# Patient Record
Sex: MALE | Race: WHITE | NOT HISPANIC OR LATINO | Employment: UNEMPLOYED | URBAN - METROPOLITAN AREA
[De-identification: names, ages, dates, MRNs, and addresses within clinical notes are randomized per-mention and may not be internally consistent; named-entity substitution may affect disease eponyms.]

---

## 2017-01-03 ENCOUNTER — TELEPHONE (OUTPATIENT)
Dept: CARDIAC SURGERY | Facility: CLINIC | Age: 62
End: 2017-01-03

## 2017-01-03 ENCOUNTER — OFFICE VISIT (OUTPATIENT)
Dept: CARDIAC SURGERY | Facility: CLINIC | Age: 62
End: 2017-01-03

## 2017-01-03 VITALS
DIASTOLIC BLOOD PRESSURE: 84 MMHG | TEMPERATURE: 98.2 F | HEART RATE: 58 BPM | RESPIRATION RATE: 20 BRPM | BODY MASS INDEX: 23.1 KG/M2 | SYSTOLIC BLOOD PRESSURE: 129 MMHG | HEIGHT: 74 IN | OXYGEN SATURATION: 99 % | WEIGHT: 180 LBS

## 2017-01-03 DIAGNOSIS — Z95.1 S/P CABG X 5: Primary | ICD-10-CM

## 2017-01-03 PROBLEM — Z95.2 S/P AVR: Status: ACTIVE | Noted: 2017-01-03

## 2017-01-03 PROBLEM — I71.40 AAA (ABDOMINAL AORTIC ANEURYSM) WITHOUT RUPTURE: Status: ACTIVE | Noted: 2017-01-03

## 2017-01-03 PROBLEM — R91.8 MASS OF LINGULA OF LUNG: Status: ACTIVE | Noted: 2017-01-03

## 2017-01-03 PROCEDURE — 99024 POSTOP FOLLOW-UP VISIT: CPT | Performed by: NURSE PRACTITIONER

## 2017-01-03 RX ORDER — LISINOPRIL 10 MG/1
TABLET ORAL
COMMUNITY
Start: 2016-11-06 | End: 2020-04-22

## 2017-01-03 RX ORDER — AMIODARONE HYDROCHLORIDE 200 MG/1
200 TABLET ORAL DAILY
COMMUNITY
End: 2017-01-26 | Stop reason: ALTCHOICE

## 2017-01-03 RX ORDER — BUDESONIDE AND FORMOTEROL FUMARATE DIHYDRATE 160; 4.5 UG/1; UG/1
2 AEROSOL RESPIRATORY (INHALATION)
COMMUNITY
Start: 2016-11-08

## 2017-01-03 RX ORDER — METOPROLOL SUCCINATE 25 MG/1
25 TABLET, EXTENDED RELEASE ORAL DAILY
COMMUNITY
End: 2020-04-22

## 2017-01-03 RX ORDER — ATORVASTATIN CALCIUM 20 MG/1
20 TABLET, FILM COATED ORAL DAILY
COMMUNITY
End: 2017-01-26

## 2017-01-03 RX ORDER — DOXYCYCLINE HYCLATE 100 MG/1
100 TABLET, DELAYED RELEASE ORAL 2 TIMES DAILY
Refills: 0
Start: 2017-01-03 | End: 2017-01-05

## 2017-01-03 RX ORDER — AZELASTINE HCL 205.5 UG/1
SPRAY NASAL
Status: ON HOLD | COMMUNITY
Start: 2016-10-01 | End: 2020-05-25

## 2017-01-03 NOTE — Clinical Note
January 3, 2017     Susan Wang MD  [address] [facility]  [address]  [facility] [address]    Patient: Alejandro Bain   YOB: 1955   Date of Visit: 1/3/2017       Dear Dr. Felipe MD:    Alejandro Bain was in my office today. Below are the relevant portions of my assessment and plan of care.           If you have questions, please do not hesitate to call me. I look forward to following Alejandro along with you.         Sincerely,        GARY Mathew        CC: MD Flor Boothn MD Yoshi Puentes MD

## 2017-01-03 NOTE — PROGRESS NOTES
1/3/2017        Subjective:      Susan Wang MD & Dr. Blank    Chief Complaint of Post-op Follow-up and Wound Check      History of Present Illness:       Dear Dr. Susan Wang MD and Colleagues,  It was nice to see Alejandro Bain in follow up today. He is status post CABG and AVR at [facility] on 12/2/2016.  As you may recall, this gentleman was also diagnosed with a 7 cm infrarenal abdominal aortic aneurysm doing his hospitalization for his NSTEMI/ CAD visit. He reports his right upper thigh incision from endovein harvest has had some bloody drainage.  He has seen Dr. Meyer since discharge and is slated for TEVAR surgery on Thursday 1/5/2017 at [facility].  Dr. Phan will be assisting with this surgery as well.  He reports he is doing much better since discharge.  He still reports minor aches/pains but these improve with movement and stretching.  He denies any c/o fever or chills.  His wife is present today for his appointment.    Patient Active Problem List   Diagnosis   • S/P CABG x 5 by Dr. Phan   • S/P AVR (tissue) by Dr. Phan   • AAA (abdominal aortic aneurysm) without rupture   • Mass of lingula of lung--left       Past Medical History   Diagnosis Date   • Aneurysm    • Anxiety    • Asthma    • Coronary artery disease    • Depression    • Hyperlipidemia    • Hypertension    • Myocardial infarction        Past Surgical History   Procedure Laterality Date   • Carotid stent     • Sinus surgery     • Coronary artery bypass graft  12/02/2016     X4  Dr Phan   • Aortic valve repair/replacement  12/03/2016     CABG x 5/ tissue AVR by DR. PHAN       Allergies   Allergen Reactions   • Avelox [Moxifloxacin]    • Penicillins    • Sulfa Antibiotics        Review of Systems   HENT:        Seasonal allergies   Respiratory: Positive for cough. Negative for chest tightness and shortness of breath.    Cardiovascular: Negative for chest pain, palpitations and leg swelling.    Musculoskeletal: Positive for neck pain and neck stiffness.   Skin:        Right upper thigh--scant bloody drainage and tenderness   All other systems reviewed and are negative.        Current Outpatient Prescriptions:   •  amiodarone (PACERONE) 200 MG tablet, Take 200 mg by mouth Daily., Disp: , Rfl:   •  atorvastatin (LIPITOR) 20 MG tablet, Take 20 mg by mouth Daily., Disp: , Rfl:   •  azelastine (ASTEPRO) 0.15 % solution nasal spray, , Disp: , Rfl:   •  lisinopril (PRINIVIL,ZESTRIL) 10 MG tablet, , Disp: , Rfl:   •  metoprolol succinate XL (TOPROL-XL) 25 MG 24 hr tablet, Take 25 mg by mouth Daily., Disp: , Rfl:   •  SYMBICORT 160-4.5 MCG/ACT inhaler, , Disp: , Rfl:   •  doxycycline (DORYX) 100 MG enteric coated tablet, Take 1 tablet by mouth 2 (Two) Times a Day for 2 days., Disp: , Rfl: 0    Social History     Social History   • Marital status:      Spouse name: N/A   • Number of children: N/A   • Years of education: N/A     Occupational History   • Not on file.     Social History Main Topics   • Smoking status: Former Smoker   • Smokeless tobacco: Not on file   • Alcohol use No   • Drug use: No   • Sexual activity: Not on file     Other Topics Concern   • Not on file     Social History Narrative   • No narrative on file       Family History   Problem Relation Age of Onset   • Cancer Father    • Cancer Sister            Physical Exam:      Vital Signs:  Weight: 180 lb (81.6 kg)   Body mass index is 23.11 kg/(m^2).  Temp: 98.2 °F (36.8 °C)   Heart Rate: 58   BP: 129/84     Physical Exam   Constitutional: He is oriented to person, place, and time. He appears well-developed and well-nourished.   HENT:   Head: Normocephalic and atraumatic.   Cardiovascular: Normal rate, regular rhythm, normal heart sounds and intact distal pulses.    Pulmonary/Chest: Effort normal and breath sounds normal.   Neurological: He is alert and oriented to person, place, and time.   Skin: Skin is warm and dry.        Sternotomy &  SVHS well healed.  Right upper thigh stitch removed without difficulty.  Scant amount of pulse expressed with small amount of blood.     Psychiatric: He has a normal mood and affect. His behavior is normal. Judgment and thought content normal.        Recommendation/Plan:     Alejnadro Bain was seen for post operative follow up. He is doing very well from a surgical standpoint. His incisions are healing well. I removed the surgical stitch and had Dr. Phan assess the site.  He recommended doxycycline 100 mg BID until surgery.  Prescription was given to pt.  I spoke with Dr. Meyer about the findings.  The plan is still to proceed with surgery on Thursday barring no further complications.  I told Mr. Bain he did not need to keep his postop CABG follow up appointment.  We will see him back after his TEVAR.   He is scheduled to see Dr. Escobedo after his vascular surgery with repeat imaging for the lung mass.  If you have any questions, please do not hesitate to call me or Dr. Phan.    Thank you for allowing me to participate in his care.    Sincerely,    GARY Wallace

## 2017-01-03 NOTE — LETTER
January 3, 2017     Susan Wang MD  [address] [facility]  [address]  [facility] [address]    Patient: Alejandro Bain   YOB: 1955   Date of Visit: 1/3/2017       Dear Dr. Felipe MD:    Thank you for allowing me to participate in Alejandro Bain to me for evaluation. Below are the relevant portions of my assessment and plan of care.    If you have questions, please do not hesitate to call me. I look forward to following Alejandro along with you.         Sincerely,        Mary Kay Nava, GARY        CC: Maciej Blank MD  Ángel MD Yoshi Puentes MD Tabitha L. Satterly-Welborn, GARY  1/3/2017  3:44 PM  Signed  1/3/2017        Subjective:      Susan Wang MD & Dr. Blank    Chief Complaint of Post-op Follow-up and Wound Check      History of Present Illness:       Dear Dr. Susan Wang MD and Colleagues,  It was nice to see Alejandro Bain in follow up today. He is status post CABG and AVR at [facility] on 12/2/2016.  As you may recall, this gentleman was also diagnosed with a 7 cm infrarenal abdominal aortic aneurysm doing his hospitalization for his NSTEMI/ CAD visit. He reports his right upper thigh incision from endovein harvest has had some bloody drainage.  He has seen Dr. Meyer since discharge and is slated for TEVAR surgery on Thursday 1/5/2017 at [facility].  Dr. Phan will be assisting with this surgery as well.  He reports he is doing much better since discharge.  He still reports minor aches/pains but these improve with movement and stretching.  He denies any c/o fever or chills.  His wife is present today for his appointment.    Patient Active Problem List   Diagnosis   • S/P CABG x 5 by Dr. Phan   • S/P AVR (tissue) by Dr. Phan   • AAA (abdominal aortic aneurysm) without rupture   • Mass of lingula of lung--left       Past Medical History   Diagnosis Date   • Aneurysm    • Anxiety    • Asthma    • Coronary artery disease       • Depression    • Hyperlipidemia    • Hypertension    • Myocardial infarction        Past Surgical History   Procedure Laterality Date   • Carotid stent     • Sinus surgery     • Coronary artery bypass graft  12/02/2016     X4  Dr Phan   • Aortic valve repair/replacement  12/03/2016     CABG x 5/ tissue AVR by DR. PHAN       Allergies   Allergen Reactions   • Avelox [Moxifloxacin]    • Penicillins    • Sulfa Antibiotics        Review of Systems   HENT:        Seasonal allergies   Respiratory: Positive for cough. Negative for chest tightness and shortness of breath.    Cardiovascular: Negative for chest pain, palpitations and leg swelling.   Musculoskeletal: Positive for neck pain and neck stiffness.   Skin:        Right upper thigh--scant bloody drainage and tenderness   All other systems reviewed and are negative.        Current Outpatient Prescriptions:   •  amiodarone (PACERONE) 200 MG tablet, Take 200 mg by mouth Daily., Disp: , Rfl:   •  atorvastatin (LIPITOR) 20 MG tablet, Take 20 mg by mouth Daily., Disp: , Rfl:   •  azelastine (ASTEPRO) 0.15 % solution nasal spray, , Disp: , Rfl:   •  lisinopril (PRINIVIL,ZESTRIL) 10 MG tablet, , Disp: , Rfl:   •  metoprolol succinate XL (TOPROL-XL) 25 MG 24 hr tablet, Take 25 mg by mouth Daily., Disp: , Rfl:   •  SYMBICORT 160-4.5 MCG/ACT inhaler, , Disp: , Rfl:   •  doxycycline (DORYX) 100 MG enteric coated tablet, Take 1 tablet by mouth 2 (Two) Times a Day for 2 days., Disp: , Rfl: 0    Social History     Social History   • Marital status:      Spouse name: N/A   • Number of children: N/A   • Years of education: N/A     Occupational History   • Not on file.     Social History Main Topics   • Smoking status: Former Smoker   • Smokeless tobacco: Not on file   • Alcohol use No   • Drug use: No   • Sexual activity: Not on file     Other Topics Concern   • Not on file     Social History Narrative   • No narrative on file       Family History   Problem Relation Age  of Onset   • Cancer Father    • Cancer Sister            Physical Exam:      Vital Signs:  Weight: 180 lb (81.6 kg)   Body mass index is 23.11 kg/(m^2).  Temp: 98.2 °F (36.8 °C)   Heart Rate: 58   BP: 129/84     Physical Exam   Constitutional: He is oriented to person, place, and time. He appears well-developed and well-nourished.   HENT:   Head: Normocephalic and atraumatic.   Cardiovascular: Normal rate, regular rhythm, normal heart sounds and intact distal pulses.    Pulmonary/Chest: Effort normal and breath sounds normal.   Neurological: He is alert and oriented to person, place, and time.   Skin: Skin is warm and dry.        Sternotomy & SVHS well healed.  Right upper thigh stitch removed without difficulty.  Scant amount of pulse expressed with small amount of blood.     Psychiatric: He has a normal mood and affect. His behavior is normal. Judgment and thought content normal.        Recommendation/Plan:     Alejandro Bain was seen for post operative follow up. He is doing very well from a surgical standpoint. His incisions are healing well. I removed the surgical stitch and had Dr. Phan assess the site.  He recommended doxycycline 100 mg BID until surgery.  Prescription was given to pt.  I spoke with Dr. Meyer about the findings.  The plan is still to proceed with surgery on Thursday barring no further complications.  I told Mr. Bain he did not need to keep his postop CABG follow up appointment.  We will see him back after his TEVAR.   He is scheduled to see Dr. Escobedo after his vascular surgery with repeat imaging for the lung mass.  If you have any questions, please do not hesitate to call me or Dr. Phan.    Thank you for allowing me to participate in his care.    Sincerely,    GARY Wallace

## 2017-01-03 NOTE — MR AVS SNAPSHOT
Alejandro Bain   1/3/2017 1:15 PM   Office Visit    Dept Phone:  [phone]   Encounter #:  [id number]    Provider:  GARY Murguia   Department:  [facility] CARDIAC SURGERY                Your Full Care Plan              Your Updated Medication List          This list is accurate as of: 1/3/17  1:52 PM.  Always use your most recent med list.                amiodarone 200 MG tablet   Commonly known as:  PACERONE       atorvastatin 20 MG tablet   Commonly known as:  LIPITOR       azelastine 0.15 % solution nasal spray   Commonly known as:  ASTEPRO       lisinopril 10 MG tablet   Commonly known as:  PRINIVIL,ZESTRIL       metoprolol succinate XL 25 MG 24 hr tablet   Commonly known as:  TOPROL-XL       SYMBICORT 160-4.5 MCG/ACT inhaler   Generic drug:  budesonide-formoterol               Instructions     None    Patient Instructions History      Upcoming Appointments     Visit Type Date Time Department    POST-OP 1/3/2017  1:15 PM [facility] CARDIAC [facility]G [place]    OUTSIDE FACILITY 2017  7:30 AM [facility]IAC [facility] [place]    POST-OP 2017 11:15 AM [facility] [place]      [employer] Signup     [facility] [employer] allows you to send messages to your doctor, view your test results, renew your prescriptions, schedule appointments, and more. To sign up, go to [employer] and click on the Sign Up Now link in the New User? box. Enter your [employer] Activation Code exactly as it appears below along with the last four digits of your Social Security Number and your Date of Birth () to complete the sign-up process. If you do not sign up before the expiration date, you must request a new code.    [employer] Activation Code: [email or web address]  Expires: 2017  1:52 PM    If you have questions, you can email [email or web address] or call [phone] to talk to our [employer] staff. Remember, [employer] is NOT to be used for urgent needs. For medical  "emergencies, dial 911.               Other Info from Your Visit           Your Appointments     Jan 05, 2017  7:30 AM EST   Outside Facility with Stevan Phan MD   [facility] CARDIAC SURGERY (--)    [address]  [facility] [address] [phone]            Jan 23, 2017 11:15 AM EST   Post-Op with GARY Murguia   [facility] CARDIAC SURGERY (--)    [address]  [facility] [address] [phone]              Allergies     Avelox [Moxifloxacin]      Penicillins      Sulfa Antibiotics        Vital Signs     Blood Pressure Pulse Temperature Respirations Height Weight    129/84 (BP Location: Right arm, Patient Position: Sitting, Cuff Size: Adult) 58 98.2 °F (36.8 °C) (Oral) 20 74\" (188 cm) 180 lb (81.6 kg)    Oxygen Saturation Body Mass Index Smoking Status             99% 23.11 kg/m2 Former Smoker           "

## 2017-01-03 NOTE — TELEPHONE ENCOUNTER
Mr Bain called concerned that he was still having a slight amount of drainage from an incision site on his upper thigh where a vein was taken for his recent CABG X 5 . He is scheduled for a AAA with Dr Phan and Dr Carpenter Thursday 1/5/17 and wanted to have it,looked at before then. I scheduled him to come in and see Dr Phan this afternoon 1/3/17

## 2017-01-05 ENCOUNTER — OUTSIDE FACILITY SERVICE (OUTPATIENT)
Dept: CARDIAC SURGERY | Facility: CLINIC | Age: 62
End: 2017-01-05

## 2017-01-05 PROCEDURE — 34825 PR AAA REPR,1ST VESSEL,EXTENSION PROSTH: CPT | Performed by: THORACIC SURGERY (CARDIOTHORACIC VASCULAR SURGERY)

## 2017-01-05 PROCEDURE — 36200 PLACE CATHETER IN AORTA: CPT | Performed by: THORACIC SURGERY (CARDIOTHORACIC VASCULAR SURGERY)

## 2017-01-05 PROCEDURE — 34802 PR AAA REPAIR,MODULR BIFURCATED PROSTH: CPT | Performed by: THORACIC SURGERY (CARDIOTHORACIC VASCULAR SURGERY)

## 2017-01-05 PROCEDURE — 34826: CPT | Performed by: THORACIC SURGERY (CARDIOTHORACIC VASCULAR SURGERY)

## 2017-01-05 PROCEDURE — 34812 OPN FEM ART EXPOS: CPT | Performed by: THORACIC SURGERY (CARDIOTHORACIC VASCULAR SURGERY)

## 2017-01-05 PROCEDURE — 0254T: CPT | Performed by: THORACIC SURGERY (CARDIOTHORACIC VASCULAR SURGERY)

## 2017-01-11 ENCOUNTER — CONVERSION ENCOUNTER (OUTPATIENT)
Dept: CARDIOLOGY | Facility: CLINIC | Age: 62
End: 2017-01-11

## 2017-01-25 ENCOUNTER — CONVERSION ENCOUNTER (OUTPATIENT)
Dept: CARDIOLOGY | Facility: CLINIC | Age: 62
End: 2017-01-25

## 2017-01-26 ENCOUNTER — OFFICE VISIT (OUTPATIENT)
Dept: CARDIAC SURGERY | Facility: CLINIC | Age: 62
End: 2017-01-26

## 2017-01-26 VITALS
BODY MASS INDEX: 23.83 KG/M2 | HEIGHT: 73 IN | TEMPERATURE: 97.5 F | DIASTOLIC BLOOD PRESSURE: 84 MMHG | RESPIRATION RATE: 20 BRPM | WEIGHT: 179.8 LBS | OXYGEN SATURATION: 99 % | HEART RATE: 66 BPM | SYSTOLIC BLOOD PRESSURE: 141 MMHG

## 2017-01-26 DIAGNOSIS — Z95.1 S/P CABG X 5: Primary | ICD-10-CM

## 2017-01-26 DIAGNOSIS — Z98.890 S/P AAA REPAIR: ICD-10-CM

## 2017-01-26 DIAGNOSIS — Z86.79 S/P AAA REPAIR: ICD-10-CM

## 2017-01-26 DIAGNOSIS — R91.8 MASS OF LINGULA OF LUNG: ICD-10-CM

## 2017-01-26 DIAGNOSIS — Z95.2 S/P AVR: ICD-10-CM

## 2017-01-26 DIAGNOSIS — I71.40 AAA (ABDOMINAL AORTIC ANEURYSM) WITHOUT RUPTURE (HCC): ICD-10-CM

## 2017-01-26 PROCEDURE — 99024 POSTOP FOLLOW-UP VISIT: CPT | Performed by: THORACIC SURGERY (CARDIOTHORACIC VASCULAR SURGERY)

## 2017-01-26 RX ORDER — HYDROCODONE BITARTRATE AND ACETAMINOPHEN 5; 325 MG/1; MG/1
TABLET ORAL
COMMUNITY
Start: 2016-12-08 | End: 2020-04-22

## 2017-01-26 RX ORDER — PANTOPRAZOLE SODIUM 40 MG/1
TABLET, DELAYED RELEASE ORAL
COMMUNITY
Start: 2016-12-08 | End: 2020-04-22

## 2017-01-26 RX ORDER — ATORVASTATIN CALCIUM 40 MG/1
40 TABLET, FILM COATED ORAL NIGHTLY
COMMUNITY
Start: 2017-01-09 | End: 2020-04-22

## 2017-01-26 RX ORDER — METOPROLOL TARTRATE 50 MG/1
TABLET, FILM COATED ORAL
COMMUNITY
Start: 2017-01-15 | End: 2017-01-26 | Stop reason: DRUGHIGH

## 2017-01-26 RX ORDER — DOXYCYCLINE HYCLATE 100 MG/1
100 CAPSULE ORAL DAILY
COMMUNITY
Start: 2017-01-03 | End: 2020-04-22

## 2017-01-26 RX ORDER — SPIRONOLACTONE 25 MG/1
TABLET ORAL
COMMUNITY
Start: 2017-01-19 | End: 2020-04-22

## 2017-01-26 RX ORDER — HYDRALAZINE HYDROCHLORIDE 25 MG/1
TABLET, FILM COATED ORAL
COMMUNITY
Start: 2016-12-08 | End: 2020-04-22

## 2017-01-26 RX ORDER — LISINOPRIL 20 MG/1
TABLET ORAL
COMMUNITY
Start: 2017-01-09 | End: 2017-01-26 | Stop reason: ALTCHOICE

## 2017-01-26 NOTE — LETTER
"January 29, 2017     Susan Wang MD  [address] [facility]  [address]  [place] [id number]    Patient: Alejandro Bain   YOB: 1955   Date of Visit: 1/26/2017       Dear Dr. Felipe MD:    Thank you for referring Alejandro Bain to me for evaluation. Below are the relevant portions of my assessment and plan of care.    If you have questions, please do not hesitate to call me. I look forward to following Alejandro along with you.         Sincerely,        Stevan Phan MD        CC: No Recipients  Stevan Phan MD  1/29/2017  9:17 AM  Sign at close encounter  CARDIOVASCULAR SURGERY FOLLOW-UP PROGRESS NOTE    HPI:     It was nice to see Alejandro Bain in follow up 4  after surgery.  As you know, he is a 61 y.o. male with a history of CAD, NSTEMI and AS who underwent a CABG/AVR in early december. He did well postoperatively and continues to do well. 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 He comes in today complaining of left gluteal discomfort, worse with ambulation and pointed. Denies LE claudication or embolic signs  His activity level has been good.       Physical Exam:         Visit Vitals   • /84 (BP Location: Right arm, Patient Position: Sitting, Cuff Size: Adult)   • Pulse 66   • Temp 97.5 °F (36.4 °C) (Oral)   • Resp 20   • Ht 73\" (185.4 cm)   • Wt 179 lb 12.8 oz (81.6 kg)   • SpO2 99%   • BMI 23.72 kg/m2     Heart:  regular rate and rhythm, S1, S2 normal, no murmur, click, rub or gallop  Lungs:  clear to auscultation bilaterally  Extremities:  no edema  Incision(s):  right groin healing well, sternum stable    Assessment/Plan:     S/P CABG and AVR. Overall, he is doing well.    I will defer to Dr. Meyer whether he will need a diagnostic angiogram of the iliacs. He still has the left lingula . I will order a PET CT to further evaluate the mass. She will see Dr. Escobedo next week hopefully.    Thank you for allowing me to participate in the care of your  " patient.    Regards,  Stevan Phan MD

## 2017-01-26 NOTE — MR AVS SNAPSHOT
Alejandro Bain   1/26/2017 11:30 AM   Office Visit    Dept Phone:  [phone]   Encounter #:  [id number]    Provider:  Stevan Phan MD   Department:  [facility] CARDIAC SURGERY                Your Full Care Plan              Today's Medication Changes          These changes are accurate as of: 1/26/17 12:20 PM.  If you have any questions, ask your nurse or doctor.               Medication(s)that have changed:     atorvastatin 40 MG tablet   Commonly known as:  LIPITOR   What changed:  Another medication with the same name was removed. Continue taking this medication, and follow the directions you see here.   Changed by:  Stevan Phan MD       lisinopril 10 MG tablet   Commonly known as:  PRINIVIL,ZESTRIL   What changed:  Another medication with the same name was removed. Continue taking this medication, and follow the directions you see here.   Changed by:  Barber Baker RN         Stop taking medication(s)listed here:     amiodarone 200 MG tablet   Commonly known as:  PACERONE   Stopped by:  Stevan Phan MD           metoprolol tartrate 50 MG tablet   Commonly known as:  LOPRESSOR   Stopped by:  Stevan Phan MD                      Your Updated Medication List          This list is accurate as of: 1/26/17 12:20 PM.  Always use your most recent med list.                atorvastatin 40 MG tablet   Commonly known as:  LIPITOR       azelastine 0.15 % solution nasal spray   Commonly known as:  ASTEPRO       doxycycline 100 MG capsule   Commonly known as:  VIBRAMYCIN       hydrALAZINE 25 MG tablet   Commonly known as:  APRESOLINE       HYDROcodone-acetaminophen 5-325 MG per tablet   Commonly known as:  NORCO       lisinopril 10 MG tablet   Commonly known as:  PRINIVIL,ZESTRIL       metoprolol succinate XL 25 MG 24 hr tablet   Commonly known as:  TOPROL-XL       pantoprazole 40 MG EC tablet   Commonly known as:  PROTONIX       spironolactone 25 MG tablet    "  Commonly known as:  ALDACTONE       SYMBICORT 160-4.5 MCG/ACT inhaler   Generic drug:  budesonide-formoterol               Instructions     None    Patient Instructions History      Upcoming Appointments     Visit Type Date Time Department    POST-OP 2017 11:30 AM [facility] [place]      [employer] Signup     [facility] [email or web address] allows you to send messages to your doctor, view your test results, renew your prescriptions, schedule appointments, and more. To sign up, go to "[employer] ([email or web address] and click on the Sign Up Now link in the New User? box. Enter your [email or web address] Activation Code exactly as it appears below along with the last four digits of your Social Security Number and your Date of Birth () to complete the sign-up process. If you do not sign up before the expiration date, you must request a new code.    [email or web address] Activation Code: [email or web address]  Expires: 2017  5:35 AM    If you have questions, you can email [email or web address] or call [phone] to talk to our [email or web address] staff. Remember, [email or web address] is NOT to be used for urgent needs. For medical emergencies, dial 911.               Other Info from Your Visit           Allergies     Avelox [Moxifloxacin]      Penicillins      Sulfa Antibiotics        Vital Signs     Blood Pressure Pulse Temperature Respirations Height Weight    141/84 (BP Location: Right arm, Patient Position: Sitting, Cuff Size: Adult) 66 97.5 °F (36.4 °C) (Oral) 20 73\" (185.4 cm) 179 lb 12.8 oz (81.6 kg)    Oxygen Saturation Body Mass Index Smoking Status             99% 23.72 kg/m2 Former Smoker           "

## 2017-01-27 ENCOUNTER — TELEPHONE (OUTPATIENT)
Dept: CARDIAC SURGERY | Facility: CLINIC | Age: 62
End: 2017-01-27

## 2017-01-29 PROBLEM — Z86.79 S/P AAA REPAIR: Status: ACTIVE | Noted: 2017-01-29

## 2017-01-29 PROBLEM — Z98.890 S/P AAA REPAIR: Status: ACTIVE | Noted: 2017-01-29

## 2017-01-29 NOTE — PROGRESS NOTES
"CARDIOVASCULAR SURGERY FOLLOW-UP PROGRESS NOTE    HPI:     It was nice to see Alejandro Bain in follow up 4  after surgery.  As you know, he is a 61 y.o. male with a history of CAD, NSTEMI and AS who underwent a CABG/AVR in early december. He did well postoperatively and continues to do well. 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 He comes in today complaining of left gluteal discomfort, worse with ambulation and pointed. Denies LE claudication or embolic signs  His activity level has been good.       Physical Exam:         Visit Vitals   • /84 (BP Location: Right arm, Patient Position: Sitting, Cuff Size: Adult)   • Pulse 66   • Temp 97.5 °F (36.4 °C) (Oral)   • Resp 20   • Ht 73\" (185.4 cm)   • Wt 179 lb 12.8 oz (81.6 kg)   • SpO2 99%   • BMI 23.72 kg/m2     Heart:  regular rate and rhythm, S1, S2 normal, no murmur, click, rub or gallop  Lungs:  clear to auscultation bilaterally  Extremities:  no edema  Incision(s):  right groin healing well, sternum stable    Assessment/Plan:     S/P CABG and AVR. Overall, he is doing well.    I will defer to Dr. Meyer whether he will need a diagnostic angiogram of the iliacs. He still has the left lingula . I will order a PET CT to further evaluate the mass. She will see Dr. Escobedo next week hopefully.    Thank you for allowing me to participate in the care of your   patient.    Regards,  Stevan Phan MD  "

## 2017-02-01 ENCOUNTER — HOSPITAL ENCOUNTER (OUTPATIENT)
Dept: ONCOLOGY | Facility: CLINIC | Age: 62
Discharge: HOME | End: 2017-02-01

## 2017-02-01 LAB — GLUCOSE BLD-MCNC: 90 MG/DL (ref 70–105)

## 2017-02-23 ENCOUNTER — TELEPHONE (OUTPATIENT)
Dept: CARDIAC SURGERY | Facility: CLINIC | Age: 62
End: 2017-02-23

## 2017-03-01 ENCOUNTER — TELEPHONE (OUTPATIENT)
Dept: CARDIAC SURGERY | Facility: CLINIC | Age: 62
End: 2017-03-01

## 2017-03-01 NOTE — TELEPHONE ENCOUNTER
Mr Bain called me back and I relayed to him Dr Phan's thoughts concerning his recent testing. Dr Phan feels the results are encouraging and that Mr Bain should continue to follow up with Dr Escobedo. Mr Bain is scheduled to have another chest CT in May with a follow up visit with Dr Escobedo. He will call our office with any questions or concerns. He was very appreciative of Dr Phan's care and continued attention

## 2017-04-26 ENCOUNTER — CONVERSION ENCOUNTER (OUTPATIENT)
Dept: CARDIOLOGY | Facility: CLINIC | Age: 62
End: 2017-04-26

## 2017-05-31 ENCOUNTER — HOSPITAL ENCOUNTER (OUTPATIENT)
Dept: CT IMAGING | Facility: HOSPITAL | Age: 62
Discharge: HOME | End: 2017-05-31
Attending: THORACIC SURGERY (CARDIOTHORACIC VASCULAR SURGERY) | Admitting: THORACIC SURGERY (CARDIOTHORACIC VASCULAR SURGERY)

## 2017-06-01 LAB — CREAT BLDA-MCNC: 0.9 MG/DL (ref 0.6–1.3)

## 2017-06-02 ENCOUNTER — CONVERSION ENCOUNTER (OUTPATIENT)
Dept: CARDIOLOGY | Facility: CLINIC | Age: 62
End: 2017-06-02

## 2017-07-05 ENCOUNTER — CONVERSION ENCOUNTER (OUTPATIENT)
Dept: CARDIOLOGY | Facility: CLINIC | Age: 62
End: 2017-07-05

## 2017-09-06 ENCOUNTER — CONVERSION ENCOUNTER (OUTPATIENT)
Dept: CARDIOLOGY | Facility: CLINIC | Age: 62
End: 2017-09-06

## 2017-09-12 ENCOUNTER — HOSPITAL ENCOUNTER (OUTPATIENT)
Dept: MRI IMAGING | Facility: HOSPITAL | Age: 62
Discharge: HOME | End: 2017-09-12
Attending: INTERNAL MEDICINE | Admitting: INTERNAL MEDICINE

## 2017-11-15 ENCOUNTER — HOSPITAL ENCOUNTER (OUTPATIENT)
Dept: LAB | Facility: HOSPITAL | Age: 62
Discharge: HOME | End: 2017-11-15
Attending: INTERNAL MEDICINE | Admitting: INTERNAL MEDICINE

## 2018-02-28 ENCOUNTER — CONVERSION ENCOUNTER (OUTPATIENT)
Dept: CARDIOLOGY | Facility: CLINIC | Age: 63
End: 2018-02-28

## 2018-06-27 ENCOUNTER — CONVERSION ENCOUNTER (OUTPATIENT)
Dept: CARDIOLOGY | Facility: CLINIC | Age: 63
End: 2018-06-27

## 2018-07-10 ENCOUNTER — HOSPITAL ENCOUNTER (OUTPATIENT)
Dept: LAB | Facility: HOSPITAL | Age: 63
Discharge: HOME | End: 2018-07-10
Attending: SURGERY | Admitting: SURGERY

## 2018-07-10 LAB — CREAT UR-MCNC: 1 MG/DL (ref 0.7–1.2)

## 2018-07-25 ENCOUNTER — HOSPITAL ENCOUNTER (OUTPATIENT)
Dept: CT IMAGING | Facility: HOSPITAL | Age: 63
Discharge: HOME | End: 2018-07-25
Attending: SURGERY | Admitting: SURGERY

## 2018-07-27 ENCOUNTER — CONVERSION ENCOUNTER (OUTPATIENT)
Dept: CARDIOLOGY | Facility: CLINIC | Age: 63
End: 2018-07-27

## 2019-02-27 ENCOUNTER — CONVERSION ENCOUNTER (OUTPATIENT)
Dept: CARDIOLOGY | Facility: CLINIC | Age: 64
End: 2019-02-27

## 2019-05-23 ENCOUNTER — HOSPITAL ENCOUNTER (OUTPATIENT)
Dept: OTHER | Facility: HOSPITAL | Age: 64
Discharge: HOME | End: 2019-05-23
Attending: SURGERY | Admitting: SURGERY

## 2019-06-02 ENCOUNTER — TRANSCRIBE ORDERS (OUTPATIENT)
Dept: ADMINISTRATIVE | Facility: HOSPITAL | Age: 64
End: 2019-06-02

## 2019-06-04 VITALS
OXYGEN SATURATION: 97 % | HEART RATE: 50 BPM | SYSTOLIC BLOOD PRESSURE: 134 MMHG | BODY MASS INDEX: 25.06 KG/M2 | SYSTOLIC BLOOD PRESSURE: 116 MMHG | HEART RATE: 49 BPM | DIASTOLIC BLOOD PRESSURE: 81 MMHG | HEART RATE: 64 BPM | HEIGHT: 71 IN | HEIGHT: 71 IN | HEART RATE: 53 BPM | HEART RATE: 53 BPM | WEIGHT: 190.6 LBS | DIASTOLIC BLOOD PRESSURE: 73 MMHG | SYSTOLIC BLOOD PRESSURE: 134 MMHG | OXYGEN SATURATION: 97 % | BODY MASS INDEX: 25.52 KG/M2 | WEIGHT: 183 LBS | SYSTOLIC BLOOD PRESSURE: 141 MMHG | WEIGHT: 174 LBS | HEIGHT: 71 IN | SYSTOLIC BLOOD PRESSURE: 135 MMHG | BODY MASS INDEX: 25.2 KG/M2 | WEIGHT: 188 LBS | OXYGEN SATURATION: 98 % | WEIGHT: 183 LBS | HEIGHT: 71 IN | HEIGHT: 71 IN | SYSTOLIC BLOOD PRESSURE: 115 MMHG | SYSTOLIC BLOOD PRESSURE: 194 MMHG | BODY MASS INDEX: 25.62 KG/M2 | HEART RATE: 52 BPM | WEIGHT: 183 LBS | DIASTOLIC BLOOD PRESSURE: 93 MMHG | HEART RATE: 53 BPM | BODY MASS INDEX: 24.92 KG/M2 | WEIGHT: 180 LBS | DIASTOLIC BLOOD PRESSURE: 82 MMHG | BODY MASS INDEX: 25.52 KG/M2 | WEIGHT: 178 LBS | SYSTOLIC BLOOD PRESSURE: 119 MMHG | HEIGHT: 71 IN | HEART RATE: 50 BPM | BODY MASS INDEX: 26.32 KG/M2 | BODY MASS INDEX: 26.68 KG/M2 | DIASTOLIC BLOOD PRESSURE: 91 MMHG | BODY MASS INDEX: 24.36 KG/M2 | HEART RATE: 45 BPM | SYSTOLIC BLOOD PRESSURE: 184 MMHG | DIASTOLIC BLOOD PRESSURE: 87 MMHG | DIASTOLIC BLOOD PRESSURE: 82 MMHG | WEIGHT: 179 LBS | DIASTOLIC BLOOD PRESSURE: 81 MMHG | RESPIRATION RATE: 20 BRPM | DIASTOLIC BLOOD PRESSURE: 82 MMHG | HEIGHT: 71 IN

## 2019-06-19 ENCOUNTER — TRANSCRIBE ORDERS (OUTPATIENT)
Dept: ADMINISTRATIVE | Facility: HOSPITAL | Age: 64
End: 2019-06-19

## 2019-06-19 DIAGNOSIS — Z95.828 HISTORY OF ENDOVASCULAR STENT GRAFT FOR ABDOMINAL AORTIC ANEURYSM: Primary | ICD-10-CM

## 2019-06-20 ENCOUNTER — LAB (OUTPATIENT)
Dept: LAB | Facility: HOSPITAL | Age: 64
End: 2019-06-20

## 2019-06-20 ENCOUNTER — TRANSCRIBE ORDERS (OUTPATIENT)
Dept: WOUND CARE | Facility: HOSPITAL | Age: 64
End: 2019-06-20

## 2019-06-20 DIAGNOSIS — Z95.9 STATUS POST ARTERIAL STENT: ICD-10-CM

## 2019-06-20 DIAGNOSIS — Z95.9 STATUS POST ARTERIAL STENT: Primary | ICD-10-CM

## 2019-06-20 LAB
CREAT BLD-MCNC: 1 MG/DL (ref 0.7–1.2)
GFR SERPL CREATININE-BSD FRML MDRD: 76 ML/MIN/1.73

## 2019-06-20 PROCEDURE — 36415 COLL VENOUS BLD VENIPUNCTURE: CPT

## 2019-06-20 PROCEDURE — 82565 ASSAY OF CREATININE: CPT

## 2019-06-25 ENCOUNTER — HOSPITAL ENCOUNTER (OUTPATIENT)
Dept: CT IMAGING | Facility: HOSPITAL | Age: 64
Discharge: HOME | End: 2019-06-25
Admitting: SURGERY

## 2019-06-25 DIAGNOSIS — Z95.828 HISTORY OF ENDOVASCULAR STENT GRAFT FOR ABDOMINAL AORTIC ANEURYSM: ICD-10-CM

## 2019-06-25 PROCEDURE — 0 IOPAMIDOL PER 1 ML: Performed by: SURGERY

## 2019-06-25 PROCEDURE — 74174 CTA ABD&PLVS W/CONTRAST: CPT

## 2019-06-25 RX ADMIN — IOPAMIDOL 100 ML: 755 INJECTION, SOLUTION INTRAVENOUS at 09:43

## 2019-06-27 ENCOUNTER — HOSPITAL ENCOUNTER (OUTPATIENT)
Dept: CARDIOLOGY | Facility: HOSPITAL | Age: 64
Discharge: HOME | End: 2019-06-27

## 2019-06-27 ENCOUNTER — APPOINTMENT (OUTPATIENT)
Dept: VASCULAR SURGERY | Facility: HOSPITAL | Age: 64
End: 2019-06-27

## 2019-06-27 ENCOUNTER — HOSPITAL ENCOUNTER (OUTPATIENT)
Dept: CARDIOLOGY | Facility: HOSPITAL | Age: 64
Discharge: HOME | End: 2019-06-27
Admitting: SURGERY

## 2019-06-27 DIAGNOSIS — I71.40 ABDOMINAL AORTIC ANEURYSM (AAA) WITHOUT RUPTURE (HCC): ICD-10-CM

## 2019-06-27 LAB
BH CV LOWER ARTERIAL LEFT ABI RATIO: 0.96
BH CV LOWER ARTERIAL LEFT DORSALIS PEDIS SYS MAX: 172 MMHG
BH CV LOWER ARTERIAL LEFT POST TIBIAL SYS MAX: 179 MMHG
BH CV LOWER ARTERIAL LEFT TBI RATIO: 0.67
BH CV LOWER ARTERIAL RIGHT ABI RATIO: 1.08
BH CV LOWER ARTERIAL RIGHT DORSALIS PEDIS SYS MAX: 200 MMHG
BH CV LOWER ARTERIAL RIGHT POST TIBIAL SYS MAX: 196 MMHG
BH CV LOWER ARTERIAL RIGHT TBI RATIO: 0.94
UPPER ARTERIAL LEFT ARM BRACHIAL SYS MAX: 186 MMHG
UPPER ARTERIAL RIGHT ARM BRACHIAL SYS MAX: 178 MMHG

## 2019-06-27 PROCEDURE — 93925 LOWER EXTREMITY STUDY: CPT

## 2019-06-27 PROCEDURE — G0463 HOSPITAL OUTPT CLINIC VISIT: HCPCS

## 2019-06-27 PROCEDURE — 93922 UPR/L XTREMITY ART 2 LEVELS: CPT

## 2019-06-28 ENCOUNTER — TRANSCRIBE ORDERS (OUTPATIENT)
Dept: ADMINISTRATIVE | Facility: HOSPITAL | Age: 64
End: 2019-06-28

## 2019-06-28 ENCOUNTER — LAB (OUTPATIENT)
Dept: LAB | Facility: HOSPITAL | Age: 64
End: 2019-06-28

## 2019-06-28 DIAGNOSIS — J98.9 RESPIRATION DISORDER: ICD-10-CM

## 2019-06-28 DIAGNOSIS — J30.1 ALLERGIC RHINITIS DUE TO POLLEN, UNSPECIFIED SEASONALITY: ICD-10-CM

## 2019-06-28 DIAGNOSIS — J30.89 OTHER ALLERGIC RHINITIS: ICD-10-CM

## 2019-06-28 DIAGNOSIS — J32.9 CHRONIC SINUSITIS, UNSPECIFIED LOCATION: ICD-10-CM

## 2019-06-28 DIAGNOSIS — J32.9 CHRONIC SINUSITIS, UNSPECIFIED LOCATION: Primary | ICD-10-CM

## 2019-06-28 LAB
ALBUMIN SERPL-MCNC: 3.6 G/DL (ref 3.5–4.8)
ALBUMIN/GLOB SERPL: 1.9 G/DL (ref 1–1.7)
ALP SERPL-CCNC: 79 U/L (ref 32–91)
ALT SERPL W P-5'-P-CCNC: 12 U/L (ref 17–63)
ANION GAP SERPL CALCULATED.3IONS-SCNC: 12.8 MMOL/L (ref 10–20)
AST SERPL-CCNC: 17 U/L (ref 15–41)
BASOPHILS # BLD AUTO: 0 10*3/MM3 (ref 0–0.2)
BASOPHILS NFR BLD AUTO: 0.5 % (ref 0–1.5)
BH CV GRAFT BRACHIAL PRESSURE LEFT: 186 MMHG
BH CV GRAFT BRACHIAL PRESSURE RIGHT: 178 MMHG
BH CV LEA LEFT DPA PRESSURE: 172 MMHG
BH CV LEA LEFT POPITEAL A  DISTAL PSV: 41 CM/S
BH CV LEA LEFT POPITEAL A  PROX PSV: 40 CM/S
BH CV LEA LEFT PTA PRESSURE: 179 MMHG
BH CV LEA LEFT SFA DISTAL PSV: 30 CM/S
BH CV LEA RIGHT DPA PRESSURE: 200 MMHG
BH CV LEA RIGHT POPITEAL A  DISTAL PSV: 67 CM/S
BH CV LEA RIGHT POPITEAL A  PROX PSV: 41 CM/S
BH CV LEA RIGHT PTA PRESSURE: 196 MMHG
BH CV LEA RIGHT SFA DISTAL PSV: 55 CM/S
BH CV LOWER ARTERIAL LEFT ABI RATIO: 0.96
BH CV LOWER ARTERIAL RIGHT ABI RATIO: 1.08
BILIRUB SERPL-MCNC: 1 MG/DL (ref 0.3–1.2)
BUN BLD-MCNC: 17 MG/DL (ref 8–20)
BUN/CREAT SERPL: 17 (ref 6.2–20.3)
CALCIUM SPEC-SCNC: 8.9 MG/DL (ref 8.9–10.3)
CHLORIDE SERPL-SCNC: 106 MMOL/L (ref 101–111)
CO2 SERPL-SCNC: 25 MMOL/L (ref 22–32)
CREAT BLD-MCNC: 1 MG/DL (ref 0.7–1.2)
DEPRECATED RDW RBC AUTO: 44.6 FL (ref 37–54)
EOSINOPHIL # BLD AUTO: 0.3 10*3/MM3 (ref 0–0.4)
EOSINOPHIL NFR BLD AUTO: 4.8 % (ref 0.3–6.2)
ERYTHROCYTE [DISTWIDTH] IN BLOOD BY AUTOMATED COUNT: 15 % (ref 12.3–15.4)
GFR SERPL CREATININE-BSD FRML MDRD: 76 ML/MIN/1.73
GLOBULIN UR ELPH-MCNC: 1.9 GM/DL (ref 2.5–3.8)
GLUCOSE BLD-MCNC: 91 MG/DL (ref 65–99)
HCT VFR BLD AUTO: 39.6 % (ref 37.5–51)
HGB BLD-MCNC: 13.2 G/DL (ref 13–17.7)
IGA1 MFR SER: 55 MG/DL (ref 50–400)
IGG1 SER-MCNC: 588 MG/DL (ref 600–1500)
IGM SERPL-MCNC: 104 MG/DL (ref 40–300)
LYMPHOCYTES # BLD AUTO: 0.3 10*3/MM3 (ref 0.7–3.1)
LYMPHOCYTES NFR BLD AUTO: 5.3 % (ref 19.6–45.3)
MCH RBC QN AUTO: 28.5 PG (ref 26.6–33)
MCHC RBC AUTO-ENTMCNC: 33.4 G/DL (ref 31.5–35.7)
MCV RBC AUTO: 85.4 FL (ref 79–97)
MONOCYTES # BLD AUTO: 0.6 10*3/MM3 (ref 0.1–0.9)
MONOCYTES NFR BLD AUTO: 11.3 % (ref 5–12)
NEUTROPHILS # BLD AUTO: 4.5 10*3/MM3 (ref 1.7–7)
NEUTROPHILS NFR BLD AUTO: 78.1 % (ref 42.7–76)
NRBC BLD AUTO-RTO: 0.1 /100 WBC (ref 0–0.2)
PLATELET # BLD AUTO: 110 10*3/MM3 (ref 140–450)
PMV BLD AUTO: 8.5 FL (ref 6–12)
POTASSIUM BLD-SCNC: 3.8 MMOL/L (ref 3.6–5.1)
PROT SERPL-MCNC: 5.5 G/DL (ref 6.1–7.9)
RBC # BLD AUTO: 4.64 10*6/MM3 (ref 4.14–5.8)
SODIUM BLD-SCNC: 140 MMOL/L (ref 136–144)
WBC NRBC COR # BLD: 5.7 10*3/MM3 (ref 3.4–10.8)

## 2019-06-28 PROCEDURE — 36415 COLL VENOUS BLD VENIPUNCTURE: CPT

## 2019-06-28 PROCEDURE — 85025 COMPLETE CBC W/AUTO DIFF WBC: CPT

## 2019-06-28 PROCEDURE — 82784 ASSAY IGA/IGD/IGG/IGM EACH: CPT

## 2019-06-28 PROCEDURE — 82785 ASSAY OF IGE: CPT

## 2019-06-28 PROCEDURE — 82787 IGG 1 2 3 OR 4 EACH: CPT

## 2019-06-28 PROCEDURE — 80053 COMPREHEN METABOLIC PANEL: CPT

## 2019-06-29 LAB
IGG SERPL-MCNC: 575 MG/DL (ref 700–1600)
IGG1 SER-MCNC: 345 MG/DL (ref 248–810)
IGG2 SER-MCNC: 122 MG/DL (ref 130–555)
IGG3 SER-MCNC: 75 MG/DL (ref 15–102)
IGG4 SER-MCNC: 2 MG/DL (ref 2–96)

## 2019-07-01 LAB — TOTAL IGE SMQN RAST: 5 IU/ML (ref 6–495)

## 2019-07-03 RX ORDER — PRAVASTATIN SODIUM 20 MG
TABLET ORAL
Qty: 30 TABLET | Refills: 3 | Status: SHIPPED | OUTPATIENT
Start: 2019-07-03 | End: 2019-10-31 | Stop reason: SDUPTHER

## 2019-07-03 RX ORDER — LISINOPRIL 20 MG/1
TABLET ORAL
Qty: 60 TABLET | Refills: 2 | Status: SHIPPED | OUTPATIENT
Start: 2019-07-03 | End: 2019-10-01 | Stop reason: SDUPTHER

## 2019-07-03 RX ORDER — HYDRALAZINE HYDROCHLORIDE 100 MG/1
TABLET, FILM COATED ORAL
Qty: 60 TABLET | Refills: 3 | Status: SHIPPED | OUTPATIENT
Start: 2019-07-03 | End: 2019-10-31 | Stop reason: SDUPTHER

## 2019-08-19 ENCOUNTER — LAB (OUTPATIENT)
Dept: LAB | Facility: HOSPITAL | Age: 64
End: 2019-08-19

## 2019-08-19 ENCOUNTER — TRANSCRIBE ORDERS (OUTPATIENT)
Dept: ADMINISTRATIVE | Facility: HOSPITAL | Age: 64
End: 2019-08-19

## 2019-08-19 DIAGNOSIS — J30.1 ALLERGIC RHINITIS DUE TO POLLEN, UNSPECIFIED SEASONALITY: Primary | ICD-10-CM

## 2019-08-19 DIAGNOSIS — J32.9 CHRONIC SINUSITIS, UNSPECIFIED LOCATION: ICD-10-CM

## 2019-08-19 DIAGNOSIS — J45.50 SEVERE PERSISTENT ASTHMA, UNCOMPLICATED: ICD-10-CM

## 2019-08-19 DIAGNOSIS — J30.89 OTHER ALLERGIC RHINITIS: ICD-10-CM

## 2019-08-19 DIAGNOSIS — J30.1 ALLERGIC RHINITIS DUE TO POLLEN, UNSPECIFIED SEASONALITY: ICD-10-CM

## 2019-08-19 LAB — IGG1 SER-MCNC: 610 MG/DL (ref 600–1500)

## 2019-08-19 PROCEDURE — 86609 BACTERIUM ANTIBODY: CPT

## 2019-08-19 PROCEDURE — 86317 IMMUNOASSAY INFECTIOUS AGENT: CPT

## 2019-08-19 PROCEDURE — 36415 COLL VENOUS BLD VENIPUNCTURE: CPT

## 2019-08-19 PROCEDURE — 82784 ASSAY IGA/IGD/IGG/IGM EACH: CPT

## 2019-08-21 LAB — C TETANI IGG SER QL: 0.99 IU/ML

## 2019-08-23 LAB
DEPRECATED S PNEUM 1 IGG SER-MCNC: <0.1 UG/ML
DEPRECATED S PNEUM12 IGG SER-MCNC: <0.1 UG/ML
DEPRECATED S PNEUM14 IGG SER-MCNC: 0.2 UG/ML
DEPRECATED S PNEUM19 IGG SER-MCNC: 0.2 UG/ML
DEPRECATED S PNEUM23 IGG SER-MCNC: <0.1 UG/ML
DEPRECATED S PNEUM3 IGG SER-MCNC: 0.2 UG/ML
DEPRECATED S PNEUM4 IGG SER-MCNC: <0.1 UG/ML
DEPRECATED S PNEUM8 IGG SER-MCNC: 1.9 UG/ML
DEPRECATED S PNEUM9 IGG SER-MCNC: <0.1 UG/ML
S PNEUM DA 18C IGG SER-MCNC: 0.2 UG/ML
S PNEUM DA 19A IGG SER-MCNC: 0.2 UG/ML
S PNEUM DA 6B IGG SER-MCNC: <0.1 UG/ML
S PNEUM DA 7F IGG SER-MCNC: <0.1 UG/ML
S PNEUM DA 9V IGG SER-MCNC: <0.1 UG/ML

## 2019-10-01 RX ORDER — LISINOPRIL 20 MG/1
TABLET ORAL
Qty: 60 TABLET | Refills: 1 | Status: SHIPPED | OUTPATIENT
Start: 2019-10-01 | End: 2019-11-29 | Stop reason: SDUPTHER

## 2019-10-28 ENCOUNTER — LAB (OUTPATIENT)
Dept: LAB | Facility: HOSPITAL | Age: 64
End: 2019-10-28

## 2019-10-28 ENCOUNTER — TRANSCRIBE ORDERS (OUTPATIENT)
Dept: ADMINISTRATIVE | Facility: HOSPITAL | Age: 64
End: 2019-10-28

## 2019-10-28 DIAGNOSIS — J30.1 ALLERGIC RHINITIS DUE TO POLLEN, UNSPECIFIED SEASONALITY: Primary | ICD-10-CM

## 2019-10-28 DIAGNOSIS — J30.89 OTHER ALLERGIC RHINITIS: ICD-10-CM

## 2019-10-28 DIAGNOSIS — J45.50 SEVERE PERSISTENT ASTHMA, UNCOMPLICATED: ICD-10-CM

## 2019-10-28 DIAGNOSIS — J30.1 ALLERGIC RHINITIS DUE TO POLLEN, UNSPECIFIED SEASONALITY: ICD-10-CM

## 2019-10-28 DIAGNOSIS — J32.9 CHRONIC SINUSITIS, UNSPECIFIED LOCATION: ICD-10-CM

## 2019-10-28 PROCEDURE — 86609 BACTERIUM ANTIBODY: CPT

## 2019-10-28 PROCEDURE — 36415 COLL VENOUS BLD VENIPUNCTURE: CPT

## 2019-10-31 RX ORDER — HYDRALAZINE HYDROCHLORIDE 100 MG/1
TABLET, FILM COATED ORAL
Qty: 60 TABLET | Refills: 2 | Status: SHIPPED | OUTPATIENT
Start: 2019-10-31 | End: 2020-01-29

## 2019-10-31 RX ORDER — PRAVASTATIN SODIUM 20 MG
TABLET ORAL
Qty: 30 TABLET | Refills: 2 | Status: SHIPPED | OUTPATIENT
Start: 2019-10-31 | End: 2020-04-22

## 2019-11-01 LAB
DEPRECATED S PNEUM 1 IGG SER-MCNC: 4.2 UG/ML
DEPRECATED S PNEUM12 IGG SER-MCNC: <0.1 UG/ML
DEPRECATED S PNEUM14 IGG SER-MCNC: 11.8 UG/ML
DEPRECATED S PNEUM19 IGG SER-MCNC: 2.4 UG/ML
DEPRECATED S PNEUM23 IGG SER-MCNC: 1 UG/ML
DEPRECATED S PNEUM3 IGG SER-MCNC: 7.3 UG/ML
DEPRECATED S PNEUM4 IGG SER-MCNC: >9.9 UG/ML
DEPRECATED S PNEUM8 IGG SER-MCNC: 2 UG/ML
DEPRECATED S PNEUM9 IGG SER-MCNC: 0.6 UG/ML
S PNEUM DA 18C IGG SER-MCNC: >10.9 UG/ML
S PNEUM DA 19A IGG SER-MCNC: 1.8 UG/ML
S PNEUM DA 6B IGG SER-MCNC: >25.8 UG/ML
S PNEUM DA 7F IGG SER-MCNC: >12.9 UG/ML
S PNEUM DA 9V IGG SER-MCNC: 4.9 UG/ML

## 2019-11-25 PROBLEM — I10 HYPERTENSION: Status: ACTIVE | Noted: 2018-02-28

## 2019-11-25 PROBLEM — R00.1 BRADYCARDIA: Status: ACTIVE | Noted: 2017-04-26

## 2019-11-25 PROBLEM — R06.02 SHORTNESS OF BREATH: Status: ACTIVE | Noted: 2017-01-25

## 2019-11-25 PROBLEM — R00.1 BRADYCARDIA, SINUS: Status: ACTIVE | Noted: 2019-11-25

## 2019-11-25 PROBLEM — I72.3 ANEURYSM OF ILIAC ARTERY: Status: ACTIVE | Noted: 2018-07-27

## 2019-11-25 PROBLEM — R00.2 PALPITATIONS: Status: ACTIVE | Noted: 2019-02-27

## 2019-11-25 PROBLEM — I72.4: Status: ACTIVE | Noted: 2018-07-27

## 2019-11-25 PROBLEM — I48.0 PAROXYSMAL ATRIAL FIBRILLATION: Status: ACTIVE | Noted: 2019-02-27

## 2019-11-25 PROBLEM — I25.10 CORONARY ARTERY DISEASE: Status: ACTIVE | Noted: 2017-01-25

## 2019-11-25 PROBLEM — E78.2 MIXED HYPERLIPIDEMIA: Status: ACTIVE | Noted: 2018-02-28

## 2019-11-25 PROBLEM — E78.5 HYPERLIPIDEMIA: Status: ACTIVE | Noted: 2018-02-28

## 2019-11-25 PROBLEM — I48.91 ATRIAL FIBRILLATION (HCC): Status: ACTIVE | Noted: 2019-02-27

## 2019-12-02 RX ORDER — LISINOPRIL 20 MG/1
TABLET ORAL
Qty: 60 TABLET | Refills: 0 | Status: SHIPPED | OUTPATIENT
Start: 2019-12-02 | End: 2019-12-30

## 2019-12-30 RX ORDER — LISINOPRIL 20 MG/1
TABLET ORAL
Qty: 60 TABLET | Refills: 0 | Status: SHIPPED | OUTPATIENT
Start: 2019-12-30 | End: 2020-01-29

## 2020-01-29 RX ORDER — HYDRALAZINE HYDROCHLORIDE 100 MG/1
TABLET, FILM COATED ORAL
Qty: 60 TABLET | Refills: 1 | Status: SHIPPED | OUTPATIENT
Start: 2020-01-29 | End: 2020-03-30

## 2020-01-29 RX ORDER — LISINOPRIL 20 MG/1
TABLET ORAL
Qty: 60 TABLET | Refills: 0 | Status: SHIPPED | OUTPATIENT
Start: 2020-01-29 | End: 2020-02-27

## 2020-02-27 RX ORDER — LISINOPRIL 20 MG/1
TABLET ORAL
Qty: 60 TABLET | Refills: 5 | Status: SHIPPED | OUTPATIENT
Start: 2020-02-27 | End: 2020-09-08

## 2020-03-06 ENCOUNTER — TRANSCRIBE ORDERS (OUTPATIENT)
Dept: ADMINISTRATIVE | Facility: HOSPITAL | Age: 65
End: 2020-03-06

## 2020-03-06 DIAGNOSIS — I71.40 ABDOMINAL AORTIC ANEURYSM WITHOUT RUPTURE (HCC): Primary | ICD-10-CM

## 2020-03-30 RX ORDER — HYDRALAZINE HYDROCHLORIDE 100 MG/1
TABLET, FILM COATED ORAL
Qty: 60 TABLET | Refills: 5 | Status: SHIPPED | OUTPATIENT
Start: 2020-03-30 | End: 2020-10-07

## 2020-04-22 ENCOUNTER — TELEMEDICINE (OUTPATIENT)
Dept: CARDIOLOGY | Facility: CLINIC | Age: 65
End: 2020-04-22

## 2020-04-22 VITALS
DIASTOLIC BLOOD PRESSURE: 79 MMHG | HEART RATE: 61 BPM | HEIGHT: 71 IN | BODY MASS INDEX: 24.92 KG/M2 | WEIGHT: 178 LBS | SYSTOLIC BLOOD PRESSURE: 144 MMHG

## 2020-04-22 DIAGNOSIS — I48.0 PAROXYSMAL ATRIAL FIBRILLATION (HCC): Primary | ICD-10-CM

## 2020-04-22 DIAGNOSIS — I25.10 CORONARY ARTERY DISEASE INVOLVING NATIVE CORONARY ARTERY OF NATIVE HEART WITHOUT ANGINA PECTORIS: ICD-10-CM

## 2020-04-22 DIAGNOSIS — E78.2 MIXED HYPERLIPIDEMIA: ICD-10-CM

## 2020-04-22 DIAGNOSIS — R00.1 BRADYCARDIA, SINUS: ICD-10-CM

## 2020-04-22 DIAGNOSIS — I10 ESSENTIAL HYPERTENSION: ICD-10-CM

## 2020-04-22 DIAGNOSIS — R00.2 PALPITATIONS: ICD-10-CM

## 2020-04-22 DIAGNOSIS — I71.40 AAA (ABDOMINAL AORTIC ANEURYSM) WITHOUT RUPTURE (HCC): ICD-10-CM

## 2020-04-22 PROCEDURE — 99213 OFFICE O/P EST LOW 20 MIN: CPT | Performed by: INTERNAL MEDICINE

## 2020-04-22 RX ORDER — ALBUTEROL SULFATE 90 UG/1
2 AEROSOL, METERED RESPIRATORY (INHALATION) EVERY 6 HOURS PRN
COMMUNITY
Start: 2020-01-22

## 2020-04-22 RX ORDER — AMLODIPINE BESYLATE 5 MG/1
5 TABLET ORAL DAILY
Qty: 90 TABLET | Refills: 3 | Status: SHIPPED | OUTPATIENT
Start: 2020-04-22 | End: 2021-02-15 | Stop reason: SDUPTHER

## 2020-04-22 RX ORDER — MELATONIN
1000 DAILY
COMMUNITY
End: 2023-01-17

## 2020-04-22 RX ORDER — ASCORBIC ACID 500 MG
500 TABLET ORAL DAILY
COMMUNITY
End: 2023-01-09

## 2020-04-22 NOTE — PROGRESS NOTES
Date of Office Visit: 2020  Encounter Provider: Dr. Maciej Blank  Place of Service: [facility] CARDIOLOGY [place]  Patient Name: Alejandro Chamberlain  :1955  Provider, No Known  You have chosen to receive care through a telephone visit. Do you consent to use a telephone visit for your medical care today? Yes  Chief Complaint   Patient presents with   • Atrial Fibrillation     1 year follow up/telephone visit   • Coronary Artery Disease   • Hyperlipidemia   • Palpitations   • Slow Heart Rate     History of Present Illness    I am pleased to have video visit with Mr. chamberlain in my office today as a follow-up.    As you know, patient is 64-year-old white gentleman whose past medical history significant for hypertension, CAD, abdominal aortic aneurysm, CABG, status post AAA repair who had video visit.    In 2016, patient was admitted with the symptom of acute coronary artery syndrome and underwent cardiac catheterization which showed three-vessel coronary artery disease.  Patient underwent CABG x5.  Patient was also diagnosed with AAA .  After CABG patient underwent AAA repair after 4 weeks.    Since the previous visit, patient is doing fairly well from cardiovascular standpoint.  Patient denies any symptom of chest pain or tightness or heaviness no orthopnea, PND, syncope or presyncope.  No leg edema noted.  No palpitation.    His blood pressure is running high.  Patient has consistent elevated readings.  Patient is on lisinopril and hydralazine.  He is on maximum doses of both of these medication.  I would add amlodipine 5 mg daily.    Past Medical History:   Diagnosis Date   • Aneurysm (CMS/HCC)    • Anxiety    • Asthma    • Bronchitis    • Coronary artery disease    • Depression    • Gout    • Hyperlipidemia    • Hypertension    • Myocardial infarction (CMS/HCC)    • Pneumonia    • Pulmonary nodule    • Familia Mountain spotted fever          Past Surgical History:   Procedure Laterality Date   •  AORTIC VALVE REPAIR/REPLACEMENT  12/03/2016    CABG x 5/ tissue AVR by DR. PHAN   • CARDIAC CATHETERIZATION     • CARDIAC SURGERY     • CAROTID STENT     • CORONARY ARTERY BYPASS GRAFT  12/02/2016    X4  Dr Phan   • ILIAC ARTERY ANEURYSM REPAIR  01/05/2017    abdominal and bilateral   • OTHER SURGICAL HISTORY      PTCI   • SINUS SURGERY     • VASCULAR SURGERY             Current Outpatient Medications:   •  aspirin (ADULT ASPIRIN EC LOW STRENGTH) 81 MG EC tablet, 1 tablet Daily., Disp: , Rfl:   •  azelastine (ASTEPRO) 0.15 % solution nasal spray, , Disp: , Rfl:   •  cholecalciferol (VITAMIN D3) 25 MCG (1000 UT) tablet, Take 1,000 Units by mouth Daily., Disp: , Rfl:   •  Dupilumab (DUPIXENT SC), Inject  under the skin into the appropriate area as directed Take As Directed., Disp: , Rfl:   •  hydrALAZINE (APRESOLINE) 100 MG tablet, TAKE ONE TABLET BY MOUTH TWICE A DAY, Disp: 60 tablet, Rfl: 5  •  lisinopril (PRINIVIL,ZESTRIL) 20 MG tablet, TAKE ONE TABLET BY MOUTH TWICE A DAY, Disp: 60 tablet, Rfl: 5  •  Multiple Vitamins-Minerals (MULTIVITAMIN ADULT PO), Take  by mouth Daily., Disp: , Rfl:   •  SYMBICORT 160-4.5 MCG/ACT inhaler, , Disp: , Rfl:   •  VENTOLIN  (90 Base) MCG/ACT inhaler, As Needed., Disp: , Rfl:   •  vitamin C (ASCORBIC ACID) 500 MG tablet, Take 500 mg by mouth Daily., Disp: , Rfl:   •  amLODIPine (NORVASC) 5 MG tablet, Take 1 tablet by mouth Daily., Disp: 90 tablet, Rfl: 3      Social History     Socioeconomic History   • Marital status:      Spouse name: Not on file   • Number of children: Not on file   • Years of education: Not on file   • Highest education level: Not on file   Tobacco Use   • Smoking status: Never Smoker   • Smokeless tobacco: Never Used   Substance and Sexual Activity   • Alcohol use: No   • Drug use: No   • Sexual activity: Defer         Review of Systems   Constitution: Negative for chills and fever.   HENT: Negative for ear discharge and nosebleeds.    Eyes:  "Negative for discharge and redness.   Cardiovascular: Negative for chest pain, orthopnea, palpitations, paroxysmal nocturnal dyspnea and syncope.   Respiratory: Negative for cough, shortness of breath and wheezing.    Endocrine: Negative for heat intolerance.   Skin: Negative for rash.   Musculoskeletal: Positive for arthritis and joint pain. Negative for myalgias.   Gastrointestinal: Negative for abdominal pain, melena, nausea and vomiting.   Genitourinary: Negative for dysuria and hematuria.   Neurological: Negative for dizziness, light-headedness, numbness and tremors.   Psychiatric/Behavioral: Negative for depression. The patient is not nervous/anxious.        Procedures    Procedures    No orders to display           Objective:    /79   Pulse 61   Ht 180.3 cm (70.98\")   Wt 80.7 kg (178 lb)   BMI 24.84 kg/m²         Physical Exam   Constitutional: He is oriented to person, place, and time. He appears well-developed and well-nourished.   Pulmonary/Chest: No respiratory distress. He has no wheezes.   Musculoskeletal: He exhibits no edema.   Neurological: He is alert and oriented to person, place, and time.           Assessment:       Diagnosis Plan   1. Paroxysmal atrial fibrillation (CMS/HCC)  amLODIPine (NORVASC) 5 MG tablet   2. Coronary artery disease involving native coronary artery of native heart without angina pectoris  amLODIPine (NORVASC) 5 MG tablet   3. Mixed hyperlipidemia  amLODIPine (NORVASC) 5 MG tablet   4. Palpitations  amLODIPine (NORVASC) 5 MG tablet   5. Essential hypertension  amLODIPine (NORVASC) 5 MG tablet   6. AAA (abdominal aortic aneurysm) without rupture (CMS/HCC)  amLODIPine (NORVASC) 5 MG tablet   7. Bradycardia, sinus  amLODIPine (NORVASC) 5 MG tablet            Plan:       I would recommend to start amlodipine.  I would consider stress test in future.  "

## 2020-05-25 ENCOUNTER — APPOINTMENT (OUTPATIENT)
Dept: NUCLEAR MEDICINE | Facility: HOSPITAL | Age: 65
End: 2020-05-25

## 2020-05-25 ENCOUNTER — HOSPITAL ENCOUNTER (OUTPATIENT)
Facility: HOSPITAL | Age: 65
Setting detail: OBSERVATION
Discharge: HOME | End: 2020-05-25
Attending: EMERGENCY MEDICINE | Admitting: EMERGENCY MEDICINE

## 2020-05-25 ENCOUNTER — APPOINTMENT (OUTPATIENT)
Dept: CT IMAGING | Facility: HOSPITAL | Age: 65
End: 2020-05-25

## 2020-05-25 ENCOUNTER — APPOINTMENT (OUTPATIENT)
Dept: GENERAL RADIOLOGY | Facility: HOSPITAL | Age: 65
End: 2020-05-25

## 2020-05-25 VITALS
BODY MASS INDEX: 23.49 KG/M2 | SYSTOLIC BLOOD PRESSURE: 132 MMHG | OXYGEN SATURATION: 95 % | WEIGHT: 177.25 LBS | HEIGHT: 73 IN | DIASTOLIC BLOOD PRESSURE: 74 MMHG | RESPIRATION RATE: 18 BRPM | HEART RATE: 57 BPM | TEMPERATURE: 97.8 F

## 2020-05-25 DIAGNOSIS — R07.9 CHEST PAIN, UNSPECIFIED TYPE: Primary | ICD-10-CM

## 2020-05-25 PROBLEM — D69.6 THROMBOCYTOPENIA (HCC): Chronic | Status: ACTIVE | Noted: 2020-05-25

## 2020-05-25 PROBLEM — R00.1 BRADYCARDIA, SINUS: Chronic | Status: ACTIVE | Noted: 2019-11-25

## 2020-05-25 PROBLEM — I48.0 PAROXYSMAL ATRIAL FIBRILLATION: Chronic | Status: ACTIVE | Noted: 2019-02-27

## 2020-05-25 PROBLEM — D69.6 THROMBOCYTOPENIA (HCC): Status: ACTIVE | Noted: 2020-05-25

## 2020-05-25 PROBLEM — J45.909 ASTHMA: Chronic | Status: ACTIVE | Noted: 2020-05-25

## 2020-05-25 PROBLEM — M54.9 ACUTE UPPER BACK PAIN: Status: ACTIVE | Noted: 2020-05-25

## 2020-05-25 PROBLEM — I25.10 CORONARY ARTERY DISEASE INVOLVING NATIVE CORONARY ARTERY OF NATIVE HEART WITHOUT ANGINA PECTORIS: Chronic | Status: ACTIVE | Noted: 2017-01-25

## 2020-05-25 PROBLEM — E78.2 MIXED HYPERLIPIDEMIA: Chronic | Status: ACTIVE | Noted: 2018-02-28

## 2020-05-25 PROBLEM — I10 ESSENTIAL HYPERTENSION: Chronic | Status: ACTIVE | Noted: 2018-02-28

## 2020-05-25 LAB
ALBUMIN SERPL-MCNC: 4.1 G/DL (ref 3.5–5.2)
ALBUMIN/GLOB SERPL: 1.7 G/DL
ALP SERPL-CCNC: 86 U/L (ref 39–117)
ALT SERPL W P-5'-P-CCNC: 22 U/L (ref 1–41)
ANION GAP SERPL CALCULATED.3IONS-SCNC: 12 MMOL/L (ref 5–15)
AST SERPL-CCNC: 19 U/L (ref 1–40)
BASOPHILS # BLD AUTO: 0.1 10*3/MM3 (ref 0–0.2)
BASOPHILS NFR BLD AUTO: 0.6 % (ref 0–1.5)
BILIRUB SERPL-MCNC: 0.3 MG/DL (ref 0.2–1.2)
BUN BLD-MCNC: 28 MG/DL (ref 8–23)
BUN/CREAT SERPL: 22.6 (ref 7–25)
CALCIUM SPEC-SCNC: 9.1 MG/DL (ref 8.6–10.5)
CHLORIDE SERPL-SCNC: 104 MMOL/L (ref 98–107)
CO2 SERPL-SCNC: 25 MMOL/L (ref 22–29)
CREAT BLD-MCNC: 1.24 MG/DL (ref 0.76–1.27)
DEPRECATED RDW RBC AUTO: 42.4 FL (ref 37–54)
EOSINOPHIL # BLD AUTO: 0.4 10*3/MM3 (ref 0–0.4)
EOSINOPHIL NFR BLD AUTO: 3.6 % (ref 0.3–6.2)
ERYTHROCYTE [DISTWIDTH] IN BLOOD BY AUTOMATED COUNT: 14.6 % (ref 12.3–15.4)
GFR SERPL CREATININE-BSD FRML MDRD: 59 ML/MIN/1.73
GLOBULIN UR ELPH-MCNC: 2.4 GM/DL
GLUCOSE BLD-MCNC: 85 MG/DL (ref 65–99)
HCT VFR BLD AUTO: 39.3 % (ref 37.5–51)
HGB BLD-MCNC: 14.1 G/DL (ref 13–17.7)
HOLD SPECIMEN: NORMAL
HOLD SPECIMEN: NORMAL
INR PPP: 1.2 (ref 0.9–1.1)
LYMPHOCYTES # BLD AUTO: 2.4 10*3/MM3 (ref 0.7–3.1)
LYMPHOCYTES NFR BLD AUTO: 24.7 % (ref 19.6–45.3)
MCH RBC QN AUTO: 29.6 PG (ref 26.6–33)
MCHC RBC AUTO-ENTMCNC: 36 G/DL (ref 31.5–35.7)
MCV RBC AUTO: 82.2 FL (ref 79–97)
MONOCYTES # BLD AUTO: 0.7 10*3/MM3 (ref 0.1–0.9)
MONOCYTES NFR BLD AUTO: 7.5 % (ref 5–12)
NEUTROPHILS # BLD AUTO: 6.3 10*3/MM3 (ref 1.7–7)
NEUTROPHILS NFR BLD AUTO: 63.6 % (ref 42.7–76)
NRBC BLD AUTO-RTO: 0 /100 WBC (ref 0–0.2)
PLATELET # BLD AUTO: 131 10*3/MM3 (ref 140–450)
PMV BLD AUTO: 7.2 FL (ref 6–12)
POTASSIUM BLD-SCNC: 3.7 MMOL/L (ref 3.5–5.2)
PROT SERPL-MCNC: 6.5 G/DL (ref 6–8.5)
PROTHROMBIN TIME: 12.2 SECONDS (ref 9.6–11.7)
RBC # BLD AUTO: 4.78 10*6/MM3 (ref 4.14–5.8)
SODIUM BLD-SCNC: 141 MMOL/L (ref 136–145)
TROPONIN T SERPL-MCNC: <0.01 NG/ML (ref 0–0.03)
WBC NRBC COR # BLD: 9.9 10*3/MM3 (ref 3.4–10.8)
WHOLE BLOOD HOLD SPECIMEN: NORMAL
WHOLE BLOOD HOLD SPECIMEN: NORMAL

## 2020-05-25 PROCEDURE — 71045 X-RAY EXAM CHEST 1 VIEW: CPT

## 2020-05-25 PROCEDURE — 99236 HOSP IP/OBS SAME DATE HI 85: CPT | Performed by: INTERNAL MEDICINE

## 2020-05-25 PROCEDURE — 84484 ASSAY OF TROPONIN QUANT: CPT | Performed by: EMERGENCY MEDICINE

## 2020-05-25 PROCEDURE — 93005 ELECTROCARDIOGRAM TRACING: CPT | Performed by: EMERGENCY MEDICINE

## 2020-05-25 PROCEDURE — G0378 HOSPITAL OBSERVATION PER HR: HCPCS

## 2020-05-25 PROCEDURE — 84484 ASSAY OF TROPONIN QUANT: CPT | Performed by: NURSE PRACTITIONER

## 2020-05-25 PROCEDURE — 85610 PROTHROMBIN TIME: CPT | Performed by: EMERGENCY MEDICINE

## 2020-05-25 PROCEDURE — 80053 COMPREHEN METABOLIC PANEL: CPT | Performed by: EMERGENCY MEDICINE

## 2020-05-25 PROCEDURE — 71250 CT THORAX DX C-: CPT

## 2020-05-25 PROCEDURE — 93018 CV STRESS TEST I&R ONLY: CPT | Performed by: INTERNAL MEDICINE

## 2020-05-25 PROCEDURE — 85025 COMPLETE CBC W/AUTO DIFF WBC: CPT | Performed by: EMERGENCY MEDICINE

## 2020-05-25 PROCEDURE — 78452 HT MUSCLE IMAGE SPECT MULT: CPT

## 2020-05-25 PROCEDURE — 0 TECHNETIUM SESTAMIBI: Performed by: INTERNAL MEDICINE

## 2020-05-25 PROCEDURE — A9500 TC99M SESTAMIBI: HCPCS | Performed by: INTERNAL MEDICINE

## 2020-05-25 PROCEDURE — 78452 HT MUSCLE IMAGE SPECT MULT: CPT | Performed by: INTERNAL MEDICINE

## 2020-05-25 PROCEDURE — 25010000002 REGADENOSON 0.4 MG/5ML SOLUTION: Performed by: INTERNAL MEDICINE

## 2020-05-25 PROCEDURE — 99284 EMERGENCY DEPT VISIT MOD MDM: CPT

## 2020-05-25 PROCEDURE — 93017 CV STRESS TEST TRACING ONLY: CPT

## 2020-05-25 RX ORDER — LISINOPRIL 20 MG/1
20 TABLET ORAL 2 TIMES DAILY
Status: DISCONTINUED | OUTPATIENT
Start: 2020-05-25 | End: 2020-05-25 | Stop reason: HOSPADM

## 2020-05-25 RX ORDER — SODIUM CHLORIDE 0.9 % (FLUSH) 0.9 %
10 SYRINGE (ML) INJECTION AS NEEDED
Status: DISCONTINUED | OUTPATIENT
Start: 2020-05-25 | End: 2020-05-25 | Stop reason: HOSPADM

## 2020-05-25 RX ORDER — ASCORBIC ACID 500 MG
500 TABLET ORAL DAILY
Status: DISCONTINUED | OUTPATIENT
Start: 2020-05-25 | End: 2020-05-25 | Stop reason: HOSPADM

## 2020-05-25 RX ORDER — CHOLECALCIFEROL (VITAMIN D3) 125 MCG
5 CAPSULE ORAL NIGHTLY PRN
Status: DISCONTINUED | OUTPATIENT
Start: 2020-05-25 | End: 2020-05-25 | Stop reason: HOSPADM

## 2020-05-25 RX ORDER — MELATONIN
1000 DAILY
Status: DISCONTINUED | OUTPATIENT
Start: 2020-05-25 | End: 2020-05-25 | Stop reason: HOSPADM

## 2020-05-25 RX ORDER — ALUMINA, MAGNESIA, AND SIMETHICONE 2400; 2400; 240 MG/30ML; MG/30ML; MG/30ML
15 SUSPENSION ORAL EVERY 6 HOURS PRN
Status: DISCONTINUED | OUTPATIENT
Start: 2020-05-25 | End: 2020-05-25 | Stop reason: HOSPADM

## 2020-05-25 RX ORDER — ONDANSETRON 4 MG/1
4 TABLET, FILM COATED ORAL EVERY 6 HOURS PRN
Status: DISCONTINUED | OUTPATIENT
Start: 2020-05-25 | End: 2020-05-25 | Stop reason: HOSPADM

## 2020-05-25 RX ORDER — ACETAMINOPHEN 160 MG/5ML
650 SOLUTION ORAL EVERY 4 HOURS PRN
Status: DISCONTINUED | OUTPATIENT
Start: 2020-05-25 | End: 2020-05-25 | Stop reason: HOSPADM

## 2020-05-25 RX ORDER — FLUTICASONE PROPIONATE 50 MCG
1 SPRAY, SUSPENSION (ML) NASAL 2 TIMES DAILY
COMMUNITY
End: 2022-12-14

## 2020-05-25 RX ORDER — ASPIRIN 81 MG/1
324 TABLET, CHEWABLE ORAL ONCE
Status: DISCONTINUED | OUTPATIENT
Start: 2020-05-25 | End: 2020-05-25

## 2020-05-25 RX ORDER — ACETAMINOPHEN 650 MG/1
650 SUPPOSITORY RECTAL EVERY 4 HOURS PRN
Status: DISCONTINUED | OUTPATIENT
Start: 2020-05-25 | End: 2020-05-25 | Stop reason: HOSPADM

## 2020-05-25 RX ORDER — ASPIRIN 81 MG/1
81 TABLET ORAL DAILY
Status: DISCONTINUED | OUTPATIENT
Start: 2020-05-25 | End: 2020-05-25 | Stop reason: HOSPADM

## 2020-05-25 RX ORDER — SODIUM CHLORIDE 0.9 % (FLUSH) 0.9 %
10 SYRINGE (ML) INJECTION EVERY 12 HOURS SCHEDULED
Status: DISCONTINUED | OUTPATIENT
Start: 2020-05-25 | End: 2020-05-25 | Stop reason: HOSPADM

## 2020-05-25 RX ORDER — MAGNESIUM SULFATE HEPTAHYDRATE 40 MG/ML
2 INJECTION, SOLUTION INTRAVENOUS AS NEEDED
Status: DISCONTINUED | OUTPATIENT
Start: 2020-05-25 | End: 2020-05-25 | Stop reason: HOSPADM

## 2020-05-25 RX ORDER — FOLIC ACID/MULTIVIT,IRON,MINER .4-18-35
1 TABLET,CHEWABLE ORAL DAILY
Status: DISCONTINUED | OUTPATIENT
Start: 2020-05-25 | End: 2020-05-25 | Stop reason: HOSPADM

## 2020-05-25 RX ORDER — MAGNESIUM SULFATE 1 G/100ML
1 INJECTION INTRAVENOUS AS NEEDED
Status: DISCONTINUED | OUTPATIENT
Start: 2020-05-25 | End: 2020-05-25 | Stop reason: HOSPADM

## 2020-05-25 RX ORDER — ONDANSETRON 2 MG/ML
4 INJECTION INTRAMUSCULAR; INTRAVENOUS EVERY 6 HOURS PRN
Status: DISCONTINUED | OUTPATIENT
Start: 2020-05-25 | End: 2020-05-25 | Stop reason: HOSPADM

## 2020-05-25 RX ORDER — HYDRALAZINE HYDROCHLORIDE 25 MG/1
100 TABLET, FILM COATED ORAL 2 TIMES DAILY
Status: DISCONTINUED | OUTPATIENT
Start: 2020-05-25 | End: 2020-05-25 | Stop reason: HOSPADM

## 2020-05-25 RX ORDER — ALBUTEROL SULFATE 2.5 MG/3ML
2.5 SOLUTION RESPIRATORY (INHALATION) EVERY 6 HOURS PRN
Status: DISCONTINUED | OUTPATIENT
Start: 2020-05-25 | End: 2020-05-25 | Stop reason: HOSPADM

## 2020-05-25 RX ORDER — DOXYCYCLINE HYCLATE 100 MG/1
100 CAPSULE ORAL 2 TIMES DAILY
COMMUNITY
End: 2021-01-06

## 2020-05-25 RX ORDER — BISACODYL 10 MG
10 SUPPOSITORY, RECTAL RECTAL DAILY PRN
Status: DISCONTINUED | OUTPATIENT
Start: 2020-05-25 | End: 2020-05-25 | Stop reason: HOSPADM

## 2020-05-25 RX ORDER — KETOROLAC TROMETHAMINE 15 MG/ML
15 INJECTION, SOLUTION INTRAMUSCULAR; INTRAVENOUS EVERY 6 HOURS PRN
Status: DISCONTINUED | OUTPATIENT
Start: 2020-05-25 | End: 2020-05-25 | Stop reason: HOSPADM

## 2020-05-25 RX ORDER — POTASSIUM CHLORIDE 20 MEQ/1
40 TABLET, EXTENDED RELEASE ORAL AS NEEDED
Status: DISCONTINUED | OUTPATIENT
Start: 2020-05-25 | End: 2020-05-25 | Stop reason: HOSPADM

## 2020-05-25 RX ORDER — AMLODIPINE BESYLATE 5 MG/1
5 TABLET ORAL DAILY
Status: DISCONTINUED | OUTPATIENT
Start: 2020-05-25 | End: 2020-05-25 | Stop reason: HOSPADM

## 2020-05-25 RX ORDER — NITROGLYCERIN 0.4 MG/1
0.4 TABLET SUBLINGUAL
Status: DISCONTINUED | OUTPATIENT
Start: 2020-05-25 | End: 2020-05-25 | Stop reason: HOSPADM

## 2020-05-25 RX ORDER — ACETAMINOPHEN 325 MG/1
650 TABLET ORAL EVERY 4 HOURS PRN
Status: DISCONTINUED | OUTPATIENT
Start: 2020-05-25 | End: 2020-05-25 | Stop reason: HOSPADM

## 2020-05-25 RX ORDER — BUDESONIDE AND FORMOTEROL FUMARATE DIHYDRATE 160; 4.5 UG/1; UG/1
2 AEROSOL RESPIRATORY (INHALATION)
Status: DISCONTINUED | OUTPATIENT
Start: 2020-05-25 | End: 2020-05-25 | Stop reason: HOSPADM

## 2020-05-25 RX ADMIN — REGADENOSON 0.4 MG: 0.08 INJECTION, SOLUTION INTRAVENOUS at 11:30

## 2020-05-25 RX ADMIN — MELATONIN 1000 UNITS: at 11:02

## 2020-05-25 RX ADMIN — OXYCODONE HYDROCHLORIDE AND ACETAMINOPHEN 500 MG: 500 TABLET ORAL at 11:02

## 2020-05-25 RX ADMIN — TECHNETIUM TC 99M SESTAMIBI 1 DOSE: 1 INJECTION INTRAVENOUS at 08:45

## 2020-05-25 RX ADMIN — Medication 10 ML: at 11:02

## 2020-05-25 RX ADMIN — TECHNETIUM TC 99M SESTAMIBI 1 DOSE: 1 INJECTION INTRAVENOUS at 10:10

## 2020-05-25 RX ADMIN — Medication 1 TABLET: at 11:02

## 2020-05-30 LAB
BH CV NUCLEAR PRIOR STUDY: 3
BH CV STRESS BP STAGE 1: NORMAL
BH CV STRESS BP STAGE 3: NORMAL
BH CV STRESS DURATION MIN STAGE 1: 3
BH CV STRESS DURATION MIN STAGE 2: 3
BH CV STRESS DURATION MIN STAGE 3: 3
BH CV STRESS DURATION SEC STAGE 1: 0
BH CV STRESS DURATION SEC STAGE 2: 0
BH CV STRESS DURATION SEC STAGE 3: 0
BH CV STRESS GRADE STAGE 1: 10
BH CV STRESS GRADE STAGE 2: 12
BH CV STRESS GRADE STAGE 3: 14
BH CV STRESS HR STAGE 1: 80
BH CV STRESS HR STAGE 2: 112
BH CV STRESS HR STAGE 3: 146
BH CV STRESS METS STAGE 1: 5
BH CV STRESS METS STAGE 2: 7.5
BH CV STRESS METS STAGE 3: 10
BH CV STRESS PROTOCOL 1: NORMAL
BH CV STRESS RECOVERY BP: NORMAL MMHG
BH CV STRESS RECOVERY HR: 82 BPM
BH CV STRESS SPEED STAGE 1: 1.7
BH CV STRESS SPEED STAGE 2: 2.5
BH CV STRESS SPEED STAGE 3: 3.4
BH CV STRESS STAGE 1: 1
BH CV STRESS STAGE 2: 2
BH CV STRESS STAGE 3: 3
LV EF NUC BP: 56 %
MAXIMAL PREDICTED HEART RATE: 157 BPM
PERCENT MAX PREDICTED HR: 92.99 %
STRESS BASELINE BP: NORMAL MMHG
STRESS BASELINE HR: 63 BPM
STRESS PERCENT HR: 109 %
STRESS POST ESTIMATED WORKLOAD: 10.1 METS
STRESS POST EXERCISE DUR MIN: 8 MIN
STRESS POST EXERCISE DUR SEC: 0 SEC
STRESS POST PEAK BP: NORMAL MMHG
STRESS POST PEAK HR: 146 BPM
STRESS TARGET HR: 133 BPM

## 2020-06-04 ENCOUNTER — APPOINTMENT (OUTPATIENT)
Dept: CARDIOLOGY | Facility: HOSPITAL | Age: 65
End: 2020-06-04

## 2020-07-15 ENCOUNTER — TELEPHONE (OUTPATIENT)
Dept: CARDIOLOGY | Facility: CLINIC | Age: 65
End: 2020-07-15

## 2020-08-06 ENCOUNTER — APPOINTMENT (OUTPATIENT)
Dept: CARDIOLOGY | Facility: HOSPITAL | Age: 65
End: 2020-08-06

## 2020-08-06 ENCOUNTER — APPOINTMENT (OUTPATIENT)
Dept: VASCULAR SURGERY | Facility: HOSPITAL | Age: 65
End: 2020-08-06

## 2020-09-08 RX ORDER — LISINOPRIL 20 MG/1
TABLET ORAL
Qty: 60 TABLET | Refills: 4 | Status: SHIPPED | OUTPATIENT
Start: 2020-09-08 | End: 2021-02-08 | Stop reason: SDUPTHER

## 2020-10-07 RX ORDER — HYDRALAZINE HYDROCHLORIDE 100 MG/1
TABLET, FILM COATED ORAL
Qty: 60 TABLET | Refills: 4 | Status: SHIPPED | OUTPATIENT
Start: 2020-10-07 | End: 2021-03-22 | Stop reason: SDUPTHER

## 2021-01-06 ENCOUNTER — OFFICE VISIT (OUTPATIENT)
Dept: CARDIOLOGY | Facility: CLINIC | Age: 66
End: 2021-01-06

## 2021-01-06 VITALS
HEIGHT: 73 IN | WEIGHT: 194 LBS | SYSTOLIC BLOOD PRESSURE: 122 MMHG | DIASTOLIC BLOOD PRESSURE: 84 MMHG | BODY MASS INDEX: 25.71 KG/M2 | HEART RATE: 49 BPM

## 2021-01-06 DIAGNOSIS — I10 ESSENTIAL HYPERTENSION: Primary | Chronic | ICD-10-CM

## 2021-01-06 DIAGNOSIS — E78.2 MIXED HYPERLIPIDEMIA: Chronic | ICD-10-CM

## 2021-01-06 DIAGNOSIS — I48.0 PAROXYSMAL ATRIAL FIBRILLATION (HCC): Chronic | ICD-10-CM

## 2021-01-06 DIAGNOSIS — R06.02 SHORTNESS OF BREATH: ICD-10-CM

## 2021-01-06 DIAGNOSIS — R00.2 PALPITATIONS: ICD-10-CM

## 2021-01-06 DIAGNOSIS — I25.10 CORONARY ARTERY DISEASE INVOLVING NATIVE CORONARY ARTERY OF NATIVE HEART WITHOUT ANGINA PECTORIS: Chronic | ICD-10-CM

## 2021-01-06 PROCEDURE — 93000 ELECTROCARDIOGRAM COMPLETE: CPT | Performed by: INTERNAL MEDICINE

## 2021-01-06 PROCEDURE — 99214 OFFICE O/P EST MOD 30 MIN: CPT | Performed by: INTERNAL MEDICINE

## 2021-01-06 RX ORDER — DUPILUMAB 300 MG/2ML
INJECTION, SOLUTION SUBCUTANEOUS
COMMUNITY
End: 2021-05-22 | Stop reason: SDUPTHER

## 2021-01-06 NOTE — PROGRESS NOTES
Date of Office Visit: 2021  Encounter Provider: Dr. Maciej Blank  Place of Service: [facility] CARDIOLOGY [place]  Patient Name: Alejandro Chamberlain  :1955  Provider, No Known    Chief Complaint   Patient presents with   • Atrial Fibrillation     9 month follow up/stress test   • Hypertension   • Hyperlipidemia   • Palpitations   • Shortness of Breath   • Coronary Artery Disease     History of Present Illness    I am pleased to see Mr. chamberlain in my office today as a follow-up.    As you know, patient is 65-year-old white gentleman whose past medical history significant for hypertension, CAD, abdominal aortic aneurysm, CABG, status post AAA repair came today for follow-up    In 2016, patient was admitted with the symptom of acute coronary artery syndrome and underwent cardiac catheterization which showed three-vessel coronary artery disease.  Patient underwent CABG x5.  Patient was also diagnosed with AAA .  After CABG patient underwent AAA repair after 4 weeks.    Since the previous visit, patient is overall stable.  Patient is fairly active.  Patient denies any symptom of angina pectoris or congestive heart failure.  Patient denies any orthopnea, PND, syncope or presyncope.  No leg edema noted.    EKG showed sinus bradycardia.  Baseline artifact noted.    Overall I am pleased with the patient progress.  He has relative bradycardia.  Previously patient was a  and runner.  However I advised the patient to monitor the blood pressure and heart rate at home.  Patient also complained of some visual disturbances I advised to see his ophthalmologist.      Past Medical History:   Diagnosis Date   • Aneurysm (CMS/HCC)    • Anxiety    • Asthma    • Bronchitis    • Coronary artery disease    • Depression    • Gout    • Hyperlipidemia    • Hypertension    • Myocardial infarction (CMS/HCC)    • Pneumonia    • Pulmonary nodule    • Familia Mountain spotted fever          Past Surgical History:   Procedure  Laterality Date   • AORTIC VALVE REPAIR/REPLACEMENT  12/03/2016    CABG x 5/ tissue AVR by DR. PHAN   • CARDIAC CATHETERIZATION     • CARDIAC SURGERY     • CARDIOVASCULAR STRESS TEST  2020   • CAROTID STENT     • CORONARY ARTERY BYPASS GRAFT  12/02/2016    X4  Dr Phan   • ILIAC ARTERY ANEURYSM REPAIR  01/05/2017    abdominal and bilateral   • OTHER SURGICAL HISTORY      PTCI   • SINUS SURGERY     • VASCULAR SURGERY             Current Outpatient Medications:   •  amLODIPine (NORVASC) 5 MG tablet, Take 1 tablet by mouth Daily., Disp: 90 tablet, Rfl: 3  •  cholecalciferol (VITAMIN D3) 25 MCG (1000 UT) tablet, Take 1,000 Units by mouth Daily., Disp: , Rfl:   •  Dupilumab (Dupixent) 300 MG/2ML solution pen-injector, Inject  under the skin into the appropriate area as directed Every 14 (Fourteen) Days., Disp: , Rfl:   •  fluticasone (FLONASE) 50 MCG/ACT nasal spray, 1 spray into the nostril(s) as directed by provider 2 (Two) Times a Day., Disp: , Rfl:   •  hydrALAZINE (APRESOLINE) 100 MG tablet, TAKE ONE TABLET BY MOUTH TWICE A DAY (Patient taking differently: Take 100 mg by mouth Daily.), Disp: 60 tablet, Rfl: 4  •  lisinopril (PRINIVIL,ZESTRIL) 20 MG tablet, TAKE ONE TABLET BY MOUTH TWICE A DAY, Disp: 60 tablet, Rfl: 4  •  Multiple Vitamins-Minerals (MULTIVITAMIN ADULT PO), Take  by mouth Daily., Disp: , Rfl:   •  SYMBICORT 160-4.5 MCG/ACT inhaler, , Disp: , Rfl:   •  VENTOLIN  (90 Base) MCG/ACT inhaler, As Needed., Disp: , Rfl:   •  vitamin C (ASCORBIC ACID) 500 MG tablet, Take 500 mg by mouth Daily., Disp: , Rfl:       Social History     Socioeconomic History   • Marital status:      Spouse name: Not on file   • Number of children: Not on file   • Years of education: Not on file   • Highest education level: Not on file   Tobacco Use   • Smoking status: Never Smoker   • Smokeless tobacco: Never Used   Substance and Sexual Activity   • Alcohol use: Not Currently     Comment: quit 2005   • Drug use: No  "  • Sexual activity: Defer         Review of Systems   Constitution: Negative for chills and fever.   HENT: Negative for ear discharge and nosebleeds.    Eyes: Negative for discharge and redness.   Cardiovascular: Negative for chest pain, orthopnea, palpitations, paroxysmal nocturnal dyspnea and syncope.   Respiratory: Negative for cough, shortness of breath and wheezing.    Endocrine: Negative for heat intolerance.   Skin: Negative for rash.   Musculoskeletal: Negative for arthritis and myalgias.   Gastrointestinal: Negative for abdominal pain, melena, nausea and vomiting.   Genitourinary: Negative for dysuria and hematuria.   Neurological: Negative for dizziness, light-headedness, numbness and tremors.   Psychiatric/Behavioral: Negative for depression. The patient is not nervous/anxious.        Procedures      ECG 12 Lead    Date/Time: 1/6/2021 10:22 AM  Performed by: Maciej Blank MD  Authorized by: Maciej Blank MD   Comparison: compared with previous ECG   Similar to previous ECG  Rhythm: sinus rhythm and sinus bradycardia    Clinical impression: normal ECG            ECG 12 Lead    (Results Pending)           Objective:    /84   Pulse (!) 49   Ht 185.4 cm (72.99\")   Wt 88 kg (194 lb)   BMI 25.60 kg/m²         Constitutional:       Appearance: Well-developed.   Eyes:      General: No scleral icterus.        Right eye: No discharge.   HENT:      Head: Normocephalic and atraumatic.   Neck:      Thyroid: No thyromegaly.      Lymphadenopathy: No cervical adenopathy.   Pulmonary:      Effort: Pulmonary effort is normal. No respiratory distress.      Breath sounds: Normal breath sounds. No wheezing. No rales.   Cardiovascular:      Normal rate. Regular rhythm.      No gallop.   Abdominal:      Tenderness: There is no abdominal tenderness.   Skin:     Findings: No erythema or rash.   Neurological:      Mental Status: Alert and oriented to person, place, and time.             Assessment:       Diagnosis Plan "   1. Essential hypertension  ECG 12 Lead   2. Paroxysmal atrial fibrillation (CMS/HCC)  ECG 12 Lead   3. Coronary artery disease involving native coronary artery of native heart without angina pectoris  ECG 12 Lead   4. Mixed hyperlipidemia  ECG 12 Lead   5. Palpitations  ECG 12 Lead   6. Shortness of breath  ECG 12 Lead            Plan:       1.  Coronary artery disease:    Clinically, at present patient is doing fairly well from CAD.  Patient does not have any signs of coronary insufficiency.  I would continue current treatment.  Patient had ischemic evaluation in May 2020 and stress test was negative for ischemia.    2.  Essential hypertension:    His blood pressure is very well controlled on current treatment.  I will continue amlodipine, hydralazine and lisinopril.    3.  Sinus bradycardia:    Patient has relative sinus bradycardia but he is asymptomatic.  I will continue to observe.  I would consider Holter monitor in future.    4.  Tachybradycardia syndrome:    Patient has paroxysmal atrial fibrillation and relative bradycardia.  He is tolerating at present.  In future he may need pacemaker.    5.  Paroxysmal atrial fibrillation,    Patient clinically is not getting any recurrence of atrial fibrillation.  At present he is in sinus rhythm.  Continue to observe.    6.  Shortness of breath:    At present patient is doing well.  I would recommend to increase aerobic activity.    7.  Wellness improvement:    Patient is advised to increase aerobic activity.  Weight loss is emphasized.

## 2021-02-09 RX ORDER — LISINOPRIL 20 MG/1
20 TABLET ORAL 2 TIMES DAILY
Qty: 60 TABLET | Refills: 4 | Status: SHIPPED | OUTPATIENT
Start: 2021-02-09 | End: 2021-05-22 | Stop reason: SDUPTHER

## 2021-02-15 DIAGNOSIS — R00.2 PALPITATIONS: ICD-10-CM

## 2021-02-15 DIAGNOSIS — I25.10 CORONARY ARTERY DISEASE INVOLVING NATIVE CORONARY ARTERY OF NATIVE HEART WITHOUT ANGINA PECTORIS: ICD-10-CM

## 2021-02-15 DIAGNOSIS — I48.0 PAROXYSMAL ATRIAL FIBRILLATION (HCC): ICD-10-CM

## 2021-02-15 DIAGNOSIS — R00.1 BRADYCARDIA, SINUS: ICD-10-CM

## 2021-02-15 DIAGNOSIS — I71.40 AAA (ABDOMINAL AORTIC ANEURYSM) WITHOUT RUPTURE (HCC): ICD-10-CM

## 2021-02-15 DIAGNOSIS — I10 ESSENTIAL HYPERTENSION: ICD-10-CM

## 2021-02-15 DIAGNOSIS — E78.2 MIXED HYPERLIPIDEMIA: ICD-10-CM

## 2021-02-15 RX ORDER — AMLODIPINE BESYLATE 5 MG/1
5 TABLET ORAL DAILY
Qty: 90 TABLET | Refills: 3 | Status: SHIPPED | OUTPATIENT
Start: 2021-02-15 | End: 2021-05-22 | Stop reason: SDUPTHER

## 2021-02-19 RX ORDER — CYCLOBENZAPRINE HCL 10 MG
TABLET ORAL
COMMUNITY
Start: 2021-02-18 | End: 2021-05-22 | Stop reason: SDUPTHER

## 2021-02-19 RX ORDER — AZITHROMYCIN 250 MG/1
TABLET, FILM COATED ORAL
COMMUNITY
Start: 2021-02-18 | End: 2021-05-22 | Stop reason: SDUPTHER

## 2021-02-19 RX ORDER — HYDRALAZINE HYDROCHLORIDE 100 MG/1
100 TABLET, FILM COATED ORAL DAILY
Qty: 90 TABLET | Refills: 2 | OUTPATIENT
Start: 2021-02-19

## 2021-02-19 RX ORDER — PREDNISONE 20 MG/1
TABLET ORAL
COMMUNITY
Start: 2021-02-18 | End: 2021-05-22 | Stop reason: SDUPTHER

## 2021-02-19 NOTE — TELEPHONE ENCOUNTER
Prescription is sent to me 100 mg daily.  However this is not the usual dose of hydralazine.  I would ask from the patient how he is taking hydralazine.

## 2021-03-23 RX ORDER — HYDRALAZINE HYDROCHLORIDE 100 MG/1
100 TABLET, FILM COATED ORAL 2 TIMES DAILY
Qty: 60 TABLET | Refills: 1 | Status: SHIPPED | OUTPATIENT
Start: 2021-03-23 | End: 2021-05-22 | Stop reason: SDUPTHER

## 2021-05-21 ENCOUNTER — TRANSCRIBE ORDERS (OUTPATIENT)
Dept: ADMINISTRATIVE | Facility: HOSPITAL | Age: 66
End: 2021-05-21

## 2021-05-21 DIAGNOSIS — R06.02 SHORTNESS OF BREATH: Primary | ICD-10-CM

## 2021-05-26 ENCOUNTER — HOSPITAL ENCOUNTER (OUTPATIENT)
Dept: CT IMAGING | Facility: HOSPITAL | Age: 66
Discharge: HOME | End: 2021-05-26
Admitting: INTERNAL MEDICINE

## 2021-05-26 DIAGNOSIS — R06.02 SHORTNESS OF BREATH: ICD-10-CM

## 2021-05-26 PROCEDURE — 71250 CT THORAX DX C-: CPT

## 2021-06-01 RX ORDER — HYDRALAZINE HYDROCHLORIDE 100 MG/1
TABLET, FILM COATED ORAL
Qty: 60 TABLET | Refills: 1 | Status: SHIPPED | OUTPATIENT
Start: 2021-06-01 | End: 2021-08-02

## 2021-06-08 NOTE — PROGRESS NOTES
Date of Office Visit: 2021  Encounter Provider: Dr. Maciej Blank  Place of Service: [facility] CARDIOLOGY [place]  Patient Name: Alejandro Chamberlain  :1955  Herrera Sarabia MD    Chief Complaint   Patient presents with   • Atrial Fibrillation     5 month follow up    • Hypertension   • Coronary Artery Disease   • Palpitations   • Hyperlipidemia     History of Present Illness    I am pleased to see Mr. chamberlain in my office today as a follow-up.    As you know, patient is 66-year-old white gentleman whose past medical history significant for hypertension, CAD, abdominal aortic aneurysm, CABG, status post AAA repair came today for follow-up    In 2016, patient was admitted with the symptom of acute coronary artery syndrome and underwent cardiac catheterization which showed three-vessel coronary artery disease.  Patient underwent CABG x5.  Patient was also diagnosed with AAA .  After CABG patient underwent AAA repair after 4 weeks.  In May 2020, patient underwent stress test which was negative for ischemia or myocardial infarction.    This is unscheduled visit for the patient.  Patient came today to discuss for symptom of shortness of breath.  Patient reports that from last 5 to 6 months he is getting short of breath.  Patient believes that what ever activity he does get short of breath.  Patient denies any chest pain or tightness or heaviness.  Patient denies any orthopnea, PND, syncope or presyncope.  No significant leg edema noted.  Occasionally edema on ankles are noted after staying on his feet for long hours.    EKG showed normal sinus rhythm.  Heart rate was 54.  Nonspecific T wave inversion in inferior leads noted.    Patient is complaining of shortness of breath.  Clinically he is not in congestive heart failure.  His recent stress test last year was negative.  I would proceed with echocardiogram.  I would recommend that patient should have CAT scan of chest as well as PFT through  pulmonary.  If these work-up is negative, I would recommend to increase stamina building and exercise.  If symptoms persist, patient may need right and left heart catheterization.        Past Medical History:   Diagnosis Date   • Aneurysm (CMS/HCC)    • Anxiety    • Asthma    • Bronchitis    • Coronary artery disease    • Depression    • Gout    • Hyperlipidemia    • Hypertension    • Myocardial infarction (CMS/HCC)    • Pneumonia    • Pulmonary nodule    • Familia Mountain spotted fever          Past Surgical History:   Procedure Laterality Date   • AORTIC VALVE REPAIR/REPLACEMENT  12/03/2016    CABG x 5/ tissue AVR by DR. PHAN   • CARDIAC CATHETERIZATION     • CARDIAC SURGERY     • CARDIOVASCULAR STRESS TEST  2020   • CAROTID STENT     • CORONARY ARTERY BYPASS GRAFT  12/02/2016    X4  Dr Phan   • ILIAC ARTERY ANEURYSM REPAIR  01/05/2017    abdominal and bilateral   • OTHER SURGICAL HISTORY      PTCI   • SINUS SURGERY     • VASCULAR SURGERY             Current Outpatient Medications:   •  amLODIPine (NORVASC) 10 MG tablet, TAKE 1 TABLET BY MOUTH EVERY DAY, Disp: , Rfl:   •  ASPIRIN 81 PO, Take  by mouth., Disp: , Rfl:   •  cholecalciferol (VITAMIN D3) 25 MCG (1000 UT) tablet, Take 1,000 Units by mouth Daily., Disp: , Rfl:   •  Dupilumab (Dupixent) 300 MG/2ML solution pen-injector, , Disp: , Rfl:   •  fluticasone (FLONASE) 50 MCG/ACT nasal spray, 1 spray into the nostril(s) as directed by provider 2 (Two) Times a Day., Disp: , Rfl:   •  hydrALAZINE (APRESOLINE) 100 MG tablet, TAKE 1 TABLET BY MOUTH TWICE A DAY, Disp: 60 tablet, Rfl: 1  •  lisinopril (PRINIVIL,ZESTRIL) 20 MG tablet, TAKE 1 TABLET BY MOUTH TWICE A DAY, Disp: , Rfl:   •  Multiple Vitamins-Minerals (MULTIVITAMIN ADULT PO), Take  by mouth Daily., Disp: , Rfl:   •  multivitamin (MULTIPLE VITAMIN PO), Take  by mouth Daily., Disp: , Rfl:   •  SYMBICORT 160-4.5 MCG/ACT inhaler, , Disp: , Rfl:   •  VENTOLIN  (90 Base) MCG/ACT inhaler, As Needed., Disp:  ", Rfl:   •  vitamin C (ASCORBIC ACID) 500 MG tablet, Take 500 mg by mouth Daily., Disp: , Rfl:       Social History     Socioeconomic History   • Marital status:      Spouse name: Not on file   • Number of children: Not on file   • Years of education: Not on file   • Highest education level: Not on file   Tobacco Use   • Smoking status: Never Smoker   • Smokeless tobacco: Never Used   Vaping Use   • Vaping Use: Never used   Substance and Sexual Activity   • Alcohol use: Not Currently     Comment: quit 2005   • Drug use: No   • Sexual activity: Defer         Review of Systems   Constitutional: Negative for chills and fever.   HENT: Negative for ear discharge and nosebleeds.    Eyes: Negative for discharge and redness.   Cardiovascular: Negative for chest pain, orthopnea, palpitations, paroxysmal nocturnal dyspnea and syncope.   Respiratory: Positive for shortness of breath. Negative for cough and wheezing.    Endocrine: Negative for heat intolerance.   Skin: Negative for rash.   Musculoskeletal: Positive for arthritis and joint pain. Negative for myalgias.   Gastrointestinal: Negative for abdominal pain, melena, nausea and vomiting.   Genitourinary: Negative for dysuria and hematuria.   Neurological: Negative for dizziness, light-headedness, numbness and tremors.   Psychiatric/Behavioral: Negative for depression. The patient is not nervous/anxious.        Procedures      ECG 12 Lead    Date/Time: 6/9/2021 9:52 AM  Performed by: Maciej Blank MD  Authorized by: Maciej Blank MD   Comparison: compared with previous ECG   Similar to previous ECG  Rhythm: sinus rhythm and sinus bradycardia    Clinical impression: normal ECG            ECG 12 Lead    (Results Pending)           Objective:    /82   Pulse 54   Ht 182.9 cm (72.01\")   Wt 85.7 kg (189 lb)   BMI 25.63 kg/m²         Constitutional:       Appearance: Well-developed.   Eyes:      General: No scleral icterus.        Right eye: No discharge.   HENT: "      Head: Normocephalic and atraumatic.   Neck:      Thyroid: No thyromegaly.      Lymphadenopathy: No cervical adenopathy.   Pulmonary:      Effort: Pulmonary effort is normal. No respiratory distress.      Breath sounds: Normal breath sounds. No wheezing. No rales.   Cardiovascular:      Normal rate. Regular rhythm.      No gallop.   Abdominal:      Tenderness: There is no abdominal tenderness.   Skin:     Findings: No erythema or rash.   Neurological:      Mental Status: Alert and oriented to person, place, and time.             Assessment:       Diagnosis Plan   1. Essential hypertension  ECG 12 Lead    Adult Transthoracic Echo Complete W/ Cont if Necessary Per Protocol   2. Paroxysmal atrial fibrillation (CMS/HCC)  ECG 12 Lead    Adult Transthoracic Echo Complete W/ Cont if Necessary Per Protocol   3. Coronary artery disease involving native coronary artery of native heart without angina pectoris  ECG 12 Lead    Adult Transthoracic Echo Complete W/ Cont if Necessary Per Protocol   4. Mixed hyperlipidemia  ECG 12 Lead    Adult Transthoracic Echo Complete W/ Cont if Necessary Per Protocol   5. Palpitations  ECG 12 Lead    Adult Transthoracic Echo Complete W/ Cont if Necessary Per Protocol   6. Shortness of breath  ECG 12 Lead    Adult Transthoracic Echo Complete W/ Cont if Necessary Per Protocol            Plan:       MDM:    1.  Shortness of breath:    Patient is very anxious about his symptom of shortness of breath.  I suspect this may be due to deconditioning or pulmonary nature.  Patient has cough as well as occasional wheezing.  Patient is following with Dr. Kang.  Proceed with CT scan of the chest and possible repeat PFT.  I would proceed with echocardiogram.  Clinically patient does not seem to be in congestive heart failure    2.  CAD:    From a cardiac standpoint patient is stable.  He does not have any symptom of angina pectoris    3.  Atrial fibrillation:    Patient is in sinus rhythm.    4.   Hyperlipidemia:    Repeat lipid panel in future.    5.  Hypertension:    Blood pressure is slightly on the higher side today but blood pressure previously was stable.  I will continue to observe.

## 2021-06-09 ENCOUNTER — OFFICE VISIT (OUTPATIENT)
Dept: CARDIOLOGY | Facility: CLINIC | Age: 66
End: 2021-06-09

## 2021-06-09 VITALS
HEART RATE: 54 BPM | SYSTOLIC BLOOD PRESSURE: 154 MMHG | HEIGHT: 72 IN | DIASTOLIC BLOOD PRESSURE: 82 MMHG | BODY MASS INDEX: 25.6 KG/M2 | WEIGHT: 189 LBS

## 2021-06-09 DIAGNOSIS — I25.10 CORONARY ARTERY DISEASE INVOLVING NATIVE CORONARY ARTERY OF NATIVE HEART WITHOUT ANGINA PECTORIS: Chronic | ICD-10-CM

## 2021-06-09 DIAGNOSIS — R00.2 PALPITATIONS: ICD-10-CM

## 2021-06-09 DIAGNOSIS — R06.02 SHORTNESS OF BREATH: ICD-10-CM

## 2021-06-09 DIAGNOSIS — I48.0 PAROXYSMAL ATRIAL FIBRILLATION (HCC): Chronic | ICD-10-CM

## 2021-06-09 DIAGNOSIS — E78.2 MIXED HYPERLIPIDEMIA: Chronic | ICD-10-CM

## 2021-06-09 DIAGNOSIS — I10 ESSENTIAL HYPERTENSION: Primary | Chronic | ICD-10-CM

## 2021-06-09 PROCEDURE — 99214 OFFICE O/P EST MOD 30 MIN: CPT | Performed by: INTERNAL MEDICINE

## 2021-06-09 PROCEDURE — 93000 ELECTROCARDIOGRAM COMPLETE: CPT | Performed by: INTERNAL MEDICINE

## 2021-06-09 RX ORDER — DOXYCYCLINE HYCLATE 100 MG/1
100 CAPSULE ORAL
COMMUNITY
Start: 2021-06-04 | End: 2021-06-09

## 2021-06-11 ENCOUNTER — HOSPITAL ENCOUNTER (OUTPATIENT)
Dept: CARDIOLOGY | Facility: HOSPITAL | Age: 66
Discharge: HOME | End: 2021-06-11
Admitting: INTERNAL MEDICINE

## 2021-06-11 VITALS
SYSTOLIC BLOOD PRESSURE: 147 MMHG | WEIGHT: 189 LBS | BODY MASS INDEX: 25.6 KG/M2 | DIASTOLIC BLOOD PRESSURE: 80 MMHG | HEIGHT: 72 IN

## 2021-06-11 DIAGNOSIS — I48.0 PAROXYSMAL ATRIAL FIBRILLATION (HCC): ICD-10-CM

## 2021-06-11 DIAGNOSIS — I10 ESSENTIAL HYPERTENSION: ICD-10-CM

## 2021-06-11 DIAGNOSIS — R00.2 PALPITATIONS: ICD-10-CM

## 2021-06-11 DIAGNOSIS — I25.10 CORONARY ARTERY DISEASE INVOLVING NATIVE CORONARY ARTERY OF NATIVE HEART WITHOUT ANGINA PECTORIS: ICD-10-CM

## 2021-06-11 DIAGNOSIS — R06.02 SHORTNESS OF BREATH: ICD-10-CM

## 2021-06-11 DIAGNOSIS — E78.2 MIXED HYPERLIPIDEMIA: ICD-10-CM

## 2021-06-11 PROCEDURE — 93306 TTE W/DOPPLER COMPLETE: CPT

## 2021-06-11 PROCEDURE — 93306 TTE W/DOPPLER COMPLETE: CPT | Performed by: INTERNAL MEDICINE

## 2021-06-17 LAB
BH CV ECHO MEAS - AO MAX PG (FULL): 21.2 MMHG
BH CV ECHO MEAS - AO MAX PG: 23.8 MMHG
BH CV ECHO MEAS - AO MEAN PG (FULL): 12.5 MMHG
BH CV ECHO MEAS - AO MEAN PG: 13.8 MMHG
BH CV ECHO MEAS - AO ROOT AREA (BSA CORRECTED): 1.2
BH CV ECHO MEAS - AO ROOT AREA: 4.6 CM^2
BH CV ECHO MEAS - AO ROOT DIAM: 2.4 CM
BH CV ECHO MEAS - AO V2 MAX: 243.6 CM/SEC
BH CV ECHO MEAS - AO V2 MEAN: 173.4 CM/SEC
BH CV ECHO MEAS - AO V2 VTI: 56 CM
BH CV ECHO MEAS - ASC AORTA: 3.5 CM
BH CV ECHO MEAS - AVA(I,A): 1.2 CM^2
BH CV ECHO MEAS - AVA(I,D): 1.2 CM^2
BH CV ECHO MEAS - AVA(V,A): 1.1 CM^2
BH CV ECHO MEAS - AVA(V,D): 1.1 CM^2
BH CV ECHO MEAS - BSA(HAYCOCK): 2.1 M^2
BH CV ECHO MEAS - BSA: 2.1 M^2
BH CV ECHO MEAS - BZI_BMI: 25.6 KILOGRAMS/M^2
BH CV ECHO MEAS - BZI_METRIC_HEIGHT: 182.9 CM
BH CV ECHO MEAS - BZI_METRIC_WEIGHT: 85.7 KG
BH CV ECHO MEAS - EDV(CUBED): 132.2 ML
BH CV ECHO MEAS - EDV(MOD-SP4): 131.2 ML
BH CV ECHO MEAS - EDV(TEICH): 123.5 ML
BH CV ECHO MEAS - EF(CUBED): 78.6 %
BH CV ECHO MEAS - EF(MOD-BP): 54 %
BH CV ECHO MEAS - EF(MOD-SP4): 53.7 %
BH CV ECHO MEAS - EF(TEICH): 70.5 %
BH CV ECHO MEAS - ESV(CUBED): 28.3 ML
BH CV ECHO MEAS - ESV(MOD-SP4): 60.7 ML
BH CV ECHO MEAS - ESV(TEICH): 36.4 ML
BH CV ECHO MEAS - FS: 40.2 %
BH CV ECHO MEAS - IVS/LVPW: 0.97
BH CV ECHO MEAS - IVSD: 1.3 CM
BH CV ECHO MEAS - LA DIMENSION(2D): 4.2 CM
BH CV ECHO MEAS - LV DIASTOLIC VOL/BSA (35-75): 63.1 ML/M^2
BH CV ECHO MEAS - LV MASS(C)D: 283.4 GRAMS
BH CV ECHO MEAS - LV MASS(C)DI: 136.3 GRAMS/M^2
BH CV ECHO MEAS - LV MAX PG: 2.6 MMHG
BH CV ECHO MEAS - LV MEAN PG: 1.3 MMHG
BH CV ECHO MEAS - LV SYSTOLIC VOL/BSA (12-30): 29.2 ML/M^2
BH CV ECHO MEAS - LV V1 MAX: 80.3 CM/SEC
BH CV ECHO MEAS - LV V1 MEAN: 52.8 CM/SEC
BH CV ECHO MEAS - LV V1 VTI: 19.5 CM
BH CV ECHO MEAS - LVIDD: 5.1 CM
BH CV ECHO MEAS - LVIDS: 3 CM
BH CV ECHO MEAS - LVOT AREA: 3.4 CM^2
BH CV ECHO MEAS - LVOT DIAM: 2.1 CM
BH CV ECHO MEAS - LVPWD: 1.4 CM
BH CV ECHO MEAS - MR MAX PG: 92.8 MMHG
BH CV ECHO MEAS - MR MAX VEL: 480.7 CM/SEC
BH CV ECHO MEAS - MV A MAX VEL: 58.2 CM/SEC
BH CV ECHO MEAS - MV DEC SLOPE: 124.8 CM/SEC^2
BH CV ECHO MEAS - MV DEC TIME: 0.41 SEC
BH CV ECHO MEAS - MV E MAX VEL: 51.4 CM/SEC
BH CV ECHO MEAS - MV E/A: 0.88
BH CV ECHO MEAS - MV MAX PG: 2.6 MMHG
BH CV ECHO MEAS - MV MEAN PG: 0.73 MMHG
BH CV ECHO MEAS - MV V2 MAX: 81.4 CM/SEC
BH CV ECHO MEAS - MV V2 MEAN: 38 CM/SEC
BH CV ECHO MEAS - MV V2 VTI: 33.2 CM
BH CV ECHO MEAS - MVA(VTI): 2 CM^2
BH CV ECHO MEAS - PA MAX PG (FULL): 2.6 MMHG
BH CV ECHO MEAS - PA MAX PG: 3.7 MMHG
BH CV ECHO MEAS - PA MEAN PG (FULL): 1.4 MMHG
BH CV ECHO MEAS - PA MEAN PG: 2 MMHG
BH CV ECHO MEAS - PA V2 MAX: 96.1 CM/SEC
BH CV ECHO MEAS - PA V2 MEAN: 66.8 CM/SEC
BH CV ECHO MEAS - PA V2 VTI: 20.6 CM
BH CV ECHO MEAS - PI MAX PG: 10.3 MMHG
BH CV ECHO MEAS - PI MAX VEL: 160.4 CM/SEC
BH CV ECHO MEAS - PULM A REVS DUR: 0.18 SEC
BH CV ECHO MEAS - PULM A REVS VEL: 36.1 CM/SEC
BH CV ECHO MEAS - PULM DIAS VEL: 37.3 CM/SEC
BH CV ECHO MEAS - PULM S/D: 1.6
BH CV ECHO MEAS - PULM SYS VEL: 57.9 CM/SEC
BH CV ECHO MEAS - PVA(I,A): 2.2 CM^2
BH CV ECHO MEAS - PVA(I,D): 2.2 CM^2
BH CV ECHO MEAS - PVA(V,A): 1.9 CM^2
BH CV ECHO MEAS - PVA(V,D): 1.9 CM^2
BH CV ECHO MEAS - QP/QS: 0.67
BH CV ECHO MEAS - RAP SYSTOLE: 8 MMHG
BH CV ECHO MEAS - RV MAX PG: 1.1 MMHG
BH CV ECHO MEAS - RV MEAN PG: 0.63 MMHG
BH CV ECHO MEAS - RV V1 MAX: 52.6 CM/SEC
BH CV ECHO MEAS - RV V1 MEAN: 37.5 CM/SEC
BH CV ECHO MEAS - RV V1 VTI: 12.9 CM
BH CV ECHO MEAS - RVDD: 3.7 CM
BH CV ECHO MEAS - RVOT AREA: 3.5 CM^2
BH CV ECHO MEAS - RVOT DIAM: 2.1 CM
BH CV ECHO MEAS - RVSP: 30.4 MMHG
BH CV ECHO MEAS - SI(AO): 124.8 ML/M^2
BH CV ECHO MEAS - SI(CUBED): 49.9 ML/M^2
BH CV ECHO MEAS - SI(LVOT): 31.9 ML/M^2
BH CV ECHO MEAS - SI(MOD-SP4): 33.9 ML/M^2
BH CV ECHO MEAS - SI(TEICH): 41.9 ML/M^2
BH CV ECHO MEAS - SV(AO): 259.5 ML
BH CV ECHO MEAS - SV(CUBED): 103.9 ML
BH CV ECHO MEAS - SV(LVOT): 66.3 ML
BH CV ECHO MEAS - SV(MOD-SP4): 70.5 ML
BH CV ECHO MEAS - SV(RVOT): 44.6 ML
BH CV ECHO MEAS - SV(TEICH): 87.1 ML
BH CV ECHO MEAS - TR MAX VEL: 236.7 CM/SEC

## 2021-06-22 ENCOUNTER — TELEPHONE (OUTPATIENT)
Dept: CARDIOLOGY | Facility: CLINIC | Age: 66
End: 2021-06-22

## 2021-08-02 RX ORDER — HYDRALAZINE HYDROCHLORIDE 100 MG/1
TABLET, FILM COATED ORAL
Qty: 60 TABLET | Refills: 1 | Status: SHIPPED | OUTPATIENT
Start: 2021-08-02 | End: 2021-10-11

## 2021-08-05 ENCOUNTER — TELEPHONE (OUTPATIENT)
Dept: CARDIOLOGY | Facility: CLINIC | Age: 66
End: 2021-08-05

## 2021-08-05 DIAGNOSIS — I25.10 CORONARY ARTERY DISEASE INVOLVING NATIVE CORONARY ARTERY OF NATIVE HEART WITHOUT ANGINA PECTORIS: Primary | ICD-10-CM

## 2021-08-12 RX ORDER — LISINOPRIL 20 MG/1
20 TABLET ORAL 2 TIMES DAILY
Qty: 60 TABLET | Refills: 1 | Status: SHIPPED | OUTPATIENT
Start: 2021-08-12 | End: 2021-10-19

## 2021-10-11 RX ORDER — HYDRALAZINE HYDROCHLORIDE 100 MG/1
TABLET, FILM COATED ORAL
Qty: 60 TABLET | Refills: 3 | Status: SHIPPED | OUTPATIENT
Start: 2021-10-11 | End: 2022-01-05

## 2021-10-19 RX ORDER — LISINOPRIL 20 MG/1
TABLET ORAL
Qty: 60 TABLET | Refills: 0 | Status: SHIPPED | OUTPATIENT
Start: 2021-10-19 | End: 2021-11-15

## 2021-11-15 RX ORDER — LISINOPRIL 20 MG/1
TABLET ORAL
Qty: 60 TABLET | Refills: 0 | Status: SHIPPED | OUTPATIENT
Start: 2021-11-15 | End: 2021-12-09

## 2021-12-09 RX ORDER — LISINOPRIL 20 MG/1
TABLET ORAL
Qty: 60 TABLET | Refills: 0 | Status: SHIPPED | OUTPATIENT
Start: 2021-12-09 | End: 2022-01-10

## 2021-12-15 ENCOUNTER — TELEPHONE (OUTPATIENT)
Dept: CARDIOLOGY | Facility: CLINIC | Age: 66
End: 2021-12-15

## 2021-12-15 RX ORDER — AMLODIPINE BESYLATE 10 MG/1
10 TABLET ORAL DAILY
Qty: 90 TABLET | Refills: 1 | Status: SHIPPED | OUTPATIENT
Start: 2021-12-15 | End: 2022-03-18 | Stop reason: SDUPTHER

## 2022-01-05 RX ORDER — HYDRALAZINE HYDROCHLORIDE 100 MG/1
TABLET, FILM COATED ORAL
Qty: 180 TABLET | Refills: 1 | Status: SHIPPED | OUTPATIENT
Start: 2022-01-05 | End: 2022-07-27

## 2022-01-10 RX ORDER — LISINOPRIL 20 MG/1
TABLET ORAL
Qty: 60 TABLET | Refills: 0 | Status: SHIPPED | OUTPATIENT
Start: 2022-01-10 | End: 2022-02-14

## 2022-02-14 RX ORDER — LISINOPRIL 20 MG/1
TABLET ORAL
Qty: 60 TABLET | Refills: 0 | Status: SHIPPED | OUTPATIENT
Start: 2022-02-14 | End: 2022-03-14

## 2022-02-23 NOTE — PROGRESS NOTES
Date of Office Visit: 2022  Encounter Provider: Dr. Maciej Blank  Place of Service: [facility] CARDIOLOGY [place]  Patient Name: Alejandro Chamberlain  :1955  Herrera Sarabia MD    Chief Complaint   Patient presents with   • Atrial Fibrillation     6 month follow up   • Coronary Artery Disease   • Hypertension   • Hyperlipidemia     History of Present Illness    I am pleased to see Mr. chamberlain in my office today as a follow-up.    As you know, patient is 66-year-old white gentleman whose past medical history significant for hypertension, CAD, abdominal aortic aneurysm, CABG, s/p AVR, status post AAA repair came today for follow-up    In 2016, patient was admitted with the symptom of acute coronary artery syndrome and underwent cardiac catheterization which showed three-vessel coronary artery disease.  Patient underwent CABG x5.  Patient was also diagnosed with AAA .  After CABG patient underwent AAA repair after 4 weeks.  In May 2020, patient underwent stress test which was negative for ischemia or myocardial infarction.    In 2021, patient underwent stress test in which she walked for total of 9 minutes and 10 seconds.  It was negative for ischemia.  Echocardiogram showed normal left ventricle size and function with EF of 55 to 60%.  Bioprosthetic aortic valve was functioning appropriately.    This is 9-month follow-up appointment for the patient.  Patient is doing well.  Patient has gained slight weight.  Patient denies any chest pain or tightness or heaviness.  No orthopnea PND no syncope or presyncope.  No leg edema noted.    EKG showed normal sinus rhythm with nonspecific ST changes.    I am overall pleased with the patient progress.  At this stage I will continue current treatment.  Blood pressure and heart rate is controlled.  Recent ischemic evaluation was desirable.  Echocardiogram showed proper functioning of bioprosthetic aortic valve        Past Medical History:   Diagnosis Date    • Aneurysm (HCC)    • Anxiety    • Asthma    • Atrial fibrillation (HCC)    • Bronchitis    • Coronary artery disease    • Depression    • Gout    • Hyperlipidemia    • Hypertension    • Myocardial infarction (HCC)    • Pneumonia    • Pulmonary nodule    • Familia Mountain spotted fever          Past Surgical History:   Procedure Laterality Date   • AORTIC VALVE REPAIR/REPLACEMENT  12/03/2016    CABG x 5/ tissue AVR by DR. PHAN   • CARDIAC CATHETERIZATION     • CARDIAC SURGERY     • CARDIOVASCULAR STRESS TEST  2020   • CAROTID STENT     • CORONARY ARTERY BYPASS GRAFT  12/02/2016    X4  Dr Phan   • ILIAC ARTERY ANEURYSM REPAIR  01/05/2017    abdominal and bilateral   • OTHER SURGICAL HISTORY      PTCI   • SINUS SURGERY     • VASCULAR SURGERY             Current Outpatient Medications:   •  amLODIPine (NORVASC) 10 MG tablet, Take 1 tablet by mouth Daily., Disp: 90 tablet, Rfl: 1  •  ASPIRIN 81 PO, Take  by mouth., Disp: , Rfl:   •  cholecalciferol (VITAMIN D3) 25 MCG (1000 UT) tablet, Take 1,000 Units by mouth Daily., Disp: , Rfl:   •  Dupilumab (Dupixent) 300 MG/2ML solution pen-injector, , Disp: , Rfl:   •  fluticasone (FLONASE) 50 MCG/ACT nasal spray, 1 spray into the nostril(s) as directed by provider 2 (Two) Times a Day., Disp: , Rfl:   •  hydrALAZINE (APRESOLINE) 100 MG tablet, TAKE 1 TABLET BY MOUTH TWICE A DAY, Disp: 180 tablet, Rfl: 1  •  lisinopril (PRINIVIL,ZESTRIL) 20 MG tablet, TAKE 1 TABLET BY MOUTH TWICE A DAY, Disp: 60 tablet, Rfl: 0  •  Multiple Vitamins-Minerals (MULTIVITAMIN ADULT PO), Take  by mouth Daily., Disp: , Rfl:   •  multivitamin (MULTIPLE VITAMIN PO), Take  by mouth Daily., Disp: , Rfl:   •  SYMBICORT 160-4.5 MCG/ACT inhaler, , Disp: , Rfl:   •  VENTOLIN  (90 Base) MCG/ACT inhaler, As Needed., Disp: , Rfl:   •  vitamin C (ASCORBIC ACID) 500 MG tablet, Take 500 mg by mouth Daily., Disp: , Rfl:       Social History     Socioeconomic History   • Marital status:    Tobacco Use  "  • Smoking status: Never Smoker   • Smokeless tobacco: Never Used   Vaping Use   • Vaping Use: Never used   Substance and Sexual Activity   • Alcohol use: Not Currently     Comment: quit 2005   • Drug use: No   • Sexual activity: Defer         Review of Systems   Constitutional: Negative for chills and fever.   HENT: Negative for ear discharge and nosebleeds.    Eyes: Negative for discharge and redness.   Cardiovascular: Negative for chest pain, orthopnea, palpitations, paroxysmal nocturnal dyspnea and syncope.   Respiratory: Positive for shortness of breath. Negative for cough and wheezing.    Endocrine: Negative for heat intolerance.   Skin: Negative for rash.   Musculoskeletal: Negative for arthritis and myalgias.   Gastrointestinal: Negative for abdominal pain, melena, nausea and vomiting.   Genitourinary: Negative for dysuria and hematuria.   Neurological: Negative for dizziness, light-headedness, numbness and tremors.   Psychiatric/Behavioral: Negative for depression. The patient is not nervous/anxious.        Procedures      ECG 12 Lead    Date/Time: 2/24/2022 6:32 PM  Performed by: Maciej Blank MD  Authorized by: Maciej Blank MD   Comparison: compared with previous ECG   Similar to previous ECG  Rhythm: sinus rhythm and sinus bradycardia    Clinical impression: normal ECG            ECG 12 Lead    (Results Pending)           Objective:    /76   Pulse 54   Ht 182.9 cm (72.01\")   Wt 87.5 kg (193 lb)   BMI 26.17 kg/m²         Constitutional:       Appearance: Well-developed.   Eyes:      General: No scleral icterus.        Right eye: No discharge.   HENT:      Head: Normocephalic and atraumatic.   Neck:      Thyroid: No thyromegaly.      Lymphadenopathy: No cervical adenopathy.   Pulmonary:      Effort: Pulmonary effort is normal. No respiratory distress.      Breath sounds: Normal breath sounds. No wheezing. No rales.   Cardiovascular:      Normal rate. Regular rhythm.      No gallop.   Abdominal: "      Tenderness: There is no abdominal tenderness.   Skin:     Findings: No erythema or rash.   Neurological:      Mental Status: Alert and oriented to person, place, and time.             Assessment:       Diagnosis Plan   1. Coronary artery disease involving native coronary artery of native heart without angina pectoris  ECG 12 Lead   2. Essential hypertension  ECG 12 Lead   3. Mixed hyperlipidemia  ECG 12 Lead   4. Palpitations  ECG 12 Lead   5. Paroxysmal atrial fibrillation (HCC)  ECG 12 Lead   6. Shortness of breath  ECG 12 Lead            Plan:       MDM:    1.  Bradycardia:    Patient has relative bradycardia.  Patient is not on any negative chronotropic agents.  Patient is asymptomatic I would continue to observe.    2.  S/p AVR:    Patient has bioprosthetic aortic valve and it is functioning appropriately.    3.  CAD:/CABG:    Patient underwent stress test this was negative for ischemia    4.  Hypertension:    Blood pressure is very well controlled

## 2022-02-24 ENCOUNTER — OFFICE VISIT (OUTPATIENT)
Dept: CARDIOLOGY | Facility: CLINIC | Age: 67
End: 2022-02-24

## 2022-02-24 VITALS
HEART RATE: 54 BPM | DIASTOLIC BLOOD PRESSURE: 76 MMHG | WEIGHT: 193 LBS | SYSTOLIC BLOOD PRESSURE: 118 MMHG | HEIGHT: 72 IN | BODY MASS INDEX: 26.14 KG/M2

## 2022-02-24 DIAGNOSIS — I10 ESSENTIAL HYPERTENSION: Chronic | ICD-10-CM

## 2022-02-24 DIAGNOSIS — R00.2 PALPITATIONS: ICD-10-CM

## 2022-02-24 DIAGNOSIS — I48.0 PAROXYSMAL ATRIAL FIBRILLATION: Chronic | ICD-10-CM

## 2022-02-24 DIAGNOSIS — E78.2 MIXED HYPERLIPIDEMIA: Chronic | ICD-10-CM

## 2022-02-24 DIAGNOSIS — R06.02 SHORTNESS OF BREATH: ICD-10-CM

## 2022-02-24 DIAGNOSIS — I25.10 CORONARY ARTERY DISEASE INVOLVING NATIVE CORONARY ARTERY OF NATIVE HEART WITHOUT ANGINA PECTORIS: Primary | Chronic | ICD-10-CM

## 2022-02-24 PROCEDURE — 93000 ELECTROCARDIOGRAM COMPLETE: CPT | Performed by: INTERNAL MEDICINE

## 2022-02-24 PROCEDURE — 99214 OFFICE O/P EST MOD 30 MIN: CPT | Performed by: INTERNAL MEDICINE

## 2022-03-14 RX ORDER — LISINOPRIL 20 MG/1
TABLET ORAL
Qty: 60 TABLET | Refills: 0 | Status: SHIPPED | OUTPATIENT
Start: 2022-03-14 | End: 2022-04-13

## 2022-03-18 ENCOUNTER — TELEPHONE (OUTPATIENT)
Dept: CARDIOLOGY | Facility: CLINIC | Age: 67
End: 2022-03-18

## 2022-03-18 RX ORDER — AMLODIPINE BESYLATE 10 MG/1
10 TABLET ORAL DAILY
Qty: 90 TABLET | Refills: 1 | Status: SHIPPED | OUTPATIENT
Start: 2022-03-18 | End: 2022-08-30

## 2022-03-18 NOTE — TELEPHONE ENCOUNTER
Needs a refill on amlodipine 10 mg sent to [facility] on [address]  The pharmacy was suppose to send for the refill 3 days ago  he is out  Can this be sent back today

## 2022-04-13 RX ORDER — LISINOPRIL 20 MG/1
TABLET ORAL
Qty: 60 TABLET | Refills: 0 | Status: SHIPPED | OUTPATIENT
Start: 2022-04-13 | End: 2022-05-18

## 2022-05-18 RX ORDER — LISINOPRIL 20 MG/1
TABLET ORAL
Qty: 60 TABLET | Refills: 0 | Status: SHIPPED | OUTPATIENT
Start: 2022-05-18 | End: 2022-06-17

## 2022-06-06 ENCOUNTER — APPOINTMENT (OUTPATIENT)
Dept: GENERAL RADIOLOGY | Facility: HOSPITAL | Age: 67
End: 2022-06-06

## 2022-06-06 ENCOUNTER — APPOINTMENT (OUTPATIENT)
Dept: CT IMAGING | Facility: HOSPITAL | Age: 67
End: 2022-06-06

## 2022-06-06 ENCOUNTER — APPOINTMENT (OUTPATIENT)
Dept: MRI IMAGING | Facility: HOSPITAL | Age: 67
End: 2022-06-06

## 2022-06-06 ENCOUNTER — HOSPITAL ENCOUNTER (EMERGENCY)
Facility: HOSPITAL | Age: 67
Discharge: HOME | End: 2022-06-06
Attending: EMERGENCY MEDICINE | Admitting: EMERGENCY MEDICINE

## 2022-06-06 VITALS
HEIGHT: 73 IN | BODY MASS INDEX: 25.22 KG/M2 | OXYGEN SATURATION: 98 % | HEART RATE: 55 BPM | WEIGHT: 190.26 LBS | RESPIRATION RATE: 16 BRPM | TEMPERATURE: 97.6 F | SYSTOLIC BLOOD PRESSURE: 168 MMHG | DIASTOLIC BLOOD PRESSURE: 96 MMHG
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
## 2022-06-06 DIAGNOSIS — S13.9XXA NECK SPRAIN, INITIAL ENCOUNTER: ICD-10-CM

## 2022-06-06 DIAGNOSIS — S43.492A OTHER SPRAIN OF LEFT SHOULDER JOINT, INITIAL ENCOUNTER: ICD-10-CM

## 2022-06-06 LAB
ALBUMIN SERPL-MCNC: 4.7 G/DL (ref 3.5–5.2)
ALBUMIN/GLOB SERPL: 2 G/DL
ALP SERPL-CCNC: 106 U/L (ref 39–117)
ALT SERPL W P-5'-P-CCNC: 12 U/L (ref 1–41)
ANION GAP SERPL CALCULATED.3IONS-SCNC: 12 MMOL/L (ref 5–15)
AST SERPL-CCNC: 19 U/L (ref 1–40)
BASOPHILS # BLD AUTO: 0 10*3/MM3 (ref 0–0.2)
BASOPHILS NFR BLD AUTO: 0.5 % (ref 0–1.5)
BILIRUB SERPL-MCNC: 0.6 MG/DL (ref 0–1.2)
BUN SERPL-MCNC: 18 MG/DL (ref 8–23)
BUN/CREAT SERPL: 16.7 (ref 7–25)
CALCIUM SPEC-SCNC: 9.4 MG/DL (ref 8.6–10.5)
CHLORIDE SERPL-SCNC: 104 MMOL/L (ref 98–107)
CO2 SERPL-SCNC: 22 MMOL/L (ref 22–29)
CREAT SERPL-MCNC: 1.08 MG/DL (ref 0.76–1.27)
DEPRECATED RDW RBC AUTO: 44.6 FL (ref 37–54)
EGFRCR SERPLBLD CKD-EPI 2021: 76.2 ML/MIN/1.73
EOSINOPHIL # BLD AUTO: 0.2 10*3/MM3 (ref 0–0.4)
EOSINOPHIL NFR BLD AUTO: 2.5 % (ref 0.3–6.2)
ERYTHROCYTE [DISTWIDTH] IN BLOOD BY AUTOMATED COUNT: 15.1 % (ref 12.3–15.4)
GLOBULIN UR ELPH-MCNC: 2.3 GM/DL
GLUCOSE SERPL-MCNC: 103 MG/DL (ref 65–99)
HCT VFR BLD AUTO: 45.3 % (ref 37.5–51)
HGB BLD-MCNC: 15 G/DL (ref 13–17.7)
LYMPHOCYTES # BLD AUTO: 1 10*3/MM3 (ref 0.7–3.1)
LYMPHOCYTES NFR BLD AUTO: 12.8 % (ref 19.6–45.3)
MCH RBC QN AUTO: 27.8 PG (ref 26.6–33)
MCHC RBC AUTO-ENTMCNC: 33.1 G/DL (ref 31.5–35.7)
MCV RBC AUTO: 84.1 FL (ref 79–97)
MONOCYTES # BLD AUTO: 0.5 10*3/MM3 (ref 0.1–0.9)
MONOCYTES NFR BLD AUTO: 6.3 % (ref 5–12)
NEUTROPHILS NFR BLD AUTO: 5.9 10*3/MM3 (ref 1.7–7)
NEUTROPHILS NFR BLD AUTO: 77.9 % (ref 42.7–76)
NRBC BLD AUTO-RTO: 0 /100 WBC (ref 0–0.2)
PLATELET # BLD AUTO: 173 10*3/MM3 (ref 140–450)
PMV BLD AUTO: 7.4 FL (ref 6–12)
POTASSIUM SERPL-SCNC: 4 MMOL/L (ref 3.5–5.2)
PROT SERPL-MCNC: 7 G/DL (ref 6–8.5)
QT INTERVAL: 476 MS
RBC # BLD AUTO: 5.39 10*6/MM3 (ref 4.14–5.8)
SODIUM SERPL-SCNC: 138 MMOL/L (ref 136–145)
TROPONIN T SERPL-MCNC: <0.01 NG/ML (ref 0–0.03)
WBC NRBC COR # BLD: 7.6 10*3/MM3 (ref 3.4–10.8)

## 2022-06-06 PROCEDURE — 36415 COLL VENOUS BLD VENIPUNCTURE: CPT

## 2022-06-06 PROCEDURE — 99282 EMERGENCY DEPT VISIT SF MDM: CPT

## 2022-06-06 PROCEDURE — 70450 CT HEAD/BRAIN W/O DYE: CPT

## 2022-06-06 PROCEDURE — 73000 X-RAY EXAM OF COLLAR BONE: CPT

## 2022-06-06 PROCEDURE — 72125 CT NECK SPINE W/O DYE: CPT

## 2022-06-06 PROCEDURE — 73030 X-RAY EXAM OF SHOULDER: CPT

## 2022-06-06 PROCEDURE — 84484 ASSAY OF TROPONIN QUANT: CPT | Performed by: EMERGENCY MEDICINE

## 2022-06-06 PROCEDURE — 72141 MRI NECK SPINE W/O DYE: CPT

## 2022-06-06 PROCEDURE — 85025 COMPLETE CBC W/AUTO DIFF WBC: CPT | Performed by: EMERGENCY MEDICINE

## 2022-06-06 PROCEDURE — 80053 COMPREHEN METABOLIC PANEL: CPT | Performed by: EMERGENCY MEDICINE

## 2022-06-06 PROCEDURE — 93005 ELECTROCARDIOGRAM TRACING: CPT | Performed by: EMERGENCY MEDICINE

## 2022-06-06 PROCEDURE — 71046 X-RAY EXAM CHEST 2 VIEWS: CPT
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
## 2022-06-07 NOTE — DISCHARGE INSTRUCTIONS
Tylenol for pain.  Ice pack next couple days and then heating pad.  Passive range of motion every day stretching but no lifting weights.  Follow-up with your orthopedist call tomorrow and set up appointment with the next week.  Return for severe headache vision change speech difficulty weakness to the extremity cool discolored hand unable to use arm or hand chest pain coughing up blood abdominal pain fevers or any other new or worse problems or concerns return immediately to the ER.

## 2022-06-17 RX ORDER — LISINOPRIL 20 MG/1
TABLET ORAL
Qty: 60 TABLET | Refills: 0 | Status: SHIPPED | OUTPATIENT
Start: 2022-06-17 | End: 2022-07-22

## 2022-07-22 RX ORDER — LISINOPRIL 20 MG/1
TABLET ORAL
Qty: 60 TABLET | Refills: 0 | Status: SHIPPED | OUTPATIENT
Start: 2022-07-22 | End: 2022-08-18

## 2022-07-26 NOTE — PROGRESS NOTES
Date of Office Visit: 2022  Encounter Provider: Dr. Maciej Blank  Place of Service: [facility] CARDIOLOGY [place]  Patient Name: Alejandro Chamberlain  :1955  Herrera Sarabia MD    Chief Complaint   Patient presents with   • Atrial Fibrillation   • Hypertension   • Coronary Artery Disease   • Hyperlipidemia   • Palpitations   • Follow-up     History of Present Illness    I am pleased to see Mr. chamberlain in my office today as a follow-up.    As you know, patient is 67-year-old white gentleman whose past medical history significant for hypertension, CAD, abdominal aortic aneurysm, CABG, s/p AVR, status post AAA repair came today for follow-up    In 2016, patient was admitted with the symptom of acute coronary artery syndrome and underwent cardiac catheterization which showed three-vessel coronary artery disease.  Patient underwent CABG x5.  Patient was also diagnosed with AAA .  After CABG patient underwent AAA repair after 4 weeks.  In May 2020, patient underwent stress test which was negative for ischemia or myocardial infarction.    In 2021, patient underwent stress test in which she walked for total of 9 minutes and 10 seconds.  It was negative for ischemia.  Echocardiogram showed normal left ventricle size and function with EF of 55 to 60%.  Bioprosthetic aortic valve was functioning appropriately.    Patient came early for the appointment.  Patient reports that he is having some memory deficits.  They recently started.  Patient was worried this could be due to his bradycardia.  Patient wants to discuss more about that.  Patient continues to have relative bradycardia patient complains of fatigue and tiredness.  Patient has not passed out.  No dizziness or lightheadedness.  No chest pain.    EKG showed sinus bradycardia with heart rate of 52 bpm    At this stage I would recommend that patient should proceed with TMT to assess chronotropic competence.  I would also proceed with 24-hour  Holter monitor.  I doubt that patient symptom of memory deficits are related to this bradycardia.  I advised him to discuss with PCP for possible memory test to rule out early dementia        Past Medical History:   Diagnosis Date   • Aneurysm (HCC)    • Anxiety    • Asthma    • Atrial fibrillation (HCC)    • Bronchitis    • Coronary artery disease    • Depression    • Gout    • Hyperlipidemia    • Hypertension    • Myocardial infarction (HCC)    • Pneumonia    • Pulmonary nodule    • Familia [place] spotted fever          Past Surgical History:   Procedure Laterality Date   • AORTIC VALVE REPAIR/REPLACEMENT  12/03/2016    CABG x 5/ tissue AVR by DR. PHAN   • CARDIAC CATHETERIZATION     • CARDIAC SURGERY     • CARDIOVASCULAR STRESS TEST  2020   • CAROTID STENT     • CORONARY ARTERY BYPASS GRAFT  12/02/2016    X4  Dr Phna   • ILIAC ARTERY ANEURYSM REPAIR  01/05/2017    abdominal and bilateral   • OTHER SURGICAL HISTORY      [facility]   • SINUS SURGERY     • VASCULAR SURGERY             Current Outpatient Medications:   •  amLODIPine (NORVASC) 10 MG tablet, Take 1 tablet by mouth Daily., Disp: 90 tablet, Rfl: 1  •  ASPIRIN 81 PO, Take  by mouth., Disp: , Rfl:   •  azelastine (ASTELIN) 0.1 % nasal spray, 1 spray 2 (Two) Times a Day., Disp: , Rfl:   •  cholecalciferol (VITAMIN D3) 25 MCG (1000 UT) tablet, Take 1,000 Units by mouth Daily., Disp: , Rfl:   •  fluticasone (FLONASE) 50 MCG/ACT nasal spray, 1 spray into the nostril(s) as directed by provider 2 (Two) Times a Day., Disp: , Rfl:   •  lisinopril (PRINIVIL,ZESTRIL) 20 MG tablet, TAKE 1 TABLET BY MOUTH TWICE A DAY, Disp: 60 tablet, Rfl: 0  •  Multiple Vitamins-Minerals (MULTIVITAMIN ADULT PO), Take  by mouth Daily., Disp: , Rfl:   •  SYMBICORT 160-4.5 MCG/ACT inhaler, , Disp: , Rfl:   •  VENTOLIN  (90 Base) MCG/ACT inhaler, As Needed., Disp: , Rfl:   •  vitamin C (ASCORBIC ACID) 500 MG tablet, Take 500 mg by mouth Daily., Disp: , Rfl:       Social History  "    Socioeconomic History   • Marital status:    Tobacco Use   • Smoking status: Never Smoker   • Smokeless tobacco: Never Used   Vaping Use   • Vaping Use: Never used   Substance and Sexual Activity   • Alcohol use: Not Currently     Comment: quit 2005   • Drug use: No   • Sexual activity: Defer         Review of Systems   Constitutional: Positive for malaise/fatigue. Negative for chills and fever.   HENT: Negative for ear discharge and nosebleeds.    Eyes: Negative for discharge and redness.   Cardiovascular: Negative for chest pain, orthopnea, palpitations, paroxysmal nocturnal dyspnea and syncope.   Respiratory: Positive for shortness of breath. Negative for cough and wheezing.    Endocrine: Negative for heat intolerance.   Skin: Negative for rash.   Musculoskeletal: Negative for arthritis and myalgias.   Gastrointestinal: Negative for abdominal pain, melena, nausea and vomiting.   Genitourinary: Negative for dysuria and hematuria.   Neurological: Negative for dizziness, light-headedness, numbness and tremors.   Psychiatric/Behavioral: Negative for depression. The patient is not nervous/anxious.        Procedures      ECG 12 Lead    Date/Time: 7/27/2022 9:24 AM  Performed by: Maciej Blank MD  Authorized by: Maciej Blank MD   Comparison: compared with previous ECG   Similar to previous ECG  Rhythm: sinus bradycardia    Clinical impression: normal ECG            ECG 12 Lead    (Results Pending)           Objective:    /88 (BP Location: Left arm, Patient Position: Sitting, Cuff Size: Adult)   Pulse 52   Ht 185.4 cm (72.99\")   Wt 88 kg (194 lb)   SpO2 98%   BMI 25.60 kg/m²         Constitutional:       Appearance: Well-developed.   Eyes:      General: No scleral icterus.        Right eye: No discharge.   HENT:      Head: Normocephalic and atraumatic.   Neck:      Thyroid: No thyromegaly.      Lymphadenopathy: No cervical adenopathy.   Pulmonary:      Effort: Pulmonary effort is normal. No " respiratory distress.      Breath sounds: Normal breath sounds. No wheezing. No rales.   Cardiovascular:      Normal rate. Regular rhythm.      No gallop.   Abdominal:      Tenderness: There is no abdominal tenderness.   Skin:     Findings: No erythema or rash.   Neurological:      Mental Status: Alert and oriented to person, place, and time.             Assessment:       Diagnosis Plan   1. Mixed hyperlipidemia  ECG 12 Lead    Treadmill Stress Test    Holter Monitor - 24 Hour   2. Palpitations  ECG 12 Lead    Treadmill Stress Test    Holter Monitor - 24 Hour   3. Paroxysmal atrial fibrillation (HCC)  ECG 12 Lead    Treadmill Stress Test    Holter Monitor - 24 Hour   4. Essential hypertension  ECG 12 Lead    Treadmill Stress Test    Holter Monitor - 24 Hour   5. Coronary artery disease involving native coronary artery of native heart without angina pectoris  ECG 12 Lead    Treadmill Stress Test    Holter Monitor - 24 Hour   6. Bradycardia, sinus  Treadmill Stress Test    Holter Monitor - 24 Hour            Plan:       MDM:    1.  Sinus bradycardia:    I suspect patient has relative sick sinus syndrome.  At this stage, I would recommend to proceed with TMT and Holter monitor.  However I doubt that this is contributing to his memory deficit    2.  Memory deficit:    I advised him to discuss with PCP for memory test to rule out early dementia    3.  CAD:    Patient is stable from coronary standpoint.    4.  Hypertension:    Blood pressure is very well controlled current treatment would be continued with lisinopril and amlodipine    5.  Paroxysmal atrial fibrillation:    Patient had postoperative A. fib but he is doing well

## 2022-07-27 ENCOUNTER — OFFICE VISIT (OUTPATIENT)
Dept: CARDIOLOGY | Facility: CLINIC | Age: 67
End: 2022-07-27

## 2022-07-27 VITALS
HEART RATE: 52 BPM | HEIGHT: 73 IN | DIASTOLIC BLOOD PRESSURE: 88 MMHG | WEIGHT: 194 LBS | BODY MASS INDEX: 25.71 KG/M2 | SYSTOLIC BLOOD PRESSURE: 136 MMHG | OXYGEN SATURATION: 98 %

## 2022-07-27 DIAGNOSIS — I48.0 PAROXYSMAL ATRIAL FIBRILLATION: Chronic | ICD-10-CM

## 2022-07-27 DIAGNOSIS — E78.2 MIXED HYPERLIPIDEMIA: Primary | Chronic | ICD-10-CM

## 2022-07-27 DIAGNOSIS — R00.2 PALPITATIONS: ICD-10-CM

## 2022-07-27 DIAGNOSIS — I10 ESSENTIAL HYPERTENSION: Chronic | ICD-10-CM

## 2022-07-27 DIAGNOSIS — I25.10 CORONARY ARTERY DISEASE INVOLVING NATIVE CORONARY ARTERY OF NATIVE HEART WITHOUT ANGINA PECTORIS: Chronic | ICD-10-CM

## 2022-07-27 DIAGNOSIS — R00.1 BRADYCARDIA, SINUS: ICD-10-CM

## 2022-07-27 PROCEDURE — 99214 OFFICE O/P EST MOD 30 MIN: CPT | Performed by: INTERNAL MEDICINE

## 2022-07-27 PROCEDURE — 93000 ELECTROCARDIOGRAM COMPLETE: CPT | Performed by: INTERNAL MEDICINE

## 2022-07-28 DIAGNOSIS — I48.0 PAROXYSMAL ATRIAL FIBRILLATION: Primary | ICD-10-CM

## 2022-07-28 DIAGNOSIS — I25.10 CORONARY ARTERY DISEASE INVOLVING NATIVE CORONARY ARTERY OF NATIVE HEART WITHOUT ANGINA PECTORIS: ICD-10-CM

## 2022-08-18 RX ORDER — LISINOPRIL 20 MG/1
TABLET ORAL
Qty: 60 TABLET | Refills: 0 | Status: SHIPPED | OUTPATIENT
Start: 2022-08-18 | End: 2022-08-30

## 2022-08-22 PROBLEM — Z20.822 ENCOUNTER FOR LABORATORY TESTING FOR COVID-19 VIRUS: Status: ACTIVE | Noted: 2022-08-22

## 2022-08-22 PROCEDURE — U0005 INFEC AGEN DETEC AMPLI PROBE: HCPCS | Performed by: NURSE PRACTITIONER

## 2022-08-22 PROCEDURE — U0004 COV-19 TEST NON-CDC HGH THRU: HCPCS | Performed by: NURSE PRACTITIONER

## 2022-08-30 RX ORDER — LISINOPRIL 20 MG/1
TABLET ORAL
Qty: 60 TABLET | Refills: 0 | Status: SHIPPED | OUTPATIENT
Start: 2022-08-30 | End: 2022-11-30

## 2022-08-30 RX ORDER — AMLODIPINE BESYLATE 10 MG/1
TABLET ORAL
Qty: 90 TABLET | Refills: 1 | Status: SHIPPED | OUTPATIENT
Start: 2022-08-30

## 2022-09-13 ENCOUNTER — TELEPHONE (OUTPATIENT)
Dept: CARDIOLOGY | Facility: CLINIC | Age: 67
End: 2022-09-13

## 2022-11-30 ENCOUNTER — TELEPHONE (OUTPATIENT)
Dept: CARDIOLOGY | Facility: CLINIC | Age: 67
End: 2022-11-30

## 2022-11-30 RX ORDER — LISINOPRIL 10 MG/1
10 TABLET ORAL DAILY
COMMUNITY
End: 2022-12-14

## 2022-11-30 NOTE — TELEPHONE ENCOUNTER
Caller: Alejandro Bain     Relationship: SELF     Best call back number: [phone]    What is your medical concern? PT HAS BEEN HAVING LOW BLOOD PRESSURES, SOMETIMES TOP NUMBER IN THE 'S, SAYS HE HAS NO OTHER SYMPTOMS WHEN THIS IS HAPPENING - PT HAS CUT DOWN HIS LISINOPRIL TO ONCE A DAY FROM TWICE A DAY - REPORTS THAT IT HELPS SOME, BUT HE IS STILL HAVING THE LOW BPS     How long has this issue been going on? ABOUT 1 WEEK

## 2022-12-14 ENCOUNTER — HOSPITAL ENCOUNTER (INPATIENT)
Facility: HOSPITAL | Age: 67
LOS: 1 days | Discharge: HOME | End: 2022-12-15
Attending: EMERGENCY MEDICINE | Admitting: INTERNAL MEDICINE

## 2022-12-14 ENCOUNTER — APPOINTMENT (OUTPATIENT)
Dept: CT IMAGING | Facility: HOSPITAL | Age: 67
End: 2022-12-14

## 2022-12-14 ENCOUNTER — APPOINTMENT (OUTPATIENT)
Dept: GENERAL RADIOLOGY | Facility: HOSPITAL | Age: 67
End: 2022-12-14

## 2022-12-14 DIAGNOSIS — N17.9 AKI (ACUTE KIDNEY INJURY): ICD-10-CM

## 2022-12-14 DIAGNOSIS — U07.1 COVID: Primary | ICD-10-CM

## 2022-12-14 DIAGNOSIS — R06.00 DYSPNEA, UNSPECIFIED TYPE: ICD-10-CM

## 2022-12-14 DIAGNOSIS — J18.9 MULTIFOCAL PNEUMONIA: ICD-10-CM

## 2022-12-14 PROBLEM — R06.03 ACUTE RESPIRATORY DISTRESS: Status: ACTIVE | Noted: 2022-12-14

## 2022-12-14 PROBLEM — I50.33 ACUTE ON CHRONIC DIASTOLIC HEART FAILURE (HCC): Status: ACTIVE | Noted: 2022-12-14

## 2022-12-14 PROBLEM — D72.829 LEUKOCYTOSIS: Status: ACTIVE | Noted: 2022-12-14

## 2022-12-14 PROBLEM — R79.89 ELEVATED LFTS: Status: ACTIVE | Noted: 2022-12-14

## 2022-12-14 PROBLEM — N28.9 ACUTE RENAL INSUFFICIENCY: Status: ACTIVE | Noted: 2022-12-14

## 2022-12-14 LAB
ALBUMIN SERPL-MCNC: 4.1 G/DL (ref 3.5–5.2)
ALBUMIN/GLOB SERPL: 1.5 G/DL
ALP SERPL-CCNC: 102 U/L (ref 39–117)
ALT SERPL W P-5'-P-CCNC: 42 U/L (ref 1–41)
ANION GAP SERPL CALCULATED.3IONS-SCNC: 12 MMOL/L (ref 5–15)
AST SERPL-CCNC: 61 U/L (ref 1–40)
BILIRUB SERPL-MCNC: 4.2 MG/DL (ref 0–1.2)
BUN SERPL-MCNC: 38 MG/DL (ref 8–23)
BUN/CREAT SERPL: 22.8 (ref 7–25)
CALCIUM SPEC-SCNC: 9.3 MG/DL (ref 8.6–10.5)
CHLORIDE SERPL-SCNC: 102 MMOL/L (ref 98–107)
CO2 SERPL-SCNC: 25 MMOL/L (ref 22–29)
CREAT SERPL-MCNC: 1.67 MG/DL (ref 0.76–1.27)
D DIMER PPP FEU-MCNC: >35.2 MG/L (FEU) (ref 0–0.65)
D-LACTATE SERPL-SCNC: 2.1 MMOL/L (ref 0.5–2)
DEPRECATED RDW RBC AUTO: 49.9 FL (ref 37–54)
EGFRCR SERPLBLD CKD-EPI 2021: 45.1 ML/MIN/1.73
ERYTHROCYTE [DISTWIDTH] IN BLOOD BY AUTOMATED COUNT: 15.8 % (ref 12.3–15.4)
GLOBULIN UR ELPH-MCNC: 2.7 GM/DL
GLUCOSE SERPL-MCNC: 100 MG/DL (ref 65–99)
HCT VFR BLD AUTO: 39.7 % (ref 37.5–51)
HGB BLD-MCNC: 13.2 G/DL (ref 13–17.7)
HOLD SPECIMEN: NORMAL
HOLD SPECIMEN: NORMAL
LYMPHOCYTES # BLD MANUAL: 1.05 10*3/MM3 (ref 0.7–3.1)
LYMPHOCYTES NFR BLD MANUAL: 4 % (ref 5–12)
MCH RBC QN AUTO: 28.5 PG (ref 26.6–33)
MCHC RBC AUTO-ENTMCNC: 33.2 G/DL (ref 31.5–35.7)
MCV RBC AUTO: 85.8 FL (ref 79–97)
MONOCYTES # BLD: 0.84 10*3/MM3 (ref 0.1–0.9)
NEUTROPHILS # BLD AUTO: 19.11 10*3/MM3 (ref 1.7–7)
NEUTROPHILS NFR BLD MANUAL: 83 % (ref 42.7–76)
NEUTS BAND NFR BLD MANUAL: 8 % (ref 0–5)
NT-PROBNP SERPL-MCNC: 1019 PG/ML (ref 0–900)
PLAT MORPH BLD: NORMAL
PLATELET # BLD AUTO: 147 10*3/MM3 (ref 140–450)
PMV BLD AUTO: 7.5 FL (ref 6–12)
POTASSIUM SERPL-SCNC: 4.2 MMOL/L (ref 3.5–5.2)
PROCALCITONIN SERPL-MCNC: 8.19 NG/ML (ref 0–0.25)
PROT SERPL-MCNC: 6.8 G/DL (ref 6–8.5)
RBC # BLD AUTO: 4.63 10*6/MM3 (ref 4.14–5.8)
RBC MORPH BLD: NORMAL
SCAN SLIDE: NORMAL
SODIUM SERPL-SCNC: 139 MMOL/L (ref 136–145)
TROPONIN T SERPL-MCNC: <0.01 NG/ML (ref 0–0.03)
VARIANT LYMPHS NFR BLD MANUAL: 5 % (ref 19.6–45.3)
WBC MORPH BLD: NORMAL
WBC NRBC COR # BLD: 21 10*3/MM3 (ref 3.4–10.8)
WHOLE BLOOD HOLD COAG: NORMAL
WHOLE BLOOD HOLD SPECIMEN: NORMAL

## 2022-12-14 PROCEDURE — 93005 ELECTROCARDIOGRAM TRACING: CPT

## 2022-12-14 PROCEDURE — 84484 ASSAY OF TROPONIN QUANT: CPT | Performed by: NURSE PRACTITIONER

## 2022-12-14 PROCEDURE — 83605 ASSAY OF LACTIC ACID: CPT | Performed by: NURSE PRACTITIONER

## 2022-12-14 PROCEDURE — 25010000002 REMDESIVIR 100 MG RECONSTITUTED SOLUTION: Performed by: NURSE PRACTITIONER

## 2022-12-14 PROCEDURE — 84145 PROCALCITONIN (PCT): CPT | Performed by: PHYSICIAN ASSISTANT

## 2022-12-14 PROCEDURE — 80053 COMPREHEN METABOLIC PANEL: CPT | Performed by: NURSE PRACTITIONER

## 2022-12-14 PROCEDURE — 36415 COLL VENOUS BLD VENIPUNCTURE: CPT | Performed by: NURSE PRACTITIONER

## 2022-12-14 PROCEDURE — 25010000002 AZITHROMYCIN PER 500 MG: Performed by: PHYSICIAN ASSISTANT

## 2022-12-14 PROCEDURE — 93005 ELECTROCARDIOGRAM TRACING: CPT | Performed by: EMERGENCY MEDICINE

## 2022-12-14 PROCEDURE — 0 IOPAMIDOL PER 1 ML: Performed by: EMERGENCY MEDICINE

## 2022-12-14 PROCEDURE — 85025 COMPLETE CBC W/AUTO DIFF WBC: CPT | Performed by: NURSE PRACTITIONER

## 2022-12-14 PROCEDURE — 25010000002 ONDANSETRON PER 1 MG: Performed by: EMERGENCY MEDICINE

## 2022-12-14 PROCEDURE — 25010000002 ENOXAPARIN PER 10 MG: Performed by: NURSE PRACTITIONER

## 2022-12-14 PROCEDURE — 85379 FIBRIN DEGRADATION QUANT: CPT | Performed by: NURSE PRACTITIONER

## 2022-12-14 PROCEDURE — 83880 ASSAY OF NATRIURETIC PEPTIDE: CPT | Performed by: NURSE PRACTITIONER

## 2022-12-14 PROCEDURE — 85007 BL SMEAR W/DIFF WBC COUNT: CPT | Performed by: NURSE PRACTITIONER

## 2022-12-14 PROCEDURE — 25010000002 DEXAMETHASONE PER 1 MG: Performed by: PHYSICIAN ASSISTANT

## 2022-12-14 PROCEDURE — 71045 X-RAY EXAM CHEST 1 VIEW: CPT

## 2022-12-14 PROCEDURE — 25010000002 CEFTRIAXONE PER 250 MG: Performed by: EMERGENCY MEDICINE

## 2022-12-14 PROCEDURE — 82248 BILIRUBIN DIRECT: CPT | Performed by: NURSE PRACTITIONER

## 2022-12-14 PROCEDURE — 87040 BLOOD CULTURE FOR BACTERIA: CPT | Performed by: PHYSICIAN ASSISTANT

## 2022-12-14 PROCEDURE — 83605 ASSAY OF LACTIC ACID: CPT

## 2022-12-14 PROCEDURE — 71275 CT ANGIOGRAPHY CHEST: CPT

## 2022-12-14 PROCEDURE — XW033E5 INTRODUCTION OF REMDESIVIR ANTI-INFECTIVE INTO PERIPHERAL VEIN, PERCUTANEOUS APPROACH, NEW TECHNOLOGY GROUP 5: ICD-10-PCS | Performed by: INTERNAL MEDICINE

## 2022-12-14 PROCEDURE — 99285 EMERGENCY DEPT VISIT HI MDM: CPT

## 2022-12-14 RX ORDER — LISINOPRIL 20 MG/1
20 TABLET ORAL DAILY
Status: DISCONTINUED | OUTPATIENT
Start: 2022-12-15 | End: 2022-12-15 | Stop reason: HOSPADM

## 2022-12-14 RX ORDER — AZELASTINE 1 MG/ML
1 SPRAY, METERED NASAL 2 TIMES DAILY
Status: DISCONTINUED | OUTPATIENT
Start: 2022-12-14 | End: 2022-12-15 | Stop reason: HOSPADM

## 2022-12-14 RX ORDER — ENOXAPARIN SODIUM 100 MG/ML
40 INJECTION SUBCUTANEOUS
Status: DISCONTINUED | OUTPATIENT
Start: 2022-12-14 | End: 2022-12-15 | Stop reason: HOSPADM

## 2022-12-14 RX ORDER — ASCORBIC ACID 500 MG
500 TABLET ORAL DAILY
Status: DISCONTINUED | OUTPATIENT
Start: 2022-12-15 | End: 2022-12-15 | Stop reason: HOSPADM

## 2022-12-14 RX ORDER — AMLODIPINE BESYLATE 5 MG/1
10 TABLET ORAL DAILY
Status: DISCONTINUED | OUTPATIENT
Start: 2022-12-15 | End: 2022-12-15 | Stop reason: HOSPADM

## 2022-12-14 RX ORDER — MULTIPLE VITAMINS W/ MINERALS TAB 9MG-400MCG
1 TAB ORAL DAILY
Status: DISCONTINUED | OUTPATIENT
Start: 2022-12-15 | End: 2022-12-15 | Stop reason: HOSPADM

## 2022-12-14 RX ORDER — SODIUM CHLORIDE 0.9 % (FLUSH) 0.9 %
10 SYRINGE (ML) INJECTION AS NEEDED
Status: DISCONTINUED | OUTPATIENT
Start: 2022-12-14 | End: 2022-12-15 | Stop reason: HOSPADM

## 2022-12-14 RX ORDER — ASPIRIN 81 MG/1
81 TABLET ORAL DAILY
Status: DISCONTINUED | OUTPATIENT
Start: 2022-12-15 | End: 2022-12-15 | Stop reason: HOSPADM

## 2022-12-14 RX ORDER — BUDESONIDE AND FORMOTEROL FUMARATE DIHYDRATE 160; 4.5 UG/1; UG/1
2 AEROSOL RESPIRATORY (INHALATION)
Status: DISCONTINUED | OUTPATIENT
Start: 2022-12-14 | End: 2022-12-15 | Stop reason: HOSPADM

## 2022-12-14 RX ORDER — MELATONIN
1000 DAILY
Status: DISCONTINUED | OUTPATIENT
Start: 2022-12-15 | End: 2022-12-15 | Stop reason: HOSPADM

## 2022-12-14 RX ORDER — ONDANSETRON 2 MG/ML
4 INJECTION INTRAMUSCULAR; INTRAVENOUS ONCE
Status: COMPLETED | OUTPATIENT
Start: 2022-12-14 | End: 2022-12-14

## 2022-12-14 RX ORDER — SODIUM CHLORIDE 9 MG/ML
50 INJECTION, SOLUTION INTRAVENOUS CONTINUOUS
Status: DISCONTINUED | OUTPATIENT
Start: 2022-12-14 | End: 2022-12-15 | Stop reason: HOSPADM

## 2022-12-14 RX ORDER — ALBUTEROL SULFATE 2.5 MG/3ML
2.5 SOLUTION RESPIRATORY (INHALATION) EVERY 6 HOURS PRN
Status: DISCONTINUED | OUTPATIENT
Start: 2022-12-14 | End: 2022-12-15 | Stop reason: HOSPADM

## 2022-12-14 RX ORDER — ACETAMINOPHEN 325 MG/1
650 TABLET ORAL EVERY 6 HOURS PRN
Status: DISCONTINUED | OUTPATIENT
Start: 2022-12-14 | End: 2022-12-14

## 2022-12-14 RX ORDER — DEXAMETHASONE SODIUM PHOSPHATE 4 MG/ML
6 INJECTION, SOLUTION INTRA-ARTICULAR; INTRALESIONAL; INTRAMUSCULAR; INTRAVENOUS; SOFT TISSUE ONCE
Status: COMPLETED | OUTPATIENT
Start: 2022-12-14 | End: 2022-12-14

## 2022-12-14 RX ORDER — DEXAMETHASONE SODIUM PHOSPHATE 4 MG/ML
6 INJECTION, SOLUTION INTRA-ARTICULAR; INTRALESIONAL; INTRAMUSCULAR; INTRAVENOUS; SOFT TISSUE DAILY
Status: DISCONTINUED | OUTPATIENT
Start: 2022-12-15 | End: 2022-12-15

## 2022-12-14 RX ORDER — ONDANSETRON 2 MG/ML
4 INJECTION INTRAMUSCULAR; INTRAVENOUS EVERY 6 HOURS PRN
Status: DISCONTINUED | OUTPATIENT
Start: 2022-12-14 | End: 2022-12-15 | Stop reason: HOSPADM

## 2022-12-14 RX ORDER — IPRATROPIUM BROMIDE AND ALBUTEROL SULFATE 2.5; .5 MG/3ML; MG/3ML
3 SOLUTION RESPIRATORY (INHALATION) EVERY 4 HOURS PRN
Status: DISCONTINUED | OUTPATIENT
Start: 2022-12-14 | End: 2022-12-15 | Stop reason: HOSPADM

## 2022-12-14 RX ADMIN — ENOXAPARIN SODIUM 40 MG: 100 INJECTION SUBCUTANEOUS at 19:45

## 2022-12-14 RX ADMIN — REMDESIVIR 200 MG: 100 INJECTION, POWDER, LYOPHILIZED, FOR SOLUTION INTRAVENOUS at 22:06

## 2022-12-14 RX ADMIN — SODIUM CHLORIDE 50 ML/HR: 9 INJECTION, SOLUTION INTRAVENOUS at 19:45

## 2022-12-14 RX ADMIN — ONDANSETRON 4 MG: 2 INJECTION INTRAMUSCULAR; INTRAVENOUS at 18:57

## 2022-12-14 RX ADMIN — AZITHROMYCIN MONOHYDRATE 500 MG: 500 INJECTION, POWDER, LYOPHILIZED, FOR SOLUTION INTRAVENOUS at 18:57

## 2022-12-14 RX ADMIN — IOPAMIDOL 100 ML: 755 INJECTION, SOLUTION INTRAVENOUS at 17:47

## 2022-12-14 RX ADMIN — CEFTRIAXONE 2 G: 2 INJECTION, POWDER, FOR SOLUTION INTRAMUSCULAR; INTRAVENOUS at 18:56

## 2022-12-14 RX ADMIN — DEXAMETHASONE SODIUM PHOSPHATE 6 MG: 4 INJECTION, SOLUTION INTRAMUSCULAR; INTRAVENOUS at 18:57

## 2022-12-14 NOTE — ED NOTES
Patient presents to the ED after testing covid positive at urgent care earlier today. Patient reports shortness of breath, chills, nausea, and vomiting, and generalized weakness.

## 2022-12-14 NOTE — ED PROVIDER NOTES
Subjective      Provider in Triage Note  67-year-old male presents with 1 week history of dyspnea,  cough, generalized body aches, headache, sore throat, back pain, nausea vomiting and weakness and fatigue. He was seen for same 1 week ago and negative for Influenza and COVID.  He reports being diagnosed with COVID today at [facility].     History of Present Illness  Chief Complaint: Shortness of breath    Patient is a 67-year-old  male history of A. fib, CAD, hypertension, previous MI presents to the ER with complaints of shortness of breath and COVID exposure.  Patient states he has felt bad for the last week, worse in the last 24 hours.  Patient states he had a negative COVID last week but tested positive for COVID today at urgent care.  Patient was directed to come to the ER due to worsening shortness of breath.  He denies any chest pain, and reports generalized fatigue, body aches, nausea.  Nonproductive cough.  Mild headache.  Fever yesterday, not today.  No lower extremity swelling.  No history of PE or DVT.  No recent travel or surgery.    PCP: Haile Sarabia    History provided by:  Patient      Review of Systems   Constitutional: Positive for chills. Negative for fever.   HENT: Negative for congestion, sore throat and trouble swallowing.    Eyes: Negative.    Respiratory: Positive for cough and shortness of breath. Negative for chest tightness and wheezing.    Cardiovascular: Negative for chest pain.   Gastrointestinal: Positive for nausea. Negative for abdominal pain, diarrhea and vomiting.   Endocrine: Negative.    Genitourinary: Negative for dysuria.   Musculoskeletal: Positive for myalgias.   Skin: Negative for rash.   Allergic/Immunologic: Negative.    Neurological: Positive for headaches.   Psychiatric/Behavioral: Negative for behavioral problems.   All other systems reviewed and are negative.      Past Medical History:   Diagnosis Date   • Acute on chronic diastolic heart failure (HCC) 12/14/2022    • Aneurysm (HCC)    • Anxiety    • Asthma    • Atrial fibrillation (HCC)    • Bronchitis    • Coronary artery disease    • Depression    • Encounter for laboratory testing for COVID-19 virus 08/22/2022   • Gout    • Hyperlipidemia    • Hypertension    • Myocardial infarction (HCC)    • Pneumonia    • Pulmonary nodule    • Familia Mountain spotted fever        Allergies   Allergen Reactions   • Avelox [Moxifloxacin] Rash   • Penicillins Rash   • Sulfa Antibiotics Rash   • Doxycycline Rash       Past Surgical History:   Procedure Laterality Date   • AORTIC VALVE REPAIR/REPLACEMENT  12/03/2016    CABG x 5/ tissue AVR by DR. PHAN   • CARDIAC CATHETERIZATION     • CARDIAC SURGERY     • CARDIOVASCULAR STRESS TEST  2020   • CAROTID STENT     • CORONARY ARTERY BYPASS GRAFT  12/02/2016    X4  Dr Phan   • ILIAC ARTERY ANEURYSM REPAIR  01/05/2017    abdominal and bilateral   • OTHER SURGICAL HISTORY      PTCI   • SINUS SURGERY     • VASCULAR SURGERY         Family History   Problem Relation Age of Onset   • Cancer Father         brain   • Cancer Sister         lung   • Heart disease Sister    • Pulmonary fibrosis Mother    • Heart disease Mother    • Cancer Brother         pancreatic, lung       Social History     Socioeconomic History   • Marital status:    Tobacco Use   • Smoking status: Never   • Smokeless tobacco: Never   Vaping Use   • Vaping Use: Never used   Substance and Sexual Activity   • Alcohol use: Not Currently     Comment: quit 2005   • Drug use: No   • Sexual activity: Defer           Objective   Physical Exam  Vitals and nursing note reviewed.   Constitutional:       General: He is not in acute distress.     Appearance: Normal appearance. He is normal weight. He is not diaphoretic.   HENT:      Head: Normocephalic and atraumatic.      Nose: Nose normal. No congestion.      Mouth/Throat:      Mouth: Mucous membranes are moist.      Pharynx: No oropharyngeal exudate.   Eyes:      Extraocular Movements:  "Extraocular movements intact.      Pupils: Pupils are equal, round, and reactive to light.   Cardiovascular:      Rate and Rhythm: Normal rate and regular rhythm.      Pulses: Normal pulses.      Heart sounds: Normal heart sounds. No murmur heard.  Pulmonary:      Effort: Pulmonary effort is normal.      Breath sounds: Wheezing present.   Abdominal:      General: Abdomen is flat.      Tenderness: There is no abdominal tenderness. There is no right CVA tenderness or left CVA tenderness.   Musculoskeletal:         General: Normal range of motion.      Cervical back: Normal range of motion.   Skin:     General: Skin is warm.      Capillary Refill: Capillary refill takes less than 2 seconds.   Neurological:      General: No focal deficit present.      Mental Status: He is alert and oriented to person, place, and time.   Psychiatric:         Mood and Affect: Mood normal.         Behavior: Behavior normal.         ECG 12 Lead Dyspnea      Date/Time: 12/14/2022 10:53 PM  Performed by: Ally Ibarra PA  Authorized by: Erika Dhaliwal APRN   Interpreted by physician  Previous ECG: no previous ECG available  Rhythm: sinus rhythm  Rate: normal  BPM: 62  Conduction: conduction normal  ST Segments: ST segments normal  T Waves: T waves normal  Other: no other findings  Clinical impression: non-specific ECG                 ED Course  ED Course as of 12/14/22 2253   Wed Dec 14, 2022   1928 Spoke with GARY Kenny who agreed to accept patient for admission for Dr. Gresham []      ED Course User Index  [] Ally Ibarra PA    /69   Pulse 80   Temp 99.5 °F (37.5 °C) (Oral)   Resp 20   Ht 185.4 cm (73\")   Wt 88.5 kg (195 lb)   SpO2 94%   BMI 25.73 kg/m²   Labs Reviewed   COMPREHENSIVE METABOLIC PANEL - Abnormal; Notable for the following components:       Result Value    Glucose 100 (*)     BUN 38 (*)     Creatinine 1.67 (*)     ALT (SGPT) 42 (*)     AST (SGOT) 61 (*)     Total Bilirubin 4.2 (*)     eGFR 45.1 (*)     All " "other components within normal limits    Narrative:     GFR Normal >60  Chronic Kidney Disease <60  Kidney Failure <15     BNP (IN-HOUSE) - Abnormal; Notable for the following components:    proBNP 1,019.0 (*)     All other components within normal limits    Narrative:     Among patients with dyspnea, NT-proBNP is highly sensitive for the detection of acute congestive heart failure. In addition NT-proBNP of <300 pg/ml effectively rules out acute congestive heart failure with 99% negative predictive value.     D-DIMER, QUANTITATIVE - Abnormal; Notable for the following components:    D-Dimer, Quantitative >35.20 (*)     All other components within normal limits    Narrative:     According to the assay 's published package insert, a normal (<0.50 mg/L (FEU)) D-dimer result in conjunction with a non-high clinical probability assessment, excludes deep vein thrombosis (DVT) and pulmonary embolism (PE) with high sensitivity.    D-dimer values increase with age and this can make VTE exclusion of an older population difficult. To address this, the [employer] College [employer], based on best available evidence and recent guidelines, recommends that clinicians use age-adjusted D-dimer thresholds in patients greater than 50 years of age with: a) a low probability of PE who do not meet all Pulmonary Embolism Rule Out Criteria, or b) in those with intermediate probability of PE.   The formula for an age-adjusted D-dimer cut-off is \"age/100\".  For example, a 60 year old patient would have an age-adjusted cut-off of 0.60 mg/L (FEU) and an 80 year old 0.80 mg/L (FEU).   CBC WITH AUTO DIFFERENTIAL - Abnormal; Notable for the following components:    WBC 21.00 (*)     RDW 15.8 (*)     All other components within normal limits    Narrative:     Modified report. Previous result was Hemogram on 12/14/2022 at 1612 EST.  The previously reported component NRBC is no longer being reported. Previous result was 0.1 /100 WBC " "(Reference Range: 0.0-0.2 /100 WBC) on 12/14/2022 at 1612 EST.   MANUAL DIFFERENTIAL - Abnormal; Notable for the following components:    Neutrophil % 83.0 (*)     Lymphocyte % 5.0 (*)     Monocyte % 4.0 (*)     Bands %  8.0 (*)     Neutrophils Absolute 19.11 (*)     All other components within normal limits   PROCALCITONIN - Abnormal; Notable for the following components:    Procalcitonin 8.19 (*)     All other components within normal limits    Narrative:     As a Marker for Sepsis (Non-Neonates):    1. <0.5 ng/mL represents a low risk of severe sepsis and/or septic shock.  2. >2 ng/mL represents a high risk of severe sepsis and/or septic shock.    As a Marker for Lower Respiratory Tract Infections that require antibiotic therapy:    PCT on Admission    Antibiotic Therapy       6-12 Hrs later    >0.5                Strongly Recommended  >0.25 - <0.5        Recommended   0.1 - 0.25          Discouraged              Remeasure/reassess PCT  <0.1                Strongly Discouraged     Remeasure/reassess PCT    As 28 day mortality risk marker: \"Change in Procalcitonin Result\" (>80% or <=80%) if Day 0 (or Day 1) and Day 4 values are available. Refer to http://www.[employer]-pct-calculator.com    Change in PCT <=80%  A decrease of PCT levels below or equal to 80% defines a positive change in PCT test result representing a higher risk for 28-day all-cause mortality of patients diagnosed with severe sepsis for septic shock.    Change in PCT >80%  A decrease of PCT levels of more than 80% defines a negative change in PCT result representing a lower risk for 28-day all-cause mortality of patients diagnosed with severe sepsis or septic shock.      POC LACTATE - Abnormal; Notable for the following components:    Lactate 2.1 (*)     All other components within normal limits   TROPONIN (IN-HOUSE) - Normal    Narrative:     Troponin T Reference Range:  <= 0.03 ng/mL-   Negative for AMI  >0.03 ng/mL-     Abnormal for myocardial " necrosis.  Clinicians would have to utilize clinical acumen, EKG, Troponin and serial changes to determine if it is an Acute Myocardial Infarction or myocardial injury due to an underlying chronic condition.       Results may be falsely decreased if patient taking Biotin.     BLOOD CULTURE   BLOOD CULTURE   RAINBOW DRAW    Narrative:     The following orders were created for panel order [place] Draw.  Procedure                               Abnormality         Status                     ---------                               -----------         ------                     Green Top (Gel)[[id number]]                                  Final result               Lavender Top[[id number]]                                     Final result               Gold Top - SST[[id number]]                                   Final result               Light Blue Top[[id number]]                                   Final result                 Please view results for these tests on the individual orders.   SCAN SLIDE   LACTIC ACID, REFLEX   LACTIC ACID, PLASMA   HEPATIC FUNCTION PANEL   CREATININE SERUM (KIDNEY FUNCTION) GFR COMPONENT   BASIC METABOLIC PANEL   LACTIC ACID, PLASMA   HEPATIC FUNCTION PANEL   CBC WITH AUTO DIFFERENTIAL   POC LACTATE   CBC AND DIFFERENTIAL    Narrative:     The following orders were created for panel order CBC & Differential.  Procedure                               Abnormality         Status                     ---------                               -----------         ------                     CBC Auto Differential[[id number]]        Abnormal            Final result               Scan Slide[[id number]]                                       Final result                 Please view results for these tests on the individual orders.   GREEN TOP   LAVENDER TOP   GOLD TOP - SST   LIGHT BLUE TOP   CBC AND DIFFERENTIAL    Narrative:     The following orders were created for panel order CBC & Differential.  Procedure                                Abnormality         Status                     ---------                               -----------         ------                     CBC Auto Differential[[id number]]                                                         Please view results for these tests on the individual orders.     Medications   sodium chloride 0.9 % flush 10 mL (has no administration in time range)   azithromycin (ZITHROMAX) 500 mg in sodium chloride 0.9 % 250 mL IVPB-VTB (has no administration in time range)   cefTRIAXone (ROCEPHIN) 2 g in sodium chloride 0.9 % 100 mL IVPB (has no administration in time range)   dexamethasone (DECADRON) injection 6 mg (has no administration in time range)   Enoxaparin Sodium (LOVENOX) syringe 40 mg (40 mg Subcutaneous Given 12/14/22 1945)   ipratropium-albuterol (DUO-NEB) nebulizer solution 3 mL (has no administration in time range)   sodium chloride 0.9 % infusion (50 mL/hr Intravenous Currently Infusing 12/14/22 2201)   ondansetron (ZOFRAN) injection 4 mg (has no administration in time range)   Pharmacy Consult - Remdesivir (has no administration in time range)   remdesivir 200 mg in 290 mL NS (200 mg Intravenous New Bag 12/14/22 2206)     Followed by   remdesivir 100 mg in sodium chloride 0.9 % 250 mL IVPB (powder vial) (has no administration in time range)   iopamidol (ISOVUE-370) 76 % injection 100 mL (100 mL Intravenous Given 12/14/22 1747)   cefTRIAXone (ROCEPHIN) 2 g in sodium chloride 0.9 % 100 mL IVPB (0 g Intravenous Stopped 12/14/22 1938)   ondansetron (ZOFRAN) injection 4 mg (4 mg Intravenous Given 12/14/22 1857)   azithromycin (ZITHROMAX) 500 mg in sodium chloride 0.9 % 250 mL IVPB-VTB (500 mg Intravenous Given 12/14/22 1857)   dexamethasone (DECADRON) injection 6 mg (6 mg Intravenous Given 12/14/22 1857)     XR Chest 2 View    Result Date: 12/14/2022  No acute cardiopulmonary abnormality. Probable emphysema.  Electronically Signed By-Mila Spencer MD On:12/14/2022 12:56  PM This report was finalized on [id number] by  Mila Spencer MD.    XR Chest 1 View    Result Date: 12/14/2022  There is no significant change when compared to the prior study. There is no evidence for acute cardiopulmonary process.  Electronically Signed By-Rinku Ko MD On:12/14/2022 3:31 PM This report was finalized on [id number] by  Rinku Ko MD.    CT Angiogram Chest Pulmonary Embolism    Result Date: 12/14/2022  1.     No evidence for pulmonary embolism. 2.     Evidence for scattered small airway or centrilobular nodular opacities throughout the lungs diffusely. Areas of septal thickening and ground glass infiltrate are also seen. The findings suggest changes of developing atypical/viral infection or multifocal pneumonia versus bronchitis/bronchiolitis.  Electronically Signed By-Rinku Ko MD On:12/14/2022 5:56 PM This report was finalized on [id number] by  Rinku Ko MD.                                           MDM  Number of Diagnoses or Management Options  IRINA (acute kidney injury) (HCC)  COVID  Dyspnea, unspecified type  Multifocal pneumonia  Diagnosis management comments: MEDICAL DECISION  Epic Chart Review: Patient seen in urgent care prior to ER arrival, positive for COVID  Stress test 9/10/2022  Conclusion:    1.  Maximal treadmill exercise stress test with no electrocardiographic evidence of ischemia  2.  Exercise capacity is fair (patient walked for total of 8 minutes and achieved workload of 9.7 METS )  3.  Normal hemodynamic response to exercise  4.  Normal chronotropic response to exercise  5.  No arrhythmia was noted on peak exercise or during recovery    Impression:    This is normal treadmill exercise stress test without evidence of ischemia or provoked arrhythmia.  Clinical correlation recommended     Comorbidities: A. fib, CAD, hypertension, anxiety, asthma  Differentials: COVID, viral syndrome, pneumonia, pleural effusion, PE, cancer; this list is not all  inclusive and does not constitute the entirety of considered causes  Radiology interpretation:  Images reviewed by me and interpreted by radiologist, as above  Lab interpretation:  Labs viewed by me significant for, as above  EKG interpretation: Reviewed myself interpreted by ER attending sinus rhythm rate of 62 repolarization abnormality, no acute ST elevation or depression    While in the ED IV was placed and labs were obtained appropriate PPE was worn during exam and throughout all encounters with the patient.  Patient had the above evaluation.  IV established, lab work obtained.  Patient placed on continuous telemetry monitoring throughout ER stay.  Patient afebrile, nontoxic in appearance and in no acute respiratory distress.  Patient given Zofran for nausea and DuoNeb treatment.  CBC moderate leukocytosis 21,000, POC lactate ordered this was 2.1.  Blood cultures pending procalcitonin elevated 8.19.  Patient given Rocephin and azithromycin, due to doxycycline allergy.  Chest x-ray shows no obvious pneumonia or cardiopulmonary abnormality.  Normal troponin.  D-dimer greater than 35.  CT PE protocol shows no evidence of pulmonary embolism.  Evidence for scattered small airway or centrilobular nodule opacities throughout the lungs diffusely, findings suggest changes of developing atypical/viral infection or multifocal pneumonia.  Patient's oxygen fluctuating, did drop to 88% on room air he was placed on 2 L.  Patient will be admitted for oxygen supplementation, likely pulmonology consultation for possible remdesivir administration due to multifocal pneumonia likely due to COVID.  Patient is agreeable.  I spoke with GARY Kenny who agreed to accept patient for admission.  Patient had otherwise unremarkable ER stay.       Amount and/or Complexity of Data Reviewed  Clinical lab tests: reviewed and ordered  Tests in the radiology section of CPT®: reviewed and ordered  Tests in the medicine section of CPT®:  reviewed    Patient Progress  Patient progress: stable      Final diagnoses:   COVID   Multifocal pneumonia   Dyspnea, unspecified type   IRINA (acute kidney injury) ([facility])       ED Disposition  ED Disposition     ED Disposition   Decision to Admit    Condition   --    Comment   Level of Care: Med/Surg [1]   Diagnosis: COVID [[id number]]   Admitting Physician: ROBIN JERNIGAN [[id number]]   Attending Physician: ROBIN JERNIGAN [[id number]]   Bed Request Comments: cardiac monitor   Certification: I Certify That Inpatient Hospital Services Are Medically Necessary For Greater Than 2 Midnights               No follow-up provider specified.       Medication List      No changes were made to your prescriptions during this visit.          Ally Ibarra PA  12/14/22 0188

## 2022-12-15 ENCOUNTER — READMISSION MANAGEMENT (OUTPATIENT)
Dept: CALL CENTER | Facility: HOSPITAL | Age: 67
End: 2022-12-15

## 2022-12-15 ENCOUNTER — APPOINTMENT (OUTPATIENT)
Dept: CARDIOLOGY | Facility: HOSPITAL | Age: 67
End: 2022-12-15

## 2022-12-15 VITALS
HEIGHT: 73 IN | SYSTOLIC BLOOD PRESSURE: 114 MMHG | OXYGEN SATURATION: 96 % | HEART RATE: 60 BPM | RESPIRATION RATE: 16 BRPM | TEMPERATURE: 95.9 F | WEIGHT: 198.41 LBS | DIASTOLIC BLOOD PRESSURE: 79 MMHG | BODY MASS INDEX: 26.3 KG/M2

## 2022-12-15 LAB
ALBUMIN SERPL-MCNC: 3.4 G/DL (ref 3.5–5.2)
ALP SERPL-CCNC: 84 U/L (ref 39–117)
ALT SERPL W P-5'-P-CCNC: 38 U/L (ref 1–41)
ANION GAP SERPL CALCULATED.3IONS-SCNC: 13 MMOL/L (ref 5–15)
ANISOCYTOSIS BLD QL: NORMAL
AST SERPL-CCNC: 94 U/L (ref 1–40)
BASOPHILS # BLD AUTO: 0.1 10*3/MM3 (ref 0–0.2)
BASOPHILS # BLD AUTO: 0.2 10*3/MM3 (ref 0–0.2)
BASOPHILS NFR BLD AUTO: 0.3 % (ref 0–1.5)
BASOPHILS NFR BLD AUTO: 1.2 % (ref 0–1.5)
BH CV LOWER VASCULAR LEFT COMMON FEMORAL AUGMENT: NORMAL
BH CV LOWER VASCULAR LEFT COMMON FEMORAL COMPETENT: NORMAL
BH CV LOWER VASCULAR LEFT COMMON FEMORAL COMPRESS: NORMAL
BH CV LOWER VASCULAR LEFT COMMON FEMORAL PHASIC: NORMAL
BH CV LOWER VASCULAR LEFT COMMON FEMORAL SPONT: NORMAL
BH CV LOWER VASCULAR LEFT DISTAL FEMORAL COMPRESS: NORMAL
BH CV LOWER VASCULAR LEFT GASTRONEMIUS COMPRESS: NORMAL
BH CV LOWER VASCULAR LEFT GREATER SAPH AK COMPRESS: NORMAL
BH CV LOWER VASCULAR LEFT GREATER SAPH BK COMPRESS: NORMAL
BH CV LOWER VASCULAR LEFT MID FEMORAL AUGMENT: NORMAL
BH CV LOWER VASCULAR LEFT MID FEMORAL COMPETENT: NORMAL
BH CV LOWER VASCULAR LEFT MID FEMORAL COMPRESS: NORMAL
BH CV LOWER VASCULAR LEFT MID FEMORAL PHASIC: NORMAL
BH CV LOWER VASCULAR LEFT MID FEMORAL SPONT: NORMAL
BH CV LOWER VASCULAR LEFT PERONEAL COMPRESS: NORMAL
BH CV LOWER VASCULAR LEFT POPLITEAL AUGMENT: NORMAL
BH CV LOWER VASCULAR LEFT POPLITEAL COMPETENT: NORMAL
BH CV LOWER VASCULAR LEFT POPLITEAL COMPRESS: NORMAL
BH CV LOWER VASCULAR LEFT POPLITEAL PHASIC: NORMAL
BH CV LOWER VASCULAR LEFT POPLITEAL SPONT: NORMAL
BH CV LOWER VASCULAR LEFT POSTERIOR TIBIAL COMPRESS: NORMAL
BH CV LOWER VASCULAR LEFT PROXIMAL FEMORAL COMPRESS: NORMAL
BH CV LOWER VASCULAR LEFT SAPHENOFEMORAL JUNCTION COMPRESS: NORMAL
BH CV LOWER VASCULAR RIGHT COMMON FEMORAL AUGMENT: NORMAL
BH CV LOWER VASCULAR RIGHT COMMON FEMORAL COMPETENT: NORMAL
BH CV LOWER VASCULAR RIGHT COMMON FEMORAL COMPRESS: NORMAL
BH CV LOWER VASCULAR RIGHT COMMON FEMORAL PHASIC: NORMAL
BH CV LOWER VASCULAR RIGHT COMMON FEMORAL SPONT: NORMAL
BILIRUB CONJ SERPL-MCNC: 0.4 MG/DL (ref 0–0.3)
BILIRUB INDIRECT SERPL-MCNC: 1.1 MG/DL
BILIRUB SERPL-MCNC: 1.5 MG/DL (ref 0–1.2)
BUN SERPL-MCNC: 32 MG/DL (ref 8–23)
BUN/CREAT SERPL: 26.7 (ref 7–25)
CALCIUM SPEC-SCNC: 8.3 MG/DL (ref 8.6–10.5)
CHLORIDE SERPL-SCNC: 103 MMOL/L (ref 98–107)
CO2 SERPL-SCNC: 20 MMOL/L (ref 22–29)
CREAT SERPL-MCNC: 1.2 MG/DL (ref 0.76–1.27)
D-LACTATE SERPL-SCNC: 1.5 MMOL/L (ref 0.5–2)
D-LACTATE SERPL-SCNC: 1.6 MMOL/L (ref 0.5–2)
D-LACTATE SERPL-SCNC: 1.7 MMOL/L (ref 0.5–2)
DEPRECATED RDW RBC AUTO: 46.8 FL (ref 37–54)
DEPRECATED RDW RBC AUTO: 47.7 FL (ref 37–54)
EGFRCR SERPLBLD CKD-EPI 2021: 67.1 ML/MIN/1.73
EOSINOPHIL # BLD AUTO: 0 10*3/MM3 (ref 0–0.4)
EOSINOPHIL # BLD AUTO: 0 10*3/MM3 (ref 0–0.4)
EOSINOPHIL NFR BLD AUTO: 0 % (ref 0.3–6.2)
EOSINOPHIL NFR BLD AUTO: 0.1 % (ref 0.3–6.2)
ERYTHROCYTE [DISTWIDTH] IN BLOOD BY AUTOMATED COUNT: 15.7 % (ref 12.3–15.4)
ERYTHROCYTE [DISTWIDTH] IN BLOOD BY AUTOMATED COUNT: 16.1 % (ref 12.3–15.4)
GLUCOSE SERPL-MCNC: 125 MG/DL (ref 65–99)
HCT VFR BLD AUTO: 37.5 % (ref 37.5–51)
HCT VFR BLD AUTO: 38.2 % (ref 37.5–51)
HGB BLD-MCNC: 12.2 G/DL (ref 13–17.7)
HGB BLD-MCNC: 12.5 G/DL (ref 13–17.7)
LYMPHOCYTES # BLD AUTO: 0.4 10*3/MM3 (ref 0.7–3.1)
LYMPHOCYTES # BLD AUTO: 0.5 10*3/MM3 (ref 0.7–3.1)
LYMPHOCYTES NFR BLD AUTO: 1.8 % (ref 19.6–45.3)
LYMPHOCYTES NFR BLD AUTO: 3.6 % (ref 19.6–45.3)
MAXIMAL PREDICTED HEART RATE: 155 BPM
MCH RBC QN AUTO: 27.9 PG (ref 26.6–33)
MCH RBC QN AUTO: 27.9 PG (ref 26.6–33)
MCHC RBC AUTO-ENTMCNC: 32.4 G/DL (ref 31.5–35.7)
MCHC RBC AUTO-ENTMCNC: 32.7 G/DL (ref 31.5–35.7)
MCV RBC AUTO: 85.3 FL (ref 79–97)
MCV RBC AUTO: 86.3 FL (ref 79–97)
MONOCYTES # BLD AUTO: 0.5 10*3/MM3 (ref 0.1–0.9)
MONOCYTES # BLD AUTO: 0.5 10*3/MM3 (ref 0.1–0.9)
MONOCYTES NFR BLD AUTO: 2.3 % (ref 5–12)
MONOCYTES NFR BLD AUTO: 3.8 % (ref 5–12)
NEUTROPHILS NFR BLD AUTO: 12.1 10*3/MM3 (ref 1.7–7)
NEUTROPHILS NFR BLD AUTO: 20.1 10*3/MM3 (ref 1.7–7)
NEUTROPHILS NFR BLD AUTO: 91.4 % (ref 42.7–76)
NEUTROPHILS NFR BLD AUTO: 95.5 % (ref 42.7–76)
NRBC BLD AUTO-RTO: 0 /100 WBC (ref 0–0.2)
NRBC BLD AUTO-RTO: 0.1 /100 WBC (ref 0–0.2)
PLATELET # BLD AUTO: 46 10*3/MM3 (ref 140–450)
PLATELET # BLD AUTO: 75 10*3/MM3 (ref 140–450)
PMV BLD AUTO: 7.9 FL (ref 6–12)
PMV BLD AUTO: 8 FL (ref 6–12)
POIKILOCYTOSIS BLD QL SMEAR: NORMAL
POTASSIUM SERPL-SCNC: 4.7 MMOL/L (ref 3.5–5.2)
PROT SERPL-MCNC: 5.8 G/DL (ref 6–8.5)
RBC # BLD AUTO: 4.35 10*6/MM3 (ref 4.14–5.8)
RBC # BLD AUTO: 4.48 10*6/MM3 (ref 4.14–5.8)
SMALL PLATELETS BLD QL SMEAR: NORMAL
SODIUM SERPL-SCNC: 136 MMOL/L (ref 136–145)
STRESS TARGET HR: 132 BPM
WBC MORPH BLD: NORMAL
WBC NRBC COR # BLD: 13.2 10*3/MM3 (ref 3.4–10.8)
WBC NRBC COR # BLD: 21.1 10*3/MM3 (ref 3.4–10.8)

## 2022-12-15 PROCEDURE — 85025 COMPLETE CBC W/AUTO DIFF WBC: CPT | Performed by: INTERNAL MEDICINE

## 2022-12-15 PROCEDURE — 83605 ASSAY OF LACTIC ACID: CPT | Performed by: NURSE PRACTITIONER

## 2022-12-15 PROCEDURE — 93971 EXTREMITY STUDY: CPT

## 2022-12-15 PROCEDURE — 85007 BL SMEAR W/DIFF WBC COUNT: CPT | Performed by: INTERNAL MEDICINE

## 2022-12-15 PROCEDURE — 94640 AIRWAY INHALATION TREATMENT: CPT

## 2022-12-15 PROCEDURE — 94761 N-INVAS EAR/PLS OXIMETRY MLT: CPT

## 2022-12-15 PROCEDURE — 94799 UNLISTED PULMONARY SVC/PX: CPT

## 2022-12-15 PROCEDURE — 94618 PULMONARY STRESS TESTING: CPT

## 2022-12-15 PROCEDURE — 63710000001 DEXAMETHASONE PER 0.25 MG: Performed by: NURSE PRACTITIONER

## 2022-12-15 RX ORDER — BENZONATATE 100 MG/1
100 CAPSULE ORAL 3 TIMES DAILY PRN
Status: DISCONTINUED | OUTPATIENT
Start: 2022-12-15 | End: 2022-12-15 | Stop reason: HOSPADM

## 2022-12-15 RX ORDER — DEXAMETHASONE 4 MG/1
6 TABLET ORAL
Status: DISCONTINUED | OUTPATIENT
Start: 2022-12-15 | End: 2022-12-15 | Stop reason: HOSPADM

## 2022-12-15 RX ORDER — CEFDINIR 300 MG/1
300 CAPSULE ORAL 2 TIMES DAILY
Qty: 6 CAPSULE | Refills: 0 | Status: SHIPPED | OUTPATIENT
Start: 2022-12-15 | End: 2023-01-09

## 2022-12-15 RX ADMIN — DEXAMETHASONE 6 MG: 4 TABLET ORAL at 12:08

## 2022-12-15 RX ADMIN — AZELASTINE HYDROCHLORIDE 1 SPRAY: 137 SPRAY, METERED NASAL at 12:08

## 2022-12-15 RX ADMIN — Medication 10 ML: at 08:12

## 2022-12-15 RX ADMIN — BENZONATATE 100 MG: 100 CAPSULE ORAL at 01:04

## 2022-12-15 RX ADMIN — MULTIPLE VITAMINS W/ MINERALS TAB 1 TABLET: TAB at 08:11

## 2022-12-15 RX ADMIN — Medication 1000 UNITS: at 08:11

## 2022-12-15 RX ADMIN — BUDESONIDE AND FORMOTEROL FUMARATE DIHYDRATE 2 PUFF: 160; 4.5 AEROSOL RESPIRATORY (INHALATION) at 11:20

## 2022-12-15 RX ADMIN — Medication 81 MG: at 08:11

## 2022-12-15 RX ADMIN — OXYCODONE HYDROCHLORIDE AND ACETAMINOPHEN 500 MG: 500 TABLET ORAL at 08:11

## 2022-12-15 RX ADMIN — AMLODIPINE BESYLATE 10 MG: 5 TABLET ORAL at 08:11

## 2022-12-15 NOTE — PROGRESS NOTES
[facility] Medicine Services Daily Progress Note    Patient Name: Alejandro Bain  : 1955  MRN: [id number]  Primary Care Physician:  Herrera Sarabia MD  Date of admission: 2022      Subjective      Brief interim history: 67-year-old male with known history of A. fib, CAD,S/p CABG, AVR, AAA, s/p repair, hypertension, HLD, pulmonary nodules, history of RMSF, asthma, HFpEF, depression/anxiety.  Was admitted on 2022, presented to [facility] ED from urgent care clinic complaining of shortness of breath and tested positive for COVID-19.  He endorses chest congestion, and generalized weakness.  Laboratory notable for lactate 2.1, procalcitonin of 8.19 and WBC of 20 1K.  CTA negative for pulmonary embolism but showed groundglass infiltrate suggesting developing atypical viral infection or multifocal pneumonia.  He is admitted with diagnosis of acute hypoxic respiratory failure in the setting of COVID-19 infection/pneumonia    12/15/2022: Seen and examined in follow-up.  No complaints or events overnight.          Objective      Vitals:   Temp:  [95.9 °F (35.5 °C)-99.5 °F (37.5 °C)] 95.9 °F (35.5 °C)  Heart Rate:  [49-80] 55  Resp:  [12-20] 12  BP: ()/(55-79) 114/79  Flow (L/min):  [2] 2    Physical Exam  Constitutional:       General: He is not in acute distress.     Appearance: He is not ill-appearing.   HENT:      Head: Normocephalic.      Right Ear: Tympanic membrane normal.      Nose: Nose normal.      Mouth/Throat:      Mouth: Mucous membranes are moist.   Eyes:      Pupils: Pupils are equal, round, and reactive to light.   Cardiovascular:      Rate and Rhythm: Normal rate.      Pulses: Normal pulses.   Pulmonary:      Effort: Pulmonary effort is normal.   Abdominal:      Palpations: Abdomen is soft.   Musculoskeletal:         General: Normal range of motion.      Cervical back: Neck supple.   Skin:     General: Skin is warm.   Neurological:      General: No focal deficit  present.      Mental Status: He is alert.   Psychiatric:         Mood and Affect: Mood normal.             Result Review    Result Review:  I have personally reviewed the results from the time of this admission to 12/15/2022 09:36 EST and agree with these findings:  []  Laboratory  []  Microbiology  []  Radiology  []  EKG/Telemetry   []  Cardiology/Vascular   []  Pathology  []  Old records  []  Other:  Most notable findings include:           Assessment & Plan          amLODIPine, 10 mg, Oral, Daily  vitamin C, 500 mg, Oral, Daily  aspirin, 81 mg, Oral, Daily  azelastine, 1 spray, Each Nare, BID  azithromycin, 500 mg, Intravenous, Q24H  budesonide-formoterol, 2 puff, Inhalation, BID - RT  cefTRIAXone, 2 g, Intravenous, Q24H  cholecalciferol, 1,000 Units, Oral, Daily  dexamethasone, 6 mg, Oral, Daily With Breakfast  enoxaparin, 40 mg, Subcutaneous, Q24H  lisinopril, 20 mg, Oral, Daily  multivitamin with minerals, 1 tablet, Oral, Daily  remdesivir, 100 mg, Intravenous, Q24H       Pharmacy Consult - Remdesivir,   sodium chloride, 50 mL/hr, Last Rate: 50 mL/hr (12/14/22 2201)         Active Hospital Problems:  Active Hospital Problems    Diagnosis    • **COVID    • Leukocytosis    • Elevated LFTs    • Acute renal insufficiency    • Acute on chronic diastolic heart failure (HCC)    • Acute respiratory distress    • Asthma    • Mixed hyperlipidemia    • Essential hypertension    • S/P AAA repair    • Coronary artery disease involving native coronary artery of native heart without angina pectoris    • S/P AVR (tissue) by Dr. Phan    • S/P CABG x 5 by Dr. Phan      Assessment/plan:    Acute hypoxic respiratory failure       -2/2 below    COVID-19 infection      -remdesivir/Decadron/Lovenox/O2 via NC       -monitor hemodynamics and oxygenation closely    Probable superimposed bacterial pneumonia      -Azithromycin/Rocephin    IRINA      continue to hydrate with saline and monitor renal indicis close    CAD,S/p CABG       -Aspirin/lisinopril/Norvasc    Hypertension      -see above    History of AVR      -cont current management    AAA , S/p repair    HFpEF       -2D echo ( 6/21), with EF of 56-60%)      -hemodynamically compensated        DVT prophylaxis:  Medical DVT prophylaxis orders are present.    CODE STATUS:    Code Status (Patient has no pulse and is not breathing): CPR (Attempt to Resuscitate)  Medical Interventions (Patient has pulse or is breathing): Full Support      Disposition:  I expect patient to be discharged       Electronically signed by Brent Torres MD, 12/15/22, 09:36 EST.  [facility] Hospitalist Team

## 2022-12-15 NOTE — PROGRESS NOTES
Exercise Oximetry    Patient Name:Alejandro Bain   MRN: [id number]   Date: 12/15/22             ROOM AIR BASELINE   SpO2% 93   Heart Rate    Blood Pressure      EXERCISE ON ROOM AIR SpO2% EXERCISE ON O2 @  LPM SpO2%   1 MINUTE 93 1 MINUTE    2 MINUTES 95 2 MINUTES    3 MINUTES 95 3 MINUTES    4 MINUTES 96 4 MINUTES    5 MINUTES 96 5 MINUTES    6 MINUTES 96 6 MINUTES               Distance Walked   Distance Walked   Dyspnea (Vandana Scale)   Dyspnea (Vandana Scale)   Fatigue (Vandana Scale)   Fatigue (Vandana Scale)   SpO2% Post Exercise   SpO2% Post Exercise   HR Post Exercise   HR Post Exercise   Time to Recovery   Time to Recovery     Comments: pt walked in room on room air.  Pt's SpO2 increased from resting saturation to 96%.  Pt had no complaints of dyspnea or dizziness.    Maritza Kim, MAURICE  12/15/2022  11:18 EST

## 2022-12-15 NOTE — PLAN OF CARE
Goal Outcome Evaluation:      Patient admitted from ED for shortness of air, diagnosed with covid. Patient denies any shortness of pain or other symptoms right now. Patient has intermittent dry cough, tessalon PO given. On heart/oxygen monitor on. Spouse is at bedside. No other complaints at this time. Call light is within reach and is able to make needs known. Enhanced precaution maintained.

## 2022-12-15 NOTE — DISCHARGE SUMMARY
[facility] Medicine Services  DISCHARGE SUMMARY    Patient Name: Alejandro Bain  : 1955  MRN: [id number]    Date of Admission: 2022  Date of discharge: 12/15/2022  Discharge Diagnosis:  1.  Dyspnea  2.  COVID-19 infection  3. Probable superimposed bacterial pneumonia/bronchitis  4.  IRINA  5.  CAD, S/p CABG  6.  Hypertension  7.  History of AVR  8.  AAA, status postrepair  9.  HFpEF    Primary Care Physician: Herrera Sarabia MD      Presenting Problem:   IRINA (acute kidney injury) (HCC) [N17.9]  Multifocal pneumonia [J18.9]  Dyspnea, unspecified type [R06.00]  COVID [U07.1]    Active and Resolved Hospital Problems:  Active Hospital Problems    Diagnosis POA   • **COVID [U07.1] Yes   • Leukocytosis [D72.829] Yes   • Elevated LFTs [R79.89] Yes   • Acute renal insufficiency [N28.9] Yes   • Acute on chronic diastolic heart failure (HCC) [I50.33] Yes   • Acute respiratory distress [R06.03] Yes   • Asthma [J45.909] Yes   • Mixed hyperlipidemia [E78.2] Yes   • Essential hypertension [I10] Yes   • S/P AAA repair [Z98.890, Z86.79] Not Applicable   • Coronary artery disease involving native coronary artery of native heart without angina pectoris [I25.10] Yes   • S/P AVR (tissue) by Dr. Phan [Z95.2] Not Applicable   • S/P CABG x 5 by Dr. Phan [Z95.1] Not Applicable      Resolved Hospital Problems   No resolved problems to display.         Hospital Course       Brief HPI/Hospital course: 67-year-old male with known history of A. fib, CAD,S/p CABG, AVR, AAA, s/p repair, hypertension, HLD, pulmonary nodules, history of RMSF, asthma, HFpEF, depression/anxiety.  Was admitted on 2022, presented to [facility] ED from urgent care clinic complaining of shortness of breath and tested positive for COVID-19.  He endorses chest congestion, and generalized weakness.  Laboratory notable for lactate 2.1, procalcitonin of 8.19 and WBC of 20 1K.  CTA negative for pulmonary embolism but showed  groundglass infiltrate suggesting developing atypical viral infection or multifocal pneumonia.      Patient was admitted with principal diagnosis of COVID-19 infection/probable superimposed bacterial pneumonia with dyspnea and generalized body ache and weakness.  Blood cultures (10/14) showed no growth.  Patient was and treated with remdesivir, Decadron, bronchodilator, Lovenox, and Rocephin/azithromycin with improvement.  Hospital course is notable for complaining of left calf pain.  Doppler ultrasound of the lower extremities (12/15) negative for DVT.  Prior to discharge home, he is afebrile with stable hemodynamics and oxygenating well on room air.  He is to continue on oral antibiotic with Omnicef for additional 3 days for a total of 5-day treatment .        DISCHARGE Follow Up Recommendations for labs and diagnostics:  1.  Follow-up with PCP and complete antibiotic as outlined discharge summary    Reasons For Change In Medications and Indications for New Medications:      Day of Discharge     Vital Signs:  Temp:  [95.9 °F (35.5 °C)-99.5 °F (37.5 °C)] 95.9 °F (35.5 °C)  Heart Rate:  [49-80] 60  Resp:  [12-20] 16  BP: ()/(55-79) 114/79  Flow (L/min):  [2] 2      Physical Exam  Constitutional:       General: He is not in acute distress.     Appearance: He is obese. He is not ill-appearing.   HENT:      Head: Normocephalic.      Right Ear: Tympanic membrane normal.      Nose: Nose normal.      Mouth/Throat:      Mouth: Mucous membranes are moist.   Eyes:      Pupils: Pupils are equal, round, and reactive to light.   Cardiovascular:      Rate and Rhythm: Normal rate.      Pulses: Normal pulses.   Pulmonary:      Effort: Pulmonary effort is normal.   Abdominal:      Palpations: Abdomen is soft.   Musculoskeletal:         General: No swelling.      Cervical back: Neck supple.   Skin:     General: Skin is warm.   Neurological:      General: No focal deficit present.      Mental Status: He is alert.           Pertinent  and/or Most Recent Results     LAB RESULTS:      Lab 12/15/22  1059 12/15/22  0539 12/14/22  2314 12/14/22  1803 12/14/22  1601   WBC 13.20*  --  21.10*  --  21.00*   HEMOGLOBIN 12.5*  --  12.2*  --  13.2   HEMATOCRIT 38.2  --  37.5  --  39.7   PLATELETS 46*  --  75*  --  147   NEUTROS ABS 12.10*  --  20.10*  --  19.11*   LYMPHS ABS 0.50*  --  0.40*  --   --    MONOS ABS 0.50  --  0.50  --   --    EOS ABS 0.00  --  0.00  --   --    MCV 85.3  --  86.3  --  85.8   PROCALCITONIN  --   --   --   --  8.19*   LACTATE 1.7 1.5 1.6 2.1*  --          Lab 12/14/22  2314 12/14/22  1601   SODIUM 136 139   POTASSIUM 4.7 4.2   CHLORIDE 103 102   CO2 20.0* 25.0   ANION GAP 13.0 12.0   BUN 32* 38*   CREATININE 1.20 1.67*   EGFR 67.1 45.1*   GLUCOSE 125* 100*   CALCIUM 8.3* 9.3         Lab 12/14/22  2314 12/14/22  1601   TOTAL PROTEIN 5.8* 6.8   ALBUMIN 3.40* 4.10   GLOBULIN  --  2.7   ALT (SGPT) 38 42*   AST (SGOT) 94* 61*   BILIRUBIN 1.5* 4.2*   INDIRECT BILIRUBIN 1.1  --    BILIRUBIN DIRECT 0.4*  --    ALK PHOS 84 102         Lab 12/14/22  1601   PROBNP 1,019.0*   TROPONIN T <0.010                 Brief Urine Lab Results     None        Microbiology Results (last 10 days)     ** No results found for the last 240 hours. **          XR Chest 2 View    Result Date: 12/14/2022  Impression: No acute cardiopulmonary abnormality. Probable emphysema.  Electronically Signed By-Mila Spencer MD On:12/14/2022 12:56 PM This report was finalized on [id number] by  Mila Spencer MD.    XR Chest 2 View    Result Date: 11/29/2022  Impression: No acute cardiopulmonary disease.  Electronically Signed By-Edy Fernandez MD On:11/29/2022 9:01 AM This report was finalized on [id number] by  Edy Fernandez MD.    XR Chest 1 View    Result Date: 12/14/2022  Impression: There is no significant change when compared to the prior study. There is no evidence for acute cardiopulmonary process.  Electronically Signed By-Rinku Ko MD  On:12/14/2022 3:31 PM This report was finalized on [id number] by  Rinku Ko MD.    CT Angiogram Chest Pulmonary Embolism    Result Date: 12/14/2022  Impression: 1.     No evidence for pulmonary embolism. 2.     Evidence for scattered small airway or centrilobular nodular opacities throughout the lungs diffusely. Areas of septal thickening and ground glass infiltrate are also seen. The findings suggest changes of developing atypical/viral infection or multifocal pneumonia versus bronchitis/bronchiolitis.  Electronically Signed By-Rinku Ko MD On:12/14/2022 5:56 PM This report was finalized on [id number] by  Rinku Ko MD.      Results for orders placed during the hospital encounter of 12/14/22    Duplex Venous Lower Extremity - Left CAR    Interpretation Summary  •  Normal left lower extremity venous duplex scan.      Results for orders placed during the hospital encounter of 12/14/22    Duplex Venous Lower Extremity - Left CAR    Interpretation Summary  •  Normal left lower extremity venous duplex scan.      Results for orders placed during the hospital encounter of 06/11/21    Adult Transthoracic Echo Complete W/ Cont if Necessary Per Protocol    Interpretation Summary  · Left ventricular wall thickness is consistent with mild concentric hypertrophy.  · Estimated left ventricular EF was in agreement with the calculated left ventricular EF. Left ventricular ejection fraction appears to be 56 - 60%. Left ventricular systolic function is normal.  · Left ventricular diastolic function is consistent with (grade I) impaired relaxation.  · The right ventricular cavity is moderately dilated.  · Estimated right ventricular systolic pressure from tricuspid regurgitation is normal (<35 mmHg).      Labs Pending at Discharge:  Pending Labs     Order Current Status    Blood Culture - Blood, Arm, Left In process    Blood Culture - Blood, Arm, Right In process          Procedures Performed           Consults:    Consults     Date and Time Order Name Status Description    12/14/2022  6:38 PM Hospitalist (on-call MD unless specified)              Discharge Details        Discharge Medications      New Medications      Instructions Start Date   cefdinir 300 MG capsule  Commonly known as: OMNICEF   300 mg, Oral, 2 Times Daily         Continue These Medications      Instructions Start Date   amLODIPine 10 MG tablet  Commonly known as: NORVASC   TAKE 1 TABLET BY MOUTH EVERY DAY      aspirin 81 MG EC tablet   81 mg, Oral, Daily      azelastine 0.1 % nasal spray  Commonly known as: ASTELIN   1 spray, 2 Times Daily      cholecalciferol 25 MCG (1000 UT) tablet  Commonly known as: VITAMIN D3   1,000 Units, Oral, Daily      lisinopril 20 MG tablet  Commonly known as: PRINIVIL,ZESTRIL   20 mg, Oral, Daily      multivitamin with minerals tablet tablet   Oral, Daily      Symbicort 160-4.5 MCG/ACT inhaler  Generic drug: budesonide-formoterol   2 puffs, Inhalation, 2 Times Daily - RT      Ventolin  (90 Base) MCG/ACT inhaler  Generic drug: albuterol sulfate HFA   2 puffs, Inhalation, Every 6 Hours PRN      vitamin C 500 MG tablet  Commonly known as: ASCORBIC ACID   500 mg, Oral, Daily             Allergies   Allergen Reactions   • Avelox [Moxifloxacin] Rash   • Penicillins Rash   • Sulfa Antibiotics Rash   • Doxycycline Rash         Discharge Disposition:   Home or Self Care    Diet:  Hospital:  Diet Order   Procedures   • Diet: Regular/House Diet; Texture: Regular Texture (IDDSI 7); Fluid Consistency: Thin (IDDSI 0)         Discharge Activity: Ad shanell.        CODE STATUS:  Code Status and Medical Interventions:   Ordered at: 12/14/22 1934     Code Status (Patient has no pulse and is not breathing):    CPR (Attempt to Resuscitate)     Medical Interventions (Patient has pulse or is breathing):    Full Support         Future Appointments   Date Time Provider Department Center   4/26/2023  9:45 AM Maciej Blank MD [facility]            Time spent on Discharge including face to face service:  > 30  minutes

## 2022-12-15 NOTE — CASE MANAGEMENT/SOCIAL WORK
Discharge Planning Assessment  [facility]     Patient Name: Alejandro Bain  MRN: [id number]  Today's Date: 12/15/2022    Admit Date: 12/14/2022    Plan: home   Discharge Needs Assessment     Row Name 12/15/22 1259       Living Environment    People in Home spouse    Current Living Arrangements home    Provides Primary Care For no one    Family Caregiver if Needed spouse    Quality of Family Relationships supportive    Able to Return to Prior Arrangements yes       Resource/Environmental Concerns    Resource/Environmental Concerns none    Transportation Concerns none       Transition Planning    Patient/Family Anticipates Transition to home with family    Patient/Family Anticipated Services at Transition none    Transportation Anticipated family or friend will provide       Discharge Needs Assessment    Readmission Within the Last 30 Days no previous admission in last 30 days    Equipment Currently Used at Home none    Concerns to be Addressed discharge planning    Anticipated Changes Related to Illness none    Equipment Needed After Discharge none    Provided Post Acute Provider List? N/A    Provided Post Acute Provider Quality & Resource List? N/A               Discharge Plan     Row Name 12/15/22 1300       Plan    Plan home    Patient/Family in Agreement with Plan yes    Plan Comments Patient is covid +. PCP and pharmacy confirmed. Barrier to dc: remdisivir IV              Continued Care and Services - Admitted Since 12/14/2022    Coordination has not been started for this encounter.       Expected Discharge Date and Time     Expected Discharge Date Expected Discharge Time    Dec 17, 2022          Demographic Summary     Row Name 12/15/22 1258       General Information    Admission Type inpatient    Arrived From emergency department    Required Notices Provided Important Message from Medicare    Referral Source admission list    Reason for Consult discharge planning    Preferred Language English    Row Name  12/15/22 1256       General Information    Admission Type inpatient    Arrived From emergency department               Functional Status     Row Name 12/15/22 1258       Functional Status    Usual Activity Tolerance good    Current Activity Tolerance moderate       Functional Status, IADL    Medications independent    Meal Preparation independent    Housekeeping independent    Laundry independent    Shopping independent       Mental Status    General Appearance WDL WDL       Mental Status Summary    Recent Changes in Mental Status/Cognitive Functioning no changes              Phone communication or documentation only - no physical contact with patient or family.           Aparna Novak RN

## 2022-12-16 LAB — QT INTERVAL: 398 MS

## 2022-12-16 NOTE — CASE MANAGEMENT/SOCIAL WORK
Case Management Discharge Note      Final Note: home    Provided Post Acute Provider List?: N/A  Provided Post Acute Provider Quality & Resource List?: N/A    Selected Continued Care - Discharged on 12/15/2022 Admission date: 12/14/2022 - Discharge disposition: Home or Self Care            Transportation Services  Private: Car    Final Discharge Disposition Code: 01 - home or self-care

## 2022-12-16 NOTE — OUTREACH NOTE
Prep Survey    Flowsheet Row Responses   [facility] from? [facility]   Is LACE score < 7 ? No   Eligibility Readm Mgmt   Discharge diagnosis Dyspnea, covid, bacterial pna   Does the patient have one of the following disease processes/diagnoses(primary or secondary)? Pneumonia   Does the patient have Home health ordered? No   Is there a DME ordered? No   Prep survey completed? Yes          MELANIE SOSA - Registered Nurse

## 2022-12-19 ENCOUNTER — TRANSCRIBE ORDERS (OUTPATIENT)
Dept: ADMINISTRATIVE | Facility: HOSPITAL | Age: 67
End: 2022-12-19

## 2022-12-19 ENCOUNTER — READMISSION MANAGEMENT (OUTPATIENT)
Dept: CALL CENTER | Facility: HOSPITAL | Age: 67
End: 2022-12-19

## 2022-12-19 ENCOUNTER — LAB (OUTPATIENT)
Dept: LAB | Facility: HOSPITAL | Age: 67
End: 2022-12-19

## 2022-12-19 DIAGNOSIS — D69.6 THROMBOCYTOPENIA, UNSPECIFIED: ICD-10-CM

## 2022-12-19 DIAGNOSIS — N17.9 ACUTE RENAL FAILURE, UNSPECIFIED ACUTE RENAL FAILURE TYPE: Primary | ICD-10-CM

## 2022-12-19 DIAGNOSIS — N17.9 ACUTE RENAL FAILURE, UNSPECIFIED ACUTE RENAL FAILURE TYPE: ICD-10-CM

## 2022-12-19 LAB
ALBUMIN SERPL-MCNC: 3.9 G/DL (ref 3.5–5.2)
ALBUMIN/GLOB SERPL: 1.7 G/DL
ALP SERPL-CCNC: 98 U/L (ref 39–117)
ALT SERPL W P-5'-P-CCNC: 48 U/L (ref 1–41)
ANION GAP SERPL CALCULATED.3IONS-SCNC: 6.9 MMOL/L (ref 5–15)
AST SERPL-CCNC: 36 U/L (ref 1–40)
BACTERIA SPEC AEROBE CULT: NORMAL
BACTERIA SPEC AEROBE CULT: NORMAL
BASOPHILS # BLD AUTO: 0.02 10*3/MM3 (ref 0–0.2)
BASOPHILS NFR BLD AUTO: 0.3 % (ref 0–1.5)
BILIRUB SERPL-MCNC: 0.8 MG/DL (ref 0–1.2)
BUN SERPL-MCNC: 21 MG/DL (ref 8–23)
BUN/CREAT SERPL: 18.8 (ref 7–25)
CALCIUM SPEC-SCNC: 8.6 MG/DL (ref 8.6–10.5)
CHLORIDE SERPL-SCNC: 103 MMOL/L (ref 98–107)
CO2 SERPL-SCNC: 27.1 MMOL/L (ref 22–29)
CREAT SERPL-MCNC: 1.12 MG/DL (ref 0.76–1.27)
DEPRECATED RDW RBC AUTO: 44.3 FL (ref 37–54)
EGFRCR SERPLBLD CKD-EPI 2021: 72.9 ML/MIN/1.73
EOSINOPHIL # BLD AUTO: 0.16 10*3/MM3 (ref 0–0.4)
EOSINOPHIL NFR BLD AUTO: 2.1 % (ref 0.3–6.2)
ERYTHROCYTE [DISTWIDTH] IN BLOOD BY AUTOMATED COUNT: 14.7 % (ref 12.3–15.4)
GLOBULIN UR ELPH-MCNC: 2.3 GM/DL
GLUCOSE SERPL-MCNC: 80 MG/DL (ref 65–99)
HCT VFR BLD AUTO: 36.4 % (ref 37.5–51)
HGB BLD-MCNC: 12.3 G/DL (ref 13–17.7)
LYMPHOCYTES # BLD AUTO: 0.97 10*3/MM3 (ref 0.7–3.1)
LYMPHOCYTES NFR BLD AUTO: 12.8 % (ref 19.6–45.3)
MCH RBC QN AUTO: 28.5 PG (ref 26.6–33)
MCHC RBC AUTO-ENTMCNC: 33.8 G/DL (ref 31.5–35.7)
MCV RBC AUTO: 84.3 FL (ref 79–97)
MONOCYTES # BLD AUTO: 0.6 10*3/MM3 (ref 0.1–0.9)
MONOCYTES NFR BLD AUTO: 7.9 % (ref 5–12)
NEUTROPHILS NFR BLD AUTO: 5.75 10*3/MM3 (ref 1.7–7)
NEUTROPHILS NFR BLD AUTO: 75.8 % (ref 42.7–76)
PLATELET # BLD AUTO: 72 10*3/MM3 (ref 140–450)
PMV BLD AUTO: 11.2 FL (ref 6–12)
POTASSIUM SERPL-SCNC: 3.7 MMOL/L (ref 3.5–5.2)
PROT SERPL-MCNC: 6.2 G/DL (ref 6–8.5)
RBC # BLD AUTO: 4.32 10*6/MM3 (ref 4.14–5.8)
SODIUM SERPL-SCNC: 137 MMOL/L (ref 136–145)
WBC NRBC COR # BLD: 7.58 10*3/MM3 (ref 3.4–10.8)

## 2022-12-19 PROCEDURE — 80053 COMPREHEN METABOLIC PANEL: CPT

## 2022-12-19 PROCEDURE — 85025 COMPLETE CBC W/AUTO DIFF WBC: CPT

## 2022-12-19 PROCEDURE — 36415 COLL VENOUS BLD VENIPUNCTURE: CPT

## 2022-12-19 NOTE — OUTREACH NOTE
COPD/PN Week 1 Survey    Flowsheet Row Responses   [facility] patient discharged from? [facility]   Does the patient have one of the following disease processes/diagnoses(primary or secondary)? Pneumonia   Week 1 attempt successful? Yes   Call start time 1138   Call end time 1144   Person spoke with today (if not patient) and relationship Priti-spouse.   Meds reviewed with patient/caregiver? Yes   Is the patient having any side effects they believe may be caused by any medication additions or changes? No   Does the patient have all medications ordered at discharge? Yes   Is the patient taking all medications as directed (includes completed medication regime)? Yes   Comments regarding appointments PCP appt 12/22/22.   Does the patient have a primary care provider?  Yes   Does the patient have an appointment with their PCP or specialist within 7 days of discharge? Yes   Has the patient kept scheduled appointments due by today? N/A   Has home health visited the patient within 72 hours of discharge? N/A   Pulse Ox monitoring None   Psychosocial issues? No   Did the patient receive a copy of their discharge instructions? Yes   Nursing interventions Reviewed instructions with patient   What is the patient's perception of their health status since discharge? Improving   Nursing Interventions Nurse provided patient education   Is the patient/caregiver able to teach back the hierarchy of who to call/visit for symptoms/problems? PCP, Specialist, Home health nurse, Urgent Care, ED, 911 Yes   Is the patient/caregiver able to teach back signs and symptoms of worsening condition: Fever/chills, Shortness of breath, Chest pain   Is the patient/caregiver able to teach back importance of completing antibiotic course of treatment? Yes   Week 1 call completed? Yes   Is the patient interested in additional calls from an ambulatory ?  NOTE:  applies to high risk patients requiring additional follow-up. No   Wrap up  additional comments Spouse states patient is improving, and attempted to return to work today, but had some nausea/fatigue. States feeling better now, and plans to return to work next week. Denies any fever, chest pain or SOA. States will call if has any questions/concerns. PCP visit 12/22/22.          MEGAN DOWELL - Registered Nurse

## 2023-01-09 ENCOUNTER — HOSPITAL ENCOUNTER (INPATIENT)
Facility: HOSPITAL | Age: 68
LOS: 2 days | Discharge: HOME | DRG: 65 | End: 2023-01-11
Attending: EMERGENCY MEDICINE | Admitting: INTERNAL MEDICINE
Payer: MEDICARE

## 2023-01-09 ENCOUNTER — APPOINTMENT (OUTPATIENT)
Dept: CT IMAGING | Facility: HOSPITAL | Age: 68
DRG: 65 | End: 2023-01-09
Payer: MEDICARE

## 2023-01-09 ENCOUNTER — APPOINTMENT (OUTPATIENT)
Dept: GENERAL RADIOLOGY | Facility: HOSPITAL | Age: 68
DRG: 65 | End: 2023-01-09
Payer: MEDICARE

## 2023-01-09 DIAGNOSIS — R51.9 ACUTE INTRACTABLE HEADACHE, UNSPECIFIED HEADACHE TYPE: ICD-10-CM

## 2023-01-09 DIAGNOSIS — R47.1 DYSARTHRIA: ICD-10-CM

## 2023-01-09 DIAGNOSIS — R26.89 BALANCE PROBLEM: ICD-10-CM

## 2023-01-09 DIAGNOSIS — U07.1 STROKE ASSOCIATED WITH COVID-19: Primary | ICD-10-CM

## 2023-01-09 DIAGNOSIS — H54.61 VISUAL LOSS, RIGHT EYE: ICD-10-CM

## 2023-01-09 DIAGNOSIS — D69.6 THROMBOCYTOPENIA: ICD-10-CM

## 2023-01-09 DIAGNOSIS — I63.9 STROKE ASSOCIATED WITH COVID-19: Primary | ICD-10-CM

## 2023-01-09 LAB
ALBUMIN SERPL-MCNC: 4.1 G/DL (ref 3.5–5.2)
ALBUMIN/GLOB SERPL: 1.7 G/DL
ALP SERPL-CCNC: 89 U/L (ref 39–117)
ALT SERPL W P-5'-P-CCNC: 21 U/L (ref 1–41)
ANION GAP SERPL CALCULATED.3IONS-SCNC: 13 MMOL/L (ref 5–15)
AST SERPL-CCNC: 22 U/L (ref 1–40)
BASOPHILS # BLD AUTO: 0 10*3/MM3 (ref 0–0.2)
BASOPHILS NFR BLD AUTO: 0.5 % (ref 0–1.5)
BILIRUB SERPL-MCNC: 0.4 MG/DL (ref 0–1.2)
BUN SERPL-MCNC: 18 MG/DL (ref 8–23)
BUN/CREAT SERPL: 14.8 (ref 7–25)
CALCIUM SPEC-SCNC: 9.3 MG/DL (ref 8.6–10.5)
CHLORIDE SERPL-SCNC: 104 MMOL/L (ref 98–107)
CO2 SERPL-SCNC: 24 MMOL/L (ref 22–29)
CREAT SERPL-MCNC: 1.22 MG/DL (ref 0.76–1.27)
DEPRECATED RDW RBC AUTO: 49.9 FL (ref 37–54)
EGFRCR SERPLBLD CKD-EPI 2021: 65.8 ML/MIN/1.73
EOSINOPHIL # BLD AUTO: 0.2 10*3/MM3 (ref 0–0.4)
EOSINOPHIL NFR BLD AUTO: 2.6 % (ref 0.3–6.2)
ERYTHROCYTE [DISTWIDTH] IN BLOOD BY AUTOMATED COUNT: 16.4 % (ref 12.3–15.4)
GLOBULIN UR ELPH-MCNC: 2.4 GM/DL
GLUCOSE BLDC GLUCOMTR-MCNC: 106 MG/DL (ref 70–105)
GLUCOSE SERPL-MCNC: 113 MG/DL (ref 65–99)
HCT VFR BLD AUTO: 40.2 % (ref 37.5–51)
HGB BLD-MCNC: 13.4 G/DL (ref 13–17.7)
HOLD SPECIMEN: NORMAL
INR PPP: 1.1 (ref 0.93–1.1)
LYMPHOCYTES # BLD AUTO: 0.6 10*3/MM3 (ref 0.7–3.1)
LYMPHOCYTES NFR BLD AUTO: 8.2 % (ref 19.6–45.3)
MAGNESIUM SERPL-MCNC: 2.2 MG/DL (ref 1.6–2.4)
MCH RBC QN AUTO: 29.1 PG (ref 26.6–33)
MCHC RBC AUTO-ENTMCNC: 33.2 G/DL (ref 31.5–35.7)
MCV RBC AUTO: 87.6 FL (ref 79–97)
MONOCYTES # BLD AUTO: 0.6 10*3/MM3 (ref 0.1–0.9)
MONOCYTES NFR BLD AUTO: 8.6 % (ref 5–12)
NEUTROPHILS NFR BLD AUTO: 5.9 10*3/MM3 (ref 1.7–7)
NEUTROPHILS NFR BLD AUTO: 80.1 % (ref 42.7–76)
NRBC BLD AUTO-RTO: 0.1 /100 WBC (ref 0–0.2)
PLATELET # BLD AUTO: 122 10*3/MM3 (ref 140–450)
PMV BLD AUTO: 7.6 FL (ref 6–12)
POTASSIUM SERPL-SCNC: 3.8 MMOL/L (ref 3.5–5.2)
PROT SERPL-MCNC: 6.5 G/DL (ref 6–8.5)
PROTHROMBIN TIME: 11.3 SECONDS (ref 9.6–11.7)
RBC # BLD AUTO: 4.59 10*6/MM3 (ref 4.14–5.8)
SODIUM SERPL-SCNC: 141 MMOL/L (ref 136–145)
TSH SERPL DL<=0.05 MIU/L-ACNC: 1.53 UIU/ML (ref 0.27–4.2)
WBC NRBC COR # BLD: 7.4 10*3/MM3 (ref 3.4–10.8)

## 2023-01-09 PROCEDURE — 85025 COMPLETE CBC W/AUTO DIFF WBC: CPT | Performed by: NURSE PRACTITIONER

## 2023-01-09 PROCEDURE — 25010000002 HEPARIN (PORCINE) PER 1000 UNITS: Performed by: NURSE PRACTITIONER

## 2023-01-09 PROCEDURE — 99285 EMERGENCY DEPT VISIT HI MDM: CPT

## 2023-01-09 PROCEDURE — 82607 VITAMIN B-12: CPT | Performed by: NURSE PRACTITIONER

## 2023-01-09 PROCEDURE — 70450 CT HEAD/BRAIN W/O DYE: CPT

## 2023-01-09 PROCEDURE — 85610 PROTHROMBIN TIME: CPT | Performed by: NURSE PRACTITIONER

## 2023-01-09 PROCEDURE — 84443 ASSAY THYROID STIM HORMONE: CPT | Performed by: NURSE PRACTITIONER

## 2023-01-09 PROCEDURE — 71045 X-RAY EXAM CHEST 1 VIEW: CPT

## 2023-01-09 PROCEDURE — 83735 ASSAY OF MAGNESIUM: CPT | Performed by: NURSE PRACTITIONER

## 2023-01-09 PROCEDURE — 25010000002 METOCLOPRAMIDE PER 10 MG: Performed by: NURSE PRACTITIONER

## 2023-01-09 PROCEDURE — 82962 GLUCOSE BLOOD TEST: CPT

## 2023-01-09 PROCEDURE — 80053 COMPREHEN METABOLIC PANEL: CPT | Performed by: NURSE PRACTITIONER

## 2023-01-09 PROCEDURE — 93005 ELECTROCARDIOGRAM TRACING: CPT | Performed by: NURSE PRACTITIONER

## 2023-01-09 PROCEDURE — 25010000002 DIPHENHYDRAMINE PER 50 MG: Performed by: NURSE PRACTITIONER

## 2023-01-09 PROCEDURE — 25010000002 DROPERIDOL PER 5 MG: Performed by: NURSE PRACTITIONER

## 2023-01-09 RX ORDER — HEPARIN SODIUM 5000 [USP'U]/ML
5000 INJECTION, SOLUTION INTRAVENOUS; SUBCUTANEOUS EVERY 12 HOURS SCHEDULED
Status: DISCONTINUED | OUTPATIENT
Start: 2023-01-09 | End: 2023-01-10

## 2023-01-09 RX ORDER — DOCUSATE SODIUM 100 MG/1
100 CAPSULE, LIQUID FILLED ORAL 2 TIMES DAILY PRN
Status: DISCONTINUED | OUTPATIENT
Start: 2023-01-09 | End: 2023-01-11 | Stop reason: HOSPADM

## 2023-01-09 RX ORDER — ATORVASTATIN CALCIUM 40 MG/1
80 TABLET, FILM COATED ORAL NIGHTLY
Status: DISCONTINUED | OUTPATIENT
Start: 2023-01-09 | End: 2023-01-11 | Stop reason: HOSPADM

## 2023-01-09 RX ORDER — ACETAMINOPHEN 650 MG/1
650 SUPPOSITORY RECTAL EVERY 4 HOURS PRN
Status: DISCONTINUED | OUTPATIENT
Start: 2023-01-09 | End: 2023-01-11 | Stop reason: HOSPADM

## 2023-01-09 RX ORDER — LISINOPRIL 20 MG/1
20 TABLET ORAL 2 TIMES DAILY
Status: DISCONTINUED | OUTPATIENT
Start: 2023-01-09 | End: 2023-01-11 | Stop reason: HOSPADM

## 2023-01-09 RX ORDER — ALBUTEROL SULFATE 2.5 MG/3ML
2.5 SOLUTION RESPIRATORY (INHALATION) EVERY 6 HOURS PRN
Status: DISCONTINUED | OUTPATIENT
Start: 2023-01-09 | End: 2023-01-11 | Stop reason: HOSPADM

## 2023-01-09 RX ORDER — ACETAMINOPHEN 325 MG/1
650 TABLET ORAL EVERY 4 HOURS PRN
Status: DISCONTINUED | OUTPATIENT
Start: 2023-01-09 | End: 2023-01-11 | Stop reason: HOSPADM

## 2023-01-09 RX ORDER — SODIUM CHLORIDE 0.9 % (FLUSH) 0.9 %
10 SYRINGE (ML) INJECTION AS NEEDED
Status: DISCONTINUED | OUTPATIENT
Start: 2023-01-09 | End: 2023-01-11 | Stop reason: HOSPADM

## 2023-01-09 RX ORDER — BUTALBITAL, ACETAMINOPHEN AND CAFFEINE 50; 325; 40 MG/1; MG/1; MG/1
1 TABLET ORAL EVERY 4 HOURS PRN
Status: DISCONTINUED | OUTPATIENT
Start: 2023-01-09 | End: 2023-01-11 | Stop reason: HOSPADM

## 2023-01-09 RX ORDER — HYDROCODONE BITARTRATE AND ACETAMINOPHEN 5; 325 MG/1; MG/1
1 TABLET ORAL EVERY 4 HOURS PRN
Status: DISCONTINUED | OUTPATIENT
Start: 2023-01-09 | End: 2023-01-11 | Stop reason: HOSPADM

## 2023-01-09 RX ORDER — ZINC SULFATE 50(220)MG
220 CAPSULE ORAL DAILY
COMMUNITY
End: 2023-01-17

## 2023-01-09 RX ORDER — SODIUM CHLORIDE 9 MG/ML
40 INJECTION, SOLUTION INTRAVENOUS AS NEEDED
Status: DISCONTINUED | OUTPATIENT
Start: 2023-01-09 | End: 2023-01-11 | Stop reason: HOSPADM

## 2023-01-09 RX ORDER — SODIUM CHLORIDE 0.9 % (FLUSH) 0.9 %
10 SYRINGE (ML) INJECTION EVERY 12 HOURS SCHEDULED
Status: DISCONTINUED | OUTPATIENT
Start: 2023-01-09 | End: 2023-01-11 | Stop reason: HOSPADM

## 2023-01-09 RX ORDER — DIPHENHYDRAMINE HYDROCHLORIDE 50 MG/ML
25 INJECTION INTRAMUSCULAR; INTRAVENOUS ONCE
Status: COMPLETED | OUTPATIENT
Start: 2023-01-09 | End: 2023-01-09

## 2023-01-09 RX ORDER — MULTIPLE VITAMINS W/ MINERALS TAB 9MG-400MCG
1 TAB ORAL DAILY
Status: DISCONTINUED | OUTPATIENT
Start: 2023-01-10 | End: 2023-01-11 | Stop reason: HOSPADM

## 2023-01-09 RX ORDER — METOCLOPRAMIDE HYDROCHLORIDE 5 MG/ML
10 INJECTION INTRAMUSCULAR; INTRAVENOUS ONCE
Status: COMPLETED | OUTPATIENT
Start: 2023-01-09 | End: 2023-01-09

## 2023-01-09 RX ORDER — ONDANSETRON 2 MG/ML
4 INJECTION INTRAMUSCULAR; INTRAVENOUS EVERY 6 HOURS PRN
Status: DISCONTINUED | OUTPATIENT
Start: 2023-01-09 | End: 2023-01-11 | Stop reason: HOSPADM

## 2023-01-09 RX ORDER — AMLODIPINE BESYLATE 5 MG/1
10 TABLET ORAL DAILY
Status: DISCONTINUED | OUTPATIENT
Start: 2023-01-10 | End: 2023-01-11 | Stop reason: HOSPADM

## 2023-01-09 RX ORDER — ASPIRIN 325 MG
325 TABLET ORAL DAILY
Status: DISCONTINUED | OUTPATIENT
Start: 2023-01-10 | End: 2023-01-09

## 2023-01-09 RX ORDER — AZELASTINE 1 MG/ML
1 SPRAY, METERED NASAL 2 TIMES DAILY
Status: DISCONTINUED | OUTPATIENT
Start: 2023-01-10 | End: 2023-01-11 | Stop reason: HOSPADM

## 2023-01-09 RX ORDER — PREDNISONE 1 MG/1
5 TABLET ORAL DAILY
COMMUNITY
End: 2023-01-17

## 2023-01-09 RX ORDER — ZINC SULFATE 50(220)MG
220 CAPSULE ORAL DAILY
Status: DISCONTINUED | OUTPATIENT
Start: 2023-01-10 | End: 2023-01-11 | Stop reason: HOSPADM

## 2023-01-09 RX ORDER — DROPERIDOL 2.5 MG/ML
2.5 INJECTION, SOLUTION INTRAMUSCULAR; INTRAVENOUS ONCE
Status: COMPLETED | OUTPATIENT
Start: 2023-01-09 | End: 2023-01-09

## 2023-01-09 RX ORDER — ASPIRIN 300 MG/1
300 SUPPOSITORY RECTAL DAILY
Status: DISCONTINUED | OUTPATIENT
Start: 2023-01-10 | End: 2023-01-09

## 2023-01-09 RX ORDER — BISACODYL 10 MG
10 SUPPOSITORY, RECTAL RECTAL DAILY PRN
Status: DISCONTINUED | OUTPATIENT
Start: 2023-01-09 | End: 2023-01-11 | Stop reason: HOSPADM

## 2023-01-09 RX ORDER — BUDESONIDE AND FORMOTEROL FUMARATE DIHYDRATE 160; 4.5 UG/1; UG/1
2 AEROSOL RESPIRATORY (INHALATION)
Status: DISCONTINUED | OUTPATIENT
Start: 2023-01-09 | End: 2023-01-11 | Stop reason: HOSPADM

## 2023-01-09 RX ORDER — ASPIRIN 81 MG/1
81 TABLET ORAL DAILY
Status: DISCONTINUED | OUTPATIENT
Start: 2023-01-10 | End: 2023-01-11 | Stop reason: HOSPADM

## 2023-01-09 RX ORDER — MELATONIN
1000 DAILY
Status: DISCONTINUED | OUTPATIENT
Start: 2023-01-10 | End: 2023-01-11 | Stop reason: HOSPADM

## 2023-01-09 RX ORDER — PREDNISONE 1 MG/1
5 TABLET ORAL DAILY
Status: DISCONTINUED | OUTPATIENT
Start: 2023-01-10 | End: 2023-01-11 | Stop reason: HOSPADM

## 2023-01-09 RX ADMIN — METOCLOPRAMIDE 10 MG: 5 INJECTION, SOLUTION INTRAMUSCULAR; INTRAVENOUS at 15:35

## 2023-01-09 RX ADMIN — Medication 10 ML: at 23:17

## 2023-01-09 RX ADMIN — DROPERIDOL 2.5 MG: 2.5 INJECTION, SOLUTION INTRAMUSCULAR; INTRAVENOUS at 23:16

## 2023-01-09 RX ADMIN — ATORVASTATIN CALCIUM 80 MG: 40 TABLET, FILM COATED ORAL at 22:17

## 2023-01-09 RX ADMIN — BUTALBITAL, ACETAMINOPHEN, AND CAFFEINE 1 TABLET: 50; 325; 40 TABLET ORAL at 22:17

## 2023-01-09 RX ADMIN — HEPARIN SODIUM 5000 UNITS: 5000 INJECTION INTRAVENOUS; SUBCUTANEOUS at 22:16

## 2023-01-09 RX ADMIN — LISINOPRIL 20 MG: 20 TABLET ORAL at 22:17

## 2023-01-09 RX ADMIN — DIPHENHYDRAMINE HYDROCHLORIDE 25 MG: 50 INJECTION, SOLUTION INTRAMUSCULAR; INTRAVENOUS at 15:36

## 2023-01-09 RX ADMIN — SODIUM CHLORIDE, POTASSIUM CHLORIDE, SODIUM LACTATE AND CALCIUM CHLORIDE 1000 ML: 600; 310; 30; 20 INJECTION, SOLUTION INTRAVENOUS at 15:35

## 2023-01-09 NOTE — ED PROVIDER NOTES
"Subjective   History of Present Illness  67-year-old male presents with a 10-day history of left frontal headache that radiates behind his eye described as \"pressure\".  He reports that the pain has increased in severity over the last 4 days now described as \"a dull throb\".  He also reports \"my vision seems like I am cross eyed and I am not thinking straight I feel like I am stumbling and my depth perception is off\" he reports associated nausea denies vomiting.  He also reports right peripheral vision deficit.  He is 3 weeks post COVID.  He reports he was seen by his PCP on Thursday and given Imitrex without relief.  He had a history of migraines but has not had one in over 20 years.  Has history significant for 5 vessel CABG, AAA repair, valve replacement, and MI with stent.    PCP Dr. Sarabia    1. Location: Left frontal  2. Quality: Headache, dull, throbbing, pressure  3. Severity: Moderate  4. Worsening factors: Denies  5. Alleviating factors: Denies  6. Onset: 10 days  7. Radiation: Left posterior eye  8. Frequency: Constant with periods of intensity  9. Co-morbidities: Past Medical History:  12/14/2022: Acute on chronic diastolic heart failure (HCC)  No date: Aneurysm (HCC)  No date: Anxiety  No date: Asthma  No date: Atrial fibrillation (HCC)  No date: Bronchitis  No date: Coronary artery disease  No date: Depression  08/22/2022: Encounter for laboratory testing for COVID-19 virus  No date: Gout  No date: Hyperlipidemia  No date: Hypertension  No date: Myocardial infarction (HCC)  No date: Pneumonia  No date: Pulmonary nodule  No date: Familia Mountain spotted fever       History provided by:  Patient and spouse   used: No        Review of Systems    Past Medical History:   Diagnosis Date   • Acute on chronic diastolic heart failure (HCC) 12/14/2022   • Aneurysm (HCC)    • Anxiety    • Asthma    • Atrial fibrillation (HCC)    • Bronchitis    • Coronary artery disease    • Depression    • " Encounter for laboratory testing for COVID-19 virus 08/22/2022   • Gout    • Hyperlipidemia    • Hypertension    • Myocardial infarction (HCC)    • Pneumonia    • Pulmonary nodule    • Familia Mountain spotted fever        Allergies   Allergen Reactions   • Avelox [Moxifloxacin] Rash   • Penicillins Rash   • Sulfa Antibiotics Rash   • Doxycycline Rash       Past Surgical History:   Procedure Laterality Date   • AORTIC VALVE REPAIR/REPLACEMENT  12/03/2016    CABG x 5/ tissue AVR by DR. PHAN   • CARDIAC CATHETERIZATION     • CARDIAC SURGERY     • CARDIOVASCULAR STRESS TEST  2020   • CAROTID STENT     • CORONARY ARTERY BYPASS GRAFT  12/02/2016    X4  Dr Phan   • ILIAC ARTERY ANEURYSM REPAIR  01/05/2017    abdominal and bilateral   • OTHER SURGICAL HISTORY      PTCI   • SINUS SURGERY     • VASCULAR SURGERY         Family History   Problem Relation Age of Onset   • Cancer Father         brain   • Cancer Sister         lung   • Heart disease Sister    • Pulmonary fibrosis Mother    • Heart disease Mother    • Cancer Brother         pancreatic, lung       Social History     Socioeconomic History   • Marital status:    Tobacco Use   • Smoking status: Never   • Smokeless tobacco: Never   Vaping Use   • Vaping Use: Never used   Substance and Sexual Activity   • Alcohol use: Not Currently     Comment: quit 2005   • Drug use: No   • Sexual activity: Defer           Objective   Physical Exam  Vitals and nursing note reviewed.   Constitutional:       General: He is not in acute distress.     Appearance: Normal appearance. He is well-developed and normal weight. He is not ill-appearing or diaphoretic.   HENT:      Head: Normocephalic and atraumatic. No raccoon eyes or Parker's sign.      Right Ear: External ear normal. No drainage.      Left Ear: External ear normal. No drainage.      Nose: Nose normal. No rhinorrhea.      Mouth/Throat:      Lips: Pink. No lesions.      Mouth: Mucous membranes are moist.      Pharynx:  Oropharynx is clear. Uvula midline.   Eyes:      General: Lids are normal. Vision grossly intact. Gaze aligned appropriately. Visual field deficit present.      Extraocular Movements: Extraocular movements intact.      Conjunctiva/sclera: Conjunctivae normal.      Pupils: Pupils are equal, round, and reactive to light.      Slit lamp exam:     Right eye: No hyphema.      Left eye: No hyphema.      Visual Fields:      Right eye: DM in the upper temporal quadrant. DM in the lower temporal quadrant.      Left eye: CF in the upper temporal quadrant. CF in the lower temporal quadrant.   Neck:      Trachea: Trachea and phonation normal.   Cardiovascular:      Pulses: Normal pulses.      Heart sounds: Normal heart sounds, S1 normal and S2 normal.   Pulmonary:      Effort: Pulmonary effort is normal.      Breath sounds: Normal breath sounds.   Musculoskeletal:         General: Normal range of motion.      Cervical back: Full passive range of motion without pain, normal range of motion and neck supple. No rigidity. No spinous process tenderness. Normal range of motion.   Skin:     General: Skin is warm and dry.      Capillary Refill: Capillary refill takes less than 2 seconds.   Neurological:      Mental Status: He is alert and oriented to person, place, and time.      GCS: GCS eye subscore is 4. GCS verbal subscore is 5. GCS motor subscore is 6.      Cranial Nerves: No cranial nerve deficit.      Sensory: Sensation is intact. No sensory deficit.      Motor: Motor function is intact. No weakness, abnormal muscle tone or pronator drift.      Coordination: Coordination is intact. Finger-Nose-Finger Test and Heel to Shin Test normal.   Psychiatric:         Behavior: Behavior normal.         Thought Content: Thought content normal.         Judgment: Judgment normal.         Procedures    EKG interpretation: Rate 61, sinus rhythm.  When compared to prior EKG on 12/14/2022, rate of 62 and sinus rhythm.  Repolarization abnormality  suggest ischemia at that time.  Interpreted by Dr. Fontanez, reviewed by me           ED Course      CT Head Without Contrast    Result Date: 1/9/2023  Impression: 1. Left posterior temporal lobe-left occipital lobe suspected subacute to evolving chronic infarct. Correlate with clinical findings and onset of symptoms. No hemorrhagic transformation is seen. I spoke with the clinician in the emergency room at the time of this dictation. Electronically Signed: Kelinbacilio Pond  1/9/2023 5:01 PM EST  Workstation ID: [id number]    XR Chest 1 View    Result Date: 1/9/2023  Impression: 1.No radiographic findings of acute cardiopulmonary abnormality. 2.Status post cardiac surgery. Electronically Signed: Sukhdeep Conde  1/9/2023 3:47 PM EST  Workstation ID: [id number]    Medications   sodium chloride 0.9 % flush 10 mL (has no administration in time range)   metoclopramide (REGLAN) injection 10 mg (10 mg Intravenous Given 1/9/23 1535)   diphenhydrAMINE (BENADRYL) injection 25 mg (25 mg Intravenous Given 1/9/23 1536)   lactated ringers bolus 1,000 mL (0 mL Intravenous Stopped 1/9/23 1729)     Labs Reviewed   COMPREHENSIVE METABOLIC PANEL - Abnormal; Notable for the following components:       Result Value    Glucose 113 (*)     All other components within normal limits    Narrative:     GFR Normal >60  Chronic Kidney Disease <60  Kidney Failure <15     CBC WITH AUTO DIFFERENTIAL - Abnormal; Notable for the following components:    RDW 16.4 (*)     Platelets 122 (*)     Neutrophil % 80.1 (*)     Lymphocyte % 8.2 (*)     Lymphocytes, Absolute 0.60 (*)     All other components within normal limits   PROTIME-INR - Normal   TSH - Normal   MAGNESIUM - Normal   CBC AND DIFFERENTIAL    Narrative:     The following orders were created for panel order CBC & Differential.  Procedure                               Abnormality         Status                     ---------                               -----------         ------                     CBC  Auto Differential[[id number]]        Abnormal            Final result                 Please view results for these tests on the individual orders.   EXTRA TUBES    Narrative:     The following orders were created for panel order Extra Tubes.  Procedure                               Abnormality         Status                     ---------                               -----------         ------                     Gold Top - SST[[id number]]                                   Final result                 Please view results for these tests on the individual orders.   [facility]                                          Medical Decision Making  Chart Review: 12/14/2022 patient was admitted for IRINA, COVID-pneumonia.  Comorbidity: Past Medical History:  12/14/2022: Acute on chronic diastolic heart failure (HCC)  No date: Aneurysm (HCC)  No date: Anxiety  No date: Asthma  No date: Atrial fibrillation (HCC)  No date: Bronchitis  No date: Coronary artery disease  No date: Depression  08/22/2022: Encounter for laboratory testing for COVID-19 virus  No date: Gout  No date: Hyperlipidemia  No date: Hypertension  No date: Myocardial infarction (HCC)  No date: Pneumonia  No date: Pulmonary nodule  No date: Familia Mountain spotted fever  Imaging: Was interpreted by physician and reviewed by myself: CT Head Without Contrast   Final Result    Impression:        1. Left posterior temporal lobe-left occipital lobe suspected subacute to evolving chronic infarct. Correlate with clinical findings and onset of symptoms. No hemorrhagic transformation is seen. I spoke with the clinician in the emergency room at the     time of this dictation.        Electronically Signed: Kelin Pond      1/9/2023 5:01 PM EST      Workstation ID: [id number]     XR Chest 1 View   Final Result    Impression:    1.No radiographic findings of acute cardiopulmonary abnormality.    2.Status post cardiac surgery.        Electronically Signed: Sukhdeep Conde       1/9/2023 3:47 PM EST      Workstation ID: [id number]    Patient undressed and placed in gown for exam.  Appropriate PPE worn during patient exam.  Patient is alert and oriented x3.  He is noted to have peripheral vision deficit on exam, otherwise nothing focal.  S1-S2 heart sounds on exam.  Lungs are clear to auscultation.  IV established and labs obtained.  Neuro work-up initiated.  CT of the head without IV contrast obtained with the above findings. Platelets are noted to be 122, previously 46.  CMP unremarkable.  TSH magnesium within normal limits.  PT/INR within normal limits.  CT was significant for left temporal occipital subacute infarct.  Patient will be admitted for further stroke work-up.  Spoke with GARY Marcos excepted admission on behalf of Dr. Stephens.    Disposition/Treatment: Discussed results with patient, verbalized understanding. Agreeable with plan of care.  Patient was stable upon admission.      Differential Diagnoses, not all-inclusive and does not constitute entirety of all causes: CVA, complicated migraine.    Upon reassessment, patient reports headache is still present.  This was discussed with the admitting provider, and she is placing orders.    Part of this note may be an electronic transcription/translation of spoken language to printed text using the Dragon Dictation System.       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 Decision-making details documented in ED Course.  Radiology: ordered. Decision-making details documented in ED Course.  ECG/medicine tests: ordered. Decision-making details documented in ED Course.      Risk  Prescription drug management.  Decision regarding hospitalization.          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       Medication List      No changes were made to your prescriptions during this visit.          Erika Dhaliwal, APRN  01/09/23 1735

## 2023-01-09 NOTE — Clinical Note
Level of Care: Telemetry [5]   Admitting Physician: ANNY AMBROSIO [[id number]]   Attending Physician: ANNY AMBROSIO [[id number]]   Bed Request Comments: ARIAN

## 2023-01-10 ENCOUNTER — APPOINTMENT (OUTPATIENT)
Dept: CT IMAGING | Facility: HOSPITAL | Age: 68
DRG: 65 | End: 2023-01-10
Payer: MEDICARE

## 2023-01-10 ENCOUNTER — APPOINTMENT (OUTPATIENT)
Dept: CARDIOLOGY | Facility: HOSPITAL | Age: 68
DRG: 65 | End: 2023-01-10
Payer: MEDICARE

## 2023-01-10 ENCOUNTER — APPOINTMENT (OUTPATIENT)
Dept: MRI IMAGING | Facility: HOSPITAL | Age: 68
DRG: 65 | End: 2023-01-10
Payer: MEDICARE

## 2023-01-10 LAB
ALBUMIN SERPL-MCNC: 3.9 G/DL (ref 3.5–5.2)
ALBUMIN/GLOB SERPL: 1.9 G/DL
ALP SERPL-CCNC: 84 U/L (ref 39–117)
ALT SERPL W P-5'-P-CCNC: 20 U/L (ref 1–41)
ANION GAP SERPL CALCULATED.3IONS-SCNC: 9 MMOL/L (ref 5–15)
AST SERPL-CCNC: 23 U/L (ref 1–40)
BH CV ECHO MEAS - ACS: 1.94 CM
BH CV ECHO MEAS - AO MAX PG: 32.1 MMHG
BH CV ECHO MEAS - AO MEAN PG: 16.3 MMHG
BH CV ECHO MEAS - AO ROOT DIAM: 3.6 CM
BH CV ECHO MEAS - AO V2 MAX: 280.6 CM/SEC
BH CV ECHO MEAS - AO V2 VTI: 52.5 CM
BH CV ECHO MEAS - AVA(I,D): 1.05 CM2
BH CV ECHO MEAS - EDV(CUBED): 108.9 ML
BH CV ECHO MEAS - EDV(MOD-SP4): 65 ML
BH CV ECHO MEAS - EF(MOD-BP): 53 %
BH CV ECHO MEAS - EF(MOD-SP4): 52.6 %
BH CV ECHO MEAS - ESV(CUBED): 24.6 ML
BH CV ECHO MEAS - ESV(MOD-SP4): 30.8 ML
BH CV ECHO MEAS - FS: 39.1 %
BH CV ECHO MEAS - IVS/LVPW: 1.11 CM
BH CV ECHO MEAS - IVSD: 1.31 CM
BH CV ECHO MEAS - LA DIMENSION: 4.2 CM
BH CV ECHO MEAS - LV DIASTOLIC VOL/BSA (35-75): 31.7 CM2
BH CV ECHO MEAS - LV MASS(C)D: 229.4 GRAMS
BH CV ECHO MEAS - LV MAX PG: 5.4 MMHG
BH CV ECHO MEAS - LV MEAN PG: 2.7 MMHG
BH CV ECHO MEAS - LV SYSTOLIC VOL/BSA (12-30): 15 CM2
BH CV ECHO MEAS - LV V1 MAX: 116.3 CM/SEC
BH CV ECHO MEAS - LV V1 VTI: 16.8 CM
BH CV ECHO MEAS - LVIDD: 4.8 CM
BH CV ECHO MEAS - LVIDS: 2.9 CM
BH CV ECHO MEAS - LVOT AREA: 3.3 CM2
BH CV ECHO MEAS - LVOT DIAM: 2.04 CM
BH CV ECHO MEAS - LVPWD: 1.18 CM
BH CV ECHO MEAS - MR MAX PG: 28.8 MMHG
BH CV ECHO MEAS - MR MAX VEL: 268.1 CM/SEC
BH CV ECHO MEAS - MV A MAX VEL: 84.7 CM/SEC
BH CV ECHO MEAS - MV DEC SLOPE: 222.8 CM/SEC2
BH CV ECHO MEAS - MV DEC TIME: 0.32 MSEC
BH CV ECHO MEAS - MV E MAX VEL: 71.3 CM/SEC
BH CV ECHO MEAS - MV E/A: 0.84
BH CV ECHO MEAS - MV MAX PG: 2.7 MMHG
BH CV ECHO MEAS - MV MEAN PG: 0.81 MMHG
BH CV ECHO MEAS - MV V2 VTI: 22.6 CM
BH CV ECHO MEAS - MVA(VTI): 2.44 CM2
BH CV ECHO MEAS - PA V2 MAX: 121.4 CM/SEC
BH CV ECHO MEAS - QP/QS: 0.99
BH CV ECHO MEAS - RAP SYSTOLE: 3 MMHG
BH CV ECHO MEAS - RV MAX PG: 3.9 MMHG
BH CV ECHO MEAS - RV V1 MAX: 98.6 CM/SEC
BH CV ECHO MEAS - RV V1 VTI: 16.6 CM
BH CV ECHO MEAS - RVDD: 3.3 CM
BH CV ECHO MEAS - RVOT DIAM: 2.04 CM
BH CV ECHO MEAS - RVSP: 39 MMHG
BH CV ECHO MEAS - SI(MOD-SP4): 16.7 ML/M2
BH CV ECHO MEAS - SV(LVOT): 55.1 ML
BH CV ECHO MEAS - SV(MOD-SP4): 34.2 ML
BH CV ECHO MEAS - SV(RVOT): 54.4 ML
BH CV ECHO MEAS - TR MAX PG: 36 MMHG
BH CV ECHO MEAS - TR MAX VEL: 299.9 CM/SEC
BH CV ECHO SHUNT ASSESSMENT PERFORMED (HIDDEN SCRIPTING): 1
BH CV XLRA MEAS LEFT DIST CCA EDV: -22.4 CM/SEC
BH CV XLRA MEAS LEFT DIST CCA PSV: -64 CM/SEC
BH CV XLRA MEAS LEFT DIST ICA EDV: -17.1 CM/SEC
BH CV XLRA MEAS LEFT DIST ICA PSV: -50 CM/SEC
BH CV XLRA MEAS LEFT ICA/CCA RATIO: -0.86
BH CV XLRA MEAS LEFT PROX CCA EDV: 23.6 CM/SEC
BH CV XLRA MEAS LEFT PROX CCA PSV: 85.1 CM/SEC
BH CV XLRA MEAS LEFT PROX ECA PSV: -122 CM/SEC
BH CV XLRA MEAS LEFT PROX ICA EDV: -25.2 CM/SEC
BH CV XLRA MEAS LEFT PROX ICA PSV: -73.5 CM/SEC
BH CV XLRA MEAS LEFT PROX SCLA PSV: 88.2 CM/SEC
BH CV XLRA MEAS LEFT VERTEBRAL A PSV: 47.2 CM/SEC
BH CV XLRA MEAS RIGHT DIST CCA EDV: 20.5 CM/SEC
BH CV XLRA MEAS RIGHT DIST CCA PSV: 62.7 CM/SEC
BH CV XLRA MEAS RIGHT DIST ICA EDV: -24.9 CM/SEC
BH CV XLRA MEAS RIGHT DIST ICA PSV: -59.6 CM/SEC
BH CV XLRA MEAS RIGHT ICA/CCA RATIO: -0.83
BH CV XLRA MEAS RIGHT PROX CCA EDV: 15.5 CM/SEC
BH CV XLRA MEAS RIGHT PROX CCA PSV: 84.5 CM/SEC
BH CV XLRA MEAS RIGHT PROX ECA PSV: -82 CM/SEC
BH CV XLRA MEAS RIGHT PROX ICA EDV: -21.7 CM/SEC
BH CV XLRA MEAS RIGHT PROX ICA PSV: -70.2 CM/SEC
BH CV XLRA MEAS RIGHT PROX SCLA PSV: 67.7 CM/SEC
BH CV XLRA MEAS RIGHT VERTEBRAL A PSV: -67.7 CM/SEC
BILIRUB SERPL-MCNC: 0.5 MG/DL (ref 0–1.2)
BUN SERPL-MCNC: 18 MG/DL (ref 8–23)
BUN/CREAT SERPL: 16.7 (ref 7–25)
CALCIUM SPEC-SCNC: 8.8 MG/DL (ref 8.6–10.5)
CHLORIDE SERPL-SCNC: 107 MMOL/L (ref 98–107)
CHOLEST SERPL-MCNC: 207 MG/DL (ref 0–200)
CO2 SERPL-SCNC: 25 MMOL/L (ref 22–29)
CREAT SERPL-MCNC: 1.08 MG/DL (ref 0.76–1.27)
DEPRECATED RDW RBC AUTO: 48.6 FL (ref 37–54)
EGFRCR SERPLBLD CKD-EPI 2021: 76.2 ML/MIN/1.73
ERYTHROCYTE [DISTWIDTH] IN BLOOD BY AUTOMATED COUNT: 16.1 % (ref 12.3–15.4)
GLOBULIN UR ELPH-MCNC: 2.1 GM/DL
GLUCOSE BLDC GLUCOMTR-MCNC: 85 MG/DL (ref 70–105)
GLUCOSE SERPL-MCNC: 89 MG/DL (ref 65–99)
HBA1C MFR BLD: 4.9 % (ref 3.5–5.6)
HCT VFR BLD AUTO: 38.2 % (ref 37.5–51)
HDLC SERPL-MCNC: 40 MG/DL (ref 40–60)
HGB BLD-MCNC: 12.7 G/DL (ref 13–17.7)
LDLC SERPL CALC-MCNC: 131 MG/DL (ref 0–100)
LDLC/HDLC SERPL: 3.16 {RATIO}
MAXIMAL PREDICTED HEART RATE: 155 BPM
MAXIMAL PREDICTED HEART RATE: 155 BPM
MCH RBC QN AUTO: 28.8 PG (ref 26.6–33)
MCHC RBC AUTO-ENTMCNC: 33.1 G/DL (ref 31.5–35.7)
MCV RBC AUTO: 86.8 FL (ref 79–97)
PLATELET # BLD AUTO: 119 10*3/MM3 (ref 140–450)
PMV BLD AUTO: 7.7 FL (ref 6–12)
POTASSIUM SERPL-SCNC: 3.7 MMOL/L (ref 3.5–5.2)
PROT SERPL-MCNC: 6 G/DL (ref 6–8.5)
RBC # BLD AUTO: 4.4 10*6/MM3 (ref 4.14–5.8)
SODIUM SERPL-SCNC: 141 MMOL/L (ref 136–145)
STRESS TARGET HR: 132 BPM
STRESS TARGET HR: 132 BPM
TRIGL SERPL-MCNC: 204 MG/DL (ref 0–150)
VLDLC SERPL-MCNC: 36 MG/DL (ref 5–40)
WBC NRBC COR # BLD: 5.5 10*3/MM3 (ref 3.4–10.8)

## 2023-01-10 PROCEDURE — 80061 LIPID PANEL: CPT | Performed by: NURSE PRACTITIONER

## 2023-01-10 PROCEDURE — 93880 EXTRACRANIAL BILAT STUDY: CPT

## 2023-01-10 PROCEDURE — 94799 UNLISTED PULMONARY SVC/PX: CPT

## 2023-01-10 PROCEDURE — 99223 1ST HOSP IP/OBS HIGH 75: CPT | Performed by: NURSE PRACTITIONER

## 2023-01-10 PROCEDURE — 0 IOPAMIDOL PER 1 ML: Performed by: HOSPITALIST

## 2023-01-10 PROCEDURE — 70496 CT ANGIOGRAPHY HEAD: CPT

## 2023-01-10 PROCEDURE — 80053 COMPREHEN METABOLIC PANEL: CPT | Performed by: NURSE PRACTITIONER

## 2023-01-10 PROCEDURE — 70551 MRI BRAIN STEM W/O DYE: CPT

## 2023-01-10 PROCEDURE — 93306 TTE W/DOPPLER COMPLETE: CPT

## 2023-01-10 PROCEDURE — 85027 COMPLETE CBC AUTOMATED: CPT | Performed by: NURSE PRACTITIONER

## 2023-01-10 PROCEDURE — 70498 CT ANGIOGRAPHY NECK: CPT

## 2023-01-10 PROCEDURE — 63710000001 PREDNISONE PER 5 MG: Performed by: NURSE PRACTITIONER

## 2023-01-10 PROCEDURE — 25010000002 HEPARIN (PORCINE) PER 1000 UNITS: Performed by: NURSE PRACTITIONER

## 2023-01-10 PROCEDURE — 94640 AIRWAY INHALATION TREATMENT: CPT

## 2023-01-10 PROCEDURE — 93306 TTE W/DOPPLER COMPLETE: CPT | Performed by: INTERNAL MEDICINE

## 2023-01-10 PROCEDURE — 83036 HEMOGLOBIN GLYCOSYLATED A1C: CPT | Performed by: NURSE PRACTITIONER

## 2023-01-10 PROCEDURE — 97161 PT EVAL LOW COMPLEX 20 MIN: CPT

## 2023-01-10 PROCEDURE — 97166 OT EVAL MOD COMPLEX 45 MIN: CPT | Performed by: OCCUPATIONAL THERAPIST

## 2023-01-10 PROCEDURE — 92523 SPEECH SOUND LANG COMPREHEN: CPT

## 2023-01-10 PROCEDURE — 82962 GLUCOSE BLOOD TEST: CPT

## 2023-01-10 PROCEDURE — 94761 N-INVAS EAR/PLS OXIMETRY MLT: CPT

## 2023-01-10 RX ORDER — GABAPENTIN 100 MG/1
100 CAPSULE ORAL 3 TIMES DAILY
Status: DISCONTINUED | OUTPATIENT
Start: 2023-01-10 | End: 2023-01-11 | Stop reason: HOSPADM

## 2023-01-10 RX ADMIN — BUTALBITAL, ACETAMINOPHEN, AND CAFFEINE 1 TABLET: 50; 325; 40 TABLET ORAL at 08:05

## 2023-01-10 RX ADMIN — Medication 10 ML: at 21:38

## 2023-01-10 RX ADMIN — BUDESONIDE AND FORMOTEROL FUMARATE DIHYDRATE 2 PUFF: 160; 4.5 AEROSOL RESPIRATORY (INHALATION) at 07:11

## 2023-01-10 RX ADMIN — LISINOPRIL 20 MG: 20 TABLET ORAL at 21:43

## 2023-01-10 RX ADMIN — HEPARIN SODIUM 5000 UNITS: 5000 INJECTION INTRAVENOUS; SUBCUTANEOUS at 08:03

## 2023-01-10 RX ADMIN — AZELASTINE HYDROCHLORIDE 1 SPRAY: 137 SPRAY, METERED NASAL at 08:03

## 2023-01-10 RX ADMIN — AMLODIPINE BESYLATE 10 MG: 5 TABLET ORAL at 08:05

## 2023-01-10 RX ADMIN — BUDESONIDE AND FORMOTEROL FUMARATE DIHYDRATE 2 PUFF: 160; 4.5 AEROSOL RESPIRATORY (INHALATION) at 20:55

## 2023-01-10 RX ADMIN — IOPAMIDOL 100 ML: 755 INJECTION, SOLUTION INTRAVENOUS at 12:32

## 2023-01-10 RX ADMIN — APIXABAN 5 MG: 5 TABLET, FILM COATED ORAL at 21:43

## 2023-01-10 RX ADMIN — ASPIRIN 81 MG: 81 TABLET, COATED ORAL at 08:05

## 2023-01-10 RX ADMIN — Medication 1000 UNITS: at 08:06

## 2023-01-10 RX ADMIN — LISINOPRIL 20 MG: 20 TABLET ORAL at 08:06

## 2023-01-10 RX ADMIN — ACETAMINOPHEN 650 MG: 325 TABLET, FILM COATED ORAL at 15:27

## 2023-01-10 RX ADMIN — APIXABAN 5 MG: 5 TABLET, FILM COATED ORAL at 14:21

## 2023-01-10 RX ADMIN — PREDNISONE 5 MG: 5 TABLET ORAL at 08:05

## 2023-01-10 RX ADMIN — MULTIPLE VITAMINS W/ MINERALS TAB 1 TABLET: TAB at 08:05

## 2023-01-10 RX ADMIN — ZINC SULFATE 220 MG (50 MG) CAPSULE 220 MG: CAPSULE at 08:05

## 2023-01-10 NOTE — CONSULTS
Primary Care Provider: Herrera Sarabia MD     Consult requested by:  GARY Elena    Reason for Consultation: Neurological evaluation    History taken from: patient chart RN    Chief complaint: Intractable headache        SUBJECTIVE:    History of present illness: Background per H&P: Alejandro Bain is a 67 y.o. year old male who presented to [facility] on 1/9/2023 complaining of 1/2-week history of a headache that has progressively worsened over the last four days, and sudden loss of peripheral vision.  Patient does report that he seen his PCP last Thursday who prescribed him Imitrex for suspected migraine headache. He has history of migraines approximately 20 years ago.  He reports his headache is a 6/10 constant dull throbbing with associated visual disturbances, feeling like his eyes are crossed, difficulty ambulating, with stumbling and feeling as if his depth perception is off or absent. He states headache initially started behind his left and has progressively radiated across his head he denies any known exacerbating or eliminating factors.  He denies any other acute distress, chest pain, shortness of breath, palpitations, syncope, lightheadedness, fever, chills, abdominal pain, nausea, vomiting, diarrhea, constipation, or  complaints.     Initial evaluation in the emergency department showed unremarkable CMP, thrombocytopenia platelets 122 that have improved from 46 December 15, and 72 December 19.  Chest x-ray shows no acute cardiopulmonary processes,  CT scan remarkable for left posterior temporal lobe left occipital lobes subacute to developing chronic infarct.    Past medical history of diastolic congestive heart failure, AAA s/p repair, atrial fibrillation, CAD, s/p CABG, s/p AVR asthma, anxiety, depression thrombocytopenia, HLD, HTN, hypokalemia, and recent COVID-19 infection 3 weeks ago.    Records reviewed: Pt recently admitted on 12/14/22 for acute covid infection, dyspnea,  cough, body aches, headache, sore throat, back pain.  D-dimer greater than 35.  CT PE protocol shows no evidence of pulmonary embolism. Doppler ultrasound of the lower extremities (12/15) negative for DVT    - Portions of the above HPI were copied from previous encounters and edited as appropriate. PMH as detailed below.     Pt states he feels better today. He has very little right peripheral vision loss. He continues to have a bifrontal headache which he states has improved. No focal deficits otherwise.     Pt states he had a brief episode of afib after his CABG a few years ago and had another episode 2 years later. He has not had any further evaluation for this and has not been on anticoagulation. He denies any recent palpitations.     Review of Systems   Constitutional: Negative for chills, diaphoresis and fatigue.   Eyes: Positive for visual disturbance. Negative for photophobia.   Neurological: Negative for dizziness, tremors, seizures, syncope, facial asymmetry, speech difficulty, weakness, light-headedness, numbness and headaches.         PATIENT HISTORY:  Past Medical History:   Diagnosis Date   • Acute on chronic diastolic heart failure (HCC) 12/14/2022   • Aneurysm (HCC)    • Anxiety    • Asthma    • Atrial fibrillation (HCC)    • Bronchitis    • Coronary artery disease    • Depression    • Encounter for laboratory testing for COVID-19 virus 08/22/2022   • Gout    • Hyperlipidemia    • Hypertension    • Intractable headache 1/9/2023   • Myocardial infarction (HCC)    • Pneumonia    • Pulmonary nodule    • Familia Mountain spotted fever    ,   Past Surgical History:   Procedure Laterality Date   • AORTIC VALVE REPAIR/REPLACEMENT  12/03/2016    CABG x 5/ tissue AVR by DR. PHAN   • CARDIAC CATHETERIZATION     • CARDIAC SURGERY     • CARDIOVASCULAR STRESS TEST  2020   • CAROTID STENT     • CORONARY ARTERY BYPASS GRAFT  12/02/2016    X4  Dr Phan   • ILIAC ARTERY ANEURYSM REPAIR  01/05/2017    abdominal and  bilateral   • OTHER SURGICAL HISTORY      [facility]   • SINUS SURGERY     • VASCULAR SURGERY     ,   Family History   Problem Relation Age of Onset   • Cancer Father         brain   • Cancer Sister         lung   • Heart disease Sister    • Pulmonary fibrosis Mother    • Heart disease Mother    • Cancer Brother         pancreatic, lung   ,   Social History     Tobacco Use   • Smoking status: Never   • Smokeless tobacco: Never   Vaping Use   • Vaping Use: Never used   Substance Use Topics   • Alcohol use: Not Currently     Comment: quit 2005   • Drug use: No   ,   Prior to Admission medications    Medication Sig Start Date End Date Taking? Authorizing Provider   amLODIPine (NORVASC) 10 MG tablet TAKE 1 TABLET BY MOUTH EVERY DAY 8/30/22  Yes Maciej Blank MD   aspirin 81 MG EC tablet Take 81 mg by mouth Daily.   Yes Emergency, Nurse Epic, RN   azelastine (ASTELIN) 0.1 % nasal spray 1 spray into the nostril(s) as directed by provider 2 (Two) Times a Day. 2/23/22  Yes Emergency, Nurse CAROL Cruz   cholecalciferol (VITAMIN D3) 25 MCG (1000 UT) tablet Take 1,000 Units by mouth Daily.   Yes Chasity Turner MD   lisinopril (PRINIVIL,ZESTRIL) 20 MG tablet Take 20 mg by mouth 2 (Two) Times a Day. 12/6/22  Yes Chasity Turner MD   Multiple Vitamins-Minerals (MULTIVITAMIN ADULT PO) Take 1 tablet by mouth Daily.   Yes Chasity Turner MD   predniSONE (DELTASONE) 5 MG tablet Take 5 mg by mouth Daily. Pt has five days of therapy left   Yes Chasity Turner MD   SYMBICORT 160-4.5 MCG/ACT inhaler Inhale 2 puffs 2 (Two) Times a Day. 11/8/16  Yes Chasity Turner MD   VENTOLIN  (90 Base) MCG/ACT inhaler Inhale 2 puffs Every 6 (Six) Hours As Needed. 1/22/20  Yes Chasity Turner MD   zinc sulfate (ZINCATE) 220 (50 Zn) MG capsule Take 220 mg by mouth Daily.   Yes Chasity Turner MD    Allergies:  Avelox [moxifloxacin], Penicillins, Sulfa antibiotics, and Doxycycline    Current Facility-Administered  Medications   Medication Dose Route Frequency Provider Last Rate Last Admin   • acetaminophen (TYLENOL) tablet 650 mg  650 mg Oral Q4H PRN Lor Kearns APRN        Or   • acetaminophen (TYLENOL) suppository 650 mg  650 mg Rectal Q4H PRN Lor Kearns APRN       • albuterol (PROVENTIL) nebulizer solution 0.083% 2.5 mg/3mL  2.5 mg Nebulization Q6H PRN Lor Kearns APRN       • amLODIPine (NORVASC) tablet 10 mg  10 mg Oral Daily Lor Kearns APRN   10 mg at 01/10/23 0805   • aspirin EC tablet 81 mg  81 mg Oral Daily Lor Kearns APRN   81 mg at 01/10/23 0805   • atorvastatin (LIPITOR) tablet 80 mg  80 mg Oral Nightly Lor Kearns APRN   80 mg at 01/09/23 2217   • azelastine (ASTELIN) nasal spray 1 spray  1 spray Each Nare BID Lor Kearns APRN   1 spray at 01/10/23 0803   • bisacodyl (DULCOLAX) suppository 10 mg  10 mg Rectal Daily PRN Lor Kearns APRN       • budesonide-formoterol (SYMBICORT) 160-4.5 MCG/ACT inhaler 2 puff  2 puff Inhalation BID - RT Lor Kearns APRN   2 puff at 01/10/23 0711   • butalbital-acetaminophen-caffeine (FIORICET, ESGIC) -40 MG per tablet 1 tablet  1 tablet Oral Q4H PRN Lor Kearns APRN   1 tablet at 01/10/23 0805   • cholecalciferol (VITAMIN D3) tablet 1,000 Units  1,000 Units Oral Daily Lor Kearns APRN   1,000 Units at 01/10/23 0806   • docusate sodium (COLACE) capsule 100 mg  100 mg Oral BID PRN Lor Kearns APRN       • heparin (porcine) 5000 UNIT/ML injection 5,000 Units  5,000 Units Subcutaneous Q12H Lor Kearns APRN   5,000 Units at 01/10/23 0803   • HYDROcodone-acetaminophen (NORCO) 5-325 MG per tablet 1 tablet  1 tablet Oral Q4H PRN Lor Kearns APRN       • lisinopril (PRINIVIL,ZESTRIL) tablet 20 mg  20 mg Oral BID Lor Kearns APRN   20 mg at 01/10/23 0806   • multivitamin with minerals 1 tablet  1 tablet  Oral Daily Lor Kearns APRN   1 tablet at 01/10/23 0805   • niCARdipine (CARDENE) 25mg in 250mL NS infusion  5-15 mg/hr Intravenous Titrated Lor Kearns APRN       • ondansetron (ZOFRAN) injection 4 mg  4 mg Intravenous Q6H PRN Lor Kearns APRN       • predniSONE (DELTASONE) tablet 5 mg  5 mg Oral Daily Lor Kearns APRN   5 mg at 01/10/23 0805   • sodium chloride 0.9 % flush 10 mL  10 mL Intravenous PRN Erika Dhaliwal APRN       • sodium chloride 0.9 % flush 10 mL  10 mL Intravenous Q12H Lor Kearns APRN   10 mL at 01/09/23 2317   • sodium chloride 0.9 % flush 10 mL  10 mL Intravenous PRN Lor Kearns APRN       • sodium chloride 0.9 % infusion 40 mL  40 mL Intravenous PRN Lor Kearns APRN       • zinc sulfate (ZINCATE) capsule 220 mg  220 mg Oral Daily Lor Kearns APRN   220 mg at 01/10/23 0805        ________________________________________________________        OBJECTIVE:  Upon today's exam, pt is awake and alert. Pleasant, cooperative. Wife at BS.      Neurologic Exam  PHYSICAL EXAM:    CONSTITUTIONAL: The patient is in no apparent distress, bright awake and alert.      HEENT: There is no tenderness over the temporal arteries bilaterally. Normocephalic, atraumatic. TMJ open symmetrically without tenderness.    NECK: ROM normal, supple    RESPIRATORY: Breathing unlabored, Breath sounds are normal    CARDIAC: Regular rate and rhythm.     EXTREMITIES:  No clubbing, cyanosis or edema.    PSYCHIATRIC: Mood/affect normal, judgement normal, appropriate    NEUROLOGICAL:    Cognition:   Fully oriented.  Fund of knowledge excellent.  Concentration and attention normal.   Language normal with normal comprehension, fluent speech, intact repetition and naming.   Short and long term memory appears intact    Cranial nerves;    II - pupils bilaterally equal reacting to light,  Minimal far right homonymous hemianopia, difficult to  appreciate on exam  Fundoscopic exam- Not able to be done, non-dilated exam  III,IV,VI: EOMI with no diplopia  V: Normal facial sensations  VII: No facial asymmetry,  VIII: No New hearing abnormality  IX, X, XI: normal gag and shoulder shrug;  XII: tongue is in the midline.    Sensory:  Intact to light touch in all extremities.     Motor: Strength 5/5 bilaterally upper and lower extremities. No involuntary movements present. Normal tone and bulk.  Deep tendon reflexes: 2/4 and symmetrical in biceps, brachioradialis, triceps, bilateral 2/4 knees and ankles. Both plantars are flexor.    Cerebellar: Finger to nose and mirror movements normal bilaterally.    Gait and balance: deferred    ________________________________________________________   RESULTS REVIEW:    VITAL SIGNS:   Temp:  [97.6 °F (36.4 °C)-98.3 °F (36.8 °C)] 97.6 °F (36.4 °C)  Heart Rate:  [59-74] 72  Resp:  [12-19] 17  BP: (126-141)/(71-91) 141/82     LABS:      Lab 01/10/23  0332 01/09/23  1530   WBC 5.50 7.40   HEMOGLOBIN 12.7* 13.4   HEMATOCRIT 38.2 40.2   PLATELETS 119* 122*   NEUTROS ABS  --  5.90   LYMPHS ABS  --  0.60*   MONOS ABS  --  0.60   EOS ABS  --  0.20   MCV 86.8 87.6   PROTIME  --  11.3         Lab 01/10/23  0332 01/09/23  1530   SODIUM 141 141   POTASSIUM 3.7 3.8   CHLORIDE 107 104   CO2 25.0 24.0   ANION GAP 9.0 13.0   BUN 18 18   CREATININE 1.08 1.22   EGFR 76.2 65.8   GLUCOSE 89 113*   CALCIUM 8.8 9.3   MAGNESIUM  --  2.2   HEMOGLOBIN A1C 4.9  --    TSH  --  1.530         Lab 01/10/23  0332 01/09/23  1530   TOTAL PROTEIN 6.0 6.5   ALBUMIN 3.9 4.1   GLOBULIN 2.1 2.4   ALT (SGPT) 20 21   AST (SGOT) 23 22   BILIRUBIN 0.5 0.4   ALK PHOS 84 89         Lab 01/09/23  1530   PROTIME 11.3   INR 1.10         Lab 01/10/23  0332   CHOLESTEROL 207*   LDL CHOL 131*   HDL CHOL 40   TRIGLYCERIDES 204*                 Lab Results   Component Value Date    TSH 1.530 01/09/2023     (H) 01/10/2023    HGBA1C 4.9 01/10/2023       IMAGING  STUDIES:  CT Head Without Contrast    Result Date: 1/9/2023  Impression: 1. Left posterior temporal lobe-left occipital lobe suspected subacute to evolving chronic infarct. Correlate with clinical findings and onset of symptoms. No hemorrhagic transformation is seen. I spoke with the clinician in the emergency room at the time of this dictation. Electronically Signed: Kelin Bundysalinas  1/9/2023 5:01 PM EST  Workstation ID: [id number]    XR Chest 1 View    Result Date: 1/9/2023  Impression: 1.No radiographic findings of acute cardiopulmonary abnormality. 2.Status post cardiac surgery. Electronically Signed: Sukhdeep Elton  1/9/2023 3:47 PM EST  Workstation ID: [id number]      I reviewed the patient's new clinical results.    ________________________________________________________     PROBLEM LIST:    CVA (cerebral vascular accident) (HCC)    S/P CABG x 5 by Dr. Phan    S/P AVR (tissue) by Dr. Phan    S/P AAA repair    Essential hypertension    Paroxysmal atrial fibrillation (HCC)    Coronary artery disease involving native coronary artery of native heart without angina pectoris    Mixed hyperlipidemia    Thrombocytopenia (HCC)    Intractable headache      ASSESSMENT/PLAN:  1. Subacute left PCA ischemic stroke,  Likely embolic. Pt with hx of atrial fibrillation and recent Covid infection likely leading to a prothrombotic state.   - CT head: Left posterior temporal lobe-left occipital lobe suspected subacute to evolving chronic infarct. No hemorrhagic transformation.  - CTA head and neck: pending  - MRI brain: Acute/subacute left PCA territory infarct involving the hippocampus, left parietal white matter and left occipital lobe. No hemorrhage. Mild chronic small vessel ischemic change. Moderate paranasal sinus mucosal disease. Images reviewed: Subacute left PCA stroke.   - Carotid duplex: No significant stenosis bilaterally   - Echo: Report pending   - EKG: Sinus rhythm  - Labs: A1C: 4.9, B12: P, LDL: 131, TSH: 1.53  -  Antithrombotics: Pt on home ASA, continue for hx of CAD. Recommend to start anticoagulation for stroke prevention with afib. Detailed discussion regarding treatment options, risks/benefits/side effects, risk of future events, potential causes of stroke. Pt and wife elect to proceed with Eliquis and they agree with continuing ASA.   - Statin: Lipitor 80mg qhs   - PT/OT/ST as appropriate, fall precautions as appropriate, Neuro checks per protocol, DVT prophylaxis, Stroke education    2. Headache, improving   - Headache is a common feature with posterior circulation strokes. No evidence of significant edema or hemorrhage on CT.   - Pt had improvement with droperidol  - Trial of gabapentin 100mg TID short term    3. Atrial fibrillation, brief episode after CABG followed by another episode about 2 years later. Pt states he has not been fully evaluated for this.   - SR on admit  - Telemetry  - [id number] Score: 6 (1/10/2023 11:17 AM)  - Strongly recommend to start anticoagulation for stroke prevention  - Recommend cardiac evaluation and Event monitor     4. Subacute thrombocytopenia   - Reported onset post-Covid 3 weeks ago, on prednisone  - Plts improved to 122 on admit (Was 147 on 12/14 then acute drop to 46 on 12/15, 72 on 12/19)  - Patient may benefit from hematology consult if continued decline.    5. Recently Elevated D-dimer when Covid positive   - D-dimer >35.2 on 12/14, Activated clotting time 276 on 1/5/17.   - Workup on 12/14 admission showed CT PE protocol shows no evidence of pulmonary embolism. Doppler ultrasound of the lower extremities (12/15) negative for DVT    6. CAD s/p CABG x5, AVR, AAA repair  - Telemetry, continue ASA    7. Hypertension  - Strict BP control, monitor per protocol    8. HLD  - Statin, recommend diet and lifestyle modifications    Modification of stroke risk factors:   - Blood pressure should be less than 130/80 outpatient, HbA1c less than 6.5, LDL less than 70; b12>500 and smoking  cessation if applicable. We would be grateful if the primary team / primary care physician would keep a close watch on the above targets.  - Stroke education  - Follow up with neurologist of choice    Will follow     I discussed the patient's findings and my recommendations with patient, nursing staff and consulting provider    GARY Nicholson  01/10/23  11:03 EST

## 2023-01-10 NOTE — PLAN OF CARE
Goal Outcome Evaluation:   Pt was seen for speech/language/cognitive evaluation and presents with mild cognitive/language deficits including immediate/short-term memory, simple math word-problems, categorization, attention to detail, and word-finding. Skilled speech therapy is recommend for pt to address deficits. Outpatient therapy after discharge may also benefit this pt. Education provided to pt and his wife on rationale for continued therapy and both verbalized agreement.

## 2023-01-10 NOTE — THERAPY EVALUATION
Patient Name: Alejandro Bain  : 1955    MRN: [id number]                              Today's Date: 1/10/2023       Admit Date: 2023    Visit Dx:     ICD-10-CM ICD-9-CM   1. Stroke associated with COVID-19 (HCC)  U07.1 434.91    I63.9 079.89   2. Visual loss, right eye  H54.61 369.8   3. Acute intractable headache, unspecified headache type  R51.9 784.0   4. Dysarthria  R47.1 784.51   5. Balance problem  R26.89 781.99   6. Thrombocytopenia (HCC)  D69.6 287.5     Patient Active Problem List   Diagnosis   • S/P CABG x 5 by Dr. Phan   • S/P AVR (tissue) by Dr. 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HCC)   • Aneurysm of iliac artery (HCC)   • Paroxysmal atrial fibrillation (HCC)   • Coronary artery disease involving native coronary artery of native heart without angina pectoris   • Mixed hyperlipidemia   • Palpitations   • Shortness of breath   • Acute upper back pain   • Asthma   • Thrombocytopenia (HCC)   • Encounter for laboratory testing for COVID-19 virus   • COVID   • Leukocytosis   • Elevated LFTs   • Acute renal insufficiency   • Acute on chronic diastolic heart failure (HCC)   • Acute respiratory distress   • CVA (cerebral vascular accident) (HCC)   • Intractable headache     Past Medical History:   Diagnosis Date   • Acute on chronic diastolic heart failure (HCC) 2022   • Aneurysm (HCC)    • Anxiety    • Asthma    • Atrial fibrillation (HCC)    • Bronchitis    • Coronary artery disease    • Depression    • Encounter for laboratory testing for COVID-19 virus 2022   • Gout    • Hyperlipidemia    • Hypertension    • Intractable headache 2023   • Myocardial infarction (HCC)    • Pneumonia    • Pulmonary nodule    • Familia [place] spotted fever      Past Surgical History:   Procedure Laterality Date   • AORTIC VALVE  REPAIR/REPLACEMENT  12/03/2016    CABG x 5/ tissue AVR by DR. PHAN   • CARDIAC CATHETERIZATION     • CARDIAC SURGERY     • CARDIOVASCULAR STRESS TEST  2020   • CAROTID STENT     • CORONARY ARTERY BYPASS GRAFT  12/02/2016    X4  Dr Phan   • ILIAC ARTERY ANEURYSM REPAIR  01/05/2017    abdominal and bilateral   • OTHER SURGICAL HISTORY      [facility]   • SINUS SURGERY     • VASCULAR SURGERY        General Information     Row Name 01/10/23 1413          OT Time and Intention    Document Type evaluation  -DT     Mode of Treatment occupational therapy  -DT     Row Name 01/10/23 1413          General Information    Patient Profile Reviewed yes  -DT     Prior Level of Function independent:;work;all household mobility;community mobility;ADL's;driving  Pt states he quit work the day of admission.  -DT     Existing Precautions/Restrictions no known precautions/restrictions  -DT     Barriers to Rehab none identified  -DT     Row Name 01/10/23 1413          Living Environment    People in Home spouse  -DT     Row Name 01/10/23 1413          Home Main Entrance    Number of Stairs, Main Entrance five  -DT     Row Name 01/10/23 1413          Stairs Within Home, Primary    Number of Stairs, Within Home, Primary none  -DT     Row Name 01/10/23 1413          Cognition    Orientation Status (Cognition) oriented x 4  -DT     Row Name 01/10/23 1413          Safety Issues, Functional Mobility    Impairments Affecting Function (Mobility) visual/perceptual  -DT           User Key  (r) = Recorded By, (t) = Taken By, (c) = Cosigned By    Initials Name Provider Type    DT Viviana Lopez, OT Occupational Therapist                 Mobility/ADL's     Row Name 01/10/23 1415          Sit-Stand Transfer    Sit-Stand [place] (Transfers) independent  -DT     Row Name 01/10/23 1415          Activities of Daily Living    BADL Assessment/Intervention lower body dressing  -DT     Row Name 01/10/23 1415          Lower Body Dressing  Assessment/Training    [place] Level (Lower Body Dressing) lower body dressing skills;independent  -DT           User Key  (r) = Recorded By, (t) = Taken By, (c) = Cosigned By    Initials Name Provider Type    Viviana Clemons OT Occupational Therapist               Obj/Interventions     Row Name 01/10/23 1415          Vision Assessment/Intervention    Visual Impairment/Limitations blurry vision;corrective lenses for distance  Peripheral vision, visual tracking, convergence, depth perception seem all intact. Pt stating he does still feel like he distance vision is a little more blurry than prior status & has double vision at times, but did not have it at time of OT eval.  -DT     Row Name 01/10/23 1415          Range of Motion Comprehensive    General Range of Motion no range of motion deficits identified  -DT     Row Name 01/10/23 1415          Strength Comprehensive (MMT)    General Manual Muscle Testing (MMT) Assessment no strength deficits identified  -DT     Row Name 01/10/23 1415          Balance    Balance Assessment sitting dynamic balance;sitting static balance;standing static balance;standing dynamic balance  -DT     Static Sitting Balance independent  -DT     Dynamic Sitting Balance independent  -DT     Static Standing Balance independent  -DT     Dynamic Standing Balance independent  -DT           User Key  (r) = Recorded By, (t) = Taken By, (c) = Cosigned By    Initials Name Provider Type    Viviana Clemons, OT Occupational Therapist               Goals/Plan    No documentation.                Clinical Impression     Row Name 01/10/23 1418          Pain Assessment    Pretreatment Pain Rating 3/10  -DT     Posttreatment Pain Rating 3/10  -DT     Pain Location - head  -DT     Pre/Posttreatment Pain Comment Pt c/o left side & frontal headache today.  -DT     Pain Intervention(s) Therapeutic presence  -DT     Row Name 01/10/23 1418          Plan of Care Review    Outcome  "Evaluation Pt is a 67 y.o. male admitted with 10 day Hx of left frontal headache with pain radiating behind left eye. He also had s/s of vision disturbances including depth perception & feeling \"cross-eyed\" & felt like he was stumbling. CT positive for L posterior temporal & occipatal subacute infarcts. Pt lives with spouse & is independent at all levels. He is retired, but had been working PT recently, but states he quit his job the day before being admitted. Upon eval, pt stating he still is having headache pain on the left side at 3/10. He has no significant deficits with ADLs, mobility, strength or balance. Visual tracking, convergence, depth perception skills all seem intact at this time. Pt reporting he feels his distant vision is more blurry than it normally is & he has times of double vision, but not at the time of the eval. No further OT tx recommended at this time as no significant deficits noted at this time. Recommend home with spouse once pt is medically stable. Will sign off for OT.  -DT     Row Name 01/10/23 1418          Therapy Assessment/Plan (OT)    Criteria for Skilled Therapeutic Interventions Met (OT) no problems identified which require skilled intervention  -DT     Row Name 01/10/23 1418          Therapy Plan Review/Discharge Plan (OT)    Anticipated Discharge Disposition (OT) home  -DT     Row Name 01/10/23 1418          Vital Signs    O2 Delivery Pre Treatment room air  -DT     O2 Delivery Intra Treatment room air  -DT     O2 Delivery Post Treatment room air  -DT     Row Name 01/10/23 1418          Positioning and Restraints    Pre-Treatment Position sitting in chair/recliner  -DT     Post Treatment Position chair  -DT     In Chair notified nsg;call light within reach;with family/caregiver  -DT           User Key  (r) = Recorded By, (t) = Taken By, (c) = Cosigned By    Initials Name Provider Type    DT Viviana Lopez, OT Occupational Therapist               Outcome Measures     Row " Name 01/10/23 1253          How much help from another person do you currently need...    Turning from your back to your side while in flat bed without using bedrails? 4  -CM     Moving from lying on back to sitting on the side of a flat bed without bedrails? 4  -CM     Moving to and from a bed to a chair (including a wheelchair)? 4  -CM     Standing up from a chair using your arms (e.g., wheelchair, bedside chair)? 4  -CM     Climbing 3-5 steps with a railing? 4  -CM     To walk in hospital room? 4  -CM     AM-PAC 6 Clicks Score (PT) 24  -CM     Highest level of mobility 8 --> Walked 250 feet or more  -CM     Row Name 01/10/23 1428 01/10/23 1253       Modified [place] Scale    Pre-Stroke Modified [place] Scale 0 - No Symptoms at all.  -DT 0 - No Symptoms at all.  -CM    Modified [place]am Scale 0 - No Symptoms at all.  -DT 1 - No significant disability despite symptoms.  Able to carry out all usual duties and activities.  -CM    Row Name 01/10/23 1428 01/10/23 1253       Functional Assessment    Outcome Measure Options Modified [place]  -DT Modified [place];AM-PAC 6 Clicks Basic Mobility (PT)  -CM          User Key  (r) = Recorded By, (t) = Taken By, (c) = Cosigned By    Initials Name Provider Type    CM Jadyn Keller, PT Physical Therapist    DT Viviana Lopez, OT Occupational Therapist                Occupational Therapy Education     Title: PT OT SLP Therapies (Done)     Topic: Occupational Therapy (Done)     Point: Precautions (Done)     Description:   Instruct learner(s) on prescribed precautions during self-care and functional transfers.              Learning Progress Summary           Patient Acceptance, E,TB, VU by DT at 1/10/2023 1429    Comment: Role of OT, s/s of CVA & importance of reporting to ED ASAP if s/s occur.   Family Acceptance, E,TB, VU by DT at 1/10/2023 1429    Comment: Role of OT, s/s of CVA & importance of reporting to ED ASAP if s/s occur.                               User Key      "Initials Effective Dates Name Provider Type Discipline    DT 11/03/22 -  Viviana Lopez, OT Occupational Therapist OT              OT Recommendation and Plan     Plan of Care Review  Outcome Evaluation: Pt is a 67 y.o. male admitted with 10 day Hx of left frontal headache with pain radiating behind left eye. He also had s/s of vision disturbances including depth perception & feeling \"cross-eyed\" & felt like he was stumbling. CT positive for L posterior temporal & occipatal subacute infarcts. Pt lives with spouse & is independent at all levels. He is retired, but had been working PT recently, but states he quit his job the day before being admitted. Upon eval, pt stating he still is having headache pain on the left side at 3/10. He has no significant deficits with ADLs, mobility, strength or balance. Visual tracking, convergence, depth perception skills all seem intact at this time. Pt reporting he feels his distant vision is more blurry than it normally is & he has times of double vision, but not at the time of the eval. No further OT tx recommended at this time as no significant deficits noted at this time. Recommend home with spouse once pt is medically stable. Will sign off for OT.     Time Calculation:              Viviana Lopez, OT  1/10/2023  "

## 2023-01-10 NOTE — CONSULTS
Diabetes Education    Patient Name:  Alejandro Bain  YOB: 1955  MRN: [id number]  Admit Date:  1/9/2023      Consult received per stroke protocol being ordered. Pt does not have hx of diabetes per problem list. Pt does not take diabetes medication at home and is not receiving diabetes medication in hospital. A1c this adm 4.9%. Diabetes education not needed at this time.       Electronically signed by:  Colleen Diamond RN  01/10/23 10:12 EST

## 2023-01-10 NOTE — PLAN OF CARE
Goal Outcome Evaluation:  Plan of Care Reviewed With: patient, spouse        Progress: improving  Outcome Evaluation: 68 yo male adm 1/9/23 after 3-4 days of severe headache w/ visual changes. Pt was seen by PCP, who thought pt was having migraine, as pt has hx of migraines. When headache (L temporal) became severe, pt proceeded to ER, where he was dx w/ L posterior temporal and L occipital subacute infarcts per CT scan. Hx of migraines, cabg, aaa repair. At baseline, pt lives w/ his wife and is completely independent. He actually retired from his job on day prior to onset of symptoms. Pt is normally completely independent for ADLs, driving, ambulating community distances. Today, pt presents w/ R lower quadrant visual loss, as well as L lateral tongue deviation w/ protrusion. However, no other deficits are noted. Pt is able to come to sit, stand, and amb 350 ft independently w/ normal gait. No deficits in strength found. No need for skilled PT at this time. Reviewed cva signs/symptoms/management w/ pt and wife, as well as gave handout. Recommend home when medically stable. Will sign off.

## 2023-01-10 NOTE — PLAN OF CARE
"Goal Outcome Evaluation:          Outcome Evaluation: Pt is a 67 y.o. male admitted with 10 day Hx of left frontal headache with pain radiating behind left eye. He also had s/s of vision disturbances including depth perception & feeling \"cross-eyed\" & felt like he was stumbling. CT positive for L posterior temporal & occipatal subacute infarcts. Pt lives with spouse & is independent at all levels. He is retired, but had been working PT recently, but states he quit his job the day before being admitted. Upon eval, pt stating he still is having headache pain on the left side at 3/10. He has no significant deficits with ADLs, mobility, strength or balance. Visual tracking, convergence, depth perception skills all seem intact at this time. Pt reporting he feels his distant vision is more blurry than it normally is & he has times of double vision, but not at the time of the eval. No further OT tx recommended at this time as no significant deficits noted at this time. Recommend home with spouse once pt is medically stable. Will sign off for OT.  "

## 2023-01-10 NOTE — PROGRESS NOTES
[facility]     Progress Note    Patient Name: Alejandro Bain  : 1955  MRN: [id number]  Primary Care Physician:  Herrera Sarabia MD  Date of admission: 2023  Service date and time: 01/10/23 14:43 EST  Subjective   Subjective     Chief Complaint: intractable headache    HPI:  Patient Reports feeling better      Objective   Objective     Vitals:   Temp:  [97.6 °F (36.4 °C)-98.3 °F (36.8 °C)] 97.6 °F (36.4 °C)  Heart Rate:  [59-72] 72  Resp:  [12-19] 17  BP: (126-141)/(71-91) 141/82  Physical Exam    Constitutional: Awake, alert   Eyes: PERRLA, sclerae anicteric, no conjunctival injection   HENT: NCAT, mucous membranes moist   Neck: Supple, no thyromegaly, no lymphadenopathy, trachea midline   Respiratory: Clear to auscultation bilaterally, nonlabored respirations    Cardiovascular: RRR, no murmurs, rubs, or gallops, palpable pedal pulses bilaterally   Gastrointestinal: Positive bowel sounds, soft, nontender, nondistended   Musculoskeletal: No bilateral ankle edema, no clubbing or cyanosis to extremities   Psychiatric: Appropriate affect, cooperative   Neurologic: Oriented x 3, strength symmetric in all extremities, Cranial Nerves grossly intact to confrontation, speech clear   Skin: No rashes     Result Review    Result Review:  I have personally reviewed the results from the time of this admission to 1/10/2023 14:43 EST and agree with these findings:  [x]  Laboratory list / accordion  [x]  Microbiology  [x]  Radiology  [x]  EKG/Telemetry   [x]  Cardiology/Vascular   []  Pathology  []  Old records  []  Other:        Assessment & Plan   Assessment / Plan       Active Hospital Problems:  Active Hospital Problems    Diagnosis    • **CVA (cerebral vascular accident) (HCC)    • Intractable headache    • Thrombocytopenia (HCC)    • Paroxysmal atrial fibrillation (HCC)    • Mixed hyperlipidemia    • Essential hypertension    • S/P AAA repair    • Coronary artery disease involving native coronary artery of  native heart without angina pectoris    • S/P AVR (tissue) by Dr. Phan    • S/P CABG x 5 by Dr. Phan      Plan:    - neuro following, to get CTA head and neck, MRI brain  - f/u echo and carotid doppler results, telemetry  - ASA/statin,   - neuro checks  - PT/OT  - cont home meds as able    DVT prophylaxis:  Medical DVT prophylaxis orders are present.    CODE STATUS:   Code Status (Patient has no pulse and is not breathing): CPR (Attempt to Resuscitate)  Medical Interventions (Patient has pulse or is breathing): Full Support  Release to patient: Routine Release    Disposition:  I expect patient to be discharged 1-2 days    Remberto Gannon MD

## 2023-01-10 NOTE — THERAPY EVALUATION
Patient Name: Alejandro Bain  : 1955    MRN: [id number]                              Today's Date: 1/10/2023       Admit Date: 2023    Visit Dx:     ICD-10-CM ICD-9-CM   1. Stroke associated with COVID-19 (HCC)  U07.1 434.91    I63.9 079.89   2. Visual loss, right eye  H54.61 369.8   3. Acute intractable headache, unspecified headache type  R51.9 784.0   4. Dysarthria  R47.1 784.51   5. Balance problem  R26.89 781.99   6. Thrombocytopenia (HCC)  D69.6 287.5     Patient Active Problem List   Diagnosis   • S/P CABG x 5 by Dr. Phan   • S/P AVR (tissue) by Dr. 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HCC)   • Aneurysm of iliac artery (HCC)   • Paroxysmal atrial fibrillation (HCC)   • Coronary artery disease involving native coronary artery of native heart without angina pectoris   • Mixed hyperlipidemia   • Palpitations   • Shortness of breath   • Acute upper back pain   • Asthma   • Thrombocytopenia (HCC)   • Encounter for laboratory testing for COVID-19 virus   • COVID   • Leukocytosis   • Elevated LFTs   • Acute renal insufficiency   • Acute on chronic diastolic heart failure (HCC)   • Acute respiratory distress   • CVA (cerebral vascular accident) (HCC)   • Intractable headache     Past Medical History:   Diagnosis Date   • Acute on chronic diastolic heart failure (HCC) 2022   • Aneurysm (HCC)    • Anxiety    • Asthma    • Atrial fibrillation (HCC)    • Bronchitis    • Coronary artery disease    • Depression    • Encounter for laboratory testing for COVID-19 virus 2022   • Gout    • Hyperlipidemia    • Hypertension    • Intractable headache 2023   • Myocardial infarction (HCC)    • Pneumonia    • Pulmonary nodule    • Familia [place] spotted fever      Past Surgical History:   Procedure Laterality Date   • AORTIC VALVE  REPAIR/REPLACEMENT  12/03/2016    CABG x 5/ tissue AVR by DR. PHAN   • CARDIAC CATHETERIZATION     • CARDIAC SURGERY     • CARDIOVASCULAR STRESS TEST  2020   • CAROTID STENT     • CORONARY ARTERY BYPASS GRAFT  12/02/2016    X4  Dr Phan   • ILIAC ARTERY ANEURYSM REPAIR  01/05/2017    abdominal and bilateral   • OTHER SURGICAL HISTORY      [facility]   • SINUS SURGERY     • VASCULAR SURGERY        General Information     Row Name 01/10/23 1244          Physical Therapy Time and Intention    Document Type evaluation  -CM     Mode of Treatment physical therapy  -CM     Row Name 01/10/23 1244          General Information    Patient Profile Reviewed yes  -CM     Prior Level of Function independent:;community mobility;gait;ADL's;driving;work  just quit work day before onset of symptoms  -CM     Existing Precautions/Restrictions no known precautions/restrictions  -CM     Barriers to Rehab none identified  -CM     Row Name 01/10/23 1244          Living Environment    People in Home spouse;other (see comments)  wife works part time a few hours per week; reports she can be home w/ pt if needed  -CM     Row Name 01/10/23 1244          Home Main Entrance    Number of Stairs, Main Entrance other (see comments);five  has 3 steps + 2 steps; no handrail  -CM     Row Name 01/10/23 1244          Stairs Within Home, Primary    Stairs, Within Home, Primary single level home  -CM     Number of Stairs, Within Home, Primary none  -CM     Row Name 01/10/23 1244          Cognition    Orientation Status (Cognition) oriented x 4  -CM     Row Name 01/10/23 1247 01/10/23 1244       Safety Issues, Functional Mobility    Impairments Affecting Function (Mobility) visual/perceptual  -CM visual/perceptual  -CM    Comment, Safety Issues/Impairments (Mobility) decreased vision in R lower quadrant  -CM --          User Key  (r) = Recorded By, (t) = Taken By, (c) = Cosigned By    Initials Name Provider Type    Jadyn Barajas, PT Physical Therapist                Mobility     Row Name 01/10/23 1246          Bed Mobility    Bed Mobility bed mobility (all) activities  -CM     All Activities, [place] (Bed Mobility) independent  -CM     Row Name 01/10/23 1246          Sit-Stand Transfer    Sit-Stand [place] (Transfers) independent  -CM     Row Name 01/10/23 1246          Gait/Stairs (Locomotion)    [place] Level (Gait) independent  -CM     Distance in Feet (Gait) 350 ft w/o any gait deviations; no loss of balance or soa; normal to rapid pace  -CM           User Key  (r) = Recorded By, (t) = Taken By, (c) = Cosigned By    Initials Name Provider Type    Jadyn Barajas, PT Physical Therapist               Obj/Interventions     Row Name 01/10/23 1246          Range of Motion Comprehensive    General Range of Motion no range of motion deficits identified  -CM     Row Name 01/10/23 1246          Strength Comprehensive (MMT)    General Manual Muscle Testing (MMT) Assessment no strength deficits identified  -CM     Comment, General Manual Muscle Testing (MMT) Assessment noted to have lateral deviation of tongue to L side w/ protrusion, but no other deficits noted.  -CM     Row Name 01/10/23 1247          Balance    Balance Assessment sitting static balance;sitting dynamic balance;standing static balance;standing dynamic balance  -CM     Static Sitting Balance independent  -CM     Dynamic Sitting Balance independent  -CM     Position, Sitting Balance unsupported;sitting edge of bed  -CM     Static Standing Balance independent  -CM     Dynamic Standing Balance independent  -CM     Position/Device Used, Standing Balance unsupported  -CM     Row Name 01/10/23 1247          Sensory Assessment (Somatosensory)    Sensory Assessment (Somatosensory) sensation intact  -CM           User Key  (r) = Recorded By, (t) = Taken By, (c) = Cosigned By    Initials Name Provider Type    Jadyn Barajas, PT Physical Therapist               Goals/Plan    No  documentation.                Clinical Impression     Row Name 01/10/23 1247          Pain    Pretreatment Pain Rating 3/10  -CM     Posttreatment Pain Rating 3/10  -CM     Pain Location - head  -CM     Pre/Posttreatment Pain Comment L temporal headache  -     Pain Intervention(s) Repositioned;Emotional support;Ambulation/increased activity  -     Row Name 01/10/23 1247          Plan of Care Review    Plan of Care Reviewed With patient;spouse  -     Progress improving  -     Outcome Evaluation 68 yo male adm 1/9/23 after 3-4 days of severe headache w/ visual changes. Pt was seen by PCP, who thought pt was having migraine, as pt has hx of migraines. When headache (L temporal) became severe, pt proceeded to ER, where he was dx w/ L posterior temporal and L occipital subacute infarcts per CT scan. Hx of migraines, cabg, aaa repair. At baseline, pt lives w/ his wife and is completely independent. He actually retired from his job on day prior to onset of symptoms. Pt is normally completely independent for ADLs, driving, ambulating community distances. Today, pt presents w/ R lower quadrant visual loss, as well as L lateral tongue deviation w/ protrusion. However, no other deficits are noted. Pt is able to come to sit, stand, and amb 350 ft independently w/ normal gait. No deficits in strength found. No need for skilled PT at this time. Reviewed cva signs/symptoms/management w/ pt and wife, as well as gave handout. Recommend home when medically stable. Will sign off.  -     Row Name 01/10/23 1252          Therapy Assessment/Plan (PT)    Patient/Family Therapy Goals Statement (PT) agreeable to home at d/c  -     Criteria for Skilled Interventions Met (PT) no;no problems identified which require skilled intervention  -     Therapy Frequency (PT) evaluation only  -     Row Name 01/10/23 1252          Vital Signs    O2 Delivery Pre Treatment room air  -CM     O2 Delivery Intra Treatment room air  -CM     O2  Delivery Post Treatment room air  -CM     Recovery Time VSS  -CM     Row Name 01/10/23 1252          Positioning and Restraints    Pre-Treatment Position in bed  -CM     Post Treatment Position chair  -CM     In Chair notified nsg;with family/caregiver;sitting;call light within reach;with OT  -CM           User Key  (r) = Recorded By, (t) = Taken By, (c) = Cosigned By    Initials Name Provider Type    Jadyn Barajas, PT Physical Therapist               Outcome Measures     Row Name 01/10/23 1253          How much help from another person do you currently need...    Turning from your back to your side while in flat bed without using bedrails? 4  -CM     Moving from lying on back to sitting on the side of a flat bed without bedrails? 4  -CM     Moving to and from a bed to a chair (including a wheelchair)? 4  -CM     Standing up from a chair using your arms (e.g., wheelchair, bedside chair)? 4  -CM     Climbing 3-5 steps with a railing? 4  -CM     To walk in hospital room? 4  -CM     AM-PAC 6 Clicks Score (PT) 24  -CM     Highest level of mobility 8 --> Walked 250 feet or more  -CM     Row Name 01/10/23 1253          Modified Anupam Scale    Pre-Stroke Modified [place] Scale 0 - No Symptoms at all.  -CM     Modified [place] Scale 1 - No significant disability despite symptoms.  Able to carry out all usual duties and activities.  -CM     Row Name 01/10/23 1253          Functional Assessment    Outcome Measure Options Modified [place];AM-PAC 6 Clicks Basic Mobility (PT)  -CM           User Key  (r) = Recorded By, (t) = Taken By, (c) = Cosigned By    Initials Name Provider Type    Jadyn Barajas, PT Physical Therapist                             Physical Therapy Education     Title: PT OT SLP Therapies (Done)     Topic: Physical Therapy (Done)     Point: Mobility training (Done)     Learning Progress Summary           Patient Acceptance, E,TB,H, VU,DU by CHELE at 1/10/2023 1255   Significant Other Acceptance,  E,TB,H, VU,DU by  at 1/10/2023 1255                               User Key     Initials Effective Dates Name Provider Type Discipline     06/16/21 -  Jadyn Keller, PT Physical Therapist PT              PT Recommendation and Plan     Plan of Care Reviewed With: patient, spouse  Progress: improving  Outcome Evaluation: 68 yo male adm 1/9/23 after 3-4 days of severe headache w/ visual changes. Pt was seen by PCP, who thought pt was having migraine, as pt has hx of migraines. When headache (L temporal) became severe, pt proceeded to ER, where he was dx w/ L posterior temporal and L occipital subacute infarcts per CT scan. Hx of migraines, cabg, aaa repair. At baseline, pt lives w/ his wife and is completely independent. He actually retired from his job on day prior to onset of symptoms. Pt is normally completely independent for ADLs, driving, ambulating community distances. Today, pt presents w/ R lower quadrant visual loss, as well as L lateral tongue deviation w/ protrusion. However, no other deficits are noted. Pt is able to come to sit, stand, and amb 350 ft independently w/ normal gait. No deficits in strength found. No need for skilled PT at this time. Reviewed cva signs/symptoms/management w/ pt and wife, as well as gave handout. Recommend home when medically stable. Will sign off.     Time Calculation:    PT Charges     Row Name 01/10/23 1255             Time Calculation    Start Time 1003  -CM      Stop Time 1037  -CM      Time Calculation (min) 34 min  -CM      PT Received On 01/10/23  -CM         Time Calculation- PT    Total Timed Code Minutes- PT 0 minute(s)  -CM            User Key  (r) = Recorded By, (t) = Taken By, (c) = Cosigned By    Initials Name Provider Type     Jadyn Keller, PT Physical Therapist              Therapy Charges for Today     Code Description Service Date Service Provider Modifiers Qty    68407733787 HC PT EVAL LOW COMPLEXITY 4 1/10/2023 Jadyn Keller, PT GP 1           PT G-Codes  Outcome Measure Options: Modified [place], AM-PAC 6 Clicks Basic Mobility (PT)  AM-PAC 6 Clicks Score (PT): 24  Modified Anupam Scale: 1 - No significant disability despite symptoms.  Able to carry out all usual duties and activities.  PT Discharge Summary  Anticipated Discharge Disposition (PT): home with assist    Jadyn Keller, PT  1/10/2023

## 2023-01-10 NOTE — PLAN OF CARE
Goal Outcome Evaluation:      VSS on room air, NIH 2, neuro consult for AM, heparin subq, x1 dose Inapsine finally gave pt relief of HA, no acute neuro changes, stroke booklet at bedside

## 2023-01-10 NOTE — THERAPY EVALUATION
Acute Care - Speech Language Pathology Initial Evaluation   Nic     Patient Name: Alejandro Bain  : 1955  MRN: [id number]  Today's Date: 1/10/2023               Admit Date: 2023   Patient was not wearing a face mask during this therapy encounter. Therapist used appropriate personal protective equipment including mask, eye protection and gloves.  Mask used was standard procedure mask. Appropriate PPE was worn during the entire therapy session. Hand hygiene was completed before and after therapy session. Patient is not in enhanced droplet precautions.     Visit Dx:    ICD-10-CM ICD-9-CM   1. Stroke associated with COVID-19 ([facility])  U07.1 434.91    I63.9 079.89   2. Visual loss, right eye  H54.61 369.8   3. Acute intractable headache, unspecified headache type  R51.9 784.0   4. Dysarthria  R47.1 784.51   5. Balance problem  R26.89 781.99   6. Thrombocytopenia ([facility])  D69.6 287.5     Patient Active Problem List   Diagnosis   • S/P CABG x 5 by Dr. Phan   • S/P AVR (tissue) by Dr. 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[facility])   • Aneurysm of iliac artery ([facility])   • Paroxysmal atrial fibrillation ([facility])   • Coronary artery disease involving native coronary artery of native heart without angina pectoris   • Mixed hyperlipidemia   • Palpitations   • Shortness of breath   • Acute upper back pain   • Asthma   • Thrombocytopenia ([facility])   • Encounter for laboratory testing for COVID-19 virus   • COVID   • Leukocytosis   • Elevated LFTs   • Acute renal insufficiency   • Acute on chronic diastolic heart failure ([facility])   • Acute respiratory distress   • CVA (cerebral vascular accident) ([facility])   • Intractable headache     Past Medical History:   Diagnosis Date   • Acute on chronic diastolic heart failure ([facility]) 2022   • Aneurysm ([facility])    • Anxiety    • Asthma     • Atrial fibrillation (HCC)    • Bronchitis    • Coronary artery disease    • Depression    • Encounter for laboratory testing for COVID-19 virus 08/22/2022   • Gout    • Hyperlipidemia    • Hypertension    • Intractable headache 1/9/2023   • Myocardial infarction (HCC)    • Pneumonia    • Pulmonary nodule    • Familia [place] spotted fever      Past Surgical History:   Procedure Laterality Date   • AORTIC VALVE REPAIR/REPLACEMENT  12/03/2016    CABG x 5/ tissue AVR by DR. PHAN   • CARDIAC CATHETERIZATION     • CARDIAC SURGERY     • CARDIOVASCULAR STRESS TEST  2020   • CAROTID STENT     • CORONARY ARTERY BYPASS GRAFT  12/02/2016    X4  Dr Phan   • ILIAC ARTERY ANEURYSM REPAIR  01/05/2017    abdominal and bilateral   • OTHER SURGICAL HISTORY      [facility]   • SINUS SURGERY     • VASCULAR SURGERY         SLP Recommendation and Plan  SLP Diagnosis: mild, cognitive-linguistic disorder, communication disorder (01/10/23 1500)  SLP Diagnosis Comments: See above for details, pt reports a history of dyslexia, and felt that may have contributed to some errors. pt's wife felt that pt has a mild decline from previous status. Pt would like to return to work, therefore skilled speech/lang/cog therapy is recommended to address above deficits. (01/10/23 1500)        [facility] Criteria for Skilled Therapy Interventions Met: yes (01/10/23 1500)  Anticipated Discharge Disposition (SLP): home (01/10/23 1500)        Predicted Duration Therapy Intervention (Days): until discharge (01/10/23 1500)  [facility] Diagnostic Follow-Up Needed: higher-level cognitive-linguistic (01/10/23 1500)           Communication Strategy Suggestions: eliminate distractions when speaking with patient (01/10/23 1500)  Treatment Assessment (SLP): clinical signs of, cognitive-linguistic disorder, communication disorder (01/10/23 1500)               SLP EVALUATION (last 72 hours)     SLP SLC Evaluation     Row Name 01/10/23 1500          Document Type evaluation  -CP     Subjective Information complains of  vision difficulties  -CP    Patient Observations alert;cooperative  -CP    Patient Effort excellent  -CP    Comment Pt's wife present for evaluation  -CP          Patient Profile Reviewed yes  -CP    Pertinent History Of Current Problem Alejandro Bain is a 67 y.o. year old male who presented to [facility] on 1/9/2023 complaining of 1/2-week history of a headache that has progressively worsened over the last four days, and sudden loss of peripheral vision.  Patient does report that he seen his PCP last Thursday who prescribed him Imitrex for suspected migraine headache. He has history of migraines approximately 20 years ago.  He reports his headache is a 6/10 constant dull throbbing with associated visual disturbances, feeling like his eyes are crossed, difficulty ambulating, with stumbling and feeling as if his depth perception is off or absent. He states headache initially started behind his left and has progressively radiated across his head he denies any known exacerbating or eliminating factors.  He denies any other acute distress, chest pain, shortness of breath, palpitations, syncope, lightheadedness, fever, chills, abdominal pain, nausea, vomiting, diarrhea, constipation, or  complaints. Pt found to have new CVA.  -CP    Prior Level of Function-Communication WFL  -CP    Plans/Goals Discussed with patient;spouse/S.O.  -CP    Barriers to Rehab none identified  -CP    Standardized Assessment Used SLUMS  -CP          Additional Documentation Pain Scale: FACES Pre/Post-Treatment (Group)  -CP          Pain: FACES Scale, Pretreatment 0-->no hurt  -CP    Posttreatment Pain Rating 0-->no hurt  -CP          Comprehension Assessment/Intervention Auditory Comprehension;Reading Comprehension  -CP          Auditory Comprehension (Communication) WFL  -CP    Able to Identify Objects/Pictures (Communication) body part;familiar objects;pictures of common objects;WFL  -CP     Answers Questions (Communication) yes/no;wh questions;personal;concrete;mulit-unit;complex;abstract;WFL  -CP    Able to Follow Commands (Communication) 2-step;3-step;multi-step;WFL  -CP    Narrative Discourse complex paragraph level;conversational level;WFL  -CP          Reading Comprehension (Communication) mild impairment  -CP    Scanning (Reading) phrases;sentences;mild impairment;other (see comments)  Possibly due to visual deficits  -CP    Phrase Level mild impairment  -CP    Paragraph Level mild impairment  -CP    Functional Reading Tasks mild impairment  -CP    Reading Comprehension, Comment Visual disturbances appear to contribute to deficits  -CP          Expression Assessment/Intervention verbal expression;graphic expression  -CP          Verbal Expression mild impairment  -CP    Automatic Speech (Communication) response to greeting;counting 1-20;WFL  -CP    Responsive Naming mild impairment;delayed responses;circumlocution;other (see comments)  Self-corrects  -CP    Confrontational Naming mild impairment;circumlocution;delayed responses;other (see comments)  self-corrects  -CP    Spontaneous/Functional Words mild impairment;perseverations;circumlocution;other (see comments)  self-corrects  -CP    Sentence Formulation WFL  -CP    Conversational Discourse/Fluency mild impairment;circumlocution;delayed responses;other (see comments)  self-corrects  -CP          Graphic Expression WFL  -CP    Graphic Expression to Dictation letters;words;phrases;WFL  -CP    Biographical Information name;address;telephone number;;WFL  -CP    Sentence Formulation WFL  -CP          Oral Motor Structure and Function WFL  -CP    Dentition Assessment natural, present and adequate  -CP    Mucosal Quality moist, healthy  -CP          Oral Motor General Assessment WFL  -CP          Motor Speech Function WFL  -CP          Quality and Resonance (Voice) WFL  -CP          Cognitive Function (Cognition) mild impairment  -CP     Orientation Status (Cognition) awareness of basic personal information;person;place;situation;WFL;time;mild impairment  -CP    Memory (Cognitive) immediate;short-term;auditory;delayed;mild impairment  -CP    Attention (Cognitive) selective;sustained;mild impairment  -CP    Thought Organization (Cognitive) concrete divergent;abstract divergent;mental manipulation;mild impairment  -CP    Reasoning (Cognitive) deductive;complex;mental flexibility;mild impairment  -CP    Functional Math (Cognitive) word problems;mild impairment  -CP    Executive Function (Cognition) deficit awareness;mild impairment;initiation  -CP    Pragmatics (Communication) affect;awareness/response to humor;initiation;eye contact;topic maintenance;turn taking;WFL  -CP    Right Hemisphere Function visuo-spatial;visuo-perceptual;mild impairment;other (see comments)  Right neglect, pt is left handed  -CP          Cognitive/Memory Tests SLUMS: [facility] Mental Status Examination  -CP          SLUMS Score 17  -CP          SLP Diagnosis mild;cognitive-linguistic disorder;communication disorder  -CP    SLP Diagnosis Comments See above for details, pt reports a history of dyslexia, and felt that may have contributed to some errors. pt's wife felt that pt has a mild decline from previous status. Pt would like to return to work, therefore skilled speech/lang/cog therapy is recommended to address above deficits.  -CP    Rehab Potential/Prognosis excellent  -CP    SLC Criteria for Skilled Therapy Interventions Met yes  -CP    Functional Impact functional impact in social situations;difficulty in expressing complex messages;restrictions in personal and social life  -CP          Treatment Assessment (SLP) clinical signs of;cognitive-linguistic disorder;communication disorder  -CP    Care Plan Review evaluation/treatment results reviewed;care plan/treatment goals reviewed;risks/benefits reviewed;patient/other agree to care plan  -CP    Care Plan  Review, Other Participant(s) spouse  -CP          Therapy Frequency (SLP SLC) PRN  -CP    Predicted Duration Therapy Intervention (Days) until discharge  -CP    Anticipated Discharge Disposition (SLP) home  -CP    Communication Strategy Suggestions eliminate distractions when speaking with patient  -CP    Further Assessment Needed in the Following Areas higher-level cognitive-linguistic  -CP    SLC Diagnostic Follow-Up Needed higher-level cognitive-linguistic  -CP          SLC LTGs Patient will demonstrate functional language skills for return to discharge environment;Patient will demonstrate functional cognitive-linguistic skills for return to discharge environment  -CP    Reading Comprehension Treatment Objectives Reading Comprehension Treatment Objectives (Group)  -CP    Verbal Expression Treatment Objectives Verbal Expression Treatment Objectives (Group)  -CP    Cognitive Linguistic Treatment Objectives Cognitive Linguistic Treatment Objectives (Group)  -CP          [place] Independently  -CP    Time frame by discharge  -CP    Progress/Outcomes new goal  -CP          [place] Independently  -CP    Time frame by discharge  -CP    Progress/Outcomes new goal  -CP          Scanning Selection scanning, SLP goal 1  -CP    Comprehension at Paragraph Level Selection comprehension at paragraph level, SLP goal 1  -CP          Improve Scanning Through: Goal 1 (SLP) scanning from left to right on page to identify target in series of letters;scanning task/letter cancellation;use compensatory strategies for visual scanning;90%;with minimal cues (75-90%)  -CP    Time Frame (Scanning Goal 1, SLP) 1 week  -CP          Improve Reading Comprehension at Paragraph Level Goal 1 (SLP) answer questions re: paragraph read;answer questions re: multiple paragraph selection;moderately complex;90%;independently (over 90% accuracy)  -CP    Time Frame (Reading Comprehension at Paragraph Level Goal 1, SLP) 1 week  -CP          Word  Retrieval Skills Selection word retrieval, SLP goal 1  -CP          Improve Word Retrieval Skills By Goal 1 (SLP) completing a divergent task;completing a convergent task;completing functional word finding tasks;90%;independently (over 90% accuracy)  -CP    Time Frame (Word Retrieval Goal 1, SLP) 1 week  -CP          Memory Skills Selection memory skills, SLP goal 1  -CP    Functional Math Skills Selection functional math skills, SLP goal 1  -CP          Improve Memory Skills Through Goal 1 (SLP) listen to a paragraph and answer questions;repeat list in sequential order;recalling related word lists with an imposed delay;90%;with minimal cues (75-90%)  -CP    Time Frame (Memory Skills Goal 1, SLP) 1 week  -CP          Improve Functional Math Skills Through Goal 1 (SLP) complete word problems involving time;complete word problems involving money;90%;with minimal cues (75-90%)  -CP    Time Frame (Functional Math Skills Goal 1, SLP) 1 week  -CP          User Key  (r) = Recorded By, (t) = Taken By, (c) = Cosigned By    Initials Name Effective Dates    CP Romana Kevin SLP 06/16/21 -                    EDUCATION  The patient has been educated in the following areas:     Cognitive Impairment Communication Impairment.           SLP GOALS     Row Name 01/10/23 1500             Patient will demonstrate functional language skills for return to discharge environment     [place] Independently  -CP      Time frame by discharge  -CP      Progress/Outcomes new goal  -CP         Patient will demonstrate functional cognitive-linguistic skills for return to discharge environment    [place] Independently  -CP      Time frame by discharge  -CP      Progress/Outcomes new goal  -CP         Scanning Goal 1 (SLP)    Improve Scanning Through: Goal 1 (SLP) scanning from left to right on page to identify target in series of letters;scanning task/letter cancellation;use compensatory strategies for visual scanning;90%;with minimal  cues (75-90%)  -CP      Time Frame (Scanning Goal 1, SLP) 1 week  -CP         Comprehension at Paragraph Level Goal 1 (SLP)    Improve Reading Comprehension at Paragraph Level Goal 1 (SLP) answer questions re: paragraph read;answer questions re: multiple paragraph selection;moderately complex;90%;independently (over 90% accuracy)  -CP      Time Frame (Reading Comprehension at Paragraph Level Goal 1, SLP) 1 week  -CP         Word Retrieval Skills Goal 1 (SLP)    Improve Word Retrieval Skills By Goal 1 (SLP) completing a divergent task;completing a convergent task;completing functional word finding tasks;90%;independently (over 90% accuracy)  -CP      Time Frame (Word Retrieval Goal 1, SLP) 1 week  -CP         Memory Skills Goal 1 (SLP)    Improve Memory Skills Through Goal 1 (SLP) listen to a paragraph and answer questions;repeat list in sequential order;recalling related word lists with an imposed delay;90%;with minimal cues (75-90%)  -CP      Time Frame (Memory Skills Goal 1, SLP) 1 week  -CP         Functional Math Skills Goal 1 (SLP)    Improve Functional Math Skills Through Goal 1 (SLP) complete word problems involving time;complete word problems involving money;90%;with minimal cues (75-90%)  -CP      Time Frame (Functional Math Skills Goal 1, SLP) 1 week  -CP            User Key  (r) = Recorded By, (t) = Taken By, (c) = Cosigned By    Initials Name Provider Type    Romana Owusu, SLP Speech and Language Pathologist                        Time Calculation:                        RAFAELA Amin  1/10/2023

## 2023-01-10 NOTE — PLAN OF CARE
Problem: Adjustment to Illness (Stroke, Ischemic/Transient Ischemic Attack)  Goal: Optimal Coping  Outcome: Ongoing, Progressing     Problem: Cerebral Tissue Perfusion (Stroke, Ischemic/Transient Ischemic Attack)  Goal: Optimal Cerebral Tissue Perfusion  Outcome: Ongoing, Progressing     Problem: Cognitive Impairment (Stroke, Ischemic/Transient Ischemic Attack)  Goal: Optimal Cognitive Function  Outcome: Ongoing, Progressing     Problem: Communication Impairment (Stroke, Ischemic/Transient Ischemic Attack)  Goal: Improved Communication Skills  Outcome: Ongoing, Progressing     Problem: Functional Ability Impaired (Stroke, Ischemic/Transient Ischemic Attack)  Goal: Optimal Functional Ability  Outcome: Ongoing, Progressing  Intervention: Optimize Functional Ability  Recent Flowsheet Documentation  Taken 1/10/2023 0805 by Karey Henry RN  Activity Management:   activity adjusted per tolerance   activity encouraged     Problem: Respiratory Compromise (Stroke, Ischemic/Transient Ischemic Attack)  Goal: Effective Oxygenation and Ventilation  Outcome: Ongoing, Progressing     Problem: Sensorimotor Impairment (Stroke, Ischemic/Transient Ischemic Attack)  Goal: Improved Sensorimotor Function  Outcome: Ongoing, Progressing  Intervention: Optimize Sensory and Perceptual Ability  Recent Flowsheet Documentation  Taken 1/10/2023 0805 by Karey Henry RN  Pressure Reduction Techniques:   frequent weight shift encouraged   heels elevated off bed  Pressure Reduction Devices: pressure-redistributing mattress utilized   Goal Outcome Evaluation:Patient resting in bed. Denies needs at this time.

## 2023-01-10 NOTE — H&P
[facility] Medicine Services      Patient Name: Alejandro Bain  : 1955  MRN: [id number]  Primary Care Physician:  Herrera Sarabia MD  Date of admission: 2023      Subjective      Chief Complaint: Intractable headache     History of Present Illness: Alejandro Bain is a 67 y.o. male with history of diastolic congestive heart failure, AAA s/p repair, atrial fibrillation, CAD, s/p CABG, s/p AVR asthma, anxiety, depression thrombocytopenia, HLD, HTN, hypokalemia, and recent COVID-19 infection 3 weeks ago, who presented to [facility] on 2023 complaining of 1/2-week history of a headache that has progressively worsened over the last four days, and sudden loss of peripheral vision loss.  Patient does report that he seen his PCP last Thursday who prescribed him Imitrex for headache, with a believe he was experiencing migraine headaches as he has history of migraines approximately 20 years ago.  He reports his headache is a 6/10 constant dull throbbing with associated visual disturbances, feeling like his eyes are crossed, difficulty ambulating, with stumbling and feeling as if his depth perception is off or absent. He states headache initially started behind his left and has progressively radiated across his head he denies any known exacerbating or eliminating factors.  He denies any other acute distress, chest pain, shortness of breath, palpitations, syncope, lightheadedness, fever, chills, abdominal pain, nausea, vomiting, diarrhea, constipation, or  complaints.    Initial evaluation in the emergency department showed unremarkable CMP, thrombocytopenia platelets 122 that have improved from 46 December 15, and .  Chest x-ray shows no acute cardiopulmonary processes,  CT scan remarkable for left posterior temporal lobe left occipital lobes subacute to developing chronic infarct.    Review of Systems   Eyes: Negative.    Cardiovascular: Negative.     Respiratory: Negative.    Endocrine: Negative.    Hematologic/Lymphatic: Negative.    Skin: Negative.    Musculoskeletal: Negative.    Gastrointestinal: Negative.    Genitourinary: Negative.    Neurological: Positive for headaches and sensory change.        Loss of right peripheral vision   Psychiatric/Behavioral: Negative.    Allergic/Immunologic: Negative.    All other systems reviewed and are negative.       Personal History     Past Medical History:   Diagnosis Date   • Acute on chronic diastolic heart failure (HCC) 12/14/2022   • Aneurysm (HCC)    • Anxiety    • Asthma    • Atrial fibrillation (HCC)    • Bronchitis    • Coronary artery disease    • Depression    • Encounter for laboratory testing for COVID-19 virus 08/22/2022   • Gout    • Hyperlipidemia    • Hypertension    • Intractable headache 1/9/2023   • Myocardial infarction (HCC)    • Pneumonia    • Pulmonary nodule    • Familia Mountain spotted fever        Past Surgical History:   Procedure Laterality Date   • AORTIC VALVE REPAIR/REPLACEMENT  12/03/2016    CABG x 5/ tissue AVR by DR. PHAN   • CARDIAC CATHETERIZATION     • CARDIAC SURGERY     • CARDIOVASCULAR STRESS TEST  2020   • CAROTID STENT     • CORONARY ARTERY BYPASS GRAFT  12/02/2016    X4  Dr Phan   • ILIAC ARTERY ANEURYSM REPAIR  01/05/2017    abdominal and bilateral   • OTHER SURGICAL HISTORY      [facility]CI   • SINUS SURGERY     • VASCULAR SURGERY         Family History: family history includes Cancer in his brother, father, and sister; Heart disease in his mother and sister; Pulmonary fibrosis in his mother.     Social History:  reports that he has never smoked. He has never used smokeless tobacco. He reports that he does not currently use alcohol. He reports that he does not use drugs.    Home Medications:  Prior to Admission Medications     Prescriptions Last Dose Informant Patient Reported? Taking?    amLODIPine (NORVASC) 10 MG tablet 1/9/2023  No Yes    TAKE 1 TABLET BY MOUTH EVERY DAY     aspirin 81 MG EC tablet 1/9/2023  Yes Yes    Take 81 mg by mouth Daily.    azelastine (ASTELIN) 0.1 % nasal spray 1/9/2023  Yes Yes    1 spray into the nostril(s) as directed by provider 2 (Two) Times a Day.    cholecalciferol (VITAMIN D3) 25 MCG (1000 UT) tablet 1/9/2023  Yes Yes    Take 1,000 Units by mouth Daily.    lisinopril (PRINIVIL,ZESTRIL) 20 MG tablet 1/9/2023  Yes Yes    Take 20 mg by mouth 2 (Two) Times a Day.    Multiple Vitamins-Minerals (MULTIVITAMIN ADULT PO) 1/9/2023  Yes Yes    Take 1 tablet by mouth Daily.    predniSONE (DELTASONE) 5 MG tablet   Yes Yes    Take 5 mg by mouth Daily. Pt has five days of therapy left    SYMBICORT 160-4.5 MCG/ACT inhaler 1/9/2023  Yes Yes    Inhale 2 puffs 2 (Two) Times a Day.    VENTOLIN  (90 Base) MCG/ACT inhaler 1/9/2023  Yes Yes    Inhale 2 puffs Every 6 (Six) Hours As Needed.    zinc sulfate (ZINCATE) 220 (50 Zn) MG capsule 1/9/2023  Yes Yes    Take 220 mg by mouth Daily.            Allergies:  Allergies   Allergen Reactions   • Avelox [Moxifloxacin] Rash   • Penicillins Rash   • Sulfa Antibiotics Rash   • Doxycycline Rash       Objective      Vitals:   Temp:  [97.9 °F (36.6 °C)-98.1 °F (36.7 °C)] 97.9 °F (36.6 °C)  Heart Rate:  [59-74] 66  Resp:  [17-19] 18  BP: (126-141)/(71-91) 130/79    Physical Exam  Vitals and nursing note reviewed.   HENT:      Head: Atraumatic.      Mouth/Throat:      Mouth: Mucous membranes are dry.   Eyes:      General: Visual field deficit present.      Conjunctiva/sclera: Conjunctivae normal.   Cardiovascular:      Rate and Rhythm: Normal rate and regular rhythm.      Pulses: Normal pulses.           Radial pulses are 2+ on the right side and 2+ on the left side.        Dorsalis pedis pulses are 2+ on the right side and 2+ on the left side.        Posterior tibial pulses are 2+ on the right side and 2+ on the left side.      Heart sounds: Normal heart sounds.   Abdominal:      General: Bowel sounds are normal.      Palpations:  Abdomen is soft.   Musculoskeletal:      Cervical back: Neck supple.      Right lower leg: No edema.      Left lower leg: No edema.   Skin:     General: Skin is warm and dry.      Capillary Refill: Capillary refill takes less than 2 seconds.   Neurological:      Mental Status: He is alert and oriented to person, place, and time.      Sensory: Sensation is intact.      Motor: Motor function is intact.      Comments: Loss of decreased peripheral vision r>L   Psychiatric:         Mood and Affect: Mood normal.          Result Review    Result Review:  I have personally reviewed the results from the time of this admission to 1/9/2023 22:03 EST and agree with these findings:  [x]  Laboratory  [x]  Microbiology  [x]  Radiology  [x]  EKG/Telemetry   [x]  Cardiology/Vascular   []  Pathology  []  Old records  []  Other:  Most notable findings include:    Assessment & Plan        Active Hospital Problems:  Active Hospital Problems    Diagnosis    • **CVA (cerebral vascular accident) ([facility])    • Intractable headache    • Thrombocytopenia (HCC)    • Paroxysmal atrial fibrillation (HCC)    • Mixed hyperlipidemia    • Essential hypertension    • S/P AAA repair    • Coronary artery disease involving native coronary artery of native heart without angina pectoris    • S/P AVR (tissue) by Dr. Phan    • S/P CABG x 5 by Dr. Phan      Plan:   CVA (cerebral vascular accident)  CT scan reviewed  Decreased right peripheral vision noted  ASA given  Blood pressure management per stroke protocol  Statin ordered,  Neurochecks per policy  NIHSS per policy  Continuous cardiac monitoring pulse oximetry  Supplemental oxygen as needed for hypoxia/respiratory distress maintain oxygen saturation greater than 90%  Neurology consulted per policy  Stroke coordinator consulted per policy  PT OT consulted per policy  Speech therapy consulted per policy  Case management consulted per policy  Consult diabetes educator per policy  Consult inpatient rehab per  policy  Check hemoglobin A1c, lipid panel    Intractable headache  Likely secondary to CVA  Patient initiated on Imitrex without relief  Patient given Norco without relief  Patient given Benadryl/Reglan without relief   ordered  Inapsine ordered   Safety precautions    Thrombocytopenia  Onset post-COVID 3 weeks ago-patient continues to be on prednisone  Platelets improved today 122  Monitor for now  Patient has continued taking ASA will continue during hospitalization.  Patient may benefit from hematology consult if continued decline.    Coronary artery disease involving native artery of native heart without angina pectoris  Mixed hyperlipidemia  Essential hypertension  S/p CABG x5, AVR, and AAA repair  Patient free of chest pain at time of  As needed nitro for chest pain  Continuous cardiac monitoring pulse oximetry  Continue home lisinopril, amlodipine, and Lipitor    Proximal atrial fibrillation  Rate controlled  EKG reviewed patient currently normal sinus rhythm    Asthma  Chronic  Normally controlled   Continue home Symbicort, and albuterol rescue inhaler  Recent COVID-19 infection has increased his need for rescue inhaler in addition to being out of his maintenance Symbicort.  Encourage pulmonary hygiene turn cough deep breath, incentive spirometry    DVT prophylaxis:  Medical DVT prophylaxis orders are present.    CODE STATUS:       Admission Status:  I believe this patient meets inpatient status.    I discussed the patient's findings and my recommendations with patient and family.    This patient has been examined wearing appropriate Personal Protective Equipment . 01/09/23      Signature: Electronically signed by GARY Doherty, 01/09/23, 10:00 PM EST.

## 2023-01-11 ENCOUNTER — NURSE TRIAGE (OUTPATIENT)
Dept: CALL CENTER | Facility: HOSPITAL | Age: 68
End: 2023-01-11
Payer: MEDICARE

## 2023-01-11 ENCOUNTER — READMISSION MANAGEMENT (OUTPATIENT)
Dept: CALL CENTER | Facility: HOSPITAL | Age: 68
End: 2023-01-11
Payer: MEDICARE

## 2023-01-11 VITALS
RESPIRATION RATE: 19 BRPM | HEIGHT: 73 IN | BODY MASS INDEX: 22.67 KG/M2 | TEMPERATURE: 98 F | HEART RATE: 62 BPM | OXYGEN SATURATION: 97 % | WEIGHT: 171.08 LBS | DIASTOLIC BLOOD PRESSURE: 78 MMHG | SYSTOLIC BLOOD PRESSURE: 115 MMHG

## 2023-01-11 LAB
QT INTERVAL: 437 MS
VIT B12 BLD-MCNC: 666 PG/ML (ref 211–946)

## 2023-01-11 PROCEDURE — 99232 SBSQ HOSP IP/OBS MODERATE 35: CPT | Performed by: NURSE PRACTITIONER

## 2023-01-11 PROCEDURE — 94799 UNLISTED PULMONARY SVC/PX: CPT

## 2023-01-11 PROCEDURE — 92507 TX SP LANG VOICE COMM INDIV: CPT

## 2023-01-11 PROCEDURE — 25010000002 ONDANSETRON PER 1 MG: Performed by: NURSE PRACTITIONER

## 2023-01-11 PROCEDURE — 63710000001 PREDNISONE PER 5 MG: Performed by: HOSPITALIST

## 2023-01-11 PROCEDURE — 94761 N-INVAS EAR/PLS OXIMETRY MLT: CPT

## 2023-01-11 RX ORDER — ATORVASTATIN CALCIUM 80 MG/1
80 TABLET, FILM COATED ORAL NIGHTLY
Qty: 90 TABLET | Refills: 0 | Status: SHIPPED | OUTPATIENT
Start: 2023-01-11 | End: 2023-01-17

## 2023-01-11 RX ORDER — GABAPENTIN 100 MG/1
100 CAPSULE ORAL 3 TIMES DAILY
Qty: 60 CAPSULE | Refills: 0 | Status: SHIPPED | OUTPATIENT
Start: 2023-01-11 | End: 2023-03-02

## 2023-01-11 RX ADMIN — ASPIRIN 81 MG: 81 TABLET, COATED ORAL at 08:30

## 2023-01-11 RX ADMIN — ONDANSETRON 4 MG: 2 INJECTION INTRAMUSCULAR; INTRAVENOUS at 08:55

## 2023-01-11 RX ADMIN — APIXABAN 5 MG: 5 TABLET, FILM COATED ORAL at 08:30

## 2023-01-11 RX ADMIN — GABAPENTIN 100 MG: 100 CAPSULE ORAL at 11:25

## 2023-01-11 RX ADMIN — MULTIPLE VITAMINS W/ MINERALS TAB 1 TABLET: TAB at 08:30

## 2023-01-11 RX ADMIN — Medication 10 ML: at 08:32

## 2023-01-11 RX ADMIN — PREDNISONE 5 MG: 5 TABLET ORAL at 08:30

## 2023-01-11 RX ADMIN — LISINOPRIL 20 MG: 20 TABLET ORAL at 08:30

## 2023-01-11 RX ADMIN — AMLODIPINE BESYLATE 10 MG: 5 TABLET ORAL at 08:28

## 2023-01-11 RX ADMIN — Medication 1000 UNITS: at 08:30

## 2023-01-11 RX ADMIN — BUTALBITAL, ACETAMINOPHEN, AND CAFFEINE 1 TABLET: 50; 325; 40 TABLET ORAL at 11:25

## 2023-01-11 RX ADMIN — BUDESONIDE AND FORMOTEROL FUMARATE DIHYDRATE 2 PUFF: 160; 4.5 AEROSOL RESPIRATORY (INHALATION) at 07:05

## 2023-01-11 RX ADMIN — BUTALBITAL, ACETAMINOPHEN, AND CAFFEINE 1 TABLET: 50; 325; 40 TABLET ORAL at 07:25

## 2023-01-11 RX ADMIN — AZELASTINE HYDROCHLORIDE 1 SPRAY: 137 SPRAY, METERED NASAL at 08:30

## 2023-01-11 RX ADMIN — ZINC SULFATE 220 MG (50 MG) CAPSULE 220 MG: CAPSULE at 08:30

## 2023-01-11 NOTE — TELEPHONE ENCOUNTER
"Wife is the caller- She was told he would be placed on Eliquis and would he need or be placed on a Holter monitor.  He was discharged from [facility] on 01/11/2023- with CVA. ( Stroke)     Reason for Disposition  • Health Information question, no triage required and triager able to answer question    Additional Information  • Negative: [1] Caller is not with the adult (patient) AND [2] reporting urgent symptoms  • Negative: Lab result questions  • Negative: Medication questions  • Negative: Caller can't be reached by phone  • Negative: Caller has already spoken to PCP or another triager  • Negative: RN needs further essential information from caller in order to complete triage  • Negative: Requesting regular office appointment  • Negative: [1] Caller requesting NON-URGENT health information AND [2] PCP's office is the best resource    Answer Assessment - Initial Assessment Questions  1. REASON FOR CALL or QUESTION: \"What is your reason for calling today?\" or \"How can I best help you?\" or \"What question do you have that I can help answer?\"      Answered question per AVS discharge    Protocols used: INFORMATION ONLY CALL - NO TRIAGE-ADULT-      "

## 2023-01-11 NOTE — PLAN OF CARE
Problem: Adjustment to Illness (Stroke, Ischemic/Transient Ischemic Attack)  Goal: Optimal Coping  Outcome: Ongoing, Progressing  Intervention: Support Psychosocial Response to Stroke  Recent Flowsheet Documentation  Taken 1/11/2023 0725 by Karey Henry RN  Supportive Measures:   active listening utilized   counseling provided   verbalization of feelings encouraged     Problem: Cerebral Tissue Perfusion (Stroke, Ischemic/Transient Ischemic Attack)  Goal: Optimal Cerebral Tissue Perfusion  Outcome: Ongoing, Progressing  Intervention: Protect and Optimize Cerebral Perfusion  Recent Flowsheet Documentation  Taken 1/11/2023 0725 by Karey Henry RN  Cerebral Perfusion Promotion: blood pressure monitored     Problem: Cognitive Impairment (Stroke, Ischemic/Transient Ischemic Attack)  Goal: Optimal Cognitive Function  Outcome: Ongoing, Progressing  Intervention: Optimize Cognitive Function  Recent Flowsheet Documentation  Taken 1/11/2023 0725 by Karey Henry RN  Reorientation Measures: clock in view     Problem: Communication Impairment (Stroke, Ischemic/Transient Ischemic Attack)  Goal: Improved Communication Skills  Outcome: Ongoing, Progressing  Intervention: Optimize Communication Skills  Recent Flowsheet Documentation  Taken 1/11/2023 0725 by Karey Henry RN  Communication Enhancement Strategies:   call light answered in person   communication board used     Problem: Functional Ability Impaired (Stroke, Ischemic/Transient Ischemic Attack)  Goal: Optimal Functional Ability  Outcome: Ongoing, Progressing  Intervention: Optimize Functional Ability  Recent Flowsheet Documentation  Taken 1/11/2023 0725 by Karey Henry RN  Activity Management: up ad shanell   Goal Outcome Evaluation:  Patient up in bathroom.  Patient continues to c/o headache but vision appears to have improved per patient.

## 2023-01-11 NOTE — PLAN OF CARE
Goal Outcome Evaluation: Discharge instructions given.  Patient verbalized understanding. Denies further questions or needs at this time.

## 2023-01-11 NOTE — DISCHARGE PLACEMENT REQUEST
"Alejandro Daniels (67 y.o. Male)     Date of Birth   1955    Social Security Number       Address   [address] [facility] [place] [address]    Home Phone   [phone]    MRN   [id number]       Mandaen   Quaker    Marital Status                               Admission Date   1/9/23    Admission Type   Emergency    Admitting Provider   Reji Stephens MD    Attending Provider   Remberto Gannon MD    Department, Room/Bed   [facility] NEURO HEART, 269/1       Discharge Date       Discharge Disposition   Home or Self Care    Discharge Destination                               Attending Provider: Remberto Gannon MD    Allergies: Avelox [Moxifloxacin], Penicillins, Sulfa Antibiotics, Doxycycline    Isolation: None   Infection: None   Code Status: CPR    Ht: 185.4 cm (73\")   Wt: 77.6 kg (171 lb 1.2 oz)    Admission Cmt: None   Principal Problem: CVA (cerebral vascular accident) (HCC) [I63.9]                 Active Insurance as of 1/9/2023     Primary Coverage     Payor Plan Insurance Group Employer/Plan Group    MEDICARE MEDICARE A & B      Payor Plan Address Payor Plan Phone Number Payor Plan Fax Number Effective Dates    [address] [phone]  5/1/2022 - None Entered    [facility] [address]       Subscriber Name Subscriber Birth Date Member ID       ALEJANDRO DANIELS 1955 [id number]           Secondary Coverage     Payor Plan Insurance Group Employer/Plan Group    AETNA COMMERCIAL AETNA HEALTH AND LIFE  SUP             Payor Plan Address Payor Plan Phone Number Payor Plan Fax Number Effective Dates    [address] [address]   5/1/2022 - None Entered    [facility] [address]       Subscriber Name Subscriber Birth Date Member ID       ALEJANDRO DANIELS 1955 [id number]                 Emergency Contacts      (Rel.) Home Phone Work Phone Mobile Phone    Priti Daniels (Spouse) -- -- [phone]           [facility] NEURO [facility]  [address] [place] " [address]  Phone:  [phone]  Fax:  [phone] Date: 2023      Ambulatory Referral to Speech Therapy     Patient:  Alejandro Bain MRN:  [id number]   [address]  [place] [address] :  1955  SSN:    Phone: [phone] Sex:  M      INSURANCE PAYOR PLAN GROUP # SUBSCRIBER ID   Primary:  Secondary:    MEDICARE  AETNA COMMERCIAL [id number]  [id number]      [id number]  [id number]      Referring Provider Information:  LINDA GANNON Phone: [phone] Fax: [phone]       Referral Information:   # Visits:  1 Referral Type: Speech Pathology [AF1]   Urgency:  Routine Referral Reason: Specialty Services Required   Start Date: 2023 End Date:  To be determined by Insurer   Diagnosis: Stroke associated with COVID-19 (HCC) (U07.1,I63.9 [ICD-10-CM] 434.91,079.89 [ICD-9-CM])      Refer to Dept:   Refer to Provider:   Refer to Provider Phone:   Refer to Facility:       Follow-up needed: Yes     This document serves as a request of services and does not constitute Insurance authorization or approval of services.  To determine eligibility, please contact the members Insurance carrier to verify and review coverage.     If you have medical questions regarding this request for services. Please contact [facility] NEURO HEART at [phone] during normal business hours.        Authorizing Provider:Linda Gannon MD  Authorizing Provider's NPI: [id number]  Order Entered By: Linda Gannon MD 2023  1:02 PM     Electronically signed by: Linda Gannon MD 2023  1:02 PM

## 2023-01-11 NOTE — CASE MANAGEMENT/SOCIAL WORK
Discharge Planning Assessment  [facility]     Patient Name: Aljeandro Bain  MRN: [id number]  Today's Date: 1/10/2023    Admit Date: 1/9/2023    Plan: Home with spouse. Services pending clinical course. Home via family car   Discharge Needs Assessment     Row Name 01/10/23 2139       Living Environment    People in Home spouse    Name(s) of People in Home Priti-Spouse    Current Living Arrangements home    Primary Care Provided by self    Provides Primary Care For no one    Family Caregiver if Needed spouse    Quality of Family Relationships supportive    Able to Return to Prior Arrangements yes       Resource/Environmental Concerns    Resource/Environmental Concerns none    Transportation Concerns none       Transition Planning    Patient/Family Anticipates Transition to home with family    Patient/Family Anticipated Services at Transition none    Transportation Anticipated family or friend will provide       Discharge Needs Assessment    Equipment Currently Used at Home none    Concerns to be Addressed no discharge needs identified;denies needs/concerns at this time    Anticipated Changes Related to Illness none    Equipment Needed After Discharge none    Provided Post Acute Provider List? N/A               Discharge Plan     Row Name 01/10/23 2145       Plan    Plan Home with spouse. Services pending clinical course. Home via family car    Plan Comments Met with pt. and spouse following appropriate PPE protocol.  Verified PCP and Pharm. No concerns paying for medications.  was Ind. and lived with spouse. Barriers to d/c: pending clinical course, and neuro workup.              Continued Care and Services - Admitted Since 1/9/2023    Coordination has not been started for this encounter.          Demographic Summary     Row Name 01/10/23 2137       General Information    Admission Type inpatient    Arrived From emergency department    Required Notices Provided Important Message from Medicare    Referral Source  admission list    Reason for Consult discharge planning    Preferred Language English               Functional Status     Row Name 01/10/23 2137       Functional Status    Usual Activity Tolerance good    Current Activity Tolerance good       Functional Status, IADL    Medications independent    Meal Preparation assistive person    Housekeeping assistive person    Laundry assistive person    Shopping assistive person       Mental Status    General Appearance WDL WDL       Mental Status Summary    Recent Changes in Mental Status/Cognitive Functioning no changes       Employment/    Employment Status retired    Current or Previous Occupation not applicable                 Mia Muñiz RN

## 2023-01-11 NOTE — CASE MANAGEMENT/SOCIAL WORK
Continued Stay Note   [facility]     Patient Name: Alejandro Bain  MRN: [id number]  Today's Date: 1/10/2023    Admit Date: 1/9/2023    Plan: Home with spouse. Services pending clinical course. Home via family car   Discharge Plan     Row Name 01/10/23 2145       Plan    Plan Home with spouse. Services pending clinical course. Home via family car    Plan Comments Met with pt. and spouse following appropriate PPE protocol.  Verified PCP and Pharm. No concerns paying for medications.  was Ind. and lived with spouse. Barriers to d/c: pending clinical course, and neuro workup.                   Mia Muñiz RN

## 2023-01-11 NOTE — DISCHARGE PLACEMENT REQUEST
"Alejandro Daniels (67 y.o. Male)     Date of Birth   1955    Social Security Number       Address   [address] [place] [address]    Home Phone   [phone]    MRN   [id number]       Congregation   L[employer]    Marital Status                               Admission Date   1/9/23    Admission Type   Emergency    Admitting Provider   Reji Stephens MD    Attending Provider   Remberto Gannon MD    Department, Room/Bed   [facility] NEURO HEART, 269/1       Discharge Date       Discharge Disposition   Home or Self Care    Discharge Destination                               Attending Provider: Remberto Gannon MD    Allergies: Avelox [Moxifloxacin], Penicillins, Sulfa Antibiotics, Doxycycline    Isolation: None   Infection: None   Code Status: CPR    Ht: 185.4 cm (73\")   Wt: 77.6 kg (171 lb 1.2 oz)    Admission Cmt: None   Principal Problem: CVA (cerebral vascular accident) (HCC) [I63.9]                 Active Insurance as of 1/9/2023     Primary Coverage     Payor Plan Insurance Group Employer/Plan Group    MEDICARE MEDICARE A & B      Payor Plan Address Payor Plan Phone Number Payor Plan Fax Number Effective Dates    [address] [phone]  5/1/2022 - None Entered    [facility] [address]       Subscriber Name Subscriber Birth Date Member ID       ALEJANDRO DANIELS 1955 [id number]           Secondary Coverage     Payor Plan Insurance Group Employer/Plan Group    AETNA COMMERCIAL AETNA HEALTH AND LIFE  SUP             Payor Plan Address Payor Plan Phone Number Payor Plan Fax Number Effective Dates    [address] [address]   5/1/2022 - None Entered    [facility] [address]       Subscriber Name Subscriber Birth Date Member ID       ALEJANDRO DANIELS 1955 [id number]                 Emergency Contacts      (Rel.) Home Phone Work Phone Mobile Phone    Priti Daniels (Spouse) -- -- [phone]              "

## 2023-01-11 NOTE — DISCHARGE SUMMARY
[facility]         DISCHARGE SUMMARY    Patient Name: Alejandro Bain  : 1955  MRN: [id number]    Date of Admission: 2023  Date of Discharge:  23  Primary Care Physician: Herrera Sarabia MD    Consults     Date and Time Order Name Status Description    2023  9:39 PM Inpatient Neurology Consult Stroke Completed     2023  5:12 PM Hospitalist (on-call MD unless specified) Completed           Presenting Problem:   Thrombocytopenia (HCC) [D69.6]  Balance problem [R26.89]  CVA (cerebral vascular accident) (HCC) [I63.9]  Dysarthria [R47.1]  Visual loss, right eye [H54.61]  Acute intractable headache, unspecified headache type [R51.9]  Stroke associated with COVID-19 (HCC) [U07.1, I63.9]    Active and Resolved Hospital Problems:  Active Hospital Problems    Diagnosis POA   • **CVA (cerebral vascular accident) (HCC) [I63.9] Yes   • Intractable headache [R51.9] Yes   • Thrombocytopenia (HCC) [D69.6] Yes   • Paroxysmal atrial fibrillation (HCC) [I48.0] Yes   • Mixed hyperlipidemia [E78.2] Yes   • Essential hypertension [I10] Yes   • S/P AAA repair [Z98.890, Z86.79] Not Applicable   • Coronary artery disease involving native coronary artery of native heart without angina pectoris [I25.10] Yes   • S/P AVR (tissue) by Dr. Phan [Z95.2] Not Applicable   • S/P CABG x 5 by Dr. Phan [Z95.1] Not Applicable      Resolved Hospital Problems   No resolved problems to display.         Hospital Course     Hospital Course:  Alejandro Bain is a 67 y.o. male  with past medical history of diastolic congestive heart failure, AAA s/p repair, atrial fibrillation, CAD, s/p CABG, s/p AVR asthma, anxiety, depression thrombocytopenia, HLD, HTN, hypokalemia, and recent COVID-19 infection 3 weeks ago, who presented to [facility] on 2023 complaining of 1/2-week history of a headache that has progressively worsened over the last four days, and sudden loss of peripheral vision loss.  Patient does  report that he seen his PCP last Thursday who prescribed him Imitrex for headache.   Initial evaluation in the emergency department showed;  CT scan remarkable for left posterior temporal lobe left occipital lobes subacute to developing chronic infarct.    Neurology was consulted and he full CVA workup ordered. His LDL was 131 and His echo was fine but - CTA head and neck: High grade stenosis of left vertebral artery at C3-C4, high grade stenosis in the distal left V4. Occuluded left PCA at the P2 segment with reconstitution of the cortical PCA branches.   - MRI brain: Acute/subacute left PCA territory infarct involving the hippocampus, left parietal white matter and left occipital lobe.  He was started on atorvastatin and eliquis along with his current meds. His deficits improved, neurology cleared him for discharge. He was also started on gabapentin for short trial to help with headaches and will have script to go home with. He is hemodynamically stable and ready for discharge.             DISCHARGE Follow Up Recommendations for labs and diagnostics: PCP, cardiology, and neurology in 1-2 weeks      Day of Discharge     Vital Signs:  Temp:  [97.6 °F (36.4 °C)-98.2 °F (36.8 °C)] 98 °F (36.7 °C)  Heart Rate:  [59-76] 62  Resp:  [14-19] 19  BP: (110-141)/(69-92) 115/78  Physical Exam:  Constitutional: Awake, alert   Eyes: PERRLA, sclerae anicteric, no conjunctival injection   HENT: NCAT, mucous membranes moist   Neck: Supple, no thyromegaly, no lymphadenopathy, trachea midline   Respiratory: Clear to auscultation bilaterally, nonlabored respirations    Cardiovascular: RRR, no murmurs, rubs, or gallops, palpable pedal pulses bilaterally   Gastrointestinal: Positive bowel sounds, soft, nontender, nondistended   Musculoskeletal: No bilateral ankle edema, no clubbing or cyanosis to extremities   Psychiatric: Appropriate affect, cooperative   Neurologic: Oriented x 3, strength symmetric in all extremities, Cranial Nerves  grossly intact to confrontation, speech clear   Skin: No rashes     Pertinent  and/or Most Recent Results     LAB RESULTS:      Lab 01/10/23  0332 01/09/23  1530   WBC 5.50 7.40   HEMOGLOBIN 12.7* 13.4   HEMATOCRIT 38.2 40.2   PLATELETS 119* 122*   NEUTROS ABS  --  5.90   LYMPHS ABS  --  0.60*   MONOS ABS  --  0.60   EOS ABS  --  0.20   MCV 86.8 87.6   PROTIME  --  11.3         Lab 01/10/23  0332 01/09/23  1530   SODIUM 141 141   POTASSIUM 3.7 3.8   CHLORIDE 107 104   CO2 25.0 24.0   ANION GAP 9.0 13.0   BUN 18 18   CREATININE 1.08 1.22   EGFR 76.2 65.8   GLUCOSE 89 113*   CALCIUM 8.8 9.3   MAGNESIUM  --  2.2   HEMOGLOBIN A1C 4.9  --    TSH  --  1.530         Lab 01/10/23  0332 01/09/23  1530   TOTAL PROTEIN 6.0 6.5   ALBUMIN 3.9 4.1   GLOBULIN 2.1 2.4   ALT (SGPT) 20 21   AST (SGOT) 23 22   BILIRUBIN 0.5 0.4   ALK PHOS 84 89         Lab 01/09/23  1530   PROTIME 11.3   INR 1.10         Lab 01/10/23  0332   CHOLESTEROL 207*   LDL CHOL 131*   HDL CHOL 40   TRIGLYCERIDES 204*             Brief Urine Lab Results     None        Microbiology Results (last 10 days)     ** No results found for the last 240 hours. **          XR Chest 2 View    Result Date: 12/14/2022  Impression: No acute cardiopulmonary abnormality. Probable emphysema.  Electronically Signed By-Mila Spencer MD On:12/14/2022 12:56 PM This report was finalized on [id number] by  Mila Spencer MD.    CT Head Without Contrast    Result Date: 1/9/2023  Impression: Impression: 1. Left posterior temporal lobe-left occipital lobe suspected subacute to evolving chronic infarct. Correlate with clinical findings and onset of symptoms. No hemorrhagic transformation is seen. I spoke with the clinician in the emergency room at the time of this dictation. Electronically Signed: Kelin Pond  1/9/2023 5:01 PM EST  Workstation ID: [id number]    MRI Brain Without Contrast    Result Date: 1/10/2023  Impression: Impression: 1. Acute/subacute left PCA territory infarct  involving the hippocampus, left parietal white matter and left occipital lobe. No hemorrhage. 2. Mild chronic small vessel ischemic change. 3. Moderate paranasal sinus mucosal disease. Electronically Signed: Hernando Blake  1/10/2023 1:10 PM EST  Workstation ID: [id number]    XR Chest 1 View    Result Date: 1/9/2023  Impression: Impression: 1.No radiographic findings of acute cardiopulmonary abnormality. 2.Status post cardiac surgery. Electronically Signed: Sukhdeep Conde  1/9/2023 3:47 PM EST  Workstation ID: [id number]    XR Chest 1 View    Result Date: 12/14/2022  Impression: There is no significant change when compared to the prior study. There is no evidence for acute cardiopulmonary process.  Electronically Signed By-Rinku Ko MD On:12/14/2022 3:31 PM This report was finalized on [id number] by  Rinku Ko MD.    CT Angiogram Chest Pulmonary Embolism    Result Date: 12/14/2022  Impression: 1.     No evidence for pulmonary embolism. 2.     Evidence for scattered small airway or centrilobular nodular opacities throughout the lungs diffusely. Areas of septal thickening and ground glass infiltrate are also seen. The findings suggest changes of developing atypical/viral infection or multifocal pneumonia versus bronchitis/bronchiolitis.  Electronically Signed By-Rinku Ko MD On:12/14/2022 5:56 PM This report was finalized on [id number] by  Rinku Ko MD.    CT Angiogram Carotids    Result Date: 1/10/2023  Impression: Impression: 1. No significant right carotid stenosis. 2. Approximately 25% narrowing of the proximal left ICA. 3. Tandem high-grade and moderate grade stenoses in the mid left vertebral artery at the C3-C4 level. Moderate focal stenosis at the left vertebral artery origin from the arch. High-grade focal stenosis in the distal left V4 segment. 4. Mild focal stenosis in the mid right vertebral artery. 5. Occluded left PCA at the P2 segment. There is reconstitution of the cortical PCA  branches although with oligemia in the region of infarct. 6. There are findings concerning for pneumonia in the visualized portions of the lungs. Electronically Signed: Hernando Hayden  1/10/2023 3:44 PM EST  Workstation ID: [id number]    CT Angiogram Head    Result Date: 1/10/2023  Impression: Impression: 1. No significant right carotid stenosis. 2. Approximately 25% narrowing of the proximal left ICA. 3. Tandem high-grade and moderate grade stenoses in the mid left vertebral artery at the C3-C4 level. Moderate focal stenosis at the left vertebral artery origin from the arch. High-grade focal stenosis in the distal left V4 segment. 4. Mild focal stenosis in the mid right vertebral artery. 5. Occluded left PCA at the P2 segment. There is reconstitution of the cortical PCA branches although with oligemia in the region of infarct. 6. There are findings concerning for pneumonia in the visualized portions of the lungs. Electronically Signed: Hernando Blake  1/10/2023 3:44 PM EST  Workstation ID: [id number]      Results for orders placed during the hospital encounter of 01/09/23    Duplex Carotid Ultrasound CAR    Interpretation Summary  •  Right internal carotid artery plaque without significant stenosis.  •  Left internal carotid artery plaque without significant stenosis.      Results for orders placed during the hospital encounter of 01/09/23    Duplex Carotid Ultrasound CAR    Interpretation Summary  •  Right internal carotid artery plaque without significant stenosis.  •  Left internal carotid artery plaque without significant stenosis.      Results for orders placed during the hospital encounter of 01/09/23    Adult Transthoracic Echo Complete W/ Cont if Necessary Per Protocol    Interpretation Summary  •  Left ventricular systolic function is normal. Left ventricular ejection fraction appears to be 56 - 60%.  •  Left ventricular wall thickness is consistent with mild concentric hypertrophy.  •  Left ventricular  diastolic function is consistent with (grade I) impaired relaxation.  •  Saline test results are negative.  •  Estimated right ventricular systolic pressure from tricuspid regurgitation is mildly elevated (35-45 mmHg). Calculated right ventricular systolic pressure from tricuspid regurgitation is 39 mmHg.      Labs Pending at Discharge:  Pending Labs     Order Current Status    Vitamin B12 In process            Discharge Details        Discharge Medications      New Medications      Instructions Start Date   apixaban 5 MG tablet tablet  Commonly known as: ELIQUIS   5 mg, Oral, Every 12 Hours Scheduled      atorvastatin 80 MG tablet  Commonly known as: LIPITOR   80 mg, Oral, Nightly      gabapentin 100 MG capsule  Commonly known as: NEURONTIN   100 mg, Oral, 3 Times Daily         Continue These Medications      Instructions Start Date   amLODIPine 10 MG tablet  Commonly known as: NORVASC   TAKE 1 TABLET BY MOUTH EVERY DAY      aspirin 81 MG EC tablet   81 mg, Oral, Daily      azelastine 0.1 % nasal spray  Commonly known as: ASTELIN   1 spray, Nasal, 2 Times Daily      cholecalciferol 25 MCG (1000 UT) tablet  Commonly known as: VITAMIN D3   1,000 Units, Oral, Daily      lisinopril 20 MG tablet  Commonly known as: PRINIVIL,ZESTRIL   20 mg, Oral, 2 Times Daily      multivitamin with minerals tablet tablet   1 tablet, Oral, Daily      predniSONE 5 MG tablet  Commonly known as: DELTASONE   5 mg, Oral, Daily, Pt has five days of therapy left      Symbicort 160-4.5 MCG/ACT inhaler  Generic drug: budesonide-formoterol   2 puffs, Inhalation, 2 Times Daily - RT      Ventolin  (90 Base) MCG/ACT inhaler  Generic drug: albuterol sulfate HFA   2 puffs, Inhalation, Every 6 Hours PRN      zinc sulfate 220 (50 Zn) MG capsule  Commonly known as: ZINCATE   220 mg, Oral, Daily             Allergies   Allergen Reactions   • Avelox [Moxifloxacin] Rash   • Penicillins Rash   • Sulfa Antibiotics Rash   • Doxycycline Rash          Discharge Disposition:  Home or Self Care    Diet:  Hospital:  Diet Order   Procedures   • Diet: Cardiac Diets; Healthy Heart (2-3 Na+); Texture: Regular Texture (IDDSI 7); Fluid Consistency: Thin (IDDSI 0)         Discharge Activity: as tolerated  Condition: stable        CODE STATUS:  Code Status and Medical Interventions:   Ordered at: 01/09/23 7298     Code Status (Patient has no pulse and is not breathing):    CPR (Attempt to Resuscitate)     Medical Interventions (Patient has pulse or is breathing):    Full Support     Release to patient:    Routine Release         Future Appointments   Date Time Provider Department Center   4/26/2023  9:45 AM Maciej Blank MD [facility] NA P [facility]           Time spent on Discharge including face to face service:  35 minutes    Remberto Gannon MD

## 2023-01-11 NOTE — PROGRESS NOTES
LOS: 2 days     Chief Complaint: headache, right peripheral vision loss        SUBJECTIVE:    History taken from: patient chart RN    Interval History: Intermittent headaches controlled by tylenol. Right vision field deficits are very mild.     Review of Systems   Constitutional: Negative.    Eyes: Positive for visual disturbance.   Cardiovascular: Negative.    Neurological: Positive for headaches. Negative for dizziness, tremors, seizures, syncope, facial asymmetry, speech difficulty, weakness, light-headedness and numbness.          Pertinent PMH:  has a past medical history of Acute on chronic diastolic heart failure (HCC) (12/14/2022), Aneurysm (HCC), Anxiety, Asthma, Atrial fibrillation (HCC), Bronchitis, Coronary artery disease, Depression, Encounter for laboratory testing for COVID-19 virus (08/22/2022), Gout, Hyperlipidemia, Hypertension, Intractable headache (1/9/2023), Myocardial infarction (HCC), Pneumonia, Pulmonary nodule, and Familia Mountain spotted fever.   ________________________________________________     OBJECTIVE:    On exam:  GENERAL: NAD  CARDIO: RRR  NEURO:  Oriented x3  EOMI, PERRL, very mild right homonymous hemianopsia   CN 2-12 intact, No facial asymmetry  Speech clear without dysarthria  Sensations intact and equal bilaterally  Strength 5/5 and equal in all extremities  No ataxia    ________________________________________________   RESULTS REVIEW    VITAL SIGNS:  Temp:  [97.6 °F (36.4 °C)-98.2 °F (36.8 °C)] 98 °F (36.7 °C)  Heart Rate:  [59-76] 67  Resp:  [14-18] 16  BP: (110-141)/(69-92) 110/69    LABS:       Lab 01/10/23  0332 01/09/23  1530   WBC 5.50 7.40   HEMOGLOBIN 12.7* 13.4   HEMATOCRIT 38.2 40.2   PLATELETS 119* 122*   NEUTROS ABS  --  5.90   LYMPHS ABS  --  0.60*   MONOS ABS  --  0.60   EOS ABS  --  0.20   MCV 86.8 87.6   PROTIME  --  11.3         Lab 01/10/23  0332 01/09/23  1530   SODIUM 141 141   POTASSIUM 3.7 3.8   CHLORIDE 107 104   CO2 25.0 24.0   ANION GAP 9.0 13.0    BUN 18 18   CREATININE 1.08 1.22   EGFR 76.2 65.8   GLUCOSE 89 113*   CALCIUM 8.8 9.3   MAGNESIUM  --  2.2   HEMOGLOBIN A1C 4.9  --    TSH  --  1.530         Lab 01/10/23  0332 01/09/23  1530   TOTAL PROTEIN 6.0 6.5   ALBUMIN 3.9 4.1   GLOBULIN 2.1 2.4   ALT (SGPT) 20 21   AST (SGOT) 23 22   BILIRUBIN 0.5 0.4   ALK PHOS 84 89         Lab 01/09/23  1530   PROTIME 11.3   INR 1.10         Lab 01/10/23  0332   CHOLESTEROL 207*   LDL CHOL 131*   HDL CHOL 40   TRIGLYCERIDES 204*                 Lab Results   Component Value Date    TSH 1.530 01/09/2023     (H) 01/10/2023    HGBA1C 4.9 01/10/2023         IMAGING STUDIES:  CT Head Without Contrast    Result Date: 1/9/2023  Impression: 1. Left posterior temporal lobe-left occipital lobe suspected subacute to evolving chronic infarct. Correlate with clinical findings and onset of symptoms. No hemorrhagic transformation is seen. I spoke with the clinician in the emergency room at the time of this dictation. Electronically Signed: Kelin Pond  1/9/2023 5:01 PM EST  Workstation ID: [id number]    MRI Brain Without Contrast    Result Date: 1/10/2023  Impression: 1. Acute/subacute left PCA territory infarct involving the hippocampus, left parietal white matter and left occipital lobe. No hemorrhage. 2. Mild chronic small vessel ischemic change. 3. Moderate paranasal sinus mucosal disease. Electronically Signed: Hernando Blake  1/10/2023 1:10 PM EST  Workstation ID: [id number]    XR Chest 1 View    Result Date: 1/9/2023  Impression: 1.No radiographic findings of acute cardiopulmonary abnormality. 2.Status post cardiac surgery. Electronically Signed: Sukhdeep Conde  1/9/2023 3:47 PM EST  Workstation ID: [id number]    CT Angiogram Carotids    Result Date: 1/10/2023  Impression: 1. No significant right carotid stenosis. 2. Approximately 25% narrowing of the proximal left ICA. 3. Tandem high-grade and moderate grade stenoses in the mid left vertebral artery at the C3-C4 level.  Moderate focal stenosis at the left vertebral artery origin from the arch. High-grade focal stenosis in the distal left V4 segment. 4. Mild focal stenosis in the mid right vertebral artery. 5. Occluded left PCA at the P2 segment. There is reconstitution of the cortical PCA branches although with oligemia in the region of infarct. 6. There are findings concerning for pneumonia in the visualized portions of the lungs. Electronically Signed: Hernando Blake  1/10/2023 3:44 PM EST  Workstation ID: [id number]    CT Angiogram Head    Result Date: 1/10/2023  Impression: 1. No significant right carotid stenosis. 2. Approximately 25% narrowing of the proximal left ICA. 3. Tandem high-grade and moderate grade stenoses in the mid left vertebral artery at the C3-C4 level. Moderate focal stenosis at the left vertebral artery origin from the arch. High-grade focal stenosis in the distal left V4 segment. 4. Mild focal stenosis in the mid right vertebral artery. 5. Occluded left PCA at the P2 segment. There is reconstitution of the cortical PCA branches although with oligemia in the region of infarct. 6. There are findings concerning for pneumonia in the visualized portions of the lungs. Electronically Signed: Hernando Blake  1/10/2023 3:44 PM EST  Workstation ID: [id number]      I reviewed the patient's new clinical results.    ________________________________________________      PROBLEM LIST:    CVA (cerebral vascular accident) (HCC)    S/P CABG x 5 by Dr. Phan    S/P AVR (tissue) by Dr. Phan    S/P AAA repair    Essential hypertension    Paroxysmal atrial fibrillation (HCC)    Coronary artery disease involving native coronary artery of native heart without angina pectoris    Mixed hyperlipidemia    Thrombocytopenia (HCC)    Intractable headache        ASSESSMENT/PLAN:  1. Subacute left PCA ischemic stroke,  Likely embolic. Pt with hx of atrial fibrillation and recent Covid infection likely leading to a prothrombotic state.   - CT  head: Left posterior temporal lobe-left occipital lobe suspected subacute to evolving chronic infarct. No hemorrhagic transformation.  - CTA head and neck: High grade stenosis of left vertebral artery at C3-C4, high grade stenosis in the distal left V4. Occuluded left PCA at the P2 segment with reconstitution of the cortical PCA branches.   - MRI brain: Acute/subacute left PCA territory infarct involving the hippocampus, left parietal white matter and left occipital lobe.  - Carotid duplex: No significant stenosis bilaterally   - Echo: EF is 56-60%, mild concentric hypertrophy.   - EKG: Sinus rhythm  - Labs: A1C: 4.9, B12: P, LDL: 131, TSH: 1.53  - Antithrombotics: Continue ASA 81 mg daily and Eliquis 5 mg bid   - Statin: Lipitor 80mg qhs   - PT/OT/ST as appropriate, fall precautions as appropriate, Neuro checks per protocol, DVT prophylaxis, Stroke education     2. Headache, improving   - Headache is a common feature with posterior circulation strokes. No evidence of significant edema or hemorrhage on CT.   - Tylenol or Fioricet as needed     3. Intracranial and extracranial atherosclerosis, subacute occlusion of P2 PCA and left vertebral high grade stenosis   - ASA and Lipitor   - Discussed patient can see endovascular neurosurgeon for surgical options on left vertebral stenosis but likely will go with medical management.   - follow up with Dr. Brooks [phone]     4. Atrial fibrillation, brief episode after CABG followed by another episode about 2 years later. Pt states he has not been fully evaluated for this.   - [id number] Score: 6 (1/10/2023 11:17 AM)  - Eliquis started with plans for follow up outpatient with Cardiology      5. Subacute thrombocytopenia   - Patient may benefit from hematology outpatient if continued decline.     6. Recently Elevated D-dimer when Covid positive   - D-dimer >35.2 on 12/14, Activated clotting time 276 on 1/5/17.   - Workup on 12/14 admission showed CT PE protocol shows no  evidence of pulmonary embolism. Doppler ultrasound of the lower extremities (12/15) negative for DVT       Modification of stroke risk factors:   - Blood pressure should be less than 130/80 outpatient, HbA1c less than 6.5, LDL less than 70; b12>500 and smoking cessation if applicable. We would be grateful if the primary team / primary care physician would keep a close watch on the above targets.  - Stroke education  - Follow up with Stroke clinic      There are no further recommendations. Will sign off, please call with any questions or concerns.    I discussed the patient's findings and my recommendations with patient, nursing staff and consulting provider    Aparna Orozco, GARY  01/11/23  09:16 EST

## 2023-01-11 NOTE — THERAPY TREATMENT NOTE
Acute Care - Speech Language Pathology Treatment Note     [facility]     Patient Name: Alejandro Bain  : 1955  MRN: [id number]    Today's Date: 2023                   Admit Date: 2023       Visit Dx:      ICD-10-CM ICD-9-CM   1. Stroke associated with COVID-19 (HCC)  U07.1 434.91    I63.9 079.89   2. Visual loss, right eye  H54.61 369.8   3. Acute intractable headache, unspecified headache type  R51.9 784.0   4. Dysarthria  R47.1 784.51   5. Balance problem  R26.89 781.99   6. Thrombocytopenia (HCC)  D69.6 287.5       Patient Active Problem List   Diagnosis   • S/P CABG x 5 by Dr. Phan   • S/P AVR (tissue) by Dr. 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HCC)   • Aneurysm of iliac artery (HCC)   • Paroxysmal atrial fibrillation (HCC)   • Coronary artery disease involving native coronary artery of native heart without angina pectoris   • Mixed hyperlipidemia   • Palpitations   • Shortness of breath   • Acute upper back pain   • Asthma   • Thrombocytopenia (HCC)   • Encounter for laboratory testing for COVID-19 virus   • COVID   • Leukocytosis   • Elevated LFTs   • Acute renal insufficiency   • Acute on chronic diastolic heart failure (HCC)   • Acute respiratory distress   • CVA (cerebral vascular accident) (HCC)   • Intractable headache       Past Medical History:   Diagnosis Date   • Acute on chronic diastolic heart failure (HCC) 2022   • Aneurysm (HCC)    • Anxiety    • Asthma    • Atrial fibrillation (HCC)    • Bronchitis    • Coronary artery disease    • Depression    • Encounter for laboratory testing for COVID-19 virus 2022   • Gout    • Hyperlipidemia    • Hypertension    • Intractable headache 2023   • Myocardial infarction (HCC)    • Pneumonia    • Pulmonary nodule    • Familia Mountain spotted fever        Past Surgical  History:   Procedure Laterality Date   • AORTIC VALVE REPAIR/REPLACEMENT  12/03/2016    CABG x 5/ tissue AVR by DR. PHAN   • CARDIAC CATHETERIZATION     • CARDIAC SURGERY     • CARDIOVASCULAR STRESS TEST  2020   • CAROTID STENT     • CORONARY ARTERY BYPASS GRAFT  12/02/2016    X4  Dr Phan   • ILIAC ARTERY ANEURYSM REPAIR  01/05/2017    abdominal and bilateral   • OTHER SURGICAL HISTORY      [facility]   • SINUS SURGERY     • VASCULAR SURGERY         Pt seen this date for speech therapy with Pt wife present. Pt in good spirits this date, agreeable and cooperative throughout the session. Pt stated he had newfound awareness of speech/communication deficits after eval on 1/10/23 and has strong desire to improve in his skills. On a scale of 1-10 with a 1 being his worst and a 10 being his best, the Pt was asked how he felt he was currently and he labeled himself between a 7 and an 8. The same question was asked of his wife to label the Pt and she said he was a 9. Recall, divergent naming and reading were addressed this date. Pt was given three words at the start of the session and was asked to recall them in 5 minutes. Therapist provided tactics to remember the words such as saying them out loud 5x, relating the words to his environment and to his self (e.g. there is a CHAIR and a BLANKET in the room). Pt was unable to recall the words ind., but when provided the environmental cue he remembered 1 word, and was able to recall the other 2 words when provided a semantic cue. Pt was asked to name 5 items in a preferred and non-preferred category. Pt provided 6 items in preferred category ind. Pt provided 4 items in non-preferred category and final item with min cue. Therapist presented a specific category tactic to generate items (CARS - e.g. LUXURY CARS). Pt was tasked with reading 3-4 word phrases. Pt was approx. 60% accuracy ind. Pt stated difficulty reading straight out in front of him due to vision impairment present after  stroke, and therefore was asked to read the paper L or R of the medial part of his vision. He was also provided a finger cue to assist in scanning. With cues Pt improved his reading accuracy to approx. 70%. Pt noted that he has dyslexia, which along with stroke could be impacting his reading. Overall, good improvement this date with cues provided.      SLP Recommendation and Plan    Recommend Pt be seen in outpatient 1-2x per week in order to improve speech and communication skills in the areas of memory, reading and writing.         EDUCATION    The patient has been educated in the following areas:     Education was provided in regards to stroke, treatment goals, and expectations in speech therapy as well as daily life. Pt and his wife asked appropriate questions throughout the session to aid in education. Cues that could be used for home practice were provided to improve recall, divergent naming, reading and overall communication skills.       Communication Impairment.             SLP GOALS     Row Name 01/11/23 1300          [place] Independently (P)   -AB    Time frame by discharge (P)   -AB    Progress/Outcomes goal ongoing (P)   -AB          [place] Independently (P)   -AB    Time frame by discharge (P)   -AB    Progress/Outcomes goal ongoing (P)   -AB          Improve Scanning Through: Goal 1 (SLP) scanning from left to right on page to identify target in series of letters;scanning task/letter cancellation;use compensatory strategies for visual scanning;90%;with minimal cues (75-90%) (P)   -AB    Time Frame (Scanning Goal 1, SLP) 1 week (P)   -AB    Progress (Scanning Goal 1, SLP) 90%;with minimal cues (75-90%) (P)   -AB    Progress/Outcomes (Scanning Goal 1, SLP) goal ongoing;good progress toward goal (P)   -AB    Comment (Scanning Goal 1, SLP) Pt seen this date to assess scanning during reading task. Pt with minimal cues (verbal) scanned the 3 word sentences from L to R. Required finger cue to  read the words appropriately to 100%. (P)   -AB          Improve Reading Comprehension at Paragraph Level Goal 1 (SLP) answer questions re: paragraph read;answer questions re: multiple paragraph selection;moderately complex;90%;independently (over 90% accuracy) (P)   -AB    Time Frame (Reading Comprehension at Paragraph Level Goal 1, SLP) 1 week (P)   -AB    Comment (Reading Comprehension at Paragraph Level Goal 1, SLP) Goal not addressed this date. (P)   -AB          Improve Word Retrieval Skills By Goal 1 (SLP) completing a divergent task;completing a convergent task;completing functional word finding tasks;90%;independently (over 90% accuracy) (P)   -AB    Time Frame (Word Retrieval Goal 1, SLP) 1 week (P)   -AB    Progress/Outcomes (Word Retrieval Goal 1, SLP) goal ongoing;continuing progress toward goal (P)   -AB    Comment (Word Retrieval Goal 1, SLP) Pt given divergent naming task. Pt was asked to name 5 items in preferred topic (golf) and non-preferred topic (car manufacturers). Prior to answering, Pt was provded education on how to recall 5 items, such as remembering preferred car types or a car that he had in the past/currently. Pt was able to name 6 items in preferred topic without cues to 100% accuracy. Pt named 4 items in non-preferred topic ind. and 5 items with semantic cue. (P)   -AB          Improve Memory Skills Through Goal 1 (SLP) listen to a paragraph and answer questions;repeat list in sequential order;recalling related word lists with an imposed delay;90%;with minimal cues (75-90%) (P)   -AB    Time Frame (Memory Skills Goal 1, SLP) 1 week (P)   -AB    Comment (Memory Skills Goal 1, SLP) Goal not addressed this date. (P)   -AB          Improve Functional Math Skills Through Goal 1 (SLP) complete word problems involving time;complete word problems involving money;90%;with minimal cues (75-90%) (P)   -AB    Time Frame (Functional Math Skills Goal 1, SLP) 1 week (P)   -AB    Comment (Functional  Math Skills Goal 1, SLP) Goal not addressed this date. (P)   -AB          User Key  (r) = Recorded By, (t) = Taken By, (c) = Cosigned By    Initials Name Provider Type    Romana Owusu, SLP Speech and Language Pathologist    Ana Florian, Speech Therapy Student SLP Student                            Time Calculation:                                  Ana Garcia, Speech Therapy Student  1/11/2023

## 2023-01-11 NOTE — CASE MANAGEMENT/SOCIAL WORK
Case Management Readmission Assessment Note    Case Management Readmission Assessment (all recorded)     Readmission Interview     [facility] Name 01/10/23 2141             Readmission Indications    Is this hospitalization related to the prior hospital diagnosis? No      What was the reason you were admitted? Intractable headache; Vision impairment      Row Name 01/10/23 2141             Recommendation for rehospitalization    Did you speak with your physician prior to coming to the hospital No      Who recommended you return to the hospital? --  spouse...condition worsening      Row Name 01/10/23 2141             Follow up appointment    Do you have a PCP? Yes      Did you have an appointment with PCP/specialist after hospitalization within 7 days? Yes      Did you keep your appointment? Yes      Row Name 01/10/23 2141             Medications    Did you have newly prescribed medications at discharge? Yes      Did you understand the reasons for your medications at discharge and how to take them? Yes      Did you understand the side effects of your medications? Yes      Are you taking all of you prescribed medications? Yes      Row Name 01/10/23 2141             Discharge Instructions    Did you understand your discharge instructions? Yes      Did your family/caregiver hear your instructions? Yes      Were you given a number of someone to call if you had questions or concerns? Yes      Row Name 01/10/23 2141             Index discharge location/services    Where did you go upon discharge? Home      Do you have supportive family or friends in the home? Yes      [facility] 01/10/23 2141             Discharge Readiness    On a scale of 1-5 (5 being well prepared), how ready were you for discharge 5      Recommendation based on interview Education on diagnosis/self management

## 2023-01-12 NOTE — CASE MANAGEMENT/SOCIAL WORK
Case Management Discharge Note      Final Note: Home with Outpt SLP at [facility]    Provided Post Acute Provider List?: N/A    Selected Continued Care - Discharged on 1/11/2023 Admission date: 1/9/2023 - Discharge disposition: Home or Self Care         Transportation Services  Private: Car    Final Discharge Disposition Code: 01 - home or self-care

## 2023-01-12 NOTE — OUTREACH NOTE
Prep Survey    Flowsheet Row Responses   [facility]   Is LACE score < 7 ? No   Eligibility Readm Mgmt   Discharge diagnosis cerebral vascular accident   Does the patient have one of the following disease processes/diagnoses(primary or secondary)? Stroke   Does the patient have Home health ordered? No   Is there a DME ordered? No   Prep survey completed? Yes          MELANIE SOSA - Registered Nurse

## 2023-01-16 ENCOUNTER — READMISSION MANAGEMENT (OUTPATIENT)
Dept: CALL CENTER | Facility: HOSPITAL | Age: 68
End: 2023-01-16
Payer: MEDICARE

## 2023-01-16 NOTE — OUTREACH NOTE
Stroke Week 1 Survey    Flowsheet Row Responses   [facility] patient discharged from? [facility]   Does the patient have one of the following disease processes/diagnoses(primary or secondary)? Stroke   Week 1 attempt successful? Yes   Call start time 1721   Call end time 1731   Discharge diagnosis cerebral vascular accident   Person spoke with today (if not patient) and relationship pt and wife, Priti Lowry reviewed with patient/caregiver? Yes   Is the patient having any side effects they believe may be caused by any medication additions or changes? No   Does the patient have all medications ordered at discharge? Yes   Is the patient taking all medications as directed (includes completed medication regime)? Yes   Does the patient have a primary care provider?  Yes   Does the patient have an appointment with their PCP within 7 days of discharge? Yes  [has cardiology appt for now, opthamologist appt too]   Has the patient kept scheduled appointments due by today? N/A   Has home health visited the patient within 72 hours of discharge? N/A   Psychosocial issues? No   Does the patient require any assistance with activities of daily living such as eating, bathing, dressing, walking, etc.? No   Does the patient have any residual symptoms from stroke/TIA? Yes   Residual symptoms comments H/A's, vision disturbances, eyes crossed, can't read   Does the patient understand the diet ordered at discharge? Yes   Did the patient receive a copy of their discharge instructions? Yes   Nursing interventions Reviewed instructions with patient   What is the patient's perception of their health status since discharge? Improving   Nursing interventions Nurse provided patient education   Is the patient/caregiver able to teach back the risk factors for a stroke? High blood pressure-goal below 120/80, High Cholesterol, Physical inactivity and obesity, History of TIAs, Carotid or other artery disease   Is the patient/caregiver able to teach  back signs and symptoms related to disease process for when to call PCP? Yes   Is the patient/caregiver able to teach back signs and symptoms related to disease process for when to call 911? Yes   If the patient is a current smoker, are they able to teach back resources for cessation? Not a smoker   Is the patient/caregiver able to teach back the hierarchy of who to call/visit for symptoms/problems? PCP, Specialist, Home health nurse, Urgent Care, ED, 911 Yes   Is the patient able to teach back FAST for Stroke? B alance: Watch for sudden loss of balance, E yes: Check for vision loss, F ace: Look for an uneven smile, A rm: Check if one arm is weak, S peech: Listen for slurred speech, T shirley: Call 9-1-1 right away   Week 1 call completed? Yes          DEEJAY COLLAZO - Registered Nurse

## 2023-01-17 ENCOUNTER — OFFICE VISIT (OUTPATIENT)
Dept: CARDIOLOGY | Facility: CLINIC | Age: 68
End: 2023-01-17
Payer: MEDICARE

## 2023-01-17 VITALS
WEIGHT: 187 LBS | OXYGEN SATURATION: 96 % | HEART RATE: 66 BPM | HEIGHT: 73 IN | DIASTOLIC BLOOD PRESSURE: 80 MMHG | BODY MASS INDEX: 24.78 KG/M2 | SYSTOLIC BLOOD PRESSURE: 125 MMHG

## 2023-01-17 DIAGNOSIS — Z86.79 S/P AAA REPAIR: ICD-10-CM

## 2023-01-17 DIAGNOSIS — Z98.890 S/P AAA REPAIR: ICD-10-CM

## 2023-01-17 DIAGNOSIS — I48.0 PAF (PAROXYSMAL ATRIAL FIBRILLATION): ICD-10-CM

## 2023-01-17 DIAGNOSIS — I25.10 CORONARY ARTERY DISEASE INVOLVING NATIVE CORONARY ARTERY OF NATIVE HEART WITHOUT ANGINA PECTORIS: Chronic | ICD-10-CM

## 2023-01-17 DIAGNOSIS — Z95.2 S/P AVR: ICD-10-CM

## 2023-01-17 DIAGNOSIS — I63.9 CEREBROVASCULAR ACCIDENT (CVA), UNSPECIFIED MECHANISM: Primary | ICD-10-CM

## 2023-01-17 DIAGNOSIS — Z95.1 S/P CABG X 5: ICD-10-CM

## 2023-01-17 PROCEDURE — 99214 OFFICE O/P EST MOD 30 MIN: CPT | Performed by: NURSE PRACTITIONER

## 2023-01-17 PROCEDURE — 93000 ELECTROCARDIOGRAM COMPLETE: CPT | Performed by: NURSE PRACTITIONER

## 2023-01-17 RX ORDER — ROSUVASTATIN CALCIUM 5 MG/1
5 TABLET, COATED ORAL DAILY
Qty: 30 TABLET | Refills: 5 | Status: SHIPPED | OUTPATIENT
Start: 2023-01-17 | End: 2023-02-10

## 2023-01-17 NOTE — LETTER
January 21, 2023     Erin Foster MD  [address] [place], [place] [address]    Patient: Alejandro Bain   YOB: 1955   Date of Visit: 1/17/2023       Dear Dr. Kristin MD:    Thank you for referring Alejandro Bain to me for evaluation. Below are the relevant portions of my assessment and plan of care.    If you have questions, please do not hesitate to call me. I look forward to following Alejandro along with you.         Sincerely,        GARY Gonsalez        CC: No Recipients  Cecilia Ibarra APRN  01/17/23 1249  Signed  Cardiology Office Follow Up Visit      Primary Care Provider:  Herrera Sarabia MD    Reason for f/u:     Hospital follow-up  Recent CVA  Paroxysmal atrial fibrillation  CAD  Previous CABG      Subjective      CC:    Complains of ongoing visual disturbances, intermittent headaches    History of Present Illness       Alejandro Bain is a 67 y.o. male.  Patient presents her office today for hospital follow-up.  He has had several hospitalizations over the last couple of months.  Initially for COVID-19 infection.  Then intractable headaches and most recently for an acute CVA.    Most recent hospitalization was at [facility] from January 9, 2023 to January 11, 2023.  He presented with symptoms of intractable headache and loss of peripheral vision.  CT scan was remarkable for left posterior temporal lobe and left occipital lobe subacute to developing chronic infarct.  MRI of his brain showed acute/subacute left PCA territory infarct involving the hippocampus left parietal white matter and left occipital lobe.  CTA of his head and neck showed high-grade stenosis of the left vertebral artery at C3-C4 high-grade stenosis in the distal left V4 occluded left PCA at the P2 segment with reconstitution of the cortical PCA branches.    Echocardiogram was repeated his ejection fraction was 55 to 60% with mild concentric LVH.      Patient is known to have coronary  artery disease.  In 2016 he underwent 5 vessel CABG.  He has had previous aortic valve replacement as well he was also diagnosed with an abdominal aortic aneurysm and had AAA repair post bypass surgery.  Patient had brief postoperative atrial fibrillation post bypass surgery but has not had documented sustained recurrence since that time.    Last stress Myoview was in August 2021 that showed no reversible ischemia.    In September 2022 the patient underwent exercise treadmill test.  He exercised for 8 minutes and had normal chronotropic response to exercise with no evidence of ischemia by EKG.    Holter monitor in July 2022 showed isolated PACs and PVCs but no documented atrial fibrillation or flutter was detected    Since his hospitalization and discharge the patient reports he continues to have daily headaches though they are lessening in severity.  He also is having continued issues with visual disturbances and difficulty reading due to his inability to put words and letters together.    The patient and his wife are expressing some frustration about the our follow-up.  He was scheduled for a neurologic appointment but not until May of this year.  He has not had any therapy set up as well.  They are requesting a referral to Dr. Dulce Meek at [facility] as she has been referred by one of their friends    He denies any current or recent chest pain or dyspnea.  No PND or orthopnea.    The patient was started on Eliquis during his hospitalization.  In addition he was started on Lipitor that he did not start this as he reports he has had previous problems with myalgias        ASSESSMENT/PLAN:      Diagnoses and all orders for this visit:    1. Cerebrovascular accident (CVA), unspecified mechanism (HCC) (Primary)  Comments:  Recent PCA distribution CVA for hospital follow-up  Orders:  -     ECG 12 Lead  -     Ambulatory Referral to Neurology  -     Mobile Cardiac Outpatient Telemetry; Future    2. PAF (paroxysmal atrial  fibrillation) ([facility])  Comments:  Patient had postoperative paroxysmal atrial fibrillation after his coronary artery bypass grafting surgery.  No recent recurrence of atrial fibrillation.  He is  Orders:  -     Mobile Cardiac Outpatient Telemetry; Future    3. Coronary artery disease involving native coronary artery of native heart without angina pectoris  Comments:  Denies chest pain or signs to suggest unstable angina    4. S/P CABG x 5 by Dr. Phan    5. S/P AVR (tissue) by Dr. Phan    6. S/P AAA repair    Other orders  -     rosuvastatin (CRESTOR) 5 MG tablet; Take 1 tablet by mouth Daily.  Dispense: 30 tablet; Refill: 5            MEDICAL DECISION MAKING:      I have spent a long time talking and reviewing medical records with the patient and his wife.    I have placed a referral for neurology to Dr. Bui at their request.    I do think the patient would benefit from rehab and I have asked him to contact the referral they were given at discharge.    In addition I have asked them to contact medical records at the hospital to request records which need to be faxed to Dr. Bui's office.    I have ordered a 30-day M cot monitor to assess for any recurrence of atrial arrhythmias.    I do think he should be on a statin.  His last LDL was 130.  I have prescribed Crestor 5 mg a day and will recommend repeating a lipid panel in 3 months    I have made no other changes in his medical therapy.    I will schedule the patient back to see Dr. Blank in follow-up in 4 to 6 weeks.  If he has any worsening symptoms or persistent severe headache he has been advised to go to the emergency room for evaluation      Past Medical History:   Diagnosis Date   • Acute on chronic diastolic heart failure (HCC) 12/14/2022   • Aneurysm (HCC)    • Anxiety    • Asthma    • Atrial fibrillation (HCC)    • Bronchitis    • CHF (congestive heart failure) ([facility])    • Coronary artery disease    • Depression    • Encounter for laboratory testing for  COVID-19 virus 08/22/2022   • Gout    • Hyperlipidemia    • Hypertension    • Intractable headache 01/09/2023   • Myocardial infarction (HCC)    • Pneumonia    • Pulmonary nodule    • Familia Mountain spotted fever    • Stroke (HCC)        Past Surgical History:   Procedure Laterality Date   • AORTIC VALVE REPAIR/REPLACEMENT  12/03/2016    CABG x 5/ tissue AVR by DR. PHAN   • CARDIAC CATHETERIZATION     • CARDIAC SURGERY     • CARDIOVASCULAR STRESS TEST  2020   • CAROTID STENT     • CORONARY ARTERY BYPASS GRAFT  12/02/2016    X4  Dr Phan   • ILIAC ARTERY ANEURYSM REPAIR  01/05/2017    abdominal and bilateral   • OTHER SURGICAL HISTORY      PTCI   • SINUS SURGERY     • VASCULAR SURGERY           Current Outpatient Medications:   •  amLODIPine (NORVASC) 10 MG tablet, TAKE 1 TABLET BY MOUTH EVERY DAY, Disp: 90 tablet, Rfl: 1  •  apixaban (ELIQUIS) 5 MG tablet tablet, Take 1 tablet by mouth Every 12 (Twelve) Hours. Indications: Atrial Fibrillation, Disp: 60 tablet, Rfl: 0  •  aspirin 81 MG EC tablet, Take 81 mg by mouth Daily., Disp: , Rfl:   •  azelastine (ASTELIN) 0.1 % nasal spray, 1 spray into the nostril(s) as directed by provider 2 (Two) Times a Day., Disp: , Rfl:   •  gabapentin (NEURONTIN) 100 MG capsule, Take 1 capsule by mouth 3 (Three) Times a Day., Disp: 60 capsule, Rfl: 0  •  lisinopril (PRINIVIL,ZESTRIL) 20 MG tablet, Take 20 mg by mouth 2 (Two) Times a Day., Disp: , Rfl:   •  Multiple Vitamins-Minerals (MULTIVITAMIN ADULT PO), Take 1 tablet by mouth Daily., Disp: , Rfl:   •  SYMBICORT 160-4.5 MCG/ACT inhaler, Inhale 2 puffs 2 (Two) Times a Day., Disp: , Rfl:   •  VENTOLIN  (90 Base) MCG/ACT inhaler, Inhale 2 puffs Every 6 (Six) Hours As Needed., Disp: , Rfl:   •  rosuvastatin (CRESTOR) 5 MG tablet, Take 1 tablet by mouth Daily., Disp: 30 tablet, Rfl: 5    Social History     Socioeconomic History   • Marital status:    Tobacco Use   • Smoking status: Never   • Smokeless tobacco: Never  "  Vaping Use   • Vaping Use: Never used   Substance and Sexual Activity   • Alcohol use: Not Currently     Comment: quit 2005   • Drug use: No   • Sexual activity: Defer       Family History   Problem Relation Age of Onset   • Cancer Father         brain   • Cancer Sister         lung   • Heart disease Sister    • Pulmonary fibrosis Mother    • Heart disease Mother    • Cancer Brother         pancreatic, lung       The following portions of the patient's history were reviewed and updated as appropriate: allergies, current medications, past family history, past medical history, past social history, past surgical history and problem list.    Review of Systems   Constitutional: Positive for malaise/fatigue.   Eyes: Positive for vision loss in right eye and visual disturbance.   Neurological: Positive for difficulty with concentration, sensory change and weakness.   Psychiatric/Behavioral: Positive for memory loss.       Pertinent items are noted in HPI, all other systems reviewed and negative    /80 (BP Location: Right arm, Patient Position: Sitting, Cuff Size: Large Adult)   Pulse 66   Ht 185.4 cm (72.99\")   Wt 84.8 kg (187 lb)   SpO2 96%   BMI 24.68 kg/m² .  Objective      Vitals reviewed.   Constitutional:       General: Not in acute distress.     Appearance: Normal appearance. Well-developed.   Eyes:      Pupils: Pupils are equal, round, and reactive to light.   HENT:      Head: Normocephalic and atraumatic.   Neck:      Vascular: No JVD.   Pulmonary:      Effort: Pulmonary effort is normal.      Breath sounds: Normal breath sounds.   Cardiovascular:      Normal rate. Regular rhythm.   Edema:     Peripheral edema absent.   Abdominal:      General: There is no distension.      Palpations: Abdomen is soft.      Tenderness: There is no abdominal tenderness.   Musculoskeletal: Normal range of motion.      Cervical back: Normal range of motion and neck supple. Skin:     General: Skin is warm and dry. "   Neurological:      Mental Status: Alert and oriented to person, place, and time.             ECG 12 Lead    Date/Time: 1/17/2023 12:48 PM  Performed by: Cecilia Ibarra APRN  Authorized by: Cecilia Ibarra APRN   Comparison: compared with previous ECG   Similar to previous ECG  Rhythm: sinus rhythm  Rate: normal  BPM: 66  QRS axis: normal  Other findings: non-specific ST-T wave changes    Clinical impression: non-specific ECG            EKG ordered by and reviewed by me in office

## 2023-01-17 NOTE — PROGRESS NOTES
Cardiology Office Follow Up Visit      Primary Care Provider:  Herrera Sarabia MD    Reason for f/u:     Hospital follow-up  Recent CVA  Paroxysmal atrial fibrillation  CAD  Previous CABG      Subjective     CC:    Complains of ongoing visual disturbances, intermittent headaches    History of Present Illness       Alejandro Bain is a 67 y.o. male.  Patient presents her office today for hospital follow-up.  He has had several hospitalizations over the last couple of months.  Initially for COVID-19 infection.  Then intractable headaches and most recently for an acute CVA.    Most recent hospitalization was at [facility] from January 9, 2023 to January 11, 2023.  He presented with symptoms of intractable headache and loss of peripheral vision.  CT scan was remarkable for left posterior temporal lobe and left occipital lobe subacute to developing chronic infarct.  MRI of his brain showed acute/subacute left PCA territory infarct involving the hippocampus left parietal white matter and left occipital lobe.  CTA of his head and neck showed high-grade stenosis of the left vertebral artery at C3-C4 high-grade stenosis in the distal left V4 occluded left PCA at the P2 segment with reconstitution of the cortical PCA branches.    Echocardiogram was repeated his ejection fraction was 55 to 60% with mild concentric LVH.      Patient is known to have coronary artery disease.  In 2016 he underwent 5 vessel CABG.  He has had previous aortic valve replacement as well he was also diagnosed with an abdominal aortic aneurysm and had AAA repair post bypass surgery.  Patient had brief postoperative atrial fibrillation post bypass surgery but has not had documented sustained recurrence since that time.    Last stress Myoview was in August 2021 that showed no reversible ischemia.    In September 2022 the patient underwent exercise treadmill test.  He exercised for 8 minutes and had normal chronotropic response to exercise with  no evidence of ischemia by EKG.    Holter monitor in July 2022 showed isolated PACs and PVCs but no documented atrial fibrillation or flutter was detected    Since his hospitalization and discharge the patient reports he continues to have daily headaches though they are lessening in severity.  He also is having continued issues with visual disturbances and difficulty reading due to his inability to put words and letters together.    The patient and his wife are expressing some frustration about the our follow-up.  He was scheduled for a neurologic appointment but not until May of this year.  He has not had any therapy set up as well.  They are requesting a referral to Dr. Dulce Meek at [facility] as she has been referred by one of their friends    He denies any current or recent chest pain or dyspnea.  No PND or orthopnea.    The patient was started on Eliquis during his hospitalization.  In addition he was started on Lipitor that he did not start this as he reports he has had previous problems with myalgias        ASSESSMENT/PLAN:      Diagnoses and all orders for this visit:    1. Cerebrovascular accident (CVA), unspecified mechanism (HCC) (Primary)  Comments:  Recent PCA distribution CVA for hospital follow-up  Orders:  -     ECG 12 Lead  -     Ambulatory Referral to Neurology  -     Mobile Cardiac Outpatient Telemetry; Future    2. PAF (paroxysmal atrial fibrillation) (HCC)  Comments:  Patient had postoperative paroxysmal atrial fibrillation after his coronary artery bypass grafting surgery.  No recent recurrence of atrial fibrillation.  He is  Orders:  -     Mobile Cardiac Outpatient Telemetry; Future    3. Coronary artery disease involving native coronary artery of native heart without angina pectoris  Comments:  Denies chest pain or signs to suggest unstable angina    4. S/P CABG x 5 by Dr. Phan    5. S/P AVR (tissue) by Dr. Phan    6. S/P AAA repair    Other orders  -     rosuvastatin (CRESTOR) 5 MG tablet;  Take 1 tablet by mouth Daily.  Dispense: 30 tablet; Refill: 5            MEDICAL DECISION MAKING:      I have spent a long time talking and reviewing medical records with the patient and his wife.    I have placed a referral for neurology to Dr. Bui at their request.    I do think the patient would benefit from rehab and I have asked him to contact the referral they were given at discharge.    In addition I have asked them to contact medical records at the hospital to request records which need to be faxed to Dr. Bui's office.    I have ordered a 30-day M cot monitor to assess for any recurrence of atrial arrhythmias.    I do think he should be on a statin.  His last LDL was 130.  I have prescribed Crestor 5 mg a day and will recommend repeating a lipid panel in 3 months    I have made no other changes in his medical therapy.    I will schedule the patient back to see Dr. Blank in follow-up in 4 to 6 weeks.  If he has any worsening symptoms or persistent severe headache he has been advised to go to the emergency room for evaluation      Past Medical History:   Diagnosis Date   • Acute on chronic diastolic heart failure (HCC) 12/14/2022   • Aneurysm (HCC)    • Anxiety    • Asthma    • Atrial fibrillation (HCC)    • Bronchitis    • CHF (congestive heart failure) (HCC)    • Coronary artery disease    • Depression    • Encounter for laboratory testing for COVID-19 virus 08/22/2022   • Gout    • Hyperlipidemia    • Hypertension    • Intractable headache 01/09/2023   • Myocardial infarction (HCC)    • Pneumonia    • Pulmonary nodule    • Familia Mountain spotted fever    • Stroke (HCC)        Past Surgical History:   Procedure Laterality Date   • AORTIC VALVE REPAIR/REPLACEMENT  12/03/2016    CABG x 5/ tissue AVR by DR. PHAN   • CARDIAC CATHETERIZATION     • CARDIAC SURGERY     • CARDIOVASCULAR STRESS TEST  2020   • CAROTID STENT     • CORONARY ARTERY BYPASS GRAFT  12/02/2016    X4  Dr Phan   • ILIAC ARTERY ANEURYSM  REPAIR  01/05/2017    abdominal and bilateral   • OTHER SURGICAL HISTORY      PTCI   • SINUS SURGERY     • VASCULAR SURGERY           Current Outpatient Medications:   •  amLODIPine (NORVASC) 10 MG tablet, TAKE 1 TABLET BY MOUTH EVERY DAY, Disp: 90 tablet, Rfl: 1  •  apixaban (ELIQUIS) 5 MG tablet tablet, Take 1 tablet by mouth Every 12 (Twelve) Hours. Indications: Atrial Fibrillation, Disp: 60 tablet, Rfl: 0  •  aspirin 81 MG EC tablet, Take 81 mg by mouth Daily., Disp: , Rfl:   •  azelastine (ASTELIN) 0.1 % nasal spray, 1 spray into the nostril(s) as directed by provider 2 (Two) Times a Day., Disp: , Rfl:   •  gabapentin (NEURONTIN) 100 MG capsule, Take 1 capsule by mouth 3 (Three) Times a Day., Disp: 60 capsule, Rfl: 0  •  lisinopril (PRINIVIL,ZESTRIL) 20 MG tablet, Take 20 mg by mouth 2 (Two) Times a Day., Disp: , Rfl:   •  Multiple Vitamins-Minerals (MULTIVITAMIN ADULT PO), Take 1 tablet by mouth Daily., Disp: , Rfl:   •  SYMBICORT 160-4.5 MCG/ACT inhaler, Inhale 2 puffs 2 (Two) Times a Day., Disp: , Rfl:   •  VENTOLIN  (90 Base) MCG/ACT inhaler, Inhale 2 puffs Every 6 (Six) Hours As Needed., Disp: , Rfl:   •  rosuvastatin (CRESTOR) 5 MG tablet, Take 1 tablet by mouth Daily., Disp: 30 tablet, Rfl: 5    Social History     Socioeconomic History   • Marital status:    Tobacco Use   • Smoking status: Never   • Smokeless tobacco: Never   Vaping Use   • Vaping Use: Never used   Substance and Sexual Activity   • Alcohol use: Not Currently     Comment: quit 2005   • Drug use: No   • Sexual activity: Defer       Family History   Problem Relation Age of Onset   • Cancer Father         brain   • Cancer Sister         lung   • Heart disease Sister    • Pulmonary fibrosis Mother    • Heart disease Mother    • Cancer Brother         pancreatic, lung       The following portions of the patient's history were reviewed and updated as appropriate: allergies, current medications, past family history, past medical  "history, past social history, past surgical history and problem list.    Review of Systems   Constitutional: Positive for malaise/fatigue.   Eyes: Positive for vision loss in right eye and visual disturbance.   Neurological: Positive for difficulty with concentration, sensory change and weakness.   Psychiatric/Behavioral: Positive for memory loss.       Pertinent items are noted in HPI, all other systems reviewed and negative    /80 (BP Location: Right arm, Patient Position: Sitting, Cuff Size: Large Adult)   Pulse 66   Ht 185.4 cm (72.99\")   Wt 84.8 kg (187 lb)   SpO2 96%   BMI 24.68 kg/m² .  Objective     Vitals reviewed.   Constitutional:       General: Not in acute distress.     Appearance: Normal appearance. Well-developed.   Eyes:      Pupils: Pupils are equal, round, and reactive to light.   HENT:      Head: Normocephalic and atraumatic.   Neck:      Vascular: No JVD.   Pulmonary:      Effort: Pulmonary effort is normal.      Breath sounds: Normal breath sounds.   Cardiovascular:      Normal rate. Regular rhythm.   Edema:     Peripheral edema absent.   Abdominal:      General: There is no distension.      Palpations: Abdomen is soft.      Tenderness: There is no abdominal tenderness.   Musculoskeletal: Normal range of motion.      Cervical back: Normal range of motion and neck supple. Skin:     General: Skin is warm and dry.   Neurological:      Mental Status: Alert and oriented to person, place, and time.             ECG 12 Lead    Date/Time: 1/17/2023 12:48 PM  Performed by: Cecilia Ibarra APRN  Authorized by: Cecilia Ibarra APRN   Comparison: compared with previous ECG   Similar to previous ECG  Rhythm: sinus rhythm  Rate: normal  BPM: 66  QRS axis: normal  Other findings: non-specific ST-T wave changes    Clinical impression: non-specific ECG            EKG ordered by and reviewed by me in office                        "

## 2023-01-23 RX ORDER — LISINOPRIL 20 MG/1
20 TABLET ORAL 2 TIMES DAILY
Qty: 180 TABLET | Refills: 1 | Status: SHIPPED | OUTPATIENT
Start: 2023-01-23 | End: 2023-02-01 | Stop reason: SDUPTHER

## 2023-01-23 NOTE — TELEPHONE ENCOUNTER
Caller: Alejandro Bain    Relationship: Self    Best call back number: [phone]     Requested Prescriptions:   Requested Prescriptions     Pending Prescriptions Disp Refills   • lisinopril (PRINIVIL,ZESTRIL) 20 MG tablet       Sig: Take 1 tablet by mouth 2 (Two) Times a Day.        Pharmacy where request should be sent: [facility]/PHARMACY #[id number] - [facility] [place] [address] [phone] [facility] [phone]      Additional details provided by patient:     Does the patient have less than a 3 day supply:  [x] Yes  [] No    Would you like a call back once the refill request has been completed: [] Yes [x] No    If the office needs to give you a call back, can they leave a voicemail: [x] Yes [] No    Isidro Montoya Rep   01/23/23 14:15 EST

## 2023-01-24 ENCOUNTER — TELEPHONE (OUTPATIENT)
Dept: CARDIOLOGY | Facility: CLINIC | Age: 68
End: 2023-01-24

## 2023-01-24 NOTE — TELEPHONE ENCOUNTER
Caller: Alejandro Bain    Relationship: Self    Best call back number: [phone]    What is the best time to reach you: ANY    Who are you requesting to speak with (clinical staff, provider,  specific staff member): ANY    What was the call regarding: HOLTER MONITOR     Do you require a callback: YES     PT HAS A 30 DAY HOLTER MONITOR WHICH HE HAS CURRENTLY TAKIN OFF. HE WOULD LIKE TO KNOW IF HE COULD COME IN OR GET INSTRUCTIONS OVER THE PHONE ON HOW IT'S SUPPOSED TO WORK.

## 2023-01-25 ENCOUNTER — READMISSION MANAGEMENT (OUTPATIENT)
Dept: CALL CENTER | Facility: HOSPITAL | Age: 68
End: 2023-01-25
Payer: MEDICARE

## 2023-01-25 NOTE — OUTREACH NOTE
Stroke Week 2 Survey    Flowsheet Row Responses   [facility] from? [facility]   Does the patient have one of the following disease processes/diagnoses(primary or secondary)? Stroke   Week 2 attempt successful? Yes   Call start time 1350   Call end time 1405   Discharge diagnosis cerebral vascular accident   Person spoke with today (if not patient) and relationship pt and wife, Priti Lowry reviewed with patient/caregiver? Yes   Prescription comments No concerns with medications.   Is the patient taking all medications as directed (includes completed medication regime)? Yes   Does the patient have a primary care provider?  Yes   Has the patient kept scheduled appointments due by today? N/A   Has home health visited the patient within 72 hours of discharge? N/A   Does the patient require any assistance with activities of daily living such as eating, bathing, dressing, walking, etc.? No   Does the patient have any residual symptoms from stroke/TIA? Yes   Residual symptoms comments Having a hard time reading, and KATZ   Does the patient understand the diet ordered at discharge? Yes   Did the patient receive a copy of their discharge instructions? Yes   Nursing interventions Reviewed instructions with patient   What is the patient's perception of their health status since discharge? Improving   Nursing interventions Nurse provided patient education   Is the patient able to teach back FAST for Stroke? B alance: Watch for sudden loss of balance, E yes: Check for vision loss, F ace: Look for an uneven smile, A rm: Check if one arm is weak, S peech: Listen for slurred speech, T shirley: Call 9-1-1 right away   Is the patient/caregiver able to teach back the risk factors for a stroke? High blood pressure-goal below 120/80, High Cholesterol, Physical inactivity and obesity, History of TIAs, Carotid or other artery disease   Is the patient/caregiver able to teach back signs and symptoms related to disease process  for when to call PCP? Yes   Is the patient/caregiver able to teach back signs and symptoms related to disease process for when to call 911? Yes   Is the patient/caregiver able to teach back the hierarchy of who to call/visit for symptoms/problems? PCP, Specialist, Home health nurse, Urgent Care, ED, 911 Yes   Week 2 call completed? Yes   Is the patient interested in additional calls from an ambulatory ?  NOTE:  applies to high risk patients requiring additional follow-up. No   Wrap up additional comments Discussed worsening HA, Confusion Patient to report to the ER. Denice was DC with HA, mild confusion- Hard time comprehending what has been read. Patient to print Bible versus large and retrain brain to read and comprehend what he is reading. Gave number to  OP ( patient needs Occupational therapy) [phone]. Patient can go to pcp if new orders need placed. No other concerns at this time. Pt Headache a 3/4 versus when coming home a constant 6.          MASHA GUERRA - Registered Nurse

## 2023-01-26 ENCOUNTER — TELEPHONE (OUTPATIENT)
Dept: NEUROLOGY | Facility: CLINIC | Age: 68
End: 2023-01-26
Payer: MEDICARE

## 2023-01-26 NOTE — TELEPHONE ENCOUNTER
Caller: Priti Bain    Relationship: Emergency Contact    Best call back number: [phone]    What was the call regarding: PT'S WIFE CALLED TO CANCEL HOSP F/U APPT WITH [facility] ON 02/1/2023. AS PT HAS ALREADY ESTABLISHED CARE WITH [facility].     SENDING ENCOUNTER PER PROTOCOL AS HOSP F/U HAS BEEN CANCELLED WITH OPT TO NOT RESCHEDULE AT TIME OF CALL.

## 2023-02-01 ENCOUNTER — READMISSION MANAGEMENT (OUTPATIENT)
Dept: CALL CENTER | Facility: HOSPITAL | Age: 68
End: 2023-02-01
Payer: MEDICARE

## 2023-02-01 ENCOUNTER — TELEPHONE (OUTPATIENT)
Dept: CARDIOLOGY | Facility: CLINIC | Age: 68
End: 2023-02-01
Payer: MEDICARE

## 2023-02-01 RX ORDER — LISINOPRIL 20 MG/1
20 TABLET ORAL 2 TIMES DAILY
Qty: 180 TABLET | Refills: 1 | Status: SHIPPED | OUTPATIENT
Start: 2023-02-01

## 2023-02-01 NOTE — OUTREACH NOTE
Stroke Week 3 Survey    Flowsheet Row Responses   [facility] discharged from? [facility]   Does the patient have one of the following disease processes/diagnoses(primary or secondary)? Stroke   Week 3 attempt successful? Yes   Call start time 1215   Call end time 1236   Discharge diagnosis cerebral vascular accident   Person spoke with today (if not patient) and relationship pt   Meds reviewed with patient/caregiver? Yes   Is the patient having any side effects they believe may be caused by any medication additions or changes? No   Does the patient have all medications ordered at discharge? Yes   Is the patient taking all medications as directed (includes completed medication regime)? Yes   Does the patient have a primary care provider?  Yes   Does the patient have an appointment with their PCP within 7 days of discharge? Yes   Has the patient kept scheduled appointments due by today? Yes   Psychosocial issues? No   Does the patient require any assistance with activities of daily living such as eating, bathing, dressing, walking, etc.? No   Does the patient have any residual symptoms from stroke/TIA? Yes   Residual symptoms comments short term memory recall, vision loss in both eyes   Does the patient understand the diet ordered at discharge? Yes   What is the patient's perception of their health status since discharge? Improving   Nursing interventions Nurse provided patient education   Is the patient able to teach back FAST for Stroke? E yes: Check for vision loss   Is the patient/caregiver able to teach back the risk factors for a stroke? High blood pressure-goal below 120/80, High Cholesterol, Physical inactivity and obesity   Is the patient/caregiver able to teach back signs and symptoms related to disease process for when to call PCP? Yes   Is the patient/caregiver able to teach back signs and symptoms related to disease process for when to call 911? Yes   If the patient is a current smoker, are they  able to teach back resources for cessation? Not a smoker   Is the patient/caregiver able to teach back the hierarchy of who to call/visit for symptoms/problems? PCP, Specialist, Home health nurse, Urgent Care, ED, 911 Yes   Week 3 call completed? Yes   Is the patient interested in additional calls from an ambulatory ?  NOTE:  applies to high risk patients requiring additional follow-up. No   Wrap up additional comments Pt reports short term memory recall, vision loss in both eyes. Pt feels he is getting better. Pt had PCP/neurology appt, and plans to continue with fu appts with neurologist. Pt is wearing holter monitor for 30 days, and will fu with cardiology.          WENDY COLLAZO - Registered Nurse

## 2023-02-09 ENCOUNTER — READMISSION MANAGEMENT (OUTPATIENT)
Dept: CALL CENTER | Facility: HOSPITAL | Age: 68
End: 2023-02-09
Payer: MEDICARE

## 2023-02-09 NOTE — OUTREACH NOTE
Stroke Week 4 Survey    Flowsheet Row Responses   [facility]   Does the patient have one of the following disease processes/diagnoses(primary or secondary)? Stroke   Week 4 attempt successful? No          Edda COLLAZO - Registered Nurse

## 2023-02-10 RX ORDER — ROSUVASTATIN CALCIUM 5 MG/1
TABLET, COATED ORAL
Qty: 30 TABLET | Refills: 5 | Status: SHIPPED | OUTPATIENT
Start: 2023-02-10 | End: 2023-03-02

## 2023-03-01 NOTE — PROGRESS NOTES
Date of Office Visit: 2023  Encounter Provider: Dr. Maciej Blank  Place of Service: [facility] CARDIOLOGY [place]  Patient Name: Alejandro Chamberlain  :1955  Herrera Sarabia MD    Chief Complaint   Patient presents with   • Atrial Fibrillation   • Hypertension   • Stroke   • Hyperlipidemia   • Coronary Artery Disease   • Follow-up     History of Present Illness:    I am pleased to see Mr. chamberlain in my office today as a follow-up.    As you know, patient is 67-year-old white gentleman whose past medical history significant for hypertension, CAD, abdominal aortic aneurysm, CABG, s/p AVR, status post AAA repair came today for follow-up    In 2016, patient was admitted with the symptom of acute coronary artery syndrome and underwent cardiac catheterization which showed three-vessel coronary artery disease.  Patient underwent CABG x5.  Patient was also diagnosed with AAA .  After CABG patient underwent AAA repair after 4 weeks.  In May 2020, patient underwent stress test which was negative for ischemia or myocardial infarction.    In 2021, patient underwent stress test in which she walked for total of 9 minutes and 10 seconds.  It was negative for ischemia.  Echocardiogram showed normal left ventricle size and function with EF of 55 to 60%.  Bioprosthetic aortic valve was functioning appropriately.    In 2023, patient was admitted at [facility] with loss of peripheral vision and headache.  CAT scan showed left posterior temporal lobe and left occipital's lobe CVA.  Patient was also noted to have vertebral artery and posterior cerebral collation stenosis on the left side.  Patient was treated with Eliquis.  Patient was also started on Crestor.  Patient follows with neurology at [facility].    Patient is overall doing well.  His peripheral vision is slowly getting better.  Patient denies any chest pain.  No orthopnea PND no syncope or presyncope.  No leg edema  noted.    Event monitor showed predominantly sinus rhythm with minimum heart rate of 48 bpm and a maximal heart rate of 123 bpm and average heart rate of 58 bpm.  Patient had 4 beat run of wide-complex tachycardia patient had isolated PVCs which constitute 2% of total PVCs burden.  Patient had less than 1% of PACs.  No atrial fibrillation was noted.    At this stage, I will continue current treatment.  Patient is advised to increase aerobic activity.  Patient would need lifelong anticoagulation.        Past Medical History:   Diagnosis Date   • Acute on chronic diastolic heart failure (HCC) 12/14/2022   • Aneurysm (HCC)    • Anxiety    • Asthma    • Atrial fibrillation (HCC)    • Bronchitis    • CHF (congestive heart failure) (HCC)    • Coronary artery disease    • Depression    • Encounter for laboratory testing for COVID-19 virus 08/22/2022   • Gout    • Hyperlipidemia    • Hypertension    • Intractable headache 01/09/2023   • Myocardial infarction (HCC)    • Pneumonia    • Pulmonary nodule    • Familia Mountain spotted fever    • Stroke (HCC)          Past Surgical History:   Procedure Laterality Date   • AORTIC VALVE REPAIR/REPLACEMENT  12/03/2016    CABG x 5/ tissue AVR by DR. PHAN   • CARDIAC CATHETERIZATION     • CARDIAC SURGERY     • CARDIOVASCULAR STRESS TEST  2020   • CAROTID STENT     • CORONARY ARTERY BYPASS GRAFT  12/02/2016    X4  Dr Phan   • ILIAC ARTERY ANEURYSM REPAIR  01/05/2017    abdominal and bilateral   • OTHER SURGICAL HISTORY      PTCI   • SINUS SURGERY     • VASCULAR SURGERY             Current Outpatient Medications:   •  amLODIPine (NORVASC) 10 MG tablet, TAKE 1 TABLET BY MOUTH EVERY DAY, Disp: 90 tablet, Rfl: 1  •  apixaban (ELIQUIS) 5 MG tablet tablet, Take 1 tablet by mouth Every 12 (Twelve) Hours. Indications: Atrial Fibrillation, Disp: 60 tablet, Rfl: 0  •  aspirin 81 MG EC tablet, Take 1 tablet by mouth Daily., Disp: , Rfl:   •  azelastine (ASTELIN) 0.1 % nasal spray, 1 spray into  the nostril(s) as directed by provider 2 (Two) Times a Day., Disp: , Rfl:   •  lisinopril (PRINIVIL,ZESTRIL) 20 MG tablet, Take 1 tablet by mouth 2 (Two) Times a Day., Disp: 180 tablet, Rfl: 1  •  Multiple Vitamins-Minerals (MULTIVITAMIN ADULT PO), Take 1 tablet by mouth Daily., Disp: , Rfl:   •  rosuvastatin (CRESTOR) 20 MG tablet, Take 1 tablet by mouth Daily., Disp: , Rfl:   •  SYMBICORT 160-4.5 MCG/ACT inhaler, Inhale 2 puffs 2 (Two) Times a Day., Disp: , Rfl:   •  VENTOLIN  (90 Base) MCG/ACT inhaler, Inhale 2 puffs Every 6 (Six) Hours As Needed., Disp: , Rfl:       Social History     Socioeconomic History   • Marital status:    Tobacco Use   • Smoking status: Never   • Smokeless tobacco: Never   Vaping Use   • Vaping Use: Never used   Substance and Sexual Activity   • Alcohol use: Not Currently     Comment: quit 2005   • Drug use: No   • Sexual activity: Defer         Review of Systems   Constitutional: Negative for chills and fever.   HENT: Negative for ear discharge and nosebleeds.    Eyes: Positive for blurred vision. Negative for discharge and redness.   Cardiovascular: Negative for chest pain, orthopnea, palpitations, paroxysmal nocturnal dyspnea and syncope.   Respiratory: Negative for cough, shortness of breath and wheezing.    Endocrine: Negative for heat intolerance.   Skin: Negative for rash.   Musculoskeletal: Negative for arthritis and myalgias.   Gastrointestinal: Negative for abdominal pain, melena, nausea and vomiting.   Genitourinary: Negative for dysuria and hematuria.   Neurological: Negative for dizziness, light-headedness, numbness and tremors.   Psychiatric/Behavioral: Negative for depression. The patient is not nervous/anxious.        Procedures      ECG 12 Lead    Date/Time: 3/2/2023 8:12 PM  Performed by: Maciej Blank MD  Authorized by: Maciej Blank MD   Comparison: compared with previous ECG   Similar to previous ECG  Rhythm: sinus rhythm    Clinical impression: normal  "ECG            ECG 12 Lead    (Results Pending)           Objective:    /91 (BP Location: Right arm, Patient Position: Sitting, Cuff Size: Large Adult)   Pulse 56   Ht 185.4 cm (72.99\")   Wt 84.8 kg (187 lb)   SpO2 95%   BMI 24.68 kg/m²         Constitutional:       Appearance: Well-developed.   Eyes:      General: No scleral icterus.        Right eye: No discharge.   HENT:      Head: Normocephalic and atraumatic.   Neck:      Thyroid: No thyromegaly.      Lymphadenopathy: No cervical adenopathy.   Pulmonary:      Effort: Pulmonary effort is normal. No respiratory distress.      Breath sounds: Normal breath sounds. No wheezing. No rales.   Cardiovascular:      Normal rate. Regular rhythm.      No gallop.   Abdominal:      Tenderness: There is no abdominal tenderness.   Skin:     Findings: No erythema or rash.   Neurological:      Mental Status: Alert and oriented to person, place, and time.             Assessment:       Diagnosis Plan   1. Essential hypertension  ECG 12 Lead      2. Paroxysmal atrial fibrillation (HCC)  ECG 12 Lead      3. Coronary artery disease involving native coronary artery of native heart without angina pectoris  ECG 12 Lead      4. Cerebrovascular accident (CVA) due to embolism of left posterior cerebral artery (HCC)  ECG 12 Lead      5. Shortness of breath  ECG 12 Lead      6. Palpitations  ECG 12 Lead               Plan:       MDM:    1.  CVA:    I would recommend continue Eliquis.    2.  CAD:    Patient is clinically doing well.  Continue aspirin.  Risk factor modification recommended    3.  Paroxysmal atrial fibrillation:    There is no documentation of atrial fibrillation.  Event monitor has showed no atrial fibrillation    4.  Hypertension:    Blood pressure is controlled.    5.  Hyperlipidemia:    Patient is on Crestor r repeat lipid panel testing  "

## 2023-03-02 ENCOUNTER — OFFICE VISIT (OUTPATIENT)
Dept: CARDIOLOGY | Facility: CLINIC | Age: 68
End: 2023-03-02
Payer: MEDICARE

## 2023-03-02 VITALS
OXYGEN SATURATION: 95 % | BODY MASS INDEX: 24.78 KG/M2 | SYSTOLIC BLOOD PRESSURE: 134 MMHG | HEART RATE: 56 BPM | WEIGHT: 187 LBS | DIASTOLIC BLOOD PRESSURE: 91 MMHG | HEIGHT: 73 IN

## 2023-03-02 DIAGNOSIS — I48.0 PAROXYSMAL ATRIAL FIBRILLATION: Chronic | ICD-10-CM

## 2023-03-02 DIAGNOSIS — R06.02 SHORTNESS OF BREATH: ICD-10-CM

## 2023-03-02 DIAGNOSIS — I10 ESSENTIAL HYPERTENSION: Primary | Chronic | ICD-10-CM

## 2023-03-02 DIAGNOSIS — I63.432 CEREBROVASCULAR ACCIDENT (CVA) DUE TO EMBOLISM OF LEFT POSTERIOR CEREBRAL ARTERY: ICD-10-CM

## 2023-03-02 DIAGNOSIS — I25.10 CORONARY ARTERY DISEASE INVOLVING NATIVE CORONARY ARTERY OF NATIVE HEART WITHOUT ANGINA PECTORIS: Chronic | ICD-10-CM

## 2023-03-02 DIAGNOSIS — R00.2 PALPITATIONS: ICD-10-CM

## 2023-03-02 PROCEDURE — 1160F RVW MEDS BY RX/DR IN RCRD: CPT | Performed by: INTERNAL MEDICINE

## 2023-03-02 PROCEDURE — 3075F SYST BP GE 130 - 139MM HG: CPT | Performed by: INTERNAL MEDICINE

## 2023-03-02 PROCEDURE — 99214 OFFICE O/P EST MOD 30 MIN: CPT | Performed by: INTERNAL MEDICINE

## 2023-03-02 PROCEDURE — 93000 ELECTROCARDIOGRAM COMPLETE: CPT | Performed by: INTERNAL MEDICINE

## 2023-03-02 PROCEDURE — 3080F DIAST BP >= 90 MM HG: CPT | Performed by: INTERNAL MEDICINE

## 2023-03-02 PROCEDURE — 1159F MED LIST DOCD IN RCRD: CPT | Performed by: INTERNAL MEDICINE

## 2023-03-02 RX ORDER — ROSUVASTATIN CALCIUM 20 MG/1
20 TABLET, COATED ORAL DAILY
COMMUNITY

## 2023-03-31 ENCOUNTER — TELEPHONE (OUTPATIENT)
Dept: CARDIOLOGY | Facility: CLINIC | Age: 68
End: 2023-03-31

## 2023-03-31 NOTE — TELEPHONE ENCOUNTER
Caller: Alejandro Bain    Relationship: Self    Best call back number: [phone]      PATIENT IS WANTING TO KNOW IF PATIENT IS OKAY TO TRAVEL BY PLANE IN MAY FOR VACATION.

## 2023-05-15 RX ORDER — AMLODIPINE BESYLATE 10 MG/1
TABLET ORAL
Qty: 90 TABLET | Refills: 1 | Status: SHIPPED | OUTPATIENT
Start: 2023-05-15

## 2023-05-23 ENCOUNTER — TELEPHONE (OUTPATIENT)
Dept: CARDIOLOGY | Facility: CLINIC | Age: 68
End: 2023-05-23

## 2023-05-23 NOTE — TELEPHONE ENCOUNTER
Caller: Alejandro Bain    Relationship: Self    Best call back number:[phone]     What is the best time to reach you: ANY    Who are you requesting to speak with (clinical staff, provider,  specific staff member): CLINICAL    What was the call regarding: PATIENT STATED THAT HE HAS STARTED A DIET AND HIS BLOOD PRESSURE HAS DROPPED. ONE READING /60 AND IT HAS BEEN CONSISTENTLY LOW. PATIENT WOULD LIKE FOR SOMEONE TO CALL HIM TO ADVISE HIM IF HE CAN CUT HIS BLOOD PRESSURE MEDICATION. PLEASE CONTACT PATIENT TO ADVISE. THANK YOU.     Do you require a callback: YES

## 2023-09-06 NOTE — PROGRESS NOTES
Date of Office Visit: 2023  Encounter Provider: Dr. Maciej Blank  Place of Service: [facility] CARDIOLOGY [place]  Patient Name: Alejandro Chamberlain  :1955  Herrera Sarabia MD    Chief Complaint   Patient presents with    Atrial Fibrillation    Coronary Artery Disease    Hypertension    Hyperlipidemia    Follow-up     History of Present Illness    I am pleased to see Mr. chamberlain in my office today as a follow-up.    As you know, patient is 68-year-old white gentleman whose past medical history significant for hypertension, CAD, abdominal aortic aneurysm, CABG, s/p AVR, status post AAA repair came today for follow-up    In 2016, patient was admitted with the symptom of acute coronary artery syndrome and underwent cardiac catheterization which showed three-vessel coronary artery disease.  Patient underwent CABG x5.  Patient was also diagnosed with AAA .  After CABG patient underwent AAA repair after 4 weeks.  In May 2020, patient underwent stress test which was negative for ischemia or myocardial infarction.    In 2021, patient underwent stress test in which she walked for total of 9 minutes and 10 seconds.  It was negative for ischemia.  Echocardiogram showed normal left ventricle size and function with EF of 55 to 60%.  Bioprosthetic aortic valve was functioning appropriately.    In 2023, patient was admitted at [facility] with loss of peripheral vision and headache.  CAT scan showed left posterior temporal lobe and left occipital's lobe CVA.  Patient was also noted to have vertebral artery and posterior cerebral collation stenosis on the left side.  Patient was treated with Eliquis.  Patient was also started on Crestor.  Patient follows with neurology at [facility].Event monitor showed predominantly sinus rhythm with minimum heart rate of 48 bpm and a maximal heart rate of 123 bpm and average heart rate of 58 bpm.  Patient had 4 beat run of wide-complex  tachycardia patient had isolated PVCs which constitute 2% of total PVCs burden.  Patient had less than 1% of PACs.  No atrial fibrillation was noted.    In July 2023, patient had blood pressure tested at home and his pulse was noted to be 30.  He was totally asymptomatic.  At that time patient went to the emergency room and his heart rate was noted to be in 60s with PVCs.  Holter monitor was placed.  Patient was noted to have PVCs but they constitute 1% of total beats.    Patient came today and overall doing well.  Patient has not had any further episode of low heart rate.  Patient denies any symptoms of dizziness and lightheadedness.  No orthopnea PND no syncope or presyncope.  No leg edema noted.    At this stage I will recommend observation.  His blood pressure is very well controlled.  EKG showed sinus rhythm no PVCs.  I would continue lisinopril and amlodipine.  Patient is on Eliquis.      Past Medical History:   Diagnosis Date    Acute on chronic diastolic heart failure 12/14/2022    Aneurysm     Anxiety     Asthma     Atrial fibrillation     Bronchitis     CHF (congestive heart failure)     Coronary artery disease     Depression     Encounter for laboratory testing for COVID-19 virus 08/22/2022    Gout     Hyperlipidemia     Hypertension     Intractable headache 01/09/2023    Myocardial infarction     Pneumonia     Pulmonary nodule     Familia Mountain spotted fever     Stroke          Past Surgical History:   Procedure Laterality Date    AORTIC VALVE REPAIR/REPLACEMENT  12/03/2016    CABG x 5/ tissue AVR by DR. PHAN    CARDIAC CATHETERIZATION      CARDIAC SURGERY      CARDIOVASCULAR STRESS TEST  2020    CAROTID STENT      CORONARY ARTERY BYPASS GRAFT  12/02/2016    X4  Dr Pahn    ILIAC ARTERY ANEURYSM REPAIR  01/05/2017    abdominal and bilateral    OTHER SURGICAL HISTORY      PTCI    SINUS SURGERY      VASCULAR SURGERY             Current Outpatient Medications:     allopurinol (ZYLOPRIM) 300 MG tablet, ,  Disp: , Rfl:     amLODIPine (NORVASC) 10 MG tablet, Take 1 tablet by mouth Daily., Disp: , Rfl:     apixaban (ELIQUIS) 5 MG tablet tablet, Take 1 tablet by mouth Every 12 (Twelve) Hours. Indications: Atrial Fibrillation, Disp: 60 tablet, Rfl: 0    aspirin 81 MG EC tablet, Take 1 tablet by mouth Daily., Disp: , Rfl:     azelastine (ASTELIN) 0.1 % nasal spray, 1 spray into the nostril(s) as directed by provider 2 (Two) Times a Day., Disp: , Rfl:     Cholecalciferol 25 MCG (1000 UT) capsule, Take 1 capsule by mouth Daily., Disp: , Rfl:     lisinopril (PRINIVIL,ZESTRIL) 20 MG tablet, Take 1 tablet by mouth 2 (Two) Times a Day., Disp: 180 tablet, Rfl: 1    Multiple Vitamins-Minerals (MULTIVITAMIN ADULT PO), Take 1 tablet by mouth Daily., Disp: , Rfl:     predniSONE (DELTASONE) 10 MG (21) dose pack, Take  by mouth Daily. Use as directed on package, Disp: 21 each, Rfl: 0    rosuvastatin (CRESTOR) 20 MG tablet, Take 1 tablet by mouth Daily., Disp: , Rfl:     SYMBICORT 160-4.5 MCG/ACT inhaler, Inhale 2 puffs 2 (Two) Times a Day., Disp: , Rfl:     VENTOLIN  (90 Base) MCG/ACT inhaler, Inhale 2 puffs Every 6 (Six) Hours As Needed., Disp: , Rfl:       Social History     Socioeconomic History    Marital status:    Tobacco Use    Smoking status: Never    Smokeless tobacco: Never   Vaping Use    Vaping Use: Never used   Substance and Sexual Activity    Alcohol use: Not Currently     Comment: quit 2005    Drug use: No    Sexual activity: Defer         Review of Systems   Constitutional: Negative for chills and fever.   HENT:  Negative for ear discharge and nosebleeds.    Eyes:  Negative for discharge and redness.   Cardiovascular:  Positive for palpitations. Negative for chest pain, orthopnea, paroxysmal nocturnal dyspnea and syncope.   Respiratory:  Negative for cough, shortness of breath and wheezing.    Endocrine: Negative for heat intolerance.   Skin:  Negative for rash.   Musculoskeletal:  Negative for arthritis  "and myalgias.   Gastrointestinal:  Negative for abdominal pain, melena, nausea and vomiting.   Genitourinary:  Negative for dysuria and hematuria.   Neurological:  Negative for dizziness, light-headedness, numbness and tremors.   Psychiatric/Behavioral:  Negative for depression. The patient is not nervous/anxious.      Procedures      ECG 12 Lead    Date/Time: 9/7/2023 1:06 PM  Performed by: Maciej Blank MD  Authorized by: Maciej Blank MD   Comparison: compared with previous ECG   Similar to previous ECG  Rhythm: sinus rhythm  Ectopy: unifocal PVCs    Clinical impression: abnormal EKG        ECG 12 Lead    (Results Pending)           Objective:    /78 (BP Location: Right arm, Patient Position: Sitting, Cuff Size: Large Adult)   Pulse 61   Ht 185.4 cm (72.99\")   Wt 87.5 kg (193 lb)   SpO2 95%   BMI 25.47 kg/m²         Constitutional:       Appearance: Well-developed.   Eyes:      General: No scleral icterus.        Right eye: No discharge.   HENT:      Head: Normocephalic and atraumatic.   Neck:      Thyroid: No thyromegaly.      Lymphadenopathy: No cervical adenopathy.   Pulmonary:      Effort: Pulmonary effort is normal. No respiratory distress.      Breath sounds: Normal breath sounds. No wheezing. No rales.   Cardiovascular:      Normal rate. Regular rhythm.      No gallop.    Edema:     Peripheral edema absent.   Abdominal:      Tenderness: There is no abdominal tenderness.   Skin:     Findings: No erythema or rash.   Neurological:      Mental Status: Alert and oriented to person, place, and time.           Assessment:       Diagnosis Plan   1. Essential hypertension  ECG 12 Lead      2. Paroxysmal atrial fibrillation  ECG 12 Lead      3. Coronary artery disease involving native coronary artery of native heart without angina pectoris  ECG 12 Lead      4. Mixed hyperlipidemia  ECG 12 Lead      5. Cerebrovascular accident (CVA) due to embolism of left posterior cerebral artery  ECG 12 Lead      6. " Palpitations  ECG 12 Lead      7. Shortness of breath  ECG 12 Lead               Plan:       MDM:    1.  Bradycardia:    Patient was noted to have bradycardia on blood pressure monitoring and pulse ox monitoring.  However it was falsely low heart rate because of PVCs.  Patient underwent Holter monitoring that did not show any significant pause of bradycardia.  1% of PVCs noted which is less than before.    2.  PVCs:    Patient still have PVCs.  But burden is low I will continue to observe    3.  CAD/CABG:    Patient is doing well no chest pain.  His recent stress test is negative for ischemia.  Echocardiogram was unremarkable    4.  Paroxysmal atrial fibrillation:    Patient is in sinus rhythm.  Patient is on Eliquis

## 2023-09-07 ENCOUNTER — OFFICE VISIT (OUTPATIENT)
Dept: CARDIOLOGY | Facility: CLINIC | Age: 68
End: 2023-09-07
Payer: MEDICARE

## 2023-09-07 VITALS
OXYGEN SATURATION: 95 % | HEIGHT: 73 IN | HEART RATE: 61 BPM | SYSTOLIC BLOOD PRESSURE: 139 MMHG | BODY MASS INDEX: 25.58 KG/M2 | DIASTOLIC BLOOD PRESSURE: 78 MMHG | WEIGHT: 193 LBS

## 2023-09-07 DIAGNOSIS — I48.0 PAROXYSMAL ATRIAL FIBRILLATION: Chronic | ICD-10-CM

## 2023-09-07 DIAGNOSIS — I25.10 CORONARY ARTERY DISEASE INVOLVING NATIVE CORONARY ARTERY OF NATIVE HEART WITHOUT ANGINA PECTORIS: Chronic | ICD-10-CM

## 2023-09-07 DIAGNOSIS — I63.432 CEREBROVASCULAR ACCIDENT (CVA) DUE TO EMBOLISM OF LEFT POSTERIOR CEREBRAL ARTERY: ICD-10-CM

## 2023-09-07 DIAGNOSIS — E78.2 MIXED HYPERLIPIDEMIA: Chronic | ICD-10-CM

## 2023-09-07 DIAGNOSIS — R00.2 PALPITATIONS: ICD-10-CM

## 2023-09-07 DIAGNOSIS — R06.02 SHORTNESS OF BREATH: ICD-10-CM

## 2023-09-07 DIAGNOSIS — I10 ESSENTIAL HYPERTENSION: Primary | Chronic | ICD-10-CM

## 2023-09-07 PROCEDURE — 3078F DIAST BP <80 MM HG: CPT | Performed by: INTERNAL MEDICINE

## 2023-09-07 PROCEDURE — 3075F SYST BP GE 130 - 139MM HG: CPT | Performed by: INTERNAL MEDICINE

## 2023-09-07 PROCEDURE — 93000 ELECTROCARDIOGRAM COMPLETE: CPT | Performed by: INTERNAL MEDICINE

## 2023-09-07 PROCEDURE — 99214 OFFICE O/P EST MOD 30 MIN: CPT | Performed by: INTERNAL MEDICINE

## 2023-09-07 PROCEDURE — 1160F RVW MEDS BY RX/DR IN RCRD: CPT | Performed by: INTERNAL MEDICINE

## 2023-09-07 PROCEDURE — 1159F MED LIST DOCD IN RCRD: CPT | Performed by: INTERNAL MEDICINE

## 2023-09-27 NOTE — PROGRESS NOTES
[address] [address]  Phone   Fax       Alejandro Bain  [phone]   1955  68 y.o.  male      PCP:Rafat Petty MD    Type of service: Initial New Patient Office Visit  Date of service: 9/28/2023            CHIEF COMPLAINT: Hypersomnolence      HISTORY OF PRESENT ILLNESS:  Alejandro Bain 68 y.o.  presents to the clinic today as a new patient to me and was seen for hypersomnolence and concerns for sleep apnea.  He has a history of aortic valve replacement and CABG.  Also with more recent stroke and paroxysmal atrial fibrillation diagnosis and on Eliquis.  More recently there were concerns about bradycardia but heart rate was found to be normal but falsely low on pulse ox at home due to bigeminy.  Cardiologist recommended ruling out sleep apnea as contributing factor.  Patient has had to stop sleeping on his back as he would wake up gasping at night.  Currently sleeps on side.  His significant other is with him in the office today.  Does get at least 7 hours of sleep at night but still needs to nap during the day.  Falls asleep easily in the evening.        PATIENT HISTORY:  Sleep schedule:  Bedtime: 10:30 PM  Wake time: 6:30 AM  Normally takes 15 to 30 minutes to fall asleep  Average hours of sleep: 7  Number of naps per day: 1    Symptoms:  In addition to the above, patient reports:   Have you ever awakened gasping for breath, coughing, choking: Yes   Change in weight:  Yes, lost 5 pounds  Morning headaches:  No   Awaken with a sore throat or dry mouth:  No   Leg jerking at night:  No   Creepy crawly feeling in legs/urge to move legs: No   Teeth grinding: No   Have you ever awakened at night with a sour taste or burning sensation in your chest:  No   Do you have muscle weakness with laughing or anger:  No   Have you ever felt paralyzed while going to sleep or waking up:  No   Sleepwalking: No   Nightmares: No   Nocturia (urination at  "night): 1 times per night    Past medical history: (Relevant to sleep medicine)  Coronary artery disease with history of CABG  History of aortic valve replacement  History of AAA repair  History of stroke  Paroxysmal atrial fibrillation  PACs and PVCs with history of bigeminy  Hypertension  Asthma      Social history:  Do you drive a commercial vehicle:  No   Shift work:  No   Tobacco use:  No  Alcohol use:  0 per week  Caffeinated drinks: 2 cups of coffee in AM      Family history (parents, siblings, children) (relevant to sleep medicine):  Heart disease  Hypertension  Cancer    Medications: reviewed     ALLERGIES: Avelox [moxifloxacin], Penicillins, Sulfa antibiotics, and Doxycycline        REVIEW OF SYSTEMS:  Full review of systems available on the intake form which is scanned in the media tab.  The relevant positives are noted below:  [place] Sleepiness Scale: Total score: 3   Fatigue  Nasal congestion  Irregular heartbeat        PHYSICAL EXAM:  Vitals:    09/28/23 0900   BP: 131/82   BP Location: Right arm   Patient Position: Sitting   Pulse: 55   Resp: 12   SpO2: 96%   Weight: 88.9 kg (196 lb)   Height: 185.4 cm (73\")    Body mass index is 25.86 kg/m². Neck Circumference: 15 inches  HEAD: atraumatic, normocephalic  THROAT: Mallampati class iv  NECK: Neck Circumference: 15 inches, trachea is in the midline  RESPIRATORY SYSTEM: Respirations even, unlabored, normal rate  CARDIOVASULAR SYSTEM: Normal rate, no edema   EXTREMITES: No cyanosis or clubbing  NEUROLOGICAL SYSTEM: Alert and oriented x 3, no gross motor defects, gait normal    Office notes from care team reviewed. Office note(s) dated 9/7/2023, reviewed.      Labs/ Test Results Reviewed:  TSH          1/9/2023    15:30 7/9/2023    04:40   TSH   TSH 1.530  3.160       Most Recent A1C          1/10/2023    03:32   HGBA1C Most Recent   Hemoglobin A1C 4.9                 ASSESSMENT AND PLAN:   Hypersomnolence: patient's symptoms and physical examination are " concerning for possible sleep apnea.   I discussed the signs, symptoms, and pathophysiology of sleep apnea with this patient.  I also discussed the potential complications of untreated sleep apnea including but not limited to resistant hypertension, insulin resistance, pulmonary hypertension, atrial fibrillation, stroke, nonrestorative sleep with hypersomnia which can increase risk for motor vehicle accidents, etc.   Different testing methods including home-based and lab based sleep studies were discussed with this patient.   Based on patient history and physical examination, will proceed with in-lab polysomnogram.  The order for the sleep study is placed in [facility].  The test will be scheduled after prior authorization has been obtained through patient's insurance.  Treatment and management will be discussed in more detail with this patient after the test is completed.  All questions were answered to patient's satisfaction.   Snoring (R06.83): snoring is the sound created by turbulent airflow vibrating upper airway soft tissue.  I have also discussed factors affecting snoring including sleep deprivation, sleeping on the back and alcohol ingestion. To minimize snoring, patient is advised to have adequate sleep, sleep on their side, and avoid alcohol and sedative medications around bedtime.   Excessive daytime fatigue: Nora Sleepiness Scale of Total score: 3.  Rule out sleep apnea as contributing factor, as above.  Paroxysmal atrial fibrillation: On Eliquis per cardiology.  PVCs  CAD  History of CVA 1/2023  Hypertension  Hyperlipidemia    Patient will follow-up after study, 31 to 90 days after PAP therapy initiated if applicable, or sooner for issues or concerns.  Patient's questions were answered.        Thank you for allowing me to participate in the care of this patient.    Nell Wade DNP, APRN  [facility] Sleep Medicine

## 2023-09-28 ENCOUNTER — OFFICE VISIT (OUTPATIENT)
Dept: SLEEP MEDICINE | Facility: CLINIC | Age: 68
End: 2023-09-28
Payer: MEDICARE

## 2023-09-28 VITALS
WEIGHT: 196 LBS | SYSTOLIC BLOOD PRESSURE: 131 MMHG | RESPIRATION RATE: 12 BRPM | HEIGHT: 73 IN | OXYGEN SATURATION: 96 % | BODY MASS INDEX: 25.98 KG/M2 | HEART RATE: 55 BPM | DIASTOLIC BLOOD PRESSURE: 82 MMHG

## 2023-09-28 DIAGNOSIS — I49.3 PVC (PREMATURE VENTRICULAR CONTRACTION): ICD-10-CM

## 2023-09-28 DIAGNOSIS — Z86.73 HISTORY OF STROKE: ICD-10-CM

## 2023-09-28 DIAGNOSIS — G47.10 HYPERSOMNOLENCE: Primary | ICD-10-CM

## 2023-09-28 DIAGNOSIS — I48.0 PAF (PAROXYSMAL ATRIAL FIBRILLATION): ICD-10-CM

## 2023-09-28 PROCEDURE — G0463 HOSPITAL OUTPT CLINIC VISIT: HCPCS

## 2023-09-28 NOTE — LETTER
September 28, 2023     GARY Workman  [address] [facility] [place] [address]    Patient: Alejandro Bain   YOB: 1955   Date of Visit: 9/28/2023     Dear GARY Workman:       Thank you for referring Alejandro Bain to me for evaluation. Below are the relevant portions of my assessment and plan of care.    If you have questions, please do not hesitate to call me. I look forward to following Alejandro along with you.         Sincerely,        Yisel Wade DNP, APRN        CC: No Recipients    Yisel Wade DNP, APRN  09/28/23 1105  Sign when Signing Visit    [address] [place] [address]  Phone   Fax       Alejandro Bain  [phone]   1955  68 y.o.  male      PCP:Rafat Petty MD    Type of service: Initial New Patient Office Visit  Date of service: 9/28/2023            CHIEF COMPLAINT: Hypersomnolence      HISTORY OF PRESENT ILLNESS:  Alejandro Bain 68 y.o.  presents to the clinic today as a new patient to me and was seen for hypersomnolence and concerns for sleep apnea.  He has a history of aortic valve replacement and CABG.  Also with more recent stroke and paroxysmal atrial fibrillation diagnosis and on Eliquis.  More recently there were concerns about bradycardia but heart rate was found to be normal but falsely low on pulse ox at home due to bigeminy.  Cardiologist recommended ruling out sleep apnea as contributing factor.  Patient has had to stop sleeping on his back as he would wake up gasping at night.  Currently sleeps on side.  His significant other is with him in the office today.  Does get at least 7 hours of sleep at night but still needs to nap during the day.  Falls asleep easily in the evening.        PATIENT HISTORY:  Sleep schedule:  Bedtime: 10:30 PM  Wake time: 6:30 AM  Normally takes 15 to 30 minutes to fall asleep  Average hours of sleep: 7  Number of naps per day: 1    Symptoms:  In addition to the  "above, patient reports:   Have you ever awakened gasping for breath, coughing, choking: Yes   Change in weight:  Yes, lost 5 pounds  Morning headaches:  No   Awaken with a sore throat or dry mouth:  No   Leg jerking at night:  No   Creepy crawly feeling in legs/urge to move legs: No   Teeth grinding: No   Have you ever awakened at night with a sour taste or burning sensation in your chest:  No   Do you have muscle weakness with laughing or anger:  No   Have you ever felt paralyzed while going to sleep or waking up:  No   Sleepwalking: No   Nightmares: No   Nocturia (urination at night): 1 times per night    Past medical history: (Relevant to sleep medicine)  Coronary artery disease with history of CABG  History of aortic valve replacement  History of AAA repair  History of stroke  Paroxysmal atrial fibrillation  PACs and PVCs with history of bigeminy  Hypertension  Asthma      Social history:  Do you drive a commercial vehicle:  No   Shift work:  No   Tobacco use:  No  Alcohol use:  0 per week  Caffeinated drinks: 2 cups of coffee in AM      Family history (parents, siblings, children) (relevant to sleep medicine):  Heart disease  Hypertension  Cancer    Medications: reviewed     ALLERGIES: Avelox [moxifloxacin], Penicillins, Sulfa antibiotics, and Doxycycline        REVIEW OF SYSTEMS:  Full review of systems available on the intake form which is scanned in the media tab.  The relevant positives are noted below:  [place] Sleepiness Scale: Total score: 3   Fatigue  Nasal congestion  Irregular heartbeat        PHYSICAL EXAM:  Vitals:    09/28/23 0900   BP: 131/82   BP Location: Right arm   Patient Position: Sitting   Pulse: 55   Resp: 12   SpO2: 96%   Weight: 88.9 kg (196 lb)   Height: 185.4 cm (73\")    Body mass index is 25.86 kg/m². Neck Circumference: 15 inches  HEAD: atraumatic, normocephalic  THROAT: Mallampati class iv  NECK: Neck Circumference: 15 inches, trachea is in the midline  RESPIRATORY SYSTEM: " Respirations even, unlabored, normal rate  CARDIOVASULAR SYSTEM: Normal rate, no edema   EXTREMITES: No cyanosis or clubbing  NEUROLOGICAL SYSTEM: Alert and oriented x 3, no gross motor defects, gait normal    Office notes from care team reviewed. Office note(s) dated 9/7/2023, reviewed.      Labs/ Test Results Reviewed:  TSH          1/9/2023    15:30 7/9/2023    04:40   TSH   TSH 1.530  3.160       Most Recent A1C          1/10/2023    03:32   HGBA1C Most Recent   Hemoglobin A1C 4.9                 ASSESSMENT AND PLAN:   Hypersomnolence: patient's symptoms and physical examination are concerning for possible sleep apnea.   I discussed the signs, symptoms, and pathophysiology of sleep apnea with this patient.  I also discussed the potential complications of untreated sleep apnea including but not limited to resistant hypertension, insulin resistance, pulmonary hypertension, atrial fibrillation, stroke, nonrestorative sleep with hypersomnia which can increase risk for motor vehicle accidents, etc.   Different testing methods including home-based and lab based sleep studies were discussed with this patient.   Based on patient history and physical examination, will proceed with in-lab polysomnogram.  The order for the sleep study is placed in [facility].  The test will be scheduled after prior authorization has been obtained through patient's insurance.  Treatment and management will be discussed in more detail with this patient after the test is completed.  All questions were answered to patient's satisfaction.   Snoring (R06.83): snoring is the sound created by turbulent airflow vibrating upper airway soft tissue.  I have also discussed factors affecting snoring including sleep deprivation, sleeping on the back and alcohol ingestion. To minimize snoring, patient is advised to have adequate sleep, sleep on their side, and avoid alcohol and sedative medications around bedtime.   Excessive daytime fatigue: [place] Sleepiness  Scale of Total score: 3.  Rule out sleep apnea as contributing factor, as above.  Paroxysmal atrial fibrillation: On Eliquis per cardiology.  PVCs  CAD  History of CVA 1/2023  Hypertension  Hyperlipidemia    Patient will follow-up after study, 31 to 90 days after PAP therapy initiated if applicable, or sooner for issues or concerns.  Patient's questions were answered.        Thank you for allowing me to participate in the care of this patient.    Nell Wade DNP, APRN  [facility]

## 2023-11-13 ENCOUNTER — TRANSCRIBE ORDERS (OUTPATIENT)
Dept: ADMINISTRATIVE | Facility: HOSPITAL | Age: 68
End: 2023-11-13
Payer: MEDICARE

## 2023-11-13 DIAGNOSIS — M81.0 SENILE OSTEOPOROSIS: Primary | ICD-10-CM

## 2023-11-13 DIAGNOSIS — M10.9 GOUT, UNSPECIFIED CAUSE, UNSPECIFIED CHRONICITY, UNSPECIFIED SITE: ICD-10-CM

## 2023-11-13 DIAGNOSIS — Z13.820 OSTEOPOROSIS SCREENING: ICD-10-CM

## 2023-11-15 RX ORDER — AMLODIPINE BESYLATE 10 MG/1
10 TABLET ORAL DAILY
Qty: 90 TABLET | Refills: 1 | Status: SHIPPED | OUTPATIENT
Start: 2023-11-15

## 2023-11-20 ENCOUNTER — TELEPHONE (OUTPATIENT)
Dept: CARDIOLOGY | Facility: CLINIC | Age: 68
End: 2023-11-20

## 2023-11-20 NOTE — TELEPHONE ENCOUNTER
Caller: Alejandro Bain    Relationship: Self    Best call back number: [phone]    What is the best time to reach you: ANY    Who are you requesting to speak with (clinical staff, provider,  specific staff member): CLINICAL    Do you know the name of the person who called:     What was the call regarding: PATIENT IS CALLING IN TO STATE THAT HE IS HAVING A COLONOSCOPY ON 11.27.23 AND IS SUPPOSED TO BE OFF OF HIS ELIQUIS FOR 5 DAYS PRIOR TO THE PROCEDURE. PATIENT IS CONCERNED AS HE WOULD LIKE TO KNOW IF THE PROCEDURE IS WORTH BEING OFF THE MEDICATION. PLEASE CONTACT PATIENT TO ADDRESS CONCERNS.     Is it okay if the provider responds through [employer]: YES

## 2023-12-11 ENCOUNTER — HOSPITAL ENCOUNTER (OUTPATIENT)
Dept: BONE DENSITY | Facility: HOSPITAL | Age: 68
Discharge: HOME | End: 2023-12-11
Admitting: INTERNAL MEDICINE
Payer: MEDICARE

## 2023-12-11 DIAGNOSIS — M81.0 SENILE OSTEOPOROSIS: ICD-10-CM

## 2023-12-11 PROCEDURE — 77080 DXA BONE DENSITY AXIAL: CPT

## 2023-12-26 ENCOUNTER — LAB (OUTPATIENT)
Dept: LAB | Facility: HOSPITAL | Age: 68
End: 2023-12-26
Payer: MEDICARE

## 2023-12-26 ENCOUNTER — TRANSCRIBE ORDERS (OUTPATIENT)
Dept: ADMINISTRATIVE | Facility: HOSPITAL | Age: 68
End: 2023-12-26
Payer: MEDICARE

## 2023-12-26 DIAGNOSIS — Z00.01 ENCOUNTER FOR GENERAL ADULT MEDICAL EXAMINATION WITH ABNORMAL FINDINGS: ICD-10-CM

## 2023-12-26 DIAGNOSIS — I15.2 ENDOCRINE HYPERTENSION: Primary | ICD-10-CM

## 2023-12-26 DIAGNOSIS — R50.9 FEVER AND CHILLS: ICD-10-CM

## 2023-12-26 DIAGNOSIS — Z00.01 ENCOUNTER FOR GENERAL ADULT MEDICAL EXAMINATION WITH ABNORMAL FINDINGS: Primary | ICD-10-CM

## 2023-12-26 DIAGNOSIS — R50.9 FEVER 106 DEGREES F OR OVER: ICD-10-CM

## 2023-12-26 DIAGNOSIS — E78.5 HYPERLIPIDEMIA, UNSPECIFIED HYPERLIPIDEMIA TYPE: ICD-10-CM

## 2023-12-26 DIAGNOSIS — I15.2 ENDOCRINE HYPERTENSION: ICD-10-CM

## 2023-12-26 LAB
ALBUMIN SERPL-MCNC: 4.1 G/DL (ref 3.5–5.2)
ALBUMIN/GLOB SERPL: 1.6 G/DL
ALP SERPL-CCNC: 107 U/L (ref 39–117)
ALT SERPL W P-5'-P-CCNC: 12 U/L (ref 1–41)
ANION GAP SERPL CALCULATED.3IONS-SCNC: 10 MMOL/L (ref 5–15)
AST SERPL-CCNC: 16 U/L (ref 1–40)
BACTERIA UR QL AUTO: ABNORMAL /HPF
BASOPHILS # BLD AUTO: 0 10*3/MM3 (ref 0–0.2)
BASOPHILS NFR BLD AUTO: 0.3 % (ref 0–1.5)
BILIRUB SERPL-MCNC: 0.8 MG/DL (ref 0–1.2)
BILIRUB UR QL STRIP: NEGATIVE
BUN SERPL-MCNC: 12 MG/DL (ref 8–23)
BUN/CREAT SERPL: 11.8 (ref 7–25)
CALCIUM SPEC-SCNC: 9.1 MG/DL (ref 8.6–10.5)
CHLORIDE SERPL-SCNC: 101 MMOL/L (ref 98–107)
CHOLEST SERPL-MCNC: 151 MG/DL (ref 0–200)
CLARITY UR: CLEAR
CO2 SERPL-SCNC: 27 MMOL/L (ref 22–29)
COLOR UR: ABNORMAL
CREAT SERPL-MCNC: 1.02 MG/DL (ref 0.76–1.27)
DEPRECATED RDW RBC AUTO: 47.7 FL (ref 37–54)
EGFRCR SERPLBLD CKD-EPI 2021: 81.1 ML/MIN/1.73
EOSINOPHIL # BLD AUTO: 0.1 10*3/MM3 (ref 0–0.4)
EOSINOPHIL NFR BLD AUTO: 1.1 % (ref 0.3–6.2)
ERYTHROCYTE [DISTWIDTH] IN BLOOD BY AUTOMATED COUNT: 15.1 % (ref 12.3–15.4)
GLOBULIN UR ELPH-MCNC: 2.5 GM/DL
GLUCOSE SERPL-MCNC: 96 MG/DL (ref 65–99)
GLUCOSE UR STRIP-MCNC: NEGATIVE MG/DL
HBA1C MFR BLD: 5.3 % (ref 4.8–5.6)
HCT VFR BLD AUTO: 40.9 % (ref 37.5–51)
HDLC SERPL-MCNC: 43 MG/DL (ref 40–60)
HGB BLD-MCNC: 13.4 G/DL (ref 13–17.7)
HGB UR QL STRIP.AUTO: ABNORMAL
HOLD SPECIMEN: NORMAL
HYALINE CASTS UR QL AUTO: ABNORMAL /LPF
KETONES UR QL STRIP: ABNORMAL
LDLC SERPL CALC-MCNC: 84 MG/DL (ref 0–100)
LDLC/HDLC SERPL: 1.88 {RATIO}
LEUKOCYTE ESTERASE UR QL STRIP.AUTO: NEGATIVE
LYMPHOCYTES # BLD AUTO: 0.8 10*3/MM3 (ref 0.7–3.1)
LYMPHOCYTES NFR BLD AUTO: 7.1 % (ref 19.6–45.3)
MCH RBC QN AUTO: 27.9 PG (ref 26.6–33)
MCHC RBC AUTO-ENTMCNC: 32.8 G/DL (ref 31.5–35.7)
MCV RBC AUTO: 85.1 FL (ref 79–97)
MONOCYTES # BLD AUTO: 0.9 10*3/MM3 (ref 0.1–0.9)
MONOCYTES NFR BLD AUTO: 8.1 % (ref 5–12)
NEUTROPHILS NFR BLD AUTO: 83.4 % (ref 42.7–76)
NEUTROPHILS NFR BLD AUTO: 9.2 10*3/MM3 (ref 1.7–7)
NITRITE UR QL STRIP: NEGATIVE
NRBC BLD AUTO-RTO: 0 /100 WBC (ref 0–0.2)
PH UR STRIP.AUTO: 5.5 [PH] (ref 5–8)
PLATELET # BLD AUTO: 147 10*3/MM3 (ref 140–450)
PMV BLD AUTO: 8.1 FL (ref 6–12)
POTASSIUM SERPL-SCNC: 3.9 MMOL/L (ref 3.5–5.2)
PROT SERPL-MCNC: 6.6 G/DL (ref 6–8.5)
PROT UR QL STRIP: NEGATIVE
RBC # BLD AUTO: 4.81 10*6/MM3 (ref 4.14–5.8)
RBC # UR STRIP: ABNORMAL /HPF
REF LAB TEST METHOD: ABNORMAL
SODIUM SERPL-SCNC: 138 MMOL/L (ref 136–145)
SP GR UR STRIP: 1.02 (ref 1–1.03)
SQUAMOUS #/AREA URNS HPF: ABNORMAL /HPF
TRIGL SERPL-MCNC: 136 MG/DL (ref 0–150)
TSH SERPL DL<=0.05 MIU/L-ACNC: 1.15 UIU/ML (ref 0.27–4.2)
UROBILINOGEN UR QL STRIP: ABNORMAL
VIT B12 BLD-MCNC: 769 PG/ML (ref 211–946)
VLDLC SERPL-MCNC: 24 MG/DL (ref 5–40)
WBC # UR STRIP: ABNORMAL /HPF
WBC NRBC COR # BLD AUTO: 11 10*3/MM3 (ref 3.4–10.8)

## 2023-12-26 PROCEDURE — 81001 URINALYSIS AUTO W/SCOPE: CPT

## 2023-12-26 PROCEDURE — 85025 COMPLETE CBC W/AUTO DIFF WBC: CPT

## 2023-12-26 PROCEDURE — 83036 HEMOGLOBIN GLYCOSYLATED A1C: CPT

## 2023-12-26 PROCEDURE — 82607 VITAMIN B-12: CPT

## 2023-12-26 PROCEDURE — 80053 COMPREHEN METABOLIC PANEL: CPT

## 2023-12-26 PROCEDURE — 80061 LIPID PANEL: CPT

## 2023-12-26 PROCEDURE — 36415 COLL VENOUS BLD VENIPUNCTURE: CPT

## 2023-12-26 PROCEDURE — 84443 ASSAY THYROID STIM HORMONE: CPT

## 2023-12-27 ENCOUNTER — HOSPITAL ENCOUNTER (EMERGENCY)
Facility: HOSPITAL | Age: 68
Discharge: HOME | End: 2023-12-27
Attending: EMERGENCY MEDICINE | Admitting: EMERGENCY MEDICINE
Payer: MEDICARE

## 2023-12-27 ENCOUNTER — APPOINTMENT (OUTPATIENT)
Dept: GENERAL RADIOLOGY | Facility: HOSPITAL | Age: 68
End: 2023-12-27
Payer: MEDICARE

## 2023-12-27 VITALS
BODY MASS INDEX: 26.99 KG/M2 | HEIGHT: 72 IN | SYSTOLIC BLOOD PRESSURE: 121 MMHG | RESPIRATION RATE: 25 BRPM | OXYGEN SATURATION: 95 % | WEIGHT: 199.3 LBS | DIASTOLIC BLOOD PRESSURE: 71 MMHG | HEART RATE: 57 BPM | TEMPERATURE: 98.4 F

## 2023-12-27 DIAGNOSIS — R05.1 ACUTE COUGH: Primary | ICD-10-CM

## 2023-12-27 DIAGNOSIS — R52 BODY ACHES: ICD-10-CM

## 2023-12-27 LAB
ALBUMIN SERPL-MCNC: 4.1 G/DL (ref 3.5–5.2)
ALBUMIN/GLOB SERPL: 1.6 G/DL
ALP SERPL-CCNC: 107 U/L (ref 39–117)
ALT SERPL W P-5'-P-CCNC: 8 U/L (ref 1–41)
ANION GAP SERPL CALCULATED.3IONS-SCNC: 12 MMOL/L (ref 5–15)
AST SERPL-CCNC: 16 U/L (ref 1–40)
B PARAPERT DNA SPEC QL NAA+PROBE: NOT DETECTED
B PERT DNA SPEC QL NAA+PROBE: NOT DETECTED
BACTERIA UR QL AUTO: NORMAL /HPF
BASOPHILS # BLD AUTO: 0 10*3/MM3 (ref 0–0.2)
BASOPHILS NFR BLD AUTO: 0.4 % (ref 0–1.5)
BILIRUB SERPL-MCNC: 0.7 MG/DL (ref 0–1.2)
BILIRUB UR QL STRIP: NEGATIVE
BUN SERPL-MCNC: 16 MG/DL (ref 8–23)
BUN/CREAT SERPL: 14.7 (ref 7–25)
C PNEUM DNA NPH QL NAA+NON-PROBE: NOT DETECTED
CALCIUM SPEC-SCNC: 9.1 MG/DL (ref 8.6–10.5)
CHLORIDE SERPL-SCNC: 102 MMOL/L (ref 98–107)
CLARITY UR: CLEAR
CO2 SERPL-SCNC: 24 MMOL/L (ref 22–29)
COLOR UR: ABNORMAL
CREAT SERPL-MCNC: 1.09 MG/DL (ref 0.76–1.27)
DEPRECATED RDW RBC AUTO: 43.8 FL (ref 37–54)
EGFRCR SERPLBLD CKD-EPI 2021: 74.9 ML/MIN/1.73
EOSINOPHIL # BLD AUTO: 0.2 10*3/MM3 (ref 0–0.4)
EOSINOPHIL NFR BLD AUTO: 2 % (ref 0.3–6.2)
ERYTHROCYTE [DISTWIDTH] IN BLOOD BY AUTOMATED COUNT: 14.5 % (ref 12.3–15.4)
FLUAV SUBTYP SPEC NAA+PROBE: NOT DETECTED
FLUBV RNA ISLT QL NAA+PROBE: NOT DETECTED
GLOBULIN UR ELPH-MCNC: 2.6 GM/DL
GLUCOSE SERPL-MCNC: 92 MG/DL (ref 65–99)
GLUCOSE UR STRIP-MCNC: NEGATIVE MG/DL
HADV DNA SPEC NAA+PROBE: NOT DETECTED
HCOV 229E RNA SPEC QL NAA+PROBE: NOT DETECTED
HCOV HKU1 RNA SPEC QL NAA+PROBE: NOT DETECTED
HCOV NL63 RNA SPEC QL NAA+PROBE: NOT DETECTED
HCOV OC43 RNA SPEC QL NAA+PROBE: NOT DETECTED
HCT VFR BLD AUTO: 39.9 % (ref 37.5–51)
HGB BLD-MCNC: 13.1 G/DL (ref 13–17.7)
HGB UR QL STRIP.AUTO: NEGATIVE
HMPV RNA NPH QL NAA+NON-PROBE: NOT DETECTED
HOLD SPECIMEN: NORMAL
HPIV1 RNA ISLT QL NAA+PROBE: NOT DETECTED
HPIV2 RNA SPEC QL NAA+PROBE: NOT DETECTED
HPIV3 RNA NPH QL NAA+PROBE: NOT DETECTED
HPIV4 P GENE NPH QL NAA+PROBE: NOT DETECTED
HYALINE CASTS UR QL AUTO: NORMAL /LPF
KETONES UR QL STRIP: ABNORMAL
LEUKOCYTE ESTERASE UR QL STRIP.AUTO: ABNORMAL
LYMPHOCYTES # BLD AUTO: 0.3 10*3/MM3 (ref 0.7–3.1)
LYMPHOCYTES NFR BLD AUTO: 3.6 % (ref 19.6–45.3)
M PNEUMO IGG SER IA-ACNC: NOT DETECTED
MCH RBC QN AUTO: 28.4 PG (ref 26.6–33)
MCHC RBC AUTO-ENTMCNC: 32.9 G/DL (ref 31.5–35.7)
MCV RBC AUTO: 86.3 FL (ref 79–97)
MONOCYTES # BLD AUTO: 0.6 10*3/MM3 (ref 0.1–0.9)
MONOCYTES NFR BLD AUTO: 7.3 % (ref 5–12)
NEUTROPHILS NFR BLD AUTO: 7.4 10*3/MM3 (ref 1.7–7)
NEUTROPHILS NFR BLD AUTO: 86.7 % (ref 42.7–76)
NITRITE UR QL STRIP: NEGATIVE
NRBC BLD AUTO-RTO: 0.1 /100 WBC (ref 0–0.2)
NT-PROBNP SERPL-MCNC: 648.1 PG/ML (ref 0–900)
PH UR STRIP.AUTO: <=5 [PH] (ref 5–8)
PLATELET # BLD AUTO: 156 10*3/MM3 (ref 140–450)
PMV BLD AUTO: 8 FL (ref 6–12)
POTASSIUM SERPL-SCNC: 3.7 MMOL/L (ref 3.5–5.2)
PROT SERPL-MCNC: 6.7 G/DL (ref 6–8.5)
PROT UR QL STRIP: NEGATIVE
QT INTERVAL: 375 MS
QTC INTERVAL: 380 MS
RBC # BLD AUTO: 4.62 10*6/MM3 (ref 4.14–5.8)
RBC # UR STRIP: NORMAL /HPF
REF LAB TEST METHOD: NORMAL
RHINOVIRUS RNA SPEC NAA+PROBE: NOT DETECTED
RSV RNA NPH QL NAA+NON-PROBE: NOT DETECTED
SARS-COV-2 RNA NPH QL NAA+NON-PROBE: NOT DETECTED
SODIUM SERPL-SCNC: 138 MMOL/L (ref 136–145)
SP GR UR STRIP: 1.02 (ref 1–1.03)
SQUAMOUS #/AREA URNS HPF: NORMAL /HPF
TROPONIN T SERPL HS-MCNC: 18 NG/L
TROPONIN T SERPL HS-MCNC: 22 NG/L
UROBILINOGEN UR QL STRIP: ABNORMAL
WBC # UR STRIP: NORMAL /HPF
WBC NRBC COR # BLD AUTO: 8.5 10*3/MM3 (ref 3.4–10.8)

## 2023-12-27 PROCEDURE — 84484 ASSAY OF TROPONIN QUANT: CPT | Performed by: EMERGENCY MEDICINE

## 2023-12-27 PROCEDURE — 81001 URINALYSIS AUTO W/SCOPE: CPT | Performed by: EMERGENCY MEDICINE

## 2023-12-27 PROCEDURE — 99284 EMERGENCY DEPT VISIT MOD MDM: CPT

## 2023-12-27 PROCEDURE — 83880 ASSAY OF NATRIURETIC PEPTIDE: CPT | Performed by: PHYSICIAN ASSISTANT

## 2023-12-27 PROCEDURE — 71045 X-RAY EXAM CHEST 1 VIEW: CPT

## 2023-12-27 PROCEDURE — 0202U NFCT DS 22 TRGT SARS-COV-2: CPT | Performed by: PHYSICIAN ASSISTANT

## 2023-12-27 PROCEDURE — 84484 ASSAY OF TROPONIN QUANT: CPT | Performed by: PHYSICIAN ASSISTANT

## 2023-12-27 PROCEDURE — 85025 COMPLETE CBC W/AUTO DIFF WBC: CPT | Performed by: PHYSICIAN ASSISTANT

## 2023-12-27 PROCEDURE — 93005 ELECTROCARDIOGRAM TRACING: CPT | Performed by: PHYSICIAN ASSISTANT

## 2023-12-27 PROCEDURE — 80053 COMPREHEN METABOLIC PANEL: CPT | Performed by: PHYSICIAN ASSISTANT

## 2023-12-27 PROCEDURE — 36415 COLL VENOUS BLD VENIPUNCTURE: CPT

## 2023-12-27 RX ORDER — METHYLPREDNISOLONE 4 MG/1
TABLET ORAL
Qty: 21 TABLET | Refills: 0 | Status: SHIPPED | OUTPATIENT
Start: 2023-12-27

## 2023-12-27 RX ORDER — AZITHROMYCIN 500 MG/1
500 TABLET, FILM COATED ORAL DAILY
Qty: 3 TABLET | Refills: 0 | Status: SHIPPED | OUTPATIENT
Start: 2023-12-27 | End: 2023-12-30

## 2023-12-27 RX ORDER — SODIUM CHLORIDE 0.9 % (FLUSH) 0.9 %
10 SYRINGE (ML) INJECTION AS NEEDED
Status: DISCONTINUED | OUTPATIENT
Start: 2023-12-27 | End: 2023-12-27 | Stop reason: HOSPADM

## 2023-12-27 NOTE — ED PROVIDER NOTES
Subjective   Provider in Triage Note  Patient is a 68-year-old male presents to the ED with complaints of bodyaches, productive cough, chills for the past 72 hours.  He also reports some associated nausea and dyspnea.  No significant chest pain or edema.  Patient was seen by PCP 2 days ago was tested for flu that was negative.  COVID swab was negative at the health department.  He denies any abdominal pain or diarrhea.  No urinary complaints.  Patient was concerned as he still feels bad and it has been 72 hours.    Due to significant overcrowding in the emergency department patient was initially seen and evaluated in triage.  Provider in triage recommended patient placed in treatment area to initiate therapy and movement to an ER bed as soon as possible.        History of Present Illness  Agree w/ above.  Review of Systems  See PIT note.  Past Medical History:   Diagnosis Date    Acute on chronic diastolic heart failure 12/14/2022    Aneurysm     Anxiety     Asthma     Atrial fibrillation     Bronchitis     CHF (congestive heart failure)     Coronary artery disease     Depression     Encounter for laboratory testing for COVID-19 virus 08/22/2022    Gout     Hyperlipidemia     Hypertension     Intractable headache 01/09/2023    Myocardial infarction     Pneumonia     Pulmonary nodule     Familia [place] spotted fever     Stroke        Allergies   Allergen Reactions    Avelox [Moxifloxacin] Rash    Penicillins Rash    Sulfa Antibiotics Rash    Doxycycline Rash       Past Surgical History:   Procedure Laterality Date    AORTIC VALVE REPAIR/REPLACEMENT  12/03/2016    CABG x 5/ tissue AVR by DR. PHAN    CARDIAC CATHETERIZATION      CARDIAC SURGERY      CARDIOVASCULAR STRESS TEST  2020    CAROTID STENT      CORONARY ARTERY BYPASS GRAFT  12/02/2016    X4  Dr Phan    HERNIA REPAIR  2010    ILIAC ARTERY ANEURYSM REPAIR  01/05/2017    abdominal and bilateral    NASAL POLYP EXCISION  2005    OTHER SURGICAL HISTORY      [facility]     REPAIR CHOANAL ATRESIA  2005    SINUS SURGERY      VASCULAR SURGERY         Family History   Problem Relation Age of Onset    Pulmonary fibrosis Mother     Heart disease Mother     Cancer Father         brain    Cancer Sister         lung    Heart disease Sister     Cancer Brother         pancreatic, lung    Sleep apnea Neg Hx        Social History     Socioeconomic History    Marital status:    Tobacco Use    Smoking status: Never     Passive exposure: Never    Smokeless tobacco: Never   Vaping Use    Vaping Use: Never used   Substance and Sexual Activity    Alcohol use: Not Currently     Comment: quit 2005    Drug use: No    Sexual activity: Defer           Objective   Physical Exam  Constitutional:  No acute distress.  Head:  Atraumatic.  Normocephalic.   Eyes:  No scleral icterus. Normal conjunctivae  ENT:  Moist mucosa.  No nasal discharge present.  Cardiovascular:  Well perfused.  Equal pulses.  Regular rate.  Normal capillary refill.    Pulmonary/Chest:  No respiratory distress.  Airway patent.  No tachypnea.  No accessory muscle usage.  CTAB.  Abdominal:  Nondistended. Nontender.   Skin:  Warm, dry  Neurological:  Alert, awake, and appropriate.  Normal speech.      Procedures           ED Course      Labs Reviewed   SINGLE HSTROPONIN T - Abnormal; Notable for the following components:       Result Value    HS Troponin T 22 (*)     All other components within normal limits    Narrative:     High Sensitive Troponin T Reference Range:  <14.0 ng/L- Negative Female for AMI  <22.0 ng/L- Negative Male for AMI  >=14 - Abnormal Female indicating possible myocardial injury.  >=22 - Abnormal Male indicating possible myocardial injury.   Clinicians would have to utilize clinical acumen, EKG, Troponin, and serial changes to determine if it is an Acute Myocardial Infarction or myocardial injury due to an underlying chronic condition.        CBC WITH AUTO DIFFERENTIAL - Abnormal; Notable for the following  components:    Neutrophil % 86.7 (*)     Lymphocyte % 3.6 (*)     Neutrophils, Absolute 7.40 (*)     Lymphocytes, Absolute 0.30 (*)     All other components within normal limits   URINALYSIS W/ MICROSCOPIC IF INDICATED (NO CULTURE) - Abnormal; Notable for the following components:    Color, UA Dark Yellow (*)     Ketones, UA Trace (*)     Leuk Esterase, UA Trace (*)     All other components within normal limits   RESPIRATORY PANEL PCR W/ COVID-19 (SARS-COV-2), NP SWAB IN UTM/VTP, 2 HR TAT - Normal    Narrative:     In the setting of a positive respiratory panel with a viral infection PLUS a negative procalcitonin without other underlying concern for bacterial infection, consider observing off antibiotics or discontinuation of antibiotics and continue supportive care. If the respiratory panel is positive for atypical bacterial infection (Bordetella pertussis, Chlamydophila pneumoniae, or Mycoplasma pneumoniae), consider antibiotic de-escalation to target atypical bacterial infection.   BNP (IN-HOUSE) - Normal    Narrative:     This assay is used as an aid in the diagnosis of individuals suspected of having heart failure. It can be used as an aid in the diagnosis of acute decompensated heart failure (ADHF) in patients presenting with signs and symptoms of ADHF to the emergency department (ED). In addition, NT-proBNP of <300 pg/mL indicates ADHF is not likely.    Age Range Result Interpretation  NT-proBNP Concentration (pg/mL:      <50             Positive            >450                   Gray                 300-450                    Negative             <300    50-75           Positive            >900                  Gray                300-900                  Negative            <300      >75             Positive            >1800                  Gray                300-1800                  Negative            <300   SINGLE HSTROPONIN T - Normal    Narrative:     High Sensitive Troponin T Reference  Range:  <14.0 ng/L- Negative Female for AMI  <22.0 ng/L- Negative Male for AMI  >=14 - Abnormal Female indicating possible myocardial injury.  >=22 - Abnormal Male indicating possible myocardial injury.   Clinicians would have to utilize clinical acumen, EKG, Troponin, and serial changes to determine if it is an Acute Myocardial Infarction or myocardial injury due to an underlying chronic condition.        COMPREHENSIVE METABOLIC PANEL    Narrative:     GFR Normal >60  Chronic Kidney Disease <60  Kidney Failure <15     URINALYSIS, MICROSCOPIC ONLY   CBC AND DIFFERENTIAL    Narrative:     The following orders were created for panel order CBC & Differential.  Procedure                               Abnormality         Status                     ---------                               -----------         ------                     CBC Auto Differential[[id number]]        Abnormal            Final result                 Please view results for these tests on the individual orders.   EXTRA TUBES    Narrative:     The following orders were created for panel order Extra Tubes.  Procedure                               Abnormality         Status                     ---------                               -----------         ------                     Gold Top - SST[[id number]]                                   Final result                 Please view results for these tests on the individual orders.   GOLD TOP - SST     XR Chest 1 View   Final Result   Impression:   Mild left basilar airspace opacity, likely due to atelectasis or less likely pneumonia.         Electronically Signed: Edie Crowe     12/27/2023 2:11 PM EST     Workstation ID: [id number]                                               Medical Decision Making  Problems Addressed:  Acute cough: complicated acute illness or injury  Body aches: complicated acute illness or injury    Amount and/or Complexity of Data Reviewed  Labs: ordered.  Radiology:  ordered.  ECG/medicine tests: ordered.    Risk  Prescription drug management.    EKG: Rate 62, NSR, normal qtc, nonspecific ST changes not significantly changed from prior.    CXR Int:  No PTX.  See above for radiology intepretation.    CXR concerning for PTA.  Less likely w/ labs.  Dyspnea earlier improved w/ asthma meds at home.  Will treat w/ short course of steroids.  Suspect more likely viral given WBC (there is shift though), but will give short course of abx for PNA given CXR findings.    Final diagnoses:   Acute cough   Body aches       ED Disposition  ED Disposition       ED Disposition   Discharge    Condition   Stable    Comment   --               Rafat Petty MD  [address] [facility] [place] [address] [phone]    In 3 days           Medication List        New Prescriptions      azithromycin 500 MG tablet  Commonly known as: ZITHROMAX  Take 1 tablet by mouth Daily for 3 days.     methylPREDNISolone 4 MG dose pack  Commonly known as: MEDROL  Take as directed on package instructions.            Stop      predniSONE 10 MG (21) dose pack  Commonly known as: DELTASONE               Where to Get Your Medications        These medications were sent to [facility] [address] [place] [facility] - [place], [place] - [address] [facility] [phone]  - [phone] FX  [address] [facility] [place] [address]      Phone: [phone]   azithromycin 500 MG tablet  methylPREDNISolone 4 MG dose pack            Dennis Hermosillo MD  12/27/23 4310

## 2024-02-02 RX ORDER — LISINOPRIL 20 MG/1
20 TABLET ORAL 2 TIMES DAILY
Qty: 180 TABLET | Refills: 1 | Status: SHIPPED | OUTPATIENT
Start: 2024-02-02

## 2024-02-14 ENCOUNTER — TELEPHONE (OUTPATIENT)
Dept: FAMILY MEDICINE CLINIC | Facility: CLINIC | Age: 69
End: 2024-02-14
Payer: COMMERCIAL

## 2024-03-06 NOTE — PROGRESS NOTES
Date of Office Visit: 2024  Encounter Provider: Dr. Maciej Blank  Place of Service: [facility] CARDIOLOGY [place]  Patient Name: Alejandro Chamberlain  :1955  Rafat Petty MD    Chief Complaint   Patient presents with    Atrial Fibrillation    Coronary Artery Disease    Hypertension    Hyperlipidemia    Follow-up     History of Present Illness    I am pleased to see Mr. chamberlain in my office today as a follow-up.    As you know, patient is 68-year-old white gentleman whose past medical history significant for hypertension, CAD, abdominal aortic aneurysm, CABG, s/p AVR, status post AAA repair came today for follow-up    In 2016, patient was admitted with the symptom of acute coronary artery syndrome and underwent cardiac catheterization which showed three-vessel coronary artery disease.  Patient underwent CABG x5.  Patient was also diagnosed with AAA .  After CABG patient underwent AAA repair after 4 weeks.  In May 2020, patient underwent stress test which was negative for ischemia or myocardial infarction.    In 2021, patient underwent stress test in which she walked for total of 9 minutes and 10 seconds.  It was negative for ischemia.  Echocardiogram showed normal left ventricle size and function with EF of 55 to 60%.  Bioprosthetic aortic valve was functioning appropriately.    In 2023, patient was admitted at [facility] with loss of peripheral vision and headache.  CAT scan showed left posterior temporal lobe and left occipital's lobe CVA.  Patient was also noted to have vertebral artery and posterior cerebral collation stenosis on the left side.  Patient was treated with Eliquis.  Patient was also started on Crestor.  Patient follows with neurology at [facility].Event monitor showed predominantly sinus rhythm with minimum heart rate of 48 bpm and a maximal heart rate of 123 bpm and average heart rate of 58 bpm.  Patient had 4 beat run of wide-complex  tachycardia patient had isolated PVCs which constitute 2% of total PVCs burden.  Patient had less than 1% of PACs.  No atrial fibrillation was noted.    In July 2023, patient had blood pressure tested at home and his pulse was noted to be 30.  He was totally asymptomatic.  At that time patient went to the emergency room and his heart rate was noted to be in 60s with PVCs.  Holter monitor was placed.  Patient was noted to have PVCs but they constitute 1% of total beats.    Patient came today for follow-up.  Patient went to the [facility] in February 2024 and had pneumonia.  He is recovering from that.  He still have mild cough.  Patient denies any orthopnea, PND, syncope or presyncope.  Notes significant shortness of breath no leg edema noted.    I am overall pleased with patient condition.  I would consider stress test and echo cardiogram on next visit.  His blood pressure is very well-controlled on current regimen.    s.    Past Medical History:   Diagnosis Date    Acute on chronic diastolic heart failure 12/14/2022    Aneurysm     Anxiety     Asthma     Atrial fibrillation     Bronchitis     CHF (congestive heart failure)     Coronary artery disease     Depression     Encounter for laboratory testing for COVID-19 virus 08/22/2022    Gout     Hyperlipidemia     Hypertension     Intractable headache 01/09/2023    Myocardial infarction     Pneumonia     Pulmonary nodule     Familia [place] spotted fever     Stroke          Past Surgical History:   Procedure Laterality Date    AORTIC VALVE REPAIR/REPLACEMENT  12/03/2016    CABG x 5/ tissue AVR by DR. PHAN    CARDIAC CATHETERIZATION      CARDIAC SURGERY      CARDIOVASCULAR STRESS TEST  2020    CAROTID STENT      CORONARY ARTERY BYPASS GRAFT  12/02/2016    X4  Dr Phan    HERNIA REPAIR  2010    ILIAC ARTERY ANEURYSM REPAIR  01/05/2017    abdominal and bilateral    NASAL POLYP EXCISION  2005    OTHER SURGICAL HISTORY      [facility]    REPAIR CHOANAL ATRESIA  2005    SINUS  SURGERY      VASCULAR SURGERY             Current Outpatient Medications:     allopurinol (ZYLOPRIM) 300 MG tablet, , Disp: , Rfl:     amLODIPine (NORVASC) 10 MG tablet, TAKE 1 TABLET BY MOUTH EVERY DAY, Disp: 90 tablet, Rfl: 1    apixaban (ELIQUIS) 5 MG tablet tablet, Take 1 tablet by mouth Every 12 (Twelve) Hours. Indications: Atrial Fibrillation, Disp: 60 tablet, Rfl: 0    aspirin 81 MG EC tablet, Take 1 tablet by mouth Daily., Disp: , Rfl:     azelastine (ASTELIN) 0.1 % nasal spray, 1 spray into the nostril(s) as directed by provider 2 (Two) Times a Day., Disp: , Rfl:     Cholecalciferol 25 MCG (1000 UT) capsule, Take 1 capsule by mouth Daily., Disp: , Rfl:     lisinopril (PRINIVIL,ZESTRIL) 20 MG tablet, TAKE 1 TABLET BY MOUTH TWICE A DAY, Disp: 180 tablet, Rfl: 1    Multiple Vitamins-Minerals (MULTIVITAMIN ADULT PO), Take 1 tablet by mouth Daily., Disp: , Rfl:     rosuvastatin (CRESTOR) 20 MG tablet, Take 1 tablet by mouth Daily., Disp: , Rfl:     SYMBICORT 160-4.5 MCG/ACT inhaler, Inhale 2 puffs 2 (Two) Times a Day., Disp: , Rfl:     VENTOLIN  (90 Base) MCG/ACT inhaler, Inhale 2 puffs Every 6 (Six) Hours As Needed., Disp: , Rfl:       Social History     Socioeconomic History    Marital status:    Tobacco Use    Smoking status: Never     Passive exposure: Never    Smokeless tobacco: Never   Vaping Use    Vaping status: Never Used   Substance and Sexual Activity    Alcohol use: Not Currently     Comment: quit 2005    Drug use: No    Sexual activity: Defer         Review of Systems   Constitutional: Negative for chills and fever.   HENT:  Negative for ear discharge and nosebleeds.    Eyes:  Negative for discharge and redness.   Cardiovascular:  Negative for chest pain, orthopnea, palpitations, paroxysmal nocturnal dyspnea and syncope.   Respiratory:  Positive for shortness of breath. Negative for cough and wheezing.    Endocrine: Negative for heat intolerance.   Skin:  Negative for rash.  "  Musculoskeletal:  Negative for arthritis and myalgias.   Gastrointestinal:  Negative for abdominal pain, melena, nausea and vomiting.   Genitourinary:  Negative for dysuria and hematuria.   Neurological:  Negative for dizziness, light-headedness, numbness and tremors.   Psychiatric/Behavioral:  Negative for depression. The patient is not nervous/anxious.        Procedures      ECG 12 Lead    Date/Time: 3/7/2024 2:43 PM  Performed by: Maciej Blank MD    Authorized by: Maciej Blank MD  Comparison: compared with previous ECG   Similar to previous ECG  Rhythm: sinus rhythm and sinus bradycardia    Clinical impression: normal ECG          ECG 12 Lead    (Results Pending)           Objective:    /78 (BP Location: Right arm, Patient Position: Sitting, Cuff Size: Large Adult)   Pulse 55   Resp 16   Ht 182.9 cm (72.01\")   Wt 87.5 kg (193 lb)   SpO2 96%   BMI 26.17 kg/m²         Constitutional:       Appearance: Well-developed.   Eyes:      General: No scleral icterus.        Right eye: No discharge.   HENT:      Head: Normocephalic and atraumatic.   Neck:      Thyroid: No thyromegaly.      Lymphadenopathy: No cervical adenopathy.   Pulmonary:      Effort: Pulmonary effort is normal. No respiratory distress.      Breath sounds: Normal breath sounds. No wheezing. No rales.   Cardiovascular:      Normal rate. Regular rhythm.      No gallop.    Edema:     Peripheral edema absent.   Abdominal:      Tenderness: There is no abdominal tenderness.   Skin:     Findings: No erythema or rash.   Neurological:      Mental Status: Alert and oriented to person, place, and time.             Assessment:       Diagnosis Plan   1. Paroxysmal atrial fibrillation  ECG 12 Lead      2. Coronary artery disease involving native coronary artery of native heart without angina pectoris  ECG 12 Lead      3. Essential hypertension  ECG 12 Lead      4. Mixed hyperlipidemia  ECG 12 Lead               Plan:       MDM:    1.  Paroxysmal atrial " fibrillation:    Patient is on Eliquis.  Continue that.    2.  CAD/CABG:    Consider stress test next year    3.  Hypertension:    Blood pressure is very well-controlled current treatment with lisinopril would be continued and patient is on amlodipine    4.  Hyperlipidemia:    Patient is on Crestor repeat lipid panel testing    5.  Sinus bradycardia:    Patient is on no negative chronotropic agent.  Patient probably has underlying sick sinus syndrome recommend observation

## 2024-03-07 ENCOUNTER — OFFICE VISIT (OUTPATIENT)
Dept: CARDIOLOGY | Facility: CLINIC | Age: 69
End: 2024-03-07
Payer: MEDICARE

## 2024-03-07 VITALS
SYSTOLIC BLOOD PRESSURE: 128 MMHG | DIASTOLIC BLOOD PRESSURE: 78 MMHG | HEART RATE: 55 BPM | HEIGHT: 72 IN | OXYGEN SATURATION: 96 % | RESPIRATION RATE: 16 BRPM | BODY MASS INDEX: 26.14 KG/M2 | WEIGHT: 193 LBS

## 2024-03-07 DIAGNOSIS — I10 ESSENTIAL HYPERTENSION: Chronic | ICD-10-CM

## 2024-03-07 DIAGNOSIS — I25.10 CORONARY ARTERY DISEASE INVOLVING NATIVE CORONARY ARTERY OF NATIVE HEART WITHOUT ANGINA PECTORIS: Chronic | ICD-10-CM

## 2024-03-07 DIAGNOSIS — E78.2 MIXED HYPERLIPIDEMIA: Chronic | ICD-10-CM

## 2024-03-07 DIAGNOSIS — I48.0 PAROXYSMAL ATRIAL FIBRILLATION: Primary | Chronic | ICD-10-CM

## 2024-03-07 PROCEDURE — 93000 ELECTROCARDIOGRAM COMPLETE: CPT | Performed by: INTERNAL MEDICINE

## 2024-03-07 PROCEDURE — 1159F MED LIST DOCD IN RCRD: CPT | Performed by: INTERNAL MEDICINE

## 2024-03-07 PROCEDURE — 99214 OFFICE O/P EST MOD 30 MIN: CPT | Performed by: INTERNAL MEDICINE

## 2024-03-07 PROCEDURE — 1160F RVW MEDS BY RX/DR IN RCRD: CPT | Performed by: INTERNAL MEDICINE

## 2024-03-07 PROCEDURE — 3078F DIAST BP <80 MM HG: CPT | Performed by: INTERNAL MEDICINE

## 2024-03-07 PROCEDURE — 3074F SYST BP LT 130 MM HG: CPT | Performed by: INTERNAL MEDICINE

## 2024-03-20 ENCOUNTER — APPOINTMENT (OUTPATIENT)
Dept: CT IMAGING | Facility: HOSPITAL | Age: 69
End: 2024-03-20
Payer: MEDICARE

## 2024-03-20 ENCOUNTER — APPOINTMENT (OUTPATIENT)
Dept: GENERAL RADIOLOGY | Facility: HOSPITAL | Age: 69
End: 2024-03-20
Payer: MEDICARE

## 2024-03-20 ENCOUNTER — HOSPITAL ENCOUNTER (OUTPATIENT)
Facility: HOSPITAL | Age: 69
Setting detail: OBSERVATION
Discharge: HOME | End: 2024-03-22
Attending: EMERGENCY MEDICINE | Admitting: HOSPITALIST
Payer: MEDICARE

## 2024-03-20 DIAGNOSIS — R42 DIZZINESS: Primary | ICD-10-CM

## 2024-03-20 PROBLEM — G45.9 TIA (TRANSIENT ISCHEMIC ATTACK): Status: ACTIVE | Noted: 2024-03-20

## 2024-03-20 LAB
ABO GROUP BLD: NORMAL
ALBUMIN SERPL-MCNC: 4.4 G/DL (ref 3.5–5.2)
ALBUMIN/GLOB SERPL: 1.7 G/DL
ALP SERPL-CCNC: 139 U/L (ref 39–117)
ALT SERPL W P-5'-P-CCNC: 14 U/L (ref 1–41)
ANION GAP SERPL CALCULATED.3IONS-SCNC: 11 MMOL/L (ref 5–15)
APTT PPP: 26.7 SECONDS (ref 24–31)
AST SERPL-CCNC: 21 U/L (ref 1–40)
BASOPHILS # BLD AUTO: 0.04 10*3/MM3 (ref 0–0.2)
BASOPHILS NFR BLD AUTO: 0.5 % (ref 0–1.5)
BILIRUB SERPL-MCNC: 0.3 MG/DL (ref 0–1.2)
BLD GP AB SCN SERPL QL: NEGATIVE
BUN SERPL-MCNC: 12 MG/DL (ref 8–23)
BUN/CREAT SERPL: 14.1 (ref 7–25)
CALCIUM SPEC-SCNC: 9.4 MG/DL (ref 8.6–10.5)
CHLORIDE SERPL-SCNC: 103 MMOL/L (ref 98–107)
CO2 SERPL-SCNC: 24 MMOL/L (ref 22–29)
CREAT BLDA-MCNC: 1 MG/DL (ref 0.6–1.3)
CREAT SERPL-MCNC: 0.85 MG/DL (ref 0.76–1.27)
DEPRECATED RDW RBC AUTO: 46.4 FL (ref 37–54)
EGFRCR SERPLBLD CKD-EPI 2021: 83 ML/MIN/1.73
EGFRCR SERPLBLD CKD-EPI 2021: 95.8 ML/MIN/1.73
EOSINOPHIL # BLD AUTO: 0.14 10*3/MM3 (ref 0–0.4)
EOSINOPHIL NFR BLD AUTO: 1.8 % (ref 0.3–6.2)
ERYTHROCYTE [DISTWIDTH] IN BLOOD BY AUTOMATED COUNT: 14.6 % (ref 12.3–15.4)
GLOBULIN UR ELPH-MCNC: 2.6 GM/DL
GLUCOSE BLDC GLUCOMTR-MCNC: 87 MG/DL (ref 70–105)
GLUCOSE SERPL-MCNC: 84 MG/DL (ref 65–99)
HCT VFR BLD AUTO: 41.2 % (ref 37.5–51)
HGB BLD-MCNC: 13.4 G/DL (ref 13–17.7)
HOLD SPECIMEN: NORMAL
IMM GRANULOCYTES # BLD AUTO: 0.02 10*3/MM3 (ref 0–0.05)
IMM GRANULOCYTES NFR BLD AUTO: 0.3 % (ref 0–0.5)
INR PPP: 1.05 (ref 0.93–1.1)
LYMPHOCYTES # BLD AUTO: 1.4 10*3/MM3 (ref 0.7–3.1)
LYMPHOCYTES NFR BLD AUTO: 18.1 % (ref 19.6–45.3)
MCH RBC QN AUTO: 27.9 PG (ref 26.6–33)
MCHC RBC AUTO-ENTMCNC: 32.5 G/DL (ref 31.5–35.7)
MCV RBC AUTO: 85.8 FL (ref 79–97)
MONOCYTES # BLD AUTO: 0.59 10*3/MM3 (ref 0.1–0.9)
MONOCYTES NFR BLD AUTO: 7.6 % (ref 5–12)
NEUTROPHILS NFR BLD AUTO: 5.54 10*3/MM3 (ref 1.7–7)
NEUTROPHILS NFR BLD AUTO: 71.7 % (ref 42.7–76)
NRBC BLD AUTO-RTO: 0 /100 WBC (ref 0–0.2)
PLATELET # BLD AUTO: 179 10*3/MM3 (ref 140–450)
PMV BLD AUTO: 9.6 FL (ref 6–12)
POTASSIUM SERPL-SCNC: 3.8 MMOL/L (ref 3.5–5.2)
PROT SERPL-MCNC: 7 G/DL (ref 6–8.5)
PROTHROMBIN TIME: 11.4 SECONDS (ref 9.6–11.7)
RBC # BLD AUTO: 4.8 10*6/MM3 (ref 4.14–5.8)
RH BLD: POSITIVE
SODIUM SERPL-SCNC: 138 MMOL/L (ref 136–145)
T&S EXPIRATION DATE: NORMAL
TROPONIN T SERPL HS-MCNC: 16 NG/L
WBC NRBC COR # BLD AUTO: 7.73 10*3/MM3 (ref 3.4–10.8)
WHOLE BLOOD HOLD COAG: NORMAL
WHOLE BLOOD HOLD SPECIMEN: NORMAL

## 2024-03-20 PROCEDURE — 82565 ASSAY OF CREATININE: CPT

## 2024-03-20 PROCEDURE — 85610 PROTHROMBIN TIME: CPT | Performed by: EMERGENCY MEDICINE

## 2024-03-20 PROCEDURE — 70496 CT ANGIOGRAPHY HEAD: CPT

## 2024-03-20 PROCEDURE — 70450 CT HEAD/BRAIN W/O DYE: CPT

## 2024-03-20 PROCEDURE — 82948 REAGENT STRIP/BLOOD GLUCOSE: CPT

## 2024-03-20 PROCEDURE — 86900 BLOOD TYPING SEROLOGIC ABO: CPT | Performed by: EMERGENCY MEDICINE

## 2024-03-20 PROCEDURE — 86901 BLOOD TYPING SEROLOGIC RH(D): CPT

## 2024-03-20 PROCEDURE — 80053 COMPREHEN METABOLIC PANEL: CPT | Performed by: EMERGENCY MEDICINE

## 2024-03-20 PROCEDURE — G0378 HOSPITAL OBSERVATION PER HR: HCPCS

## 2024-03-20 PROCEDURE — 25510000001 IOPAMIDOL PER 1 ML: Performed by: EMERGENCY MEDICINE

## 2024-03-20 PROCEDURE — 86850 RBC ANTIBODY SCREEN: CPT | Performed by: EMERGENCY MEDICINE

## 2024-03-20 PROCEDURE — 85730 THROMBOPLASTIN TIME PARTIAL: CPT | Performed by: EMERGENCY MEDICINE

## 2024-03-20 PROCEDURE — 70498 CT ANGIOGRAPHY NECK: CPT

## 2024-03-20 PROCEDURE — 93005 ELECTROCARDIOGRAM TRACING: CPT | Performed by: EMERGENCY MEDICINE

## 2024-03-20 PROCEDURE — 84484 ASSAY OF TROPONIN QUANT: CPT | Performed by: EMERGENCY MEDICINE

## 2024-03-20 PROCEDURE — 86901 BLOOD TYPING SEROLOGIC RH(D): CPT | Performed by: EMERGENCY MEDICINE

## 2024-03-20 PROCEDURE — 85025 COMPLETE CBC W/AUTO DIFF WBC: CPT | Performed by: EMERGENCY MEDICINE

## 2024-03-20 PROCEDURE — 86900 BLOOD TYPING SEROLOGIC ABO: CPT

## 2024-03-20 PROCEDURE — 99285 EMERGENCY DEPT VISIT HI MDM: CPT

## 2024-03-20 PROCEDURE — 99214 OFFICE O/P EST MOD 30 MIN: CPT | Performed by: PSYCHIATRY & NEUROLOGY

## 2024-03-20 PROCEDURE — 71045 X-RAY EXAM CHEST 1 VIEW: CPT

## 2024-03-20 RX ORDER — ATORVASTATIN CALCIUM 40 MG/1
80 TABLET, FILM COATED ORAL NIGHTLY
Status: DISCONTINUED | OUTPATIENT
Start: 2024-03-20 | End: 2024-03-22 | Stop reason: HOSPADM

## 2024-03-20 RX ORDER — SODIUM CHLORIDE 9 MG/ML
40 INJECTION, SOLUTION INTRAVENOUS AS NEEDED
Status: DISCONTINUED | OUTPATIENT
Start: 2024-03-20 | End: 2024-03-22 | Stop reason: HOSPADM

## 2024-03-20 RX ORDER — IPRATROPIUM BROMIDE AND ALBUTEROL SULFATE 2.5; .5 MG/3ML; MG/3ML
3 SOLUTION RESPIRATORY (INHALATION) EVERY 4 HOURS PRN
COMMUNITY

## 2024-03-20 RX ORDER — BISACODYL 10 MG
10 SUPPOSITORY, RECTAL RECTAL DAILY PRN
Status: DISCONTINUED | OUTPATIENT
Start: 2024-03-20 | End: 2024-03-22 | Stop reason: HOSPADM

## 2024-03-20 RX ORDER — SODIUM CHLORIDE 0.9 % (FLUSH) 0.9 %
10 SYRINGE (ML) INJECTION EVERY 12 HOURS SCHEDULED
Status: DISCONTINUED | OUTPATIENT
Start: 2024-03-20 | End: 2024-03-22 | Stop reason: HOSPADM

## 2024-03-20 RX ORDER — ACETAMINOPHEN 325 MG/1
650 TABLET ORAL EVERY 4 HOURS PRN
Status: DISCONTINUED | OUTPATIENT
Start: 2024-03-20 | End: 2024-03-22 | Stop reason: HOSPADM

## 2024-03-20 RX ORDER — SODIUM CHLORIDE 0.9 % (FLUSH) 0.9 %
10 SYRINGE (ML) INJECTION AS NEEDED
Status: DISCONTINUED | OUTPATIENT
Start: 2024-03-20 | End: 2024-03-22 | Stop reason: HOSPADM

## 2024-03-20 RX ORDER — ACETAMINOPHEN 650 MG/1
650 SUPPOSITORY RECTAL EVERY 4 HOURS PRN
Status: DISCONTINUED | OUTPATIENT
Start: 2024-03-20 | End: 2024-03-22 | Stop reason: HOSPADM

## 2024-03-20 RX ORDER — ALUMINA, MAGNESIA, AND SIMETHICONE 2400; 2400; 240 MG/30ML; MG/30ML; MG/30ML
7.5 SUSPENSION ORAL EVERY 4 HOURS PRN
Status: DISCONTINUED | OUTPATIENT
Start: 2024-03-20 | End: 2024-03-22 | Stop reason: HOSPADM

## 2024-03-20 RX ORDER — ONDANSETRON 2 MG/ML
4 INJECTION INTRAMUSCULAR; INTRAVENOUS EVERY 6 HOURS PRN
Status: DISCONTINUED | OUTPATIENT
Start: 2024-03-20 | End: 2024-03-22 | Stop reason: HOSPADM

## 2024-03-20 RX ADMIN — APIXABAN 5 MG: 5 TABLET, FILM COATED ORAL at 21:56

## 2024-03-20 RX ADMIN — IOPAMIDOL 100 ML: 755 INJECTION, SOLUTION INTRAVENOUS at 20:48

## 2024-03-20 RX ADMIN — ACETAMINOPHEN 650 MG: 325 TABLET, FILM COATED ORAL at 22:07

## 2024-03-20 RX ADMIN — Medication 10 ML: at 21:22

## 2024-03-21 ENCOUNTER — APPOINTMENT (OUTPATIENT)
Dept: CARDIOLOGY | Facility: HOSPITAL | Age: 69
End: 2024-03-21
Payer: MEDICARE

## 2024-03-21 ENCOUNTER — APPOINTMENT (OUTPATIENT)
Dept: MRI IMAGING | Facility: HOSPITAL | Age: 69
End: 2024-03-21
Payer: MEDICARE

## 2024-03-21 LAB
ALBUMIN SERPL-MCNC: 4 G/DL (ref 3.5–5.2)
ALBUMIN/GLOB SERPL: 1.7 G/DL
ALP SERPL-CCNC: 122 U/L (ref 39–117)
ALT SERPL W P-5'-P-CCNC: 13 U/L (ref 1–41)
ANION GAP SERPL CALCULATED.3IONS-SCNC: 10 MMOL/L (ref 5–15)
AST SERPL-CCNC: 20 U/L (ref 1–40)
BH CV XLRA MEAS LEFT DIST CCA EDV: 23.6 CM/SEC
BH CV XLRA MEAS LEFT DIST CCA PSV: 62.6 CM/SEC
BH CV XLRA MEAS LEFT DIST ICA EDV: -29 CM/SEC
BH CV XLRA MEAS LEFT DIST ICA PSV: -65.4 CM/SEC
BH CV XLRA MEAS LEFT ICA/CCA RATIO: -0.9
BH CV XLRA MEAS LEFT PROX CCA EDV: 25.1 CM/SEC
BH CV XLRA MEAS LEFT PROX CCA PSV: 90.9 CM/SEC
BH CV XLRA MEAS LEFT PROX ECA PSV: -131 CM/SEC
BH CV XLRA MEAS LEFT PROX ICA EDV: -36 CM/SEC
BH CV XLRA MEAS LEFT PROX ICA PSV: -81.4 CM/SEC
BH CV XLRA MEAS LEFT PROX SCLA PSV: 65.9 CM/SEC
BH CV XLRA MEAS LEFT VERTEBRAL A PSV: -87.6 CM/SEC
BH CV XLRA MEAS RIGHT DIST CCA EDV: 17.7 CM/SEC
BH CV XLRA MEAS RIGHT DIST CCA PSV: 56 CM/SEC
BH CV XLRA MEAS RIGHT DIST ICA EDV: -23.6 CM/SEC
BH CV XLRA MEAS RIGHT DIST ICA PSV: -68.3 CM/SEC
BH CV XLRA MEAS RIGHT ICA/CCA RATIO: -1.38
BH CV XLRA MEAS RIGHT PROX CCA EDV: 18.2 CM/SEC
BH CV XLRA MEAS RIGHT PROX CCA PSV: 56 CM/SEC
BH CV XLRA MEAS RIGHT PROX ECA PSV: -88 CM/SEC
BH CV XLRA MEAS RIGHT PROX ICA EDV: -24.1 CM/SEC
BH CV XLRA MEAS RIGHT PROX ICA PSV: -77.2 CM/SEC
BH CV XLRA MEAS RIGHT PROX SCLA PSV: 129 CM/SEC
BH CV XLRA MEAS RIGHT VERTEBRAL A PSV: -33.9 CM/SEC
BILIRUB SERPL-MCNC: 0.3 MG/DL (ref 0–1.2)
BUN SERPL-MCNC: 11 MG/DL (ref 8–23)
BUN/CREAT SERPL: 12.5 (ref 7–25)
CALCIUM SPEC-SCNC: 8.9 MG/DL (ref 8.6–10.5)
CHLORIDE SERPL-SCNC: 107 MMOL/L (ref 98–107)
CHOLEST SERPL-MCNC: 138 MG/DL (ref 0–200)
CO2 SERPL-SCNC: 24 MMOL/L (ref 22–29)
CREAT SERPL-MCNC: 0.88 MG/DL (ref 0.76–1.27)
DEPRECATED RDW RBC AUTO: 46.5 FL (ref 37–54)
EGFRCR SERPLBLD CKD-EPI 2021: 94.8 ML/MIN/1.73
ERYTHROCYTE [DISTWIDTH] IN BLOOD BY AUTOMATED COUNT: 14.7 % (ref 12.3–15.4)
ERYTHROCYTE [SEDIMENTATION RATE] IN BLOOD: 19 MM/HR (ref 0–20)
FOLATE SERPL-MCNC: >20 NG/ML (ref 4.78–24.2)
GLOBULIN UR ELPH-MCNC: 2.3 GM/DL
GLUCOSE BLDC GLUCOMTR-MCNC: 84 MG/DL (ref 70–105)
GLUCOSE BLDC GLUCOMTR-MCNC: 90 MG/DL (ref 70–105)
GLUCOSE SERPL-MCNC: 95 MG/DL (ref 65–99)
HBA1C MFR BLD: 5.4 % (ref 4.8–5.6)
HCT VFR BLD AUTO: 38.7 % (ref 37.5–51)
HDLC SERPL-MCNC: 33 MG/DL (ref 40–60)
HGB BLD-MCNC: 12.5 G/DL (ref 13–17.7)
LDLC SERPL CALC-MCNC: 83 MG/DL (ref 0–100)
LDLC/HDLC SERPL: 2.45 {RATIO}
LEFT ARM BP: NORMAL MMHG
MCH RBC QN AUTO: 27.8 PG (ref 26.6–33)
MCHC RBC AUTO-ENTMCNC: 32.3 G/DL (ref 31.5–35.7)
MCV RBC AUTO: 86 FL (ref 79–97)
PLATELET # BLD AUTO: 153 10*3/MM3 (ref 140–450)
PMV BLD AUTO: 9.8 FL (ref 6–12)
POTASSIUM SERPL-SCNC: 3.8 MMOL/L (ref 3.5–5.2)
PROT SERPL-MCNC: 6.3 G/DL (ref 6–8.5)
QT INTERVAL: 467 MS
QTC INTERVAL: 451 MS
RBC # BLD AUTO: 4.5 10*6/MM3 (ref 4.14–5.8)
SODIUM SERPL-SCNC: 141 MMOL/L (ref 136–145)
TRIGL SERPL-MCNC: 121 MG/DL (ref 0–150)
TSH SERPL DL<=0.05 MIU/L-ACNC: 3.31 UIU/ML (ref 0.27–4.2)
VIT B12 BLD-MCNC: 818 PG/ML (ref 211–946)
VLDLC SERPL-MCNC: 22 MG/DL (ref 5–40)
WBC NRBC COR # BLD AUTO: 5.68 10*3/MM3 (ref 3.4–10.8)

## 2024-03-21 PROCEDURE — G0378 HOSPITAL OBSERVATION PER HR: HCPCS

## 2024-03-21 PROCEDURE — 80053 COMPREHEN METABOLIC PANEL: CPT | Performed by: PSYCHIATRY & NEUROLOGY

## 2024-03-21 PROCEDURE — 82607 VITAMIN B-12: CPT | Performed by: PSYCHIATRY & NEUROLOGY

## 2024-03-21 PROCEDURE — 83036 HEMOGLOBIN GLYCOSYLATED A1C: CPT | Performed by: PSYCHIATRY & NEUROLOGY

## 2024-03-21 PROCEDURE — 82948 REAGENT STRIP/BLOOD GLUCOSE: CPT | Performed by: PSYCHIATRY & NEUROLOGY

## 2024-03-21 PROCEDURE — 85027 COMPLETE CBC AUTOMATED: CPT | Performed by: PSYCHIATRY & NEUROLOGY

## 2024-03-21 PROCEDURE — 93880 EXTRACRANIAL BILAT STUDY: CPT | Performed by: SURGERY

## 2024-03-21 PROCEDURE — 85652 RBC SED RATE AUTOMATED: CPT | Performed by: PSYCHIATRY & NEUROLOGY

## 2024-03-21 PROCEDURE — 84443 ASSAY THYROID STIM HORMONE: CPT | Performed by: PSYCHIATRY & NEUROLOGY

## 2024-03-21 PROCEDURE — 93880 EXTRACRANIAL BILAT STUDY: CPT

## 2024-03-21 PROCEDURE — 80061 LIPID PANEL: CPT | Performed by: PSYCHIATRY & NEUROLOGY

## 2024-03-21 PROCEDURE — 94640 AIRWAY INHALATION TREATMENT: CPT

## 2024-03-21 PROCEDURE — 97162 PT EVAL MOD COMPLEX 30 MIN: CPT

## 2024-03-21 PROCEDURE — 93306 TTE W/DOPPLER COMPLETE: CPT

## 2024-03-21 PROCEDURE — 70551 MRI BRAIN STEM W/O DYE: CPT

## 2024-03-21 PROCEDURE — 93306 TTE W/DOPPLER COMPLETE: CPT | Performed by: INTERNAL MEDICINE

## 2024-03-21 PROCEDURE — 82746 ASSAY OF FOLIC ACID SERUM: CPT | Performed by: PSYCHIATRY & NEUROLOGY

## 2024-03-21 PROCEDURE — 82948 REAGENT STRIP/BLOOD GLUCOSE: CPT

## 2024-03-21 PROCEDURE — 99213 OFFICE O/P EST LOW 20 MIN: CPT | Performed by: NURSE PRACTITIONER

## 2024-03-21 PROCEDURE — 97165 OT EVAL LOW COMPLEX 30 MIN: CPT

## 2024-03-21 RX ORDER — ASPIRIN 81 MG/1
81 TABLET ORAL DAILY
Status: DISCONTINUED | OUTPATIENT
Start: 2024-03-21 | End: 2024-03-22 | Stop reason: HOSPADM

## 2024-03-21 RX ORDER — IPRATROPIUM BROMIDE AND ALBUTEROL SULFATE 2.5; .5 MG/3ML; MG/3ML
3 SOLUTION RESPIRATORY (INHALATION) EVERY 6 HOURS PRN
Status: DISCONTINUED | OUTPATIENT
Start: 2024-03-21 | End: 2024-03-22 | Stop reason: HOSPADM

## 2024-03-21 RX ORDER — LISINOPRIL 20 MG/1
20 TABLET ORAL
Status: DISCONTINUED | OUTPATIENT
Start: 2024-03-21 | End: 2024-03-22 | Stop reason: HOSPADM

## 2024-03-21 RX ORDER — BUDESONIDE AND FORMOTEROL FUMARATE DIHYDRATE 160; 4.5 UG/1; UG/1
2 AEROSOL RESPIRATORY (INHALATION)
Status: DISCONTINUED | OUTPATIENT
Start: 2024-03-21 | End: 2024-03-22 | Stop reason: HOSPADM

## 2024-03-21 RX ORDER — AMLODIPINE BESYLATE 5 MG/1
10 TABLET ORAL
Status: DISCONTINUED | OUTPATIENT
Start: 2024-03-21 | End: 2024-03-22 | Stop reason: HOSPADM

## 2024-03-21 RX ADMIN — APIXABAN 5 MG: 5 TABLET, FILM COATED ORAL at 10:19

## 2024-03-21 RX ADMIN — BUDESONIDE AND FORMOTEROL FUMARATE DIHYDRATE 2 PUFF: 160; 4.5 AEROSOL RESPIRATORY (INHALATION) at 19:10

## 2024-03-21 RX ADMIN — AMLODIPINE BESYLATE 10 MG: 5 TABLET ORAL at 17:43

## 2024-03-21 RX ADMIN — ATORVASTATIN CALCIUM 80 MG: 40 TABLET, FILM COATED ORAL at 22:03

## 2024-03-21 RX ADMIN — APIXABAN 5 MG: 5 TABLET, FILM COATED ORAL at 22:03

## 2024-03-21 RX ADMIN — Medication 10 ML: at 09:00

## 2024-03-21 RX ADMIN — ASPIRIN 81 MG: 81 TABLET, COATED ORAL at 07:09

## 2024-03-21 RX ADMIN — Medication 10 ML: at 22:03

## 2024-03-21 RX ADMIN — LISINOPRIL 20 MG: 20 TABLET ORAL at 17:43

## 2024-03-21 NOTE — H&P
[facility] Medicine Services  History & Physical    Patient Name: Alejandro Bain  : 1955  MRN: [id number]  Primary Care Physician:  Rafat Petty MD  Date of admission: 3/20/2024  Date and Time of Service: 3/20/2024 at 23:54 EDT      Subjective      Chief Complaint: Dizziness    History of Present Illness: Alejandro Bain is a 68 y.o. male with a PMH of atrial fibrillation on Eliquis, CHF, CVA and aneurysm who presented to [facility] on 3/[facility]/20[facility] with complaints of acute onset dizziness.  Patient also stated he had vision change (stated that his vision was crossed eyed) He also had diaphoresis associated with the dizziness.  Of note, patient has a history of occipital stroke about a year ago.  He stated that his presentation tonight is quite similar to his presentation last year when he had his stroke.  He states that he has been taking his medication as prescribed including his Eliquis.  Denies any fever, chills, nausea, vomiting, abdominal pain, chest pain or palpitations.    In the ED, vital signs were stable.  Labs unremarkable.  Head CT showed no acute findings.  Noted old PCA cerebral infarct.  Neurology was consulted.      Review of Systems  As above  Personal History     Past Medical History:   Diagnosis Date    Acute on chronic diastolic heart failure 2022    Aneurysm     Anxiety     Asthma     Atrial fibrillation     Bronchitis     CHF (congestive heart failure)     Coronary artery disease     Depression     Encounter for laboratory testing for COVID-19 virus 2022    Gout     Hyperlipidemia     Hypertension     Intractable headache 2023    Myocardial infarction     Pneumonia     Pulmonary nodule     Familia [place] spotted fever     Stroke        Past Surgical History:   Procedure Laterality Date    AORTIC VALVE REPAIR/REPLACEMENT  2016    CABG x 5/ tissue AVR by DR. TORRES    CARDIAC CATHETERIZATION      CARDIAC SURGERY      CARDIOVASCULAR STRESS TEST   2020    CAROTID STENT      CORONARY ARTERY BYPASS GRAFT  12/02/2016    X4  Dr Phan    HERNIA REPAIR  2010    ILIAC ARTERY ANEURYSM REPAIR  01/05/2017    abdominal and bilateral    NASAL POLYP EXCISION  2005    OTHER SURGICAL HISTORY      [facility]    REPAIR CHOANAL ATRESIA  2005    SINUS SURGERY      VASCULAR SURGERY         Family History: family history includes Cancer in his brother, father, and sister; Heart disease in his mother and sister; Pulmonary fibrosis in his mother. Otherwise pertinent FHx was reviewed and not pertinent to current issue.    Social History:  reports that he has never smoked. He has never been exposed to tobacco smoke. He has never used smokeless tobacco. He reports that he does not currently use alcohol. He reports that he does not use drugs.    Home Medications:  Prior to Admission Medications       Prescriptions Last Dose Informant Patient Reported? Taking?    allopurinol (ZYLOPRIM) 300 MG tablet 3/19/2024  Yes Yes    Take 1 tablet by mouth Every Night.    amLODIPine (NORVASC) 10 MG tablet 3/20/2024  No Yes    TAKE 1 TABLET BY MOUTH EVERY DAY    apixaban (ELIQUIS) 5 MG tablet tablet 3/20/2024  No Yes    Take 1 tablet by mouth Every 12 (Twelve) Hours. Indications: Atrial Fibrillation    aspirin 81 MG EC tablet 3/20/2024  Yes Yes    Take 1 tablet by mouth Daily.    Bacillus Coagulans-Inulin (ALIGN PREBIOTIC-PROBIOTIC PO) 3/20/2024  Yes Yes    Take 1 tablet by mouth Daily.    Cholecalciferol 25 MCG (1000 UT) capsule 3/20/2024  Yes Yes    Take 1 capsule by mouth Daily.    ipratropium-albuterol (DUO-NEB) 0.5-2.5 mg/3 ml nebulizer 3/20/2024  Yes Yes    Take 3 mL by nebulization Every 4 (Four) Hours As Needed for Wheezing.    lisinopril (PRINIVIL,ZESTRIL) 20 MG tablet 3/20/2024  No Yes    TAKE 1 TABLET BY MOUTH TWICE A DAY    Multiple Vitamins-Minerals (MULTIVITAMIN ADULT PO) 3/20/2024  Yes Yes    Take 1 tablet by mouth Daily.    NON FORMULARY 3/19/2024  Yes Yes    Nasal spray . 2 sprays in each  nostril twice daily.    rosuvastatin (CRESTOR) 20 MG tablet 3/20/2024  Yes Yes    Take 1 tablet by mouth Daily.    SYMBICORT 160-4.5 MCG/ACT inhaler 3/20/2024  Yes Yes    Inhale 2 puffs 2 (Two) Times a Day.    VENTOLIN  (90 Base) MCG/ACT inhaler   Yes Yes    Inhale 2 puffs Every 6 (Six) Hours As Needed.    NON FORMULARY   Yes No    Biologic asthma injection .  One injection every 4th week.              Allergies:  Allergies   Allergen Reactions    Avelox [Moxifloxacin] Rash    Penicillins Rash    Sulfa Antibiotics Rash    Levaquin [Levofloxacin] GI Intolerance       Objective      Vitals:   Temp:  [97.5 °F (36.4 °C)] 97.5 °F (36.4 °C)  Heart Rate:  [56-60] 56  Resp:  [19] 19  BP: (132-147)/(75-85) 138/75  Body mass index is 25.86 kg/m².  Physical Exam  General Appearance: AOO x 4, cooperative, no distress, appropriate for age  Head:  Normocephalic, without obvious abnormality  Eyes:  PERRL, EOM's intact, conjunctivae and cornea clear  Nose:  Nares symmetrical, septum midline, mucosa pink  Throat:  Lips, tongue, and mucosa are moist, pink, and intact  Neck:  Supple; symmetrical, trachea midline, no adenopathy  Back:  Symmetrical, ROM normal, no CVA tenderness  Lungs: Respirations unlabored, no audible wheeze  Heart: Regular rate & rhythm, S1 and S2 normal  Abdomen:  Soft, nontender, bowel sounds active all four quadrants  Musculoskeletal: Tone and strength strong and symmetrical, all extremities; no joint pain or edema         Skin/Hair/Nails:  Skin warm, dry and intact, no rashes or abnormal dyspigmentation     Diagnostic Data:  Lab Results (last 24 hours)       Procedure Component Value Units Date/Time    [place] Draw [[id number]] Collected: 03/20/24 2039    Specimen: Blood Updated: 03/20/24 2146    Narrative:      The following orders were created for panel order [place] Draw.  Procedure                               Abnormality         Status                     ---------                                -----------         ------                     Green Top (Gel)[[id number]]                                  Final result               Lavender Top[[id number]]                                     Final result               Gold Top - SST[[id number]]                                   Final result               Light Blue Top[[id number]]                                   Final result                 Please view results for these tests on the individual orders.    Lavender Top [[id number]] Collected: 03/20/24 2039    Specimen: Blood Updated: 03/20/24 2146     Extra Tube hold for add-on     Comment: Auto resulted       Gold Top - SST [[id number]] Collected: 03/20/24 2039    Specimen: Blood Updated: 03/20/24 2146     Extra Tube Hold for add-ons.     Comment: Auto resulted.       Light Blue Top [[id number]] Collected: 03/20/24 2039    Specimen: Blood Updated: 03/20/24 2146     Extra Tube Hold for add-ons.     Comment: Auto resulted       Green Top (Gel) [[id number]] Collected: 03/20/24 2039    Specimen: Blood Updated: 03/20/24 2117    Protime-INR [[id number]]  (Normal) Collected: 03/20/24 2039    Specimen: Blood Updated: 03/20/24 2106     Protime 11.4 Seconds      INR 1.05    aPTT [[id number]]  (Normal) Collected: 03/20/24 2039    Specimen: Blood Updated: 03/20/24 2106     PTT 26.7 seconds     Comprehensive Metabolic Panel [[id number]]  (Abnormal) Collected: 03/20/24 2039    Specimen: Blood Updated: 03/20/24 2102     Glucose 84 mg/dL      BUN 12 mg/dL      Creatinine 0.85 mg/dL      Sodium 138 mmol/L      Potassium 3.8 mmol/L      Chloride 103 mmol/L      CO2 24.0 mmol/L      Calcium 9.4 mg/dL      Total Protein 7.0 g/dL      Albumin 4.4 g/dL      ALT (SGPT) 14 U/L      AST (SGOT) 21 U/L      Alkaline Phosphatase 139 U/L      Total Bilirubin 0.3 mg/dL      Globulin 2.6 gm/dL      A/G Ratio 1.7 g/dL      BUN/Creatinine Ratio 14.1     Anion Gap 11.0 mmol/L      eGFR 95.8 mL/min/1.73     Narrative:      GFR Normal >60  Chronic Kidney  Disease <60  Kidney Failure <15      Single High Sensitivity Troponin T [[id number]]  (Normal) Collected: 03/20/24 2039    Specimen: Blood Updated: 03/20/24 2102     HS Troponin T 16 ng/L     Narrative:      High Sensitive Troponin T Reference Range:  <14.0 ng/L- Negative Female for AMI  <22.0 ng/L- Negative Male for AMI  >=14 - Abnormal Female indicating possible myocardial injury.  >=22 - Abnormal Male indicating possible myocardial injury.   Clinicians would have to utilize clinical acumen, EKG, Troponin, and serial changes to determine if it is an Acute Myocardial Infarction or myocardial injury due to an underlying chronic condition.         CBC & Differential [[id number]]  (Abnormal) Collected: 03/20/24 2039    Specimen: Blood Updated: 03/20/24 2043    Narrative:      The following orders were created for panel order CBC & Differential.  Procedure                               Abnormality         Status                     ---------                               -----------         ------                     CBC Auto Differential[[id number]]        Abnormal            Final result                 Please view results for these tests on the individual orders.    CBC Auto Differential [[id number]]  (Abnormal) Collected: 03/20/24 2039    Specimen: Blood Updated: 03/20/24 2043     WBC 7.73 10*3/mm3      RBC 4.80 10*6/mm3      Hemoglobin 13.4 g/dL      Hematocrit 41.2 %      MCV 85.8 fL      MCH 27.9 pg      MCHC 32.5 g/dL      RDW 14.6 %      RDW-SD 46.4 fl      MPV 9.6 fL      Platelets 179 10*3/mm3      Neutrophil % 71.7 %      Lymphocyte % 18.1 %      Monocyte % 7.6 %      Eosinophil % 1.8 %      Basophil % 0.5 %      Immature Grans % 0.3 %      Neutrophils, Absolute 5.54 10*3/mm3      Lymphocytes, Absolute 1.40 10*3/mm3      Monocytes, Absolute 0.59 10*3/mm3      Eosinophils, Absolute 0.14 10*3/mm3      Basophils, Absolute 0.04 10*3/mm3      Immature Grans, Absolute 0.02 10*3/mm3      nRBC 0.0 /100 WBC     POC  Creatinine [[id number]]  (Normal) Collected: 03/20/24 2041    Specimen: Blood Updated: 03/20/24 2043     Creatinine 1.00 mg/dL      Comment: Serial Number: [id number]:  [id number]        eGFR 83.0 mL/min/1.73     POC Glucose Once [[id number]]  (Normal) Collected: 03/20/24 2023    Specimen: Blood Updated: 03/20/24 2025     Glucose 87 mg/dL      Comment: Serial Number: [id number]:  [id number]                Imaging Results (Last 24 Hours)       Procedure Component Value Units Date/Time    XR Chest 1 View [[id number]] Collected: 03/20/24 2144     Updated: 03/20/24 2147    Narrative:      XR CHEST 1 VW    Date of Exam: 3/20/2024 9:39 PM EDT    Indication: Acute cerebrovascular accident    Comparison: 2/9/2024.    Findings:  The patient is had a previous CABG procedure. The pulmonary vascular markings are normal. The lungs and pleural spaces are clear of active disease. There are chronic age-related changes involving the bony thorax and thoracic aorta.      Impression:      Impression:  No active disease.      Electronically Signed: Chetan Lockhart MD    3/20/2024 9:45 PM EDT    Workstation ID: [id number]    CT Angiogram Head w AI Analysis of LVO [[id number]] Collected: 03/20/24 2054     Updated: 03/20/24 2132    Narrative:      CT ANGIOGRAM HEAD W AI ANALYSIS OF LVO, CT ANGIOGRAM NECK    Date of Exam: 3/20/2024 8:43 PM EDT    Indication: Suspected acute cerebrovascular accident, nausea and weakness.    Comparison: CT of the head performed on 3/20/2024 and previous CT angiogram of the head and neck from 1/10/2023.    Technique: CTA of the head was performed after the uneventful intravenous administration of iodinated contrast. Reconstructed coronal and sagittal images were also obtained. In addition, a 3-D volume rendered image was created for interpretation.   Automated exposure control and iterative reconstruction methods were used.      Findings:  Vascular: There is a stable chronic occlusion of the left posterior  cerebral artery. Only a short segment of the P1 segment on the left side is visualized. There is a stable focal stenosis in the mid right posterior cerebral artery. The anterior and   middle cerebral arteries are patent. The basilar artery is patent. There is no new large vessel occlusion or filling defect indicate acute cerebral thrombus. There is atherosclerotic vascular plaque in the supraclinoid segment of the right internal   carotid artery with no flow limiting stenosis. There is also atherosclerotic vascular plaque in the supraclinoid segment of the left internal carotid artery with no flow-limiting stenosis. There is no saccular aneurysm or arteriovenous malformation.   There is a calcified plaque at the right carotid bifurcation with no flow-limiting stenosis by NASCET criteria. There is calcified and noncalcified plaque at the left carotid bifurcation with no flow-limiting stenosis by NASCET criteria. The proximal   left internal carotid artery stenosis again measures approximately 25%. Both the right and left vertebral arteries are widely patent. The left vertebral artery originates directly from the thoracic aortic arch. There is a mild focal stenosis again noted   in the mid right vertebral artery. This is secondary to calcified plaque. There is a moderate to high-grade stenosis within the mid left vertebral artery secondary to calcified plaque. There is also a mild-moderate stenosis in the proximal left vertebral   artery just beyond the origin secondary to noncalcified plaque. There is plaque in the innominate artery with no flow-limiting stenosis. The right and left subclavian arteries are widely patent.    Nonvascular: There are no enhancing intracranial lesions. There is chronic paranasal sinus disease with evidence of previous surgery. The parotid and submandibular glands are normal. The nasopharynx, palatine tonsils, vallecula, epiglottis, piriform   sinuses, and larynx are normal. The thyroid  gland is unremarkable. The pulmonary apices are clear. There are degenerative changes within the cervical and thoracic spine. There are no suspicious osteolytic or sclerotic lesions.      Impression:      Impression:    1. Stable chronic occlusion of the left posterior cerebral artery. Only a short segment of the P1 segment on the left side is visualized similar to before. There is also a stable focal stenosis in the mid right posterior cerebral artery.  2. No large vessel occlusion or filling defect indicate acute cerebral thrombus.  3. Stable stenotic areas in the right and left vertebral arteries.  4. There is plaque at both carotid bifurcations. There is no flow-limiting stenosis by NASCET criteria as described.  5. Additional vascular and nonvascular findings as noted above.        Electronically Signed: Chetan Lockhart MD    3/20/2024 9:30 PM EDT    Workstation ID: [id number]    CT Angiogram Neck [[id number]] Collected: 03/20/24 2054     Updated: 03/20/24 2132    Narrative:      CT ANGIOGRAM HEAD W AI ANALYSIS OF LVO, CT ANGIOGRAM NECK    Date of Exam: 3/20/2024 8:43 PM EDT    Indication: Suspected acute cerebrovascular accident, nausea and weakness.    Comparison: CT of the head performed on 3/20/2024 and previous CT angiogram of the head and neck from 1/10/2023.    Technique: CTA of the head was performed after the uneventful intravenous administration of iodinated contrast. Reconstructed coronal and sagittal images were also obtained. In addition, a 3-D volume rendered image was created for interpretation.   Automated exposure control and iterative reconstruction methods were used.      Findings:  Vascular: There is a stable chronic occlusion of the left posterior cerebral artery. Only a short segment of the P1 segment on the left side is visualized. There is a stable focal stenosis in the mid right posterior cerebral artery. The anterior and   middle cerebral arteries are patent. The basilar artery is patent.  There is no new large vessel occlusion or filling defect indicate acute cerebral thrombus. There is atherosclerotic vascular plaque in the supraclinoid segment of the right internal   carotid artery with no flow limiting stenosis. There is also atherosclerotic vascular plaque in the supraclinoid segment of the left internal carotid artery with no flow-limiting stenosis. There is no saccular aneurysm or arteriovenous malformation.   There is a calcified plaque at the right carotid bifurcation with no flow-limiting stenosis by NASCET criteria. There is calcified and noncalcified plaque at the left carotid bifurcation with no flow-limiting stenosis by NASCET criteria. The proximal   left internal carotid artery stenosis again measures approximately 25%. Both the right and left vertebral arteries are widely patent. The left vertebral artery originates directly from the thoracic aortic arch. There is a mild focal stenosis again noted   in the mid right vertebral artery. This is secondary to calcified plaque. There is a moderate to high-grade stenosis within the mid left vertebral artery secondary to calcified plaque. There is also a mild-moderate stenosis in the proximal left vertebral   artery just beyond the origin secondary to noncalcified plaque. There is plaque in the innominate artery with no flow-limiting stenosis. The right and left subclavian arteries are widely patent.    Nonvascular: There are no enhancing intracranial lesions. There is chronic paranasal sinus disease with evidence of previous surgery. The parotid and submandibular glands are normal. The nasopharynx, palatine tonsils, vallecula, epiglottis, piriform   sinuses, and larynx are normal. The thyroid gland is unremarkable. The pulmonary apices are clear. There are degenerative changes within the cervical and thoracic spine. There are no suspicious osteolytic or sclerotic lesions.      Impression:      Impression:    1. Stable chronic occlusion of  the left posterior cerebral artery. Only a short segment of the P1 segment on the left side is visualized similar to before. There is also a stable focal stenosis in the mid right posterior cerebral artery.  2. No large vessel occlusion or filling defect indicate acute cerebral thrombus.  3. Stable stenotic areas in the right and left vertebral arteries.  4. There is plaque at both carotid bifurcations. There is no flow-limiting stenosis by NASCET criteria as described.  5. Additional vascular and nonvascular findings as noted above.        Electronically Signed: Chetan Lockhart MD    3/20/2024 9:30 PM EDT    Workstation ID: [id number]    CT Head Without Contrast Stroke Protocol [[id number]] Collected: 03/20/24 2039     Updated: 03/20/24 2046    Narrative:      CT HEAD WO CONTRAST STROKE PROTOCOL    Date of Exam: 3/20/2024 8:35 PM EDT    Indication: Acute neurologic deficit.    Comparison: 1/9/2023.    Technique: Axial CT images were obtained of the head without contrast administration.  Reconstructed coronal and sagittal images were also obtained. Automated exposure control and iterative construction methods were used.    Scan Time: 2039 hours  Results discussed with Dr. Pascal at 2044 hours.      Findings:  The sulci, fissures, ventricles, and basal cisterns are normal for age. There is been interval development of encephalomalacia in the left occipital lobe and medial left temporal lobe consistent with a prior left PCA distribution cerebral infarct. There   is some subtle areas of decreased density in the periventricular white matter likely due to chronic microvascular ischemia. The gray-white matter differentiation is otherwise preserved. There is no acute hemorrhage, midline shift, or suspicious   extra-axial fluid collections. There are atherosclerotic vascular calcifications in the distal vertebral arteries as well as the cavernous and supraclinoid carotid arteries. The orbital contents are normal. There is mucosal  thickening throughout the   paranasal sinuses indicating chronic sinusitis. The patient has had previous paranasal sinus surgery. The mastoid sinuses are clear.      Impression:      Impression:    1. No acute findings.  2. 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  3. Mild chronic periventricular white matter microvascular ischemia.  4. Chronic paranasal sinus disease with either previous sinus surgery.        Electronically Signed: Chetan Lockhart MD    3/20/2024 8:44 PM EDT    Workstation ID: [id number]              Assessment & Plan        This is a 68 y.o. male with PMH of    Active and Resolved Problems  Active Hospital Problems    Diagnosis  POA    **TIA (transient ischemic attack) [G45.9]  Yes      Resolved Hospital Problems   No resolved problems to display.     TIA  Dizziness  History of CVA  -Initiate TIA protocol  - Aspirin and Lipitor  -Obtain MRI of the head  -Obtain echocardiogram  -Neurology consult  -PT OT consult    History of A-fib  Resume Eliquis    CHF  Stable  Obtain repeat echo    Hypertension  Blood pressure stable  Resume home meds    Hyperlipidemia  Obtain lipid profile  Resume Lipitor    Anxiety and depression  Resume home meds    DVT prophylaxis:  Medical and mechanical DVT prophylaxis orders are present.        CODE STATUS:           Admission Status:  I believe this patient meets Obs status.      I discussed the patient's findings and my recommendations with patient.      Signature:     This document has been electronically signed by Page Pinto MD on March 20, 2024 23:29 EDT   [facility] Hospitalist Team

## 2024-03-21 NOTE — PROGRESS NOTES
"[facility] MEDICINE SERVICE  DAILY PROGRESS NOTE      Patient Name: Alejandro Bain  Date of Admission: 3/20/2024  Today's Date: 03/21/24  Length of Stay: 0  Primary Care Physician: Rafat Petty MD    Subjective   Patient is doing well at this time.  He states his symptoms have resolved, but he does have a mild headache.  No other complaints.  No overnight events.      Objective    Temp:  [97.5 °F (36.4 °C)-98.5 °F (36.9 °C)] 97.6 °F (36.4 °C)  Heart Rate:  [53-68] 58  Resp:  [16-19] 16  BP: (114-163)/(74-93) 130/80  Physical Exam  General: NAD, AAOx3  HEENT: AT NC, EOMI, PERRL  Neck: No JVD  CVS: S1/S2 present, RRR, no M/R/G  Lungs: CTA B/L  Abdomen: soft, NT, ND, BS+  Ext: no edema  Skin: no rashes, bruises or discolorations  Neuro: no focal deficits, CN II-XII  Psych: Not agitated         Results Review:  I have reviewed the labs, radiology results, and diagnostic studies.    Laboratory Data:   Results from last 7 days   Lab Units 03/21/24  0657 03/20/24 2039   WBC 10*3/mm3 5.68 7.73   HEMOGLOBIN g/dL 12.5* 13.4   HEMATOCRIT % 38.7 41.2   PLATELETS 10*3/mm3 153 179        Results from last 7 days   Lab Units 03/21/24  0657 03/20/24 2041 03/20/24 2039   SODIUM mmol/L 141  --  138   POTASSIUM mmol/L 3.8  --  3.8   CHLORIDE mmol/L 107  --  103   CO2 mmol/L 24.0  --  24.0   BUN mg/dL 11  --  12   CREATININE mg/dL 0.88 1.00 0.85   CALCIUM mg/dL 8.9  --  9.4   BILIRUBIN mg/dL 0.3  --  0.3   ALK PHOS U/L 122*  --  139*   ALT (SGPT) U/L 13  --  14   AST (SGOT) U/L 20  --  21   GLUCOSE mg/dL 95  --  84       Culture Data:   No results found for: \"BLOODCX\"  No results found for: \"URINECX\"  No results found for: \"RESPCX\"  No results found for: \"WOUNDCX\"  No results found for: \"STOOLCX\"  No components found for: \"BODYFLD\"    Radiology Data:   Imaging Results (Last 24 Hours)       Procedure Component Value Units Date/Time    MRI Brain Without Contrast [[id number]] Collected: 03/21/24 0829     Updated: 03/21/24 " 0844    Narrative:      MRI BRAIN WO CONTRAST    Date of Exam: 3/21/2024 7:55 AM EDT    Indication: Stroke, follow up.     Comparison: CT angiogram brain 3/20/2024, MRI brain 1/10/2023    Technique:  Routine multiplanar/multisequence sequence images of the brain were obtained without contrast administration.      Findings:  Diffusion weighted images demonstrate no evidence of acute infarct. There is no evidence of acute intracranial hemorrhage.    There is encephalomalacia involving the left occipital lobe compatible with old left PCA distribution infarct. No abnormal extra-axial fluid collection identified. No mass, mass effect, or hydrocephalus.    There is a short linear segment of abnormal increased T1 signal in the region of the P2 segment of the left posterior cerebral artery compatible with known chronic occlusion. Other major intracranial vascular flow voids are preserved. Sella and   suprasellar cistern are within normal limits. Craniovertebral junction appears normal.    There is partial opacification of the bilateral mastoid air cells. There is mucosal thickening within the sinuses consistent with chronic sinusitis.    Globes and orbits are within normal limits.      Impression:      Impression:    1. No evidence of acute infarct or hemorrhage.  2. 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 There is resultant encephalomalacia within the left occipital lobe compatible with old infarct.  3. Chronic pansinusitis        Electronically Signed: Edy Walters MD    3/21/2024 8:42 AM EDT    Workstation ID: [id number]    XR Chest 1 View [[id number]] Collected: 03/20/24 2144     Updated: 03/20/24 2147    Narrative:      XR CHEST 1 VW    Date of Exam: 3/20/2024 9:39 PM EDT    Indication: Acute cerebrovascular accident    Comparison: 2/9/2024.    Findings:  The patient is had a previous CABG procedure. The pulmonary vascular markings are normal. The lungs and pleural spaces are clear of  active disease. There are chronic age-related changes involving the bony thorax and thoracic aorta.      Impression:      Impression:  No active disease.      Electronically Signed: Chetan Lockhart MD    3/20/2024 9:45 PM EDT    Workstation ID: [id number]    CT Angiogram Head w AI Analysis of LVO [[id number]] Collected: 03/20/24 2054     Updated: 03/20/24 2132    Narrative:      CT ANGIOGRAM HEAD W AI ANALYSIS OF LVO, CT ANGIOGRAM NECK    Date of Exam: 3/20/2024 8:43 PM EDT    Indication: Suspected acute cerebrovascular accident, nausea and weakness.    Comparison: CT of the head performed on 3/20/2024 and previous CT angiogram of the head and neck from 1/10/2023.    Technique: CTA of the head was performed after the uneventful intravenous administration of iodinated contrast. Reconstructed coronal and sagittal images were also obtained. In addition, a 3-D volume rendered image was created for interpretation.   Automated exposure control and iterative reconstruction methods were used.      Findings:  Vascular: There is a stable chronic occlusion of the left posterior cerebral artery. Only a short segment of the P1 segment on the left side is visualized. There is a stable focal stenosis in the mid right posterior cerebral artery. The anterior and   middle cerebral arteries are patent. The basilar artery is patent. There is no new large vessel occlusion or filling defect indicate acute cerebral thrombus. There is atherosclerotic vascular plaque in the supraclinoid segment of the right internal   carotid artery with no flow limiting stenosis. There is also atherosclerotic vascular plaque in the supraclinoid segment of the left internal carotid artery with no flow-limiting stenosis. There is no saccular aneurysm or arteriovenous malformation.   There is a calcified plaque at the right carotid bifurcation with no flow-limiting stenosis by NASCET criteria. There is calcified and noncalcified plaque at the left carotid  bifurcation with no flow-limiting stenosis by NASCET criteria. The proximal   left internal carotid artery stenosis again measures approximately 25%. Both the right and left vertebral arteries are widely patent. The left vertebral artery originates directly from the thoracic aortic arch. There is a mild focal stenosis again noted   in the mid right vertebral artery. This is secondary to calcified plaque. There is a moderate to high-grade stenosis within the mid left vertebral artery secondary to calcified plaque. There is also a mild-moderate stenosis in the proximal left vertebral   artery just beyond the origin secondary to noncalcified plaque. There is plaque in the innominate artery with no flow-limiting stenosis. The right and left subclavian arteries are widely patent.    Nonvascular: There are no enhancing intracranial lesions. There is chronic paranasal sinus disease with evidence of previous surgery. The parotid and submandibular glands are normal. The nasopharynx, palatine tonsils, vallecula, epiglottis, piriform   sinuses, and larynx are normal. The thyroid gland is unremarkable. The pulmonary apices are clear. There are degenerative changes within the cervical and thoracic spine. There are no suspicious osteolytic or sclerotic lesions.      Impression:      Impression:    1. Stable chronic occlusion of the left posterior cerebral artery. Only a short segment of the P1 segment on the left side is visualized similar to before. There is also a stable focal stenosis in the mid right posterior cerebral artery.  2. No large vessel occlusion or filling defect indicate acute cerebral thrombus.  3. Stable stenotic areas in the right and left vertebral arteries.  4. There is plaque at both carotid bifurcations. There is no flow-limiting stenosis by NASCET criteria as described.  5. Additional vascular and nonvascular findings as noted above.        Electronically Signed: Chetan Lockhart MD    3/20/2024 9:30 PM EDT     Workstation ID: [id number]    CT Angiogram Neck [[id number]] Collected: 03/20/24 2054     Updated: 03/20/24 2132    Narrative:      CT ANGIOGRAM HEAD W AI ANALYSIS OF LVO, CT ANGIOGRAM NECK    Date of Exam: 3/20/2024 8:43 PM EDT    Indication: Suspected acute cerebrovascular accident, nausea and weakness.    Comparison: CT of the head performed on 3/20/2024 and previous CT angiogram of the head and neck from 1/10/2023.    Technique: CTA of the head was performed after the uneventful intravenous administration of iodinated contrast. Reconstructed coronal and sagittal images were also obtained. In addition, a 3-D volume rendered image was created for interpretation.   Automated exposure control and iterative reconstruction methods were used.      Findings:  Vascular: There is a stable chronic occlusion of the left posterior cerebral artery. Only a short segment of the P1 segment on the left side is visualized. There is a stable focal stenosis in the mid right posterior cerebral artery. The anterior and   middle cerebral arteries are patent. The basilar artery is patent. There is no new large vessel occlusion or filling defect indicate acute cerebral thrombus. There is atherosclerotic vascular plaque in the supraclinoid segment of the right internal   carotid artery with no flow limiting stenosis. There is also atherosclerotic vascular plaque in the supraclinoid segment of the left internal carotid artery with no flow-limiting stenosis. There is no saccular aneurysm or arteriovenous malformation.   There is a calcified plaque at the right carotid bifurcation with no flow-limiting stenosis by NASCET criteria. There is calcified and noncalcified plaque at the left carotid bifurcation with no flow-limiting stenosis by NASCET criteria. The proximal   left internal carotid artery stenosis again measures approximately 25%. Both the right and left vertebral arteries are widely patent. The left vertebral artery originates  directly from the thoracic aortic arch. There is a mild focal stenosis again noted   in the mid right vertebral artery. This is secondary to calcified plaque. There is a moderate to high-grade stenosis within the mid left vertebral artery secondary to calcified plaque. There is also a mild-moderate stenosis in the proximal left vertebral   artery just beyond the origin secondary to noncalcified plaque. There is plaque in the innominate artery with no flow-limiting stenosis. The right and left subclavian arteries are widely patent.    Nonvascular: There are no enhancing intracranial lesions. There is chronic paranasal sinus disease with evidence of previous surgery. The parotid and submandibular glands are normal. The nasopharynx, palatine tonsils, vallecula, epiglottis, piriform   sinuses, and larynx are normal. The thyroid gland is unremarkable. The pulmonary apices are clear. There are degenerative changes within the cervical and thoracic spine. There are no suspicious osteolytic or sclerotic lesions.      Impression:      Impression:    1. Stable chronic occlusion of the left posterior cerebral artery. Only a short segment of the P1 segment on the left side is visualized similar to before. There is also a stable focal stenosis in the mid right posterior cerebral artery.  2. No large vessel occlusion or filling defect indicate acute cerebral thrombus.  3. Stable stenotic areas in the right and left vertebral arteries.  4. There is plaque at both carotid bifurcations. There is no flow-limiting stenosis by NASCET criteria as described.  5. Additional vascular and nonvascular findings as noted above.        Electronically Signed: Chetan Lockhart MD    3/20/2024 9:30 PM EDT    Workstation ID: [id number]    CT Head Without Contrast Stroke Protocol [[id number]] Collected: 03/20/24 2039     Updated: 03/20/24 2046    Narrative:      CT HEAD WO CONTRAST STROKE PROTOCOL    Date of Exam: 3/20/2024 8:35 PM EDT    Indication: Acute  neurologic deficit.    Comparison: 1/9/2023.    Technique: Axial CT images were obtained of the head without contrast administration.  Reconstructed coronal and sagittal images were also obtained. Automated exposure control and iterative construction methods were used.    Scan Time: 2039 hours  Results discussed with Dr. Pascal at 2044 hours.      Findings:  The sulci, fissures, ventricles, and basal cisterns are normal for age. There is been interval development of encephalomalacia in the left occipital lobe and medial left temporal lobe consistent with a prior left PCA distribution cerebral infarct. There   is some subtle areas of decreased density in the periventricular white matter likely due to chronic microvascular ischemia. The gray-white matter differentiation is otherwise preserved. There is no acute hemorrhage, midline shift, or suspicious   extra-axial fluid collections. There are atherosclerotic vascular calcifications in the distal vertebral arteries as well as the cavernous and supraclinoid carotid arteries. The orbital contents are normal. There is mucosal thickening throughout the   paranasal sinuses indicating chronic sinusitis. The patient has had previous paranasal sinus surgery. The mastoid sinuses are clear.      Impression:      Impression:    1. No acute findings.  2. 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  3. Mild chronic periventricular white matter microvascular ischemia.  4. Chronic paranasal sinus disease with either previous sinus surgery.        Electronically Signed: Chetan Lockhart MD    3/20/2024 8:44 PM EDT    Workstation ID: [id number]            I have reviewed the patient's current medications.     Assessment/Plan     1) TIA  - imaging shows no acute abnormalities  - neurology on board, appreciate recs  - continue eliquis and ASA  - lipitor  - TTE pending  - monitor on telemetry  - lifestyle changes advised    2) HTN  - home meds    3) HLD  - lipitor    4) COPD  - symbicort,  john    5) GI/DVT ppx  - none/eliquis          Electronically signed by Kavita Casanova MD, 03/21/24, 17:08 EDT.

## 2024-03-21 NOTE — THERAPY EVALUATION
Patient Name: Alejandro Bain  : 1955    MRN: [id number]                              Today's Date: 3/21/2024       Admit Date: 3/20/2024    Visit Dx:     ICD-10-CM ICD-9-CM   1. Dizziness  R42 780.4     Patient Active Problem List   Diagnosis    S/P CABG x 5 by Dr. Phan    S/P AVR (tissue) by Dr. Phan    AAA (abdominal aortic aneurysm) without rupture    Mass of lingula of lung--left    S/P AAA repair    Aneurysm of abdominal vessel    Essential hypertension    Aneurysm of femoral artery    Aneurysm of iliac artery    Paroxysmal atrial fibrillation    Coronary artery disease involving native coronary artery of native heart without angina pectoris    Mixed hyperlipidemia    Palpitations    Shortness of breath    Acute upper back pain    Asthma    Thrombocytopenia    Encounter for laboratory testing for COVID-19 virus    COVID    Leukocytosis    Elevated LFTs    Acute renal insufficiency    Acute on chronic diastolic heart failure    Acute respiratory distress    CVA (cerebral vascular accident)    Intractable headache    TIA (transient ischemic attack)     Past Medical History:   Diagnosis Date    Acute on chronic diastolic heart failure 2022    Aneurysm     Anxiety     Asthma     Atrial fibrillation     Bronchitis     CHF (congestive heart failure)     Coronary artery disease     Depression     Encounter for laboratory testing for COVID-19 virus 2022    Gout     Hyperlipidemia     Hypertension     Intractable headache 2023    Myocardial infarction     Pneumonia     Pulmonary nodule     Familia Mountain spotted fever     Stroke      Past Surgical History:   Procedure Laterality Date    AORTIC VALVE REPAIR/REPLACEMENT  2016    CABG x 5/ tissue AVR by DR. PHAN    CARDIAC CATHETERIZATION      CARDIAC SURGERY      CARDIOVASCULAR STRESS TEST      CAROTID STENT      CORONARY ARTERY BYPASS GRAFT  12/02/2016    X4  Dr Phan    HERNIA REPAIR      ILIAC ARTERY ANEURYSM REPAIR   01/05/2017    abdominal and bilateral    NASAL POLYP EXCISION  2005    OTHER SURGICAL HISTORY      [facility]    REPAIR CHOANAL ATRESIA  2005    SINUS SURGERY      VASCULAR SURGERY        General Information       Row Name 03/21/24 1316          Physical Therapy Time and Intention    Document Type evaluation  -MB     Mode of Treatment physical therapy  -MB       Row Name 03/21/24 1316          General Information    Patient Profile Reviewed yes  -MB     Prior Level of Function independent:;all household mobility;community mobility;gait;transfer;ADL's;home management;driving;using stairs;yard work  -MB     Existing Precautions/Restrictions no known precautions/restrictions  -MB     Barriers to Rehab none identified  -MB       Row Name 03/21/24 1316          Living Environment    People in Home spouse;child(dariela), adult  -MB       Row Name 03/21/24 1316          Home Main Entrance    Number of Stairs, Main Entrance six  -MB       Row Name 03/21/24 1316          Stairs Within Home, Primary    Number of Stairs, Within Home, Primary none  -MB       Row Name 03/21/24 1316          Cognition    Orientation Status (Cognition) oriented x 4  -MB               User Key  (r) = Recorded By, (t) = Taken By, (c) = Cosigned By      Initials Name Provider Type    MB Javier Cano, PT Physical Therapist                   Mobility       Row Name 03/21/24 1316          Bed Mobility    Bed Mobility bed mobility (all) activities  -MB     All Activities, [place] (Bed Mobility) independent  -MB       Row Name 03/21/24 1316          Bed-Chair Transfer    Bed-Chair [place] (Transfers) independent  -MB     Comment, (Bed-Chair Transfer) no AD  -MB       Row Name 03/21/24 1316          Sit-Stand Transfer    Sit-Stand [place] (Transfers) independent  -MB     Comment, (Sit-Stand Transfer) no AD  -MB       Row Name 03/21/24 1316          Gait/Stairs (Locomotion)    [place] Level (Gait) standby assist  -MB     Patient was able to  Ambulate yes  -MB     Distance in Feet (Gait) 80  -MB     Pattern (Gait) swing-through  -MB     Comment, (Gait/Stairs) 2x40' SBA with no AD, no balance concerns  -MB               User Key  (r) = Recorded By, (t) = Taken By, (c) = Cosigned By      Initials Name Provider Type    Javier Padilla, MATT Physical Therapist                   Obj/Interventions       Row Name 03/21/24 1317          Range of Motion Comprehensive    General Range of Motion no range of motion deficits identified  -MB       Row Name 03/21/24 1317          Strength Comprehensive (MMT)    General Manual Muscle Testing (MMT) Assessment no strength deficits identified  -[facility] Name 03/21/24 1317          Motor Skills    Motor Skills coordination;functional endurance  -MB     Coordination WNL  -MB       [facility] Name 03/21/24 1317          Balance    Balance Assessment sitting static balance;sit to stand dynamic balance;sitting dynamic balance;standing static balance;standing dynamic balance  -MB     Static Sitting Balance independent  -MB     Dynamic Sitting Balance independent  -MB     Position, Sitting Balance unsupported;sitting edge of bed  -MB     Sit to Stand Dynamic Balance independent  -MB     Static Standing Balance independent  -MB     Dynamic Standing Balance standby assist  -MB       Row Name 03/21/24 1317          Sensory Assessment (Somatosensory)    Sensory Assessment (Somatosensory) sensation intact  -MB               User Key  (r) = Recorded By, (t) = Taken By, (c) = Cosigned By      Initials Name Provider Type    Javier Padilla, MATT Physical Therapist                   Goals/Plan    No documentation.                  Clinical Impression       Row Name 03/21/24 1318          Pain    Pretreatment Pain Rating 0/10 - no pain  -MB     Posttreatment Pain Rating 0/10 - no pain  -MB       Row Name 03/21/24 1324 03/21/24 1318       Plan of Care Review    Plan of Care Reviewed With -- patient;spouse  -MB    Progress -- improving  -MB     Outcome Evaluation Pt is a 67 y/o M admitted to [facility] on 3/20/24 with complaints of acute dizziness and diaphoresis. He has history of aneurysm, A-fib, and CVA in the occipital lobe. Wife reports his eyes crossed at home, which was a symptom of his previous CVA. CT Head (-) acute, CTA Head/Neck (-) for acute changes, Carotid Duplex (+) for calcified heterogeneous plaque present at L Prox ICA. MRI Brain (-) for acute. Being worked up for TIA vs CVA. He is currently living at home with spouse and adult son in [facility] with 6 steps to enter. At baseline, he is normally IND with all mobility and ADLs with no AD. Drives and performs yard work, walks 10 miles/week and lives very active lifestyle. This date, he is AAOx4 and reports his vision has completely returned back to normal. He completes bed mobility IND, transfers IND, and amb 2x40' SBA with no AD to complete toileting IND. There were no strength, coordination, sensation, or balance concerns when tested this date. No vision issues when tested or with ambulation. Symptoms seem to have fully resolved at this time and he does not exhibit any functional deficits. PT completing orders at this time and new orders will need to be placed if new symptoms arise. He will be safe to d/c home when medically stable.  -MB --      Row Name 03/21/24 1318          Therapy Assessment/Plan (PT)    Criteria for Skilled Interventions Met (PT) no  -MB     Therapy Frequency (PT) evaluation only  -MB     Predicted Duration of Therapy Intervention (PT) eval only  -MB       Row Name 03/21/24 1318          Vital Signs    Pre Systolic BP Rehab 133  -MB     Pre Treatment Diastolic BP 97  -MB     Intra Systolic BP Rehab 164  -MB     Intra Treatment Diastolic BP 63  -MB     Post Systolic BP Rehab 163  -MB     Post Treatment Diastolic BP 93  -MB     Pretreatment Heart Rate (beats/min) 57  -MB     Posttreatment Heart Rate (beats/min) 64  -MB     Pre SpO2 (%) 97  -MB     O2 Delivery Pre Treatment room air   -MB     O2 Delivery Intra Treatment room air  -MB     Post SpO2 (%) 98  -MB     O2 Delivery Post Treatment room air  -MB     Pre Patient Position Supine  -MB     Intra Patient Position Standing  -MB     Post Patient Position Sitting  -MB       Row Name 03/21/24 1318          Positioning and Restraints    Pre-Treatment Position in bed  -MB     Post Treatment Position chair  -MB     In Chair notified nsg;sitting;call light within reach;with family/caregiver  -MB               User Key  (r) = Recorded By, (t) = Taken By, (c) = Cosigned By      Initials Name Provider Type    Javier Padilla, MATT Physical Therapist                   Outcome Measures       Row Name 03/21/24 1319          How much help from another person do you currently need...    Turning from your back to your side while in flat bed without using bedrails? 4  -MB     Moving from lying on back to sitting on the side of a flat bed without bedrails? 4  -MB     Moving to and from a bed to a chair (including a wheelchair)? 4  -MB     Standing up from a chair using your arms (e.g., wheelchair, bedside chair)? 4  -MB     Climbing 3-5 steps with a railing? 4  -MB     To walk in hospital room? 4  -MB     AM-PAC 6 Clicks Score (PT) 24  -MB     Highest Level of Mobility Goal 8 --> Walked 250 feet or more  -MB       Row Name 03/21/24 1319          Modified [place] Scale    Pre-Stroke Modified [place] Scale 0 - No Symptoms at all.  -MB     Modified [place] Scale 0 - No Symptoms at all.  -MB       Row Name 03/21/24 1319          Functional Assessment    Outcome Measure Options Modified [place]  -MB               User Key  (r) = Recorded By, (t) = Taken By, (c) = Cosigned By      Initials Name Provider Type    Javier Padilla, PT Physical Therapist                                 Physical Therapy Education       Title: PT OT SLP Therapies (Done)       Topic: Physical Therapy (Done)       Point: Mobility training (Done)       Learning Progress Summary             Patient  Acceptance, E,TB, VU by MB at 3/21/2024 1319                         Point: Precautions (Done)       Learning Progress Summary             Patient Acceptance, E,TB, VU by MB at 3/21/2024 1319                                         User Key       Initials Effective Dates Name Provider Type Discipline    MB 06/06/23 -  Javier Cano, PT Physical Therapist PT                  PT Recommendation and Plan     Plan of Care Reviewed With: patient, spouse  Progress: improving  Outcome Evaluation: Pt is a 69 y/o M admitted to [facility] on 3/20/24 with complaints of acute dizziness and diaphoresis. He has history of aneurysm, A-fib, and CVA in the occipital lobe. Wife reports his eyes crossed at home, which was a symptom of his previous CVA. CT Head (-) acute, CTA Head/Neck (-) for acute changes, Carotid Duplex (+) for calcified heterogeneous plaque present at L Prox ICA. MRI Brain (-) for acute. Being worked up for TIA vs CVA. He is currently living at home with spouse and adult son in [facility] with 6 steps to enter. At baseline, he is normally IND with all mobility and ADLs with no AD. Drives and performs yard work, walks 10 miles/week and lives very active lifestyle. This date, he is AAOx4 and reports his vision has completely returned back to normal. He completes bed mobility IND, transfers IND, and amb 2x40' SBA with no AD to complete toileting IND. There were no strength, coordination, sensation, or balance concerns when tested this date. No vision issues when tested or with ambulation. Symptoms seem to have fully resolved at this time and he does not exhibit any functional deficits. PT completing orders at this time and new orders will need to be placed if new symptoms arise. He will be safe to d/c home when medically stable.     Time Calculation:   PT Evaluation Complexity  History, PT Evaluation Complexity: 1-2 personal factors and/or comorbidities  Examination of Body Systems (PT Eval Complexity): total of 3 or more  elements  Clinical Presentation (PT Evaluation Complexity): evolving  Clinical Decision Making (PT Evaluation Complexity): moderate complexity  Overall Complexity (PT Evaluation Complexity): moderate complexity     PT Charges       Row Name 03/21/24 1319             Time Calculation    Start Time 0940  -MB      Stop Time 1005  -MB      Time Calculation (min) 25 min  -MB      PT Received On 03/21/24  -MB         Time Calculation- PT    Total Timed Code Minutes- PT 0 minute(s)  -MB                User Key  (r) = Recorded By, (t) = Taken By, (c) = Cosigned By      Initials Name Provider Type    Javier Padilla, PT Physical Therapist                  Therapy Charges for Today       Code Description Service Date Service Provider Modifiers Qty    [id number] HC PT EVAL MOD COMPLEXITY 4 3/21/2024 Javier Cano, PT GP 1            PT G-Codes  Outcome Measure Options: Modified [place]  AM-PAC 6 Clicks Score (PT): 24  Modified [place] Scale: 0 - No Symptoms at all.  PT Discharge Summary  Anticipated Discharge Disposition (PT): home    Javier Cano PT  3/21/2024

## 2024-03-21 NOTE — ED PROVIDER NOTES
Subjective     Provider in Triage Note  Patient is a 68-year-old overweight  male with a history of aneurysm, atrial fibrillation, CHF and stroke in the occipital lobe about a year ago who presents the emergency room with complaints of acute onset dizziness that occurred about 8 PM.  He states that  the episode caused him to become diaphoretic.  Wife states that patient's eyes crossed, which occurred when he had the stroke.  Stroke deficits include some vision loss in his right eye and memory issues.  He does take Eliquis daily.  He states that symptoms have improved now but he is feeling nauseated.  No vomiting, fever, chest pain or shortness of breath.  He has cardiac history as well and sees Dr. Blnak.    Due to significant overcrowding in the emergency department, this patient was initially seen and evaluated in triage.  Provider in triage recommended patient be placed in treatment area to initiate therapy with movement to an ER bed as soon as possible.      History of Present Illness  Chief complaint: Patient is a 68-year-old who was brought back as a team B.  He had sudden onset vision change with severe dizziness.  He got hot and flushed all over.  This started at 8:02 PM.  He came straight here.  He had an occipital stroke a year ago.  He remains on Eliquis and took his last dose this morning.  He did not have any focal weakness on the right or left side.  But as he had similar symptoms with his occipital stroke in the past he presented here immediately.  He is improving at this point in time.    Context:    Duration:    Timing: Acute sudden onset    Severity: Severe    Associated Symptoms:        PCP:  LMP:      Review of Systems   Eyes:  Positive for visual disturbance.   Respiratory: Negative.     Cardiovascular: Negative.    Gastrointestinal: Negative.    Musculoskeletal: Negative.    Neurological:  Positive for dizziness.       Past Medical History:   Diagnosis Date    Acute on chronic diastolic  heart failure 12/14/2022    Aneurysm     Anxiety     Asthma     Atrial fibrillation     Bronchitis     CHF (congestive heart failure)     Coronary artery disease     Depression     Encounter for laboratory testing for COVID-19 virus 08/22/2022    Gout     Hyperlipidemia     Hypertension     Intractable headache 01/09/2023    Myocardial infarction     Pneumonia     Pulmonary nodule     Familia Mountain spotted fever     Stroke        Allergies   Allergen Reactions    Avelox [Moxifloxacin] Rash    Penicillins Rash    Sulfa Antibiotics Rash    Levaquin [Levofloxacin] GI Intolerance    Doxycycline Rash     States face breaks out       Past Surgical History:   Procedure Laterality Date    AORTIC VALVE REPAIR/REPLACEMENT  12/03/2016    CABG x 5/ tissue AVR by DR. PHAN    CARDIAC CATHETERIZATION      CARDIAC SURGERY      CARDIOVASCULAR STRESS TEST  2020    CAROTID STENT      CORONARY ARTERY BYPASS GRAFT  12/02/2016    X4  Dr Phan    HERNIA REPAIR  2010    ILIAC ARTERY ANEURYSM REPAIR  01/05/2017    abdominal and bilateral    NASAL POLYP EXCISION  2005    OTHER SURGICAL HISTORY      [facility]    REPAIR CHOANAL ATRESIA  2005    SINUS SURGERY      VASCULAR SURGERY         Family History   Problem Relation Age of Onset    Pulmonary fibrosis Mother     Heart disease Mother     Cancer Father         brain    Cancer Sister         lung    Heart disease Sister     Cancer Brother         pancreatic, lung    Sleep apnea Neg Hx        Social History     Socioeconomic History    Marital status:    Tobacco Use    Smoking status: Never     Passive exposure: Never    Smokeless tobacco: Never   Vaping Use    Vaping status: Never Used   Substance and Sexual Activity    Alcohol use: Not Currently     Comment: quit 2005    Drug use: No    Sexual activity: Defer           Objective   Physical Exam  Vitals and nursing note reviewed.   Constitutional:       Appearance: Normal appearance.   HENT:      Head: Normocephalic and atraumatic.    Eyes:      Extraocular Movements: Extraocular movements intact.      Pupils: Pupils are equal, round, and reactive to light.   Pulmonary:      Effort: Pulmonary effort is normal.   Abdominal:      Tenderness: There is no abdominal tenderness.   Musculoskeletal:         General: Normal range of motion.   Skin:     General: Skin is warm and dry.   Neurological:      General: No focal deficit present.      Mental Status: He is alert and oriented to person, place, and time.   Psychiatric:         Mood and Affect: Mood normal.         Thought Content: Thought content normal.         Procedures           ED Course  ED Course as of 03/20/24 2050   Wed Mar 20, 2024   2038 Patient seen immediately as team B.  Spoke with dr armenta and willl hold cpp but get cta. [LH]      ED Course User Index  [LH] Duncan Pascal DO                                   Results for orders placed or performed during the hospital encounter of 03/20/24   Comprehensive Metabolic Panel    Specimen: Blood   Result Value Ref Range    Glucose 84 65 - 99 mg/dL    BUN 12 8 - 23 mg/dL    Creatinine 0.85 0.76 - 1.27 mg/dL    Sodium 138 136 - 145 mmol/L    Potassium 3.8 3.5 - 5.2 mmol/L    Chloride 103 98 - 107 mmol/L    CO2 24.0 22.0 - 29.0 mmol/L    Calcium 9.4 8.6 - 10.5 mg/dL    Total Protein 7.0 6.0 - 8.5 g/dL    Albumin 4.4 3.5 - 5.2 g/dL    ALT (SGPT) 14 1 - 41 U/L    AST (SGOT) 21 1 - 40 U/L    Alkaline Phosphatase 139 (H) 39 - 117 U/L    Total Bilirubin 0.3 0.0 - 1.2 mg/dL    Globulin 2.6 gm/dL    A/G Ratio 1.7 g/dL    BUN/Creatinine Ratio 14.1 7.0 - 25.0    Anion Gap 11.0 5.0 - 15.0 mmol/L    eGFR 95.8 >60.0 mL/min/1.73   Protime-INR    Specimen: Blood   Result Value Ref Range    Protime 11.4 9.6 - 11.7 Seconds    INR 1.05 0.93 - 1.10   aPTT    Specimen: Blood   Result Value Ref Range    PTT 26.7 24.0 - 31.0 seconds   Single High Sensitivity Troponin T    Specimen: Blood   Result Value Ref Range    HS Troponin T 16 <22 ng/L   CBC Auto  Differential    Specimen: Blood   Result Value Ref Range    WBC 7.73 3.40 - 10.80 10*3/mm3    RBC 4.80 4.14 - 5.80 10*6/mm3    Hemoglobin 13.4 13.0 - 17.7 g/dL    Hematocrit 41.2 37.5 - 51.0 %    MCV 85.8 79.0 - 97.0 fL    MCH 27.9 26.6 - 33.0 pg    MCHC 32.5 31.5 - 35.7 g/dL    RDW 14.6 12.3 - 15.4 %    RDW-SD 46.4 37.0 - 54.0 fl    MPV 9.6 6.0 - 12.0 fL    Platelets 179 140 - 450 10*3/mm3    Neutrophil % 71.7 42.7 - 76.0 %    Lymphocyte % 18.1 (L) 19.6 - 45.3 %    Monocyte % 7.6 5.0 - 12.0 %    Eosinophil % 1.8 0.3 - 6.2 %    Basophil % 0.5 0.0 - 1.5 %    Immature Grans % 0.3 0.0 - 0.5 %    Neutrophils, Absolute 5.54 1.70 - 7.00 10*3/mm3    Lymphocytes, Absolute 1.40 0.70 - 3.10 10*3/mm3    Monocytes, Absolute 0.59 0.10 - 0.90 10*3/mm3    Eosinophils, Absolute 0.14 0.00 - 0.40 10*3/mm3    Basophils, Absolute 0.04 0.00 - 0.20 10*3/mm3    Immature Grans, Absolute 0.02 0.00 - 0.05 10*3/mm3    nRBC 0.0 0.0 - 0.2 /100 WBC   POC Glucose Once    Specimen: Blood   Result Value Ref Range    Glucose 87 70 - 105 mg/dL   POC Creatinine    Specimen: Blood   Result Value Ref Range    Creatinine 1.00 0.60 - 1.30 mg/dL    eGFR 83.0 >60.0 mL/min/1.73   ECG 12 Lead Stroke Evaluation   Result Value Ref Range    QT Interval 467 ms    QTC Interval 451 ms        CT Angiogram Head w AI Analysis of LVO    Result Date: 3/20/2024  Impression: 1. Stable chronic occlusion of the left posterior cerebral artery. Only a short segment of the P1 segment on the left side is visualized similar to before. There is also a stable focal stenosis in the mid right posterior cerebral artery. 2. No large vessel occlusion or filling defect indicate acute cerebral thrombus. 3. Stable stenotic areas in the right and left vertebral arteries. 4. There is plaque at both carotid bifurcations. There is no flow-limiting stenosis by NASCET criteria as described. 5. Additional vascular and nonvascular findings as noted above. Electronically Signed: Chetan Lockhart MD   3/20/2024 9:30 PM EDT  Workstation ID: [id number]    CT Angiogram Neck    Result Date: 3/20/2024  Impression: 1. Stable chronic occlusion of the left posterior cerebral artery. Only a short segment of the P1 segment on the left side is visualized similar to before. There is also a stable focal stenosis in the mid right posterior cerebral artery. 2. No large vessel occlusion or filling defect indicate acute cerebral thrombus. 3. Stable stenotic areas in the right and left vertebral arteries. 4. There is plaque at both carotid bifurcations. There is no flow-limiting stenosis by NASCET criteria as described. 5. Additional vascular and nonvascular findings as noted above. Electronically Signed: Chetan Lockhart MD  3/20/2024 9:30 PM EDT  Workstation ID: [id number]    CT Head Without Contrast Stroke Protocol    Result Date: 3/20/2024  Impression: 1. No acute findings. 2. 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 3. Mild chronic periventricular white matter microvascular ischemia. 4. Chronic paranasal sinus disease with either previous sinus surgery. Electronically Signed: Chetan Lockhart MD  3/20/2024 8:44 PM EDT  Workstation ID: [id number]          Medical Decision Making  Patient was seen evaluated for the above problem    Differential diagnosis includes but is not limited to acute CVA, intracerebral hemorrhage, migraine headache, peripheral vertigo    Patient had similar symptoms with prior occipital stroke.  He is on Eliquis and took his last Eliquis this morning.  He is due at 10 PM tonight.  Initial examination I was concerned about posterior circulation as well calling a code stroke.  I spoke with  who did evaluate the patient and we held on perfusion study but ordered CT and CT angiogram.  He is not a TNK candidate as he is on Eliquis.  His symptoms are resolving as well.  He feels much better.  He will be admitted for further neurologic monitoring and workup.  Patient verbalized understanding is okay with this.  I  spoke with Dr. Pinto who agrees to admit the  patient.      Amount and/or Complexity of Data Reviewed  Labs: ordered. Decision-making details documented in ED Course.     Details: Labs reviewed by myself  Radiology: ordered and independent interpretation performed.     Details: CT brain shows old stroke findings.  CT angiogram shows occlusions of posterior cerebral artery that are chronic from prior stroke.  No new occlusion.  Reviewed by myself.  ECG/medicine tests: ordered and independent interpretation performed.     Details: EKG interpreted myself sinus bradycardia left axis deviation nonspecific repolarization abnormality rate of 56    Risk  Prescription drug management.  Decision regarding hospitalization.        Final diagnoses:   None     Dizziness  Headache  Vision changes  ED Disposition  ED Disposition       None            No follow-up provider specified.       Medication List      No changes were made to your prescriptions during this visit.            Duncan Pascal,   03/20/24 1594

## 2024-03-21 NOTE — PLAN OF CARE
Goal Outcome Evaluation:  Plan of Care Reviewed With: patient, spouse           Outcome Evaluation: 68 y.o. male with a PMH of atrial fibrillation on Eliquis, CHF, CVA and aneurysm who presented to [facility] on 3/20/2024 with complaints of acute onset dizziness. Patient also stated he had vision change and diaphoresis. Pt admitted with dx of TIA, as he has returned to baseline per neurology. CT negative, MRI negative. He is currently living at home with spouse and adult son in [facility] with 6 steps to enter. At baseline, he is normally IND with all mobility and ADLs with no AD. Drives and performs yard work, walks 10 miles/week and lives very active lifestyle. This date, he is AAOx4 and reports his vision has completely returned back to normal. He completes bed mobility IND, transfers IND, and amb 2x40' IND with no AD to complete toileting IND. He has returned to IND with all ADLs and appears overall to be at his baseline. OT will sign off, safe to dc home when stable.      Anticipated Discharge Disposition (OT): home

## 2024-03-21 NOTE — PROGRESS NOTES
LOS: 0 days     Chief Complaint: right side vision change     SUBJECTIVE:    History taken from: patient chart    Interval History: Pt was admitted llast night and seen by Dr. García with Tele-neurology as followed:     68 y.o. right-handed white male with a Hx of paroxysmal atrial fibrillation and prior history of left occipital lobe infarct with which she initially had right-sided hemianopia that is currently resolved to a slight right upper quadrantanopia that is currently fixed deficit and he has been following up with the nurse practitioner Ms. Jessica Wallace at the [facility] neurology department with the last visit being April 18, 2023.  He has been started on Eliquis 5 mg twice daily with food because of his history of paroxysmal atrial fibrillation which was also reported by Dr. Maciej Blank, his cardiologist at the [facility].  He is currently also complaining of a slight headache.  His vision has remarkably improved since he has returned from the CT angiogram of the head and neck with contrast.  There is no facial asymmetry noted and his speech is clear and his mentation is normal.  No sensory loss noted.  Muscles of both upper and lower extremities show good bulk power and tone and no drift noticeable in the upper or lower extremities.  He passed his bedside swallow and he was able to sit up by the side of the bed independently and his Romberg is negative.  The current NIH stroke scale is down to 0.  He feels flushed in his face after the administration of the intravenous iodine for the CT angiogram.       Patient Complaints: Pt felt and overall feeling of flush, lightheaded and then had the same vision loss he experience with his first stroke. Symptoms all resolved within 2 minutes.  BP was checked and he was SBP in 170s. He has not missed any doses of Eliquis. He was diagnosed with double pneumonia in February and since then has not been back to his normal  baseline.     He does check his BP at home regularly and it is normally ok. No hx of diabetes. No symptoms today.       Review of Systems   Constitutional:  Positive for fatigue.   HENT: Negative.     Eyes:  Positive for visual disturbance.   Respiratory: Negative.     Cardiovascular: Negative.    Gastrointestinal:  Negative for nausea and vomiting.   Endocrine: Negative.    Genitourinary: Negative.    Musculoskeletal: Negative.    Skin: Negative.    Allergic/Immunologic: Negative.    Neurological:  Positive for light-headedness. Negative for dizziness, tremors, seizures, syncope, facial asymmetry, weakness, numbness and headaches.   Hematological: Negative.    Psychiatric/Behavioral:  Negative for confusion.            Pertinent PMH:  has a past medical history of Acute on chronic diastolic heart failure (12/14/2022), Aneurysm, Anxiety, Asthma, Atrial fibrillation, Bronchitis, CHF (congestive heart failure), Coronary artery disease, Depression, Encounter for laboratory testing for COVID-19 virus (08/22/2022), Gout, Hyperlipidemia, Hypertension, Intractable headache (01/09/2023), Myocardial infarction, Pneumonia, Pulmonary nodule, Familia Mountain spotted fever, and Stroke.   ________________________________________________     OBJECTIVE:     On exam:  GENERAL: NAD  CARDIO: RRR  NEURO:  Oriented x3  EOMI, PERRL  Very small right upper quadrant homonymous hemianopsia from old stroke   CN 2-12 intact, No facial asymmetry  Speech clear without dysarthria  Sensations intact and equal bilaterally  Strength 5/5 and equal in all extremities  No ataxia  ________________________________________________   RESULTS REVIEW    VITAL SIGNS:  Temp:  [97.5 °F (36.4 °C)-98.5 °F (36.9 °C)] 97.7 °F (36.5 °C)  Heart Rate:  [54-64] 54  Resp:  [16-19] 16  BP: (131-150)/(75-92) 150/92    LABS:       Lab 03/21/24  0657 03/20/24 2039   WBC 5.68 7.73   HEMOGLOBIN 12.5* 13.4   HEMATOCRIT 38.7 41.2   PLATELETS 153 179   NEUTROS ABS  --  5.54    IMMATURE GRANS (ABS)  --  0.02   LYMPHS ABS  --  1.40   MONOS ABS  --  0.59   EOS ABS  --  0.14   MCV 86.0 85.8   SED RATE 19  --    PROTIME  --  11.4   APTT  --  26.7         Lab 03/21/24  0657 03/20/24 2041 03/20/24 2039   SODIUM 141  --  138   POTASSIUM 3.8  --  3.8   CHLORIDE 107  --  103   CO2 24.0  --  24.0   ANION GAP 10.0  --  11.0   BUN 11  --  12   CREATININE 0.88 1.00 0.85   EGFR 94.8 83.0 95.8   GLUCOSE 95  --  84   CALCIUM 8.9  --  9.4   HEMOGLOBIN A1C 5.40  --   --    TSH 3.310  --   --          Lab 03/21/24  0657 03/20/24 2039   TOTAL PROTEIN 6.3 7.0   ALBUMIN 4.0 4.4   GLOBULIN 2.3 2.6   ALT (SGPT) 13 14   AST (SGOT) 20 21   BILIRUBIN 0.3 0.3   ALK PHOS 122* 139*         Lab 03/20/24 2039   HSTROP T 16   PROTIME 11.4   INR 1.05         Lab 03/21/24  0657   CHOLESTEROL 138   LDL CHOL 83   HDL CHOL 33*   TRIGLYCERIDES 121         Lab 03/21/24  0657 03/20/24 2049   FOLATE >20.00  --    VITAMIN B 12 818  --    ABO TYPING  --  A   RH TYPING  --  Positive   ANTIBODY SCREEN  --  Negative         UA          12/26/2023    13:12 12/27/2023    15:27   Urinalysis   Squamous Epithelial Cells, UA 0-2  0-2    Specific Gravity, UA 1.025  1.019    Ketones, UA Trace  Trace    Blood, UA Trace  Negative    Leukocytes, UA Negative  Trace    Nitrite, UA Negative  Negative    RBC, UA 6-10  0-2    WBC, UA 0-2  0-2    Bacteria, UA None Seen  None Seen        Lab Results   Component Value Date    TSH 3.310 03/21/2024    LDL 83 03/21/2024    HGBA1C 5.40 03/21/2024    NBIIEEJL22 818 03/21/2024         IMAGING STUDIES:  MRI Brain Without Contrast    Result Date: 3/21/2024  Impression: 1. No evidence of acute infarct or hemorrhage. 2. 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 There is resultant encephalomalacia within the left occipital lobe compatible with old infarct. 3. Chronic pansinusitis Electronically Signed: Edy Walters MD  3/21/2024 8:42 AM EDT  Workstation ID: [id number]    XR  Chest 1 View    Result Date: 3/20/2024  Impression: No active disease. Electronically Signed: Chetan Lockhart MD  3/20/2024 9:45 PM EDT  Workstation ID: [id number]    CT Angiogram Head w AI Analysis of LVO    Result Date: 3/20/2024  Impression: 1. Stable chronic occlusion of the left posterior cerebral artery. Only a short segment of the P1 segment on the left side is visualized similar to before. There is also a stable focal stenosis in the mid right posterior cerebral artery. 2. No large vessel occlusion or filling defect indicate acute cerebral thrombus. 3. Stable stenotic areas in the right and left vertebral arteries. 4. There is plaque at both carotid bifurcations. There is no flow-limiting stenosis by NASCET criteria as described. 5. Additional vascular and nonvascular findings as noted above. Electronically Signed: Chetan Lockhart MD  3/20/2024 9:30 PM EDT  Workstation ID: [id number]    CT Angiogram Neck    Result Date: 3/20/2024  Impression: 1. Stable chronic occlusion of the left posterior cerebral artery. Only a short segment of the P1 segment on the left side is visualized similar to before. There is also a stable focal stenosis in the mid right posterior cerebral artery. 2. No large vessel occlusion or filling defect indicate acute cerebral thrombus. 3. Stable stenotic areas in the right and left vertebral arteries. 4. There is plaque at both carotid bifurcations. There is no flow-limiting stenosis by NASCET criteria as described. 5. Additional vascular and nonvascular findings as noted above. Electronically Signed: Chetna Lockhart MD  3/20/2024 9:30 PM EDT  Workstation ID: [id number]    CT Head Without Contrast Stroke Protocol    Result Date: 3/20/2024  Impression: 1. No acute findings. 2. 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 3. Mild chronic periventricular white matter microvascular ischemia. 4. Chronic paranasal sinus disease with either previous sinus surgery. Electronically Signed: Chetan Lockhart MD  3/20/2024  8:44 PM EDT  Workstation ID: [id number]     I reviewed the patient's new clinical results.    ________________________________________________      PROBLEM LIST:    TIA (transient ischemic attack)        ASSESSMENT/PLAN:    Right vision changes with sudden feeling flushed and lightheaded. Pt has hx of right homonymous hemianopsia from previous left occipital lobe stroke that has improved but had worsening of his initial symptoms.   MRI brain negative for acute stroke.   Likely patient had a event either blood pressure or blood glucose related that caused recrudescence/unmasking of chronic infarct.   - MRI brain: Reviewed, no acute findings.  There is encephalomalacia of the left occipital lobe from chronic infarct.  - CTA head and neck: Chronic occlusion of the left PCA, stable focal stenosis of the right PCA.  There is plaque in both carotid bifurcations without flow-limiting stenosis.  - Echo: Pending  - EKG: Sinus bradycardia, rate 56  - Labs: A1C: 5.40 B12: 818, LDL: 83, TSH: 3.310  -Continue Eliquis and baby aspirin  -Crestor 40  -Had a very long discussion with patient and wife about staying well-hydrated, making sure patient is eating nutritious diet with healthy fats and lean protein.   -Patient either can follow-up with [facility] neurology or our stroke clinic in 1 month    2. Hypertension  - Continue home medications  - BP goal 130/90, avoid hypotension    3.  Extracranial atherosclerotic disease, chronic occlusion of the right PCA, carotid plaque bilaterally without flow-limiting stenosis-continue aspirin and statin, stay well-hydrated and avoid hypotension    There are no further recommendations. Will sign off, please call with any questions or concerns.    I discussed the patient's findings and my recommendations with patient, family, and nursing staff    GARY Gallardo  03/21/24  11:13 EDT

## 2024-03-21 NOTE — NURSING NOTE
Pt reported the top half of his vision in his right eye was gone and black. After 1 minute pt reported that vision was returning. After 9 minutes pt reported his vision had returned to baseline.

## 2024-03-21 NOTE — CASE MANAGEMENT/SOCIAL WORK
Discharge Planning Assessment  [facility]     Patient Name: Alejandro Bain  MRN: [id number]  Today's Date: 3/21/2024    Admit Date: 3/20/2024    Plan: DC PLAN: From routine home. PT/OT bianca pending.     Discharge Needs Assessment       Row Name 03/21/24 1248       Living Environment    People in Home spouse    Current Living Arrangements home    Potentially Unsafe Housing Conditions none    In the past 12 months has the electric, gas, oil, or water company threatened to shut off services in your home? No    Primary Care Provided by self    Provides Primary Care For no one    Family Caregiver if Needed spouse    Quality of Family Relationships helpful;involved;supportive    Able to Return to Prior Arrangements yes       Resource/Environmental Concerns    Resource/Environmental Concerns none    Transportation Concerns none       Transportation Needs    In the past 12 months, has lack of transportation kept you from medical appointments or from getting medications? no    In the past 12 months, has lack of transportation kept you from meetings, work, or from getting things needed for daily living? No       Food Insecurity    Within the past 12 months, you worried that your food would run out before you got the money to buy more. Never true    Within the past 12 months, the food you bought just didn't last and you didn't have money to get more. Never true       Transition Planning    Patient/Family Anticipates Transition to home with family    Patient/Family Anticipated Services at Transition none    Transportation Anticipated car, drives self;family or friend will provide       Discharge Needs Assessment    Readmission Within the Last 30 Days no previous admission in last 30 days    Equipment Currently Used at Home none    Anticipated Changes Related to Illness none    Equipment Needed After Discharge none                   Discharge Plan       Row Name 03/21/24 1248       Plan    Plan DC PLAN: From routine home. PT/OT  eval pending.    Patient/Family in Agreement with Plan yes    Plan Comments Met with patient at bedside, from routine home with wife. Independent with ADL's no DME. PCP is [facility], pharmacy is [facility]. Able to afford medications and denies any issues with food or utilities. Denies any transportation issues, still drives and wife will provide at time of discharge. Denies any HHC or SNF needs, but PT/OT eval pending. Denies any concerns about return home.                    Continued Care and Services - Admitted Since 3/20/2024    No active coordination exists for this encounter.          Demographic Summary       Row Name 03/21/24 1247       General Information    Admission Type observation    Arrived From emergency department    Required Notices Provided Observation Status Notice    Referral Source admission list    Reason for Consult discharge planning    Preferred Language English       Contact Information    Permission Granted to Share Info With     Contact Information Obtained for                    Functional Status       Row Name 03/21/24 1248       Functional Status    Usual Activity Tolerance excellent    Current Activity Tolerance excellent       Physical Activity    On average, how many days per week do you engage in moderate to strenuous exercise (like a brisk walk)? 0 days    On average, how many minutes do you engage in exercise at this level? 0 min    Number of minutes of exercise per week 0       Assessment of Health Literacy    How often do you have someone help you read hospital materials? Sometimes    How often do you have problems learning about your medical condition because of difficulty understanding written information? Sometimes    How often do you have a problem understanding what is told to you about your medical condition? Sometimes    How confident are you filling out medical forms by yourself? Somewhat    Health Literacy Moderate       Functional Status, IADL     Medications independent    Meal Preparation independent    Housekeeping independent    Laundry independent    Shopping independent       Mental Status    General Appearance WDL WDL       Mental Status Summary    Recent Changes in Mental Status/Cognitive Functioning no changes                       Patient Forms       Row Name 03/21/24 1247       Patient Forms    Important Message from Medicare ([facility]) Delivered  MIRAMONTES 3/20/24 per registration    Delivered to Patient    Method of delivery In person                  Tasneem Paulson, RN    Case Management

## 2024-03-21 NOTE — CONSULTS
Diabetes Education    Patient Name:  Alejandro Bain  YOB: 1955  MRN: [id number]  Admit Date:  3/20/2024    Consult received per stroke protocol being ordered. Pt does not have hx of diabetes per problem list. Pt does not take diabetes medication at home and is not receiving diabetes medication in hospital. A1c this adm 5.4%. Diabetes education not needed at this time.       Electronically signed by:  Colleen Diamond RN  03/21/24 10:44 EDT

## 2024-03-21 NOTE — THERAPY EVALUATION
Patient Name: Alejandro Bain  : 1955    MRN: [id number]                              Today's Date: 3/21/2024       Admit Date: 3/20/2024    Visit Dx:     ICD-10-CM ICD-9-CM   1. Dizziness  R42 780.4     Patient Active Problem List   Diagnosis    S/P CABG x 5 by Dr. Phan    S/P AVR (tissue) by Dr. Phan    AAA (abdominal aortic aneurysm) without rupture    Mass of lingula of lung--left    S/P AAA repair    Aneurysm of abdominal vessel    Essential hypertension    Aneurysm of femoral artery    Aneurysm of iliac artery    Paroxysmal atrial fibrillation    Coronary artery disease involving native coronary artery of native heart without angina pectoris    Mixed hyperlipidemia    Palpitations    Shortness of breath    Acute upper back pain    Asthma    Thrombocytopenia    Encounter for laboratory testing for COVID-19 virus    COVID    Leukocytosis    Elevated LFTs    Acute renal insufficiency    Acute on chronic diastolic heart failure    Acute respiratory distress    CVA (cerebral vascular accident)    Intractable headache    TIA (transient ischemic attack)     Past Medical History:   Diagnosis Date    Acute on chronic diastolic heart failure 2022    Aneurysm     Anxiety     Asthma     Atrial fibrillation     Bronchitis     CHF (congestive heart failure)     Coronary artery disease     Depression     Encounter for laboratory testing for COVID-19 virus 2022    Gout     Hyperlipidemia     Hypertension     Intractable headache 2023    Myocardial infarction     Pneumonia     Pulmonary nodule     Fam[place] spotted fever     Stroke      Past Surgical History:   Procedure Laterality Date    AORTIC VALVE REPAIR/REPLACEMENT  2016    CABG x 5/ tissue AVR by DR. PHAN    CARDIAC CATHETERIZATION      CARDIAC SURGERY      CARDIOVASCULAR STRESS TEST      CAROTID STENT      CORONARY ARTERY BYPASS GRAFT  12/02/2016    X4  Dr Phan    HERNIA REPAIR      ILIAC ARTERY ANEURYSM REPAIR   01/05/2017    abdominal and bilateral    NASAL POLYP EXCISION  2005    OTHER SURGICAL HISTORY      [facility]    REPAIR CHOANAL ATRESIA  2005    SINUS SURGERY      VASCULAR SURGERY        General Information       Row Name 03/21/24 1743          OT Time and Intention    Document Type evaluation  -LS     Mode of Treatment occupational therapy  -       Row Name 03/21/24 1743          General Information    Patient Profile Reviewed yes  -LS     Existing Precautions/Restrictions no known precautions/restrictions  -       Row Name 03/21/24 1743          Living Environment    People in Home spouse;child(dariela), adult  -       Row Name 03/21/24 1743          Home Main Entrance    Number of Stairs, Main Entrance six  -LS       Row Name 03/21/24 1743          Cognition    Orientation Status (Cognition) oriented x 4  -               User Key  (r) = Recorded By, (t) = Taken By, (c) = Cosigned By      Initials Name Provider Type    Vidal Mckeon OT Occupational Therapist                     Mobility/ADL's       Row Name 03/21/24 1743          Bed Mobility    Bed Mobility bed mobility (all) activities  -     All Activities, [place] (Bed Mobility) independent  -       Row Name 03/21/24 1743          Transfers    Transfers sit-stand transfer;bed-chair transfer  -       Row Name 03/21/24 1743          Bed-Chair Transfer    Bed-Chair [place] (Transfers) independent  -LS       Row Name 03/21/24 1743          Sit-Stand Transfer    Sit-Stand [place] (Transfers) independent  -LS       Row Name 03/21/24 1743          Functional Mobility    Functional Mobility- Ind. Level independent  -LS     Patient was able to Ambulate yes  -       Row Name 03/21/24 1743          Activities of Daily Living    BADL Assessment/Intervention other (see comments)  IND with ADLs  -               User Key  (r) = Recorded By, (t) = Taken By, (c) = Cosigned By      Initials Name Provider Type    Vidal Mckeon OT Occupational  Therapist                   Obj/Interventions       Row Name 03/21/24 1744          Sensory Assessment (Somatosensory)    Sensory Assessment (Somatosensory) sensation intact  -LS       Row Name 03/21/24 1744          Range of Motion Comprehensive    General Range of Motion no range of motion deficits identified  -LS       [facility] Name 03/21/24 1744          Strength Comprehensive (MMT)    General Manual Muscle Testing (MMT) Assessment no strength deficits identified  -[facility] Name 03/21/24 1744          Motor Skills    Motor Skills coordination;neuro-muscular function  -     Coordination WNL  -     Neuromuscular Function WNL  -LS       [facility] Name 03/21/24 1744          Balance    Balance Assessment sitting static balance;sitting dynamic balance;standing static balance;standing dynamic balance  -LS     Static Sitting Balance independent  -LS     Dynamic Sitting Balance independent  -LS     Static Standing Balance independent  -LS     Dynamic Standing Balance independent  -LS     Position/Device Used, Standing Balance unsupported  -LS               User Key  (r) = Recorded By, (t) = Taken By, (c) = Cosigned By      Initials Name Provider Type    Vidal Mckeon OT Occupational Therapist                   Goals/Plan    No documentation.                  Clinical Impression       [facility] Name 03/21/24 1745          Pain Assessment    Pretreatment Pain Rating 0/10 - no pain  -LS     Posttreatment Pain Rating 0/10 - no pain  -LS       [facility] Name 03/21/24 1745          Plan of Care Review    Plan of Care Reviewed With patient;spouse  -     Outcome Evaluation 68 y.o. male with a PMH of atrial fibrillation on Eliquis, CHF, CVA and aneurysm who presented to [facility] on 3/20/2024 with complaints of acute onset dizziness. Patient also stated he had vision change and diaphoresis. Pt admitted with dx of TIA, as he has returned to baseline per neurology. CT negative, MRI negative. He is currently living at home with  spouse and adult son in [facility] with 6 steps to enter. At baseline, he is normally IND with all mobility and ADLs with no AD. Drives and performs yard work, walks 10 miles/week and lives very active lifestyle. This date, he is AAOx4 and reports his vision has completely returned back to normal. He completes bed mobility IND, transfers IND, and amb 2x40' IND with no AD to complete toileting IND. He has returned to IND with all ADLs and appears overall to be at his baseline. OT will sign off, safe to dc home when stable.  -LS       Row Name 03/21/24 1745          Therapy Assessment/Plan (OT)    Criteria for Skilled Therapeutic Interventions Met (OT) no;no problems identified which require skilled intervention  -LS     Therapy Frequency (OT) evaluation only  -LS     Predicted Duration of Therapy Intervention (OT) eval only  -LS       Row Name 03/21/24 1745          Therapy Plan Review/Discharge Plan (OT)    Anticipated Discharge Disposition (OT) home  -LS       Row Name 03/21/24 1746          Vital Signs    Pre Systolic BP Rehab 133  -LS     Pre Treatment Diastolic BP 97  -LS     Intra Systolic BP Rehab 164  -LS     Intra Treatment Diastolic BP 63  -LS     Post Systolic BP Rehab 163  -LS     Post Treatment Diastolic BP 93  -LS     Pre SpO2 (%) 97  -LS     Post SpO2 (%) 98  -LS     O2 Delivery Post Treatment room air  -LS     Pre Patient Position Supine  -LS     Intra Patient Position Standing  -LS     Post Patient Position Sitting  -LS       Row Name 03/21/24 1947          Positioning and Restraints    Pre-Treatment Position in bed  -LS     Post Treatment Position chair  -LS     In Chair notified nsg;sitting;encouraged to call for assist  -LS               User Key  (r) = Recorded By, (t) = Taken By, (c) = Cosigned By      Initials Name Provider Type    Vidal Mckeon OT Occupational Therapist                   Outcome Measures       Row Name 03/21/24 3096          How much help from another is currently needed...     Putting on and taking off regular lower body clothing? 4  -LS     Bathing (including washing, rinsing, and drying) 4  -LS     Toileting (which includes using toilet bed pan or urinal) 4  -LS     Putting on and taking off regular upper body clothing 4  -LS     Taking care of personal grooming (such as brushing teeth) 4  -LS     Eating meals 4  -LS     AM-PAC 6 Clicks Score (OT) 24  -LS       Row Name 03/21/24 1319 03/21/24 0720       How much help from another person do you currently need...    Turning from your back to your side while in flat bed without using bedrails? 4  -MB 4  -SH    Moving from lying on back to sitting on the side of a flat bed without bedrails? 4  -MB 4  -SH    Moving to and from a bed to a chair (including a wheelchair)? 4  -MB 4  -SH    Standing up from a chair using your arms (e.g., wheelchair, bedside chair)? 4  -MB 4  -SH    Climbing 3-5 steps with a railing? 4  -MB 4  -SH    To walk in hospital room? 4  -MB 4  -SH    AM-PAC 6 Clicks Score (PT) 24  -MB 24  -SH    Highest Level of Mobility Goal 8 --> Walked 250 feet or more  -MB 8 --> Walked 250 feet or more  -SH      Row Name 03/21/24 1749 03/21/24 1319       Modified [place] Scale    Pre-Stroke Modified [place] Scale -- 0 - No Symptoms at all.  -MB    Modified [place] Scale 0 - No Symptoms at all.  -LS 0 - No Symptoms at all.  -MB      Row Name 03/21/24 1749 03/21/24 1319       Functional Assessment    Outcome Measure Options Modified [place]  -LS Modified [place]  -MB              User Key  (r) = Recorded By, (t) = Taken By, (c) = Cosigned By      Initials Name Provider Type     Silvia Fontanez RN Registered Nurse    Vidal Mckeon OT Occupational Therapist    Javier Padilla, PT Physical Therapist                    Occupational Therapy Education       Title: PT OT SLP Therapies (Done)       Topic: Occupational Therapy (Done)       Point: ADL training (Done)       Description:   Instruct learner(s) on proper safety adaptation and  remediation techniques during self care or transfers.   Instruct in proper use of assistive devices.                  Learning Progress Summary             Patient Acceptance, E,TB, VU by  at 3/21/2024 1750   Family Acceptance, E,TB, VU by  at 3/21/2024 1750                                         User Key       Initials Effective Dates Name Provider Type Discipline     09/22/22 -  Vidal Collins OT Occupational Therapist OT                  OT Recommendation and Plan  Therapy Frequency (OT): evaluation only  Plan of Care Review  Plan of Care Reviewed With: patient, spouse  Outcome Evaluation: 68 y.o. male with a PMH of atrial fibrillation on Eliquis, CHF, CVA and aneurysm who presented to [facility] on 3/20/2024 with complaints of acute onset dizziness. Patient also stated he had vision change and diaphoresis. Pt admitted with dx of TIA, as he has returned to baseline per neurology. CT negative, MRI negative. He is currently living at home with spouse and adult son in [facility] with 6 steps to enter. At baseline, he is normally IND with all mobility and ADLs with no AD. Drives and performs yard work, walks 10 miles/week and lives very active lifestyle. This date, he is AAOx4 and reports his vision has completely returned back to normal. He completes bed mobility IND, transfers IND, and amb 2x40' IND with no AD to complete toileting IND. He has returned to IND with all ADLs and appears overall to be at his baseline. OT will sign off, safe to dc home when stable.     Time Calculation:         Time Calculation- OT       Row Name 03/21/24 1751             Time Calculation- OT    OT Start Time 0940  -      OT Stop Time 1005  -      OT Time Calculation (min) 25 min  -LS      OT Received On 03/21/24  -         Untimed Charges    OT Eval/Re-eval Minutes 25  -LS         Total Minutes    Untimed Charges Total Minutes 25  -LS       Total Minutes 25  -LS                User Key  (r) = Recorded By, (t) = Taken  By, (c) = Cosigned By      Initials Name Provider Type     Vidal Collins OT Occupational Therapist                  Therapy Charges for Today       Code Description Service Date Service Provider Modifiers Qty    [id number] HC OT EVAL LOW COMPLEXITY 4 3/21/2024 Vidal Collins OT GO 1                 Vidal Collins OT  3/21/2024

## 2024-03-21 NOTE — PLAN OF CARE
Goal Outcome Evaluation:  Plan of Care Reviewed With: patient, spouse        Progress: improving   Pt is a 67 y/o M admitted to [facility] on 3/20/24 with complaints of acute dizziness and diaphoresis. He has history of aneurysm, A-fib, and CVA in the occipital lobe. Wife reports his eyes crossed at home, which was a symptom of his previous CVA. CT Head (-) acute, CTA Head/Neck (-) for acute changes, Carotid Duplex (+) for calcified heterogeneous plaque present at L Prox ICA. MRI Brain (-) for acute. Being worked up for TIA vs CVA. He is currently living at home with spouse and adult son in [facility] with 6 steps to enter. At baseline, he is normally IND with all mobility and ADLs with no AD. Drives and performs yard work, walks 10 miles/week and lives very active lifestyle. This date, he is AAOx4 and reports his vision has completely returned back to normal. He completes bed mobility IND, transfers IND, and amb 2x40' SBA with no AD to complete toileting IND. There were no strength, coordination, sensation, or balance concerns when tested this date. No vision issues when tested or with ambulation. Symptoms seem to have fully resolved at this time and he does not exhibit any functional deficits. PT completing orders at this time and new orders will need to be placed if new symptoms arise. He will be safe to d/c home when medically stable.    Anticipated Discharge Disposition (PT): home

## 2024-03-21 NOTE — CONSULTS
DOS: 3/20/2024  NAME: Alejandro Bain   : 1955  PCP: Rafat Petty MD  CC: Acute right-sided vision loss that occurred around 8:02 PM while he was eating chocolate ice cream.  Referring MD: Duncan Chris DO    Neurological Problem and Interval History:  68 y.o. right-handed white male with a Hx of paroxysmal atrial fibrillation and prior history of left occipital lobe infarct with which she initially had right-sided hemianopia that is currently resolved to a slight right upper quadrantanopia that is currently fixed deficit and he has been following up with the nurse practitioner Ms. Jessica Wallace at the [facility] neurology department with the last visit being 2023.  He has been started on Eliquis 5 mg twice daily with food because of his history of paroxysmal atrial fibrillation which was also reported by Dr. Maciej Blank, his cardiologist at the [facility].  He is currently also complaining of a slight headache.  His vision has remarkably improved since he has returned from the CT angiogram of the head and neck with contrast.  There is no facial asymmetry noted and his speech is clear and his mentation is normal.  No sensory loss noted.  Muscles of both upper and lower extremities show good bulk power and tone and no drift noticeable in the upper or lower extremities.  He passed his bedside swallow and he was able to sit up by the side of the bed independently and his Romberg is negative.  The current NIH stroke scale is down to 0.  He feels flushed in his face after the administration of the intravenous iodine for the CT angiogram.    Past Medical/Surgical Hx:  Past Medical History:   Diagnosis Date    Acute on chronic diastolic heart failure 2022    Aneurysm     Anxiety     Asthma     Atrial fibrillation     Bronchitis     CHF (congestive heart failure)     Coronary artery disease     Depression     Encounter for laboratory testing for COVID-19  virus 08/22/2022    Gout     Hyperlipidemia     Hypertension     Intractable headache 01/09/2023    Myocardial infarction     Pneumonia     Pulmonary nodule     Familia Mountain spotted fever     Stroke      Past Surgical History:   Procedure Laterality Date    AORTIC VALVE REPAIR/REPLACEMENT  12/03/2016    CABG x 5/ tissue AVR by DR. PHAN    CARDIAC CATHETERIZATION      CARDIAC SURGERY      CARDIOVASCULAR STRESS TEST  2020    CAROTID STENT      CORONARY ARTERY BYPASS GRAFT  12/02/2016    X4  Dr Phan    HERNIA REPAIR  2010    ILIAC ARTERY ANEURYSM REPAIR  01/05/2017    abdominal and bilateral    NASAL POLYP EXCISION  2005    OTHER SURGICAL HISTORY      [facility]    REPAIR CHOANAL ATRESIA  2005    SINUS SURGERY      VASCULAR SURGERY         Review of Systems:    Constitutional: Pleasant gentleman currently feeling back to baseline.  Cardiovascular: Denies any chest pain or palpitations at this time.  Respiratory: Denies any shortness of breath.  Gastrointestinal: Denies any nausea and vomiting.  Genitourinary: Denies any bladder incontinence.  Musculoskeletal: Denies any aches and pains in the muscles or joints.  Dermatological: No skin breakdown noted.  Neurological: The NIH stroke scale is 0.  Psychiatric: Denies any underlying major anxiety or depression.  Ophthalmological: Has fixed deficit in the right upper quadrantanopsia from the prior stroke.          Medications On Admission  (Not in a hospital admission)      Prior to Admission medications    Medication Sig Start Date End Date Taking? Authorizing Provider   allopurinol (ZYLOPRIM) 300 MG tablet  4/17/23   Provider, MD Chasity   amLODIPine (NORVASC) 10 MG tablet TAKE 1 TABLET BY MOUTH EVERY DAY 11/15/23   Maciej Blank MD   apixaban (ELIQUIS) 5 MG tablet tablet Take 1 tablet by mouth Every 12 (Twelve) Hours. Indications: Atrial Fibrillation 1/11/23   Remberto Gannon MD   aspirin 81 MG EC tablet Take 1 tablet by mouth Daily.    Emergency, Nurse Anthony, RN    azelastine (ASTELIN) 0.1 % nasal spray 1 spray into the nostril(s) as directed by provider 2 (Two) Times a Day. 2/23/22   Emergency, Nurse Anthony, RN   Cholecalciferol 25 MCG (1000 UT) capsule Take 1 capsule by mouth Daily.    Chasity Turner MD   lisinopril (PRINIVIL,ZESTRIL) 20 MG tablet TAKE 1 TABLET BY MOUTH TWICE A DAY 2/2/24   Maciej Blank MD   Multiple Vitamins-Minerals (MULTIVITAMIN ADULT PO) Take 1 tablet by mouth Daily.    Chasity Turner MD   rosuvastatin (CRESTOR) 20 MG tablet Take 1 tablet by mouth Daily.    Chasity Turner MD   SYMBICORT 160-4.5 MCG/ACT inhaler Inhale 2 puffs 2 (Two) Times a Day. 11/8/16   Chasity Turner MD   VENTOLIN  (90 Base) MCG/ACT inhaler Inhale 2 puffs Every 6 (Six) Hours As Needed. 1/22/20   Chasity Turner MD        Allergies:  Allergies   Allergen Reactions    Avelox [Moxifloxacin] Rash    Penicillins Rash    Sulfa Antibiotics Rash    Levaquin [Levofloxacin] GI Intolerance       Social Hx:  Social History     Socioeconomic History    Marital status:    Tobacco Use    Smoking status: Never     Passive exposure: Never    Smokeless tobacco: Never   Vaping Use    Vaping status: Never Used   Substance and Sexual Activity    Alcohol use: Not Currently     Comment: quit 2005    Drug use: No    Sexual activity: Defer       Family Hx:  Family History   Problem Relation Age of Onset    Pulmonary fibrosis Mother     Heart disease Mother     Cancer Father         brain    Cancer Sister         lung    Heart disease Sister     Cancer Brother         pancreatic, lung    Sleep apnea Neg Hx        Review of Imaging (Interpretation of images not reports): An MRI of the brain performed on January 10, 2023 had shown the following:    Findings:  There is diffusion restriction in the posterior left hippocampus, of the left parietal periventricular white matter and throughout the large portion of the left occipital lobe. There is no additional  diffusion restriction. Calvarial and superficial soft   tissue signal is within normal limits. There is abnormal left PCA flow void with intact flow void within the upper major intracranial vessels. There is moderate multifocal paranasal sinus mucosal disease. There are small bilateral mastoid effusions.   There is patchy FLAIR hyperintense signal abnormality within the cerebral white matter and there is FLAIR/T2 hyperintensity within the areas of diffusion restriction referenced previously. Midline structures appear intact. There is no acute or chronic   intracranial hemorrhage. No mass effect or midline shift. No abnormal extra-axial collections.     IMPRESSION:  Impression:     1. Acute/subacute left PCA territory infarct involving the hippocampus, left parietal white matter and left occipital lobe. No hemorrhage.  2. Mild chronic small vessel ischemic change.  3. Moderate paranasal sinus mucosal disease.       CT Head Without Contrast Stroke Protocol    Result Date: 3/20/2024  CT HEAD WO CONTRAST STROKE PROTOCOL Date of Exam: 3/20/2024 8:35 PM EDT Indication: Acute neurologic deficit. Comparison: 1/9/2023. Technique: Axial CT images were obtained of the head without contrast administration.  Reconstructed coronal and sagittal images were also obtained. Automated exposure control and iterative construction methods were used. Scan Time: 2039 hours Results discussed with Dr. Pascal at 2044 hours. Findings: The sulci, fissures, ventricles, and basal cisterns are normal for age. There is been interval development of encephalomalacia in the left occipital lobe and medial left temporal lobe consistent with a prior left PCA distribution cerebral infarct. There is some subtle areas of decreased density in the periventricular white matter likely due to chronic microvascular ischemia. The gray-white matter differentiation is otherwise preserved. There is no acute hemorrhage, midline shift, or suspicious extra-axial fluid  collections. There are atherosclerotic vascular calcifications in the distal vertebral arteries as well as the cavernous and supraclinoid carotid arteries. The orbital contents are normal. There is mucosal thickening throughout the paranasal sinuses indicating chronic sinusitis. The patient has had previous paranasal sinus surgery. The mastoid sinuses are clear.     Impression: Impression: 1. No acute findings. 2. 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 3. Mild chronic periventricular white matter microvascular ischemia. 4. Chronic paranasal sinus disease with either previous sinus surgery. Electronically Signed: Chetan Lockhart MD  3/20/2024 8:44 PM EDT  Workstation ID: [id number]                 Additional Tests Performed: Reviewing the CT angiogram of the neck, the raw images are not showing any evidence of any hemodynamic compromise.    The CT angiogram of the brain shows the old left PCA territory stenosis/thrombus from before.    Results for orders placed during the hospital encounter of 01/09/23    Adult Transthoracic Echo Complete W/ Cont if Necessary Per Protocol    Interpretation Summary    Left ventricular systolic function is normal. Left ventricular ejection fraction appears to be 56 - 60%.    Left ventricular wall thickness is consistent with mild concentric hypertrophy.    Left ventricular diastolic function is consistent with (grade I) impaired relaxation.    Saline test results are negative.    Estimated right ventricular systolic pressure from tricuspid regurgitation is mildly elevated (35-45 mmHg). Calculated right ventricular systolic pressure from tricuspid regurgitation is 39 mmHg.       Results for orders placed during the hospital encounter of 01/09/23    Duplex Carotid Ultrasound CAR    Interpretation Summary    Right internal carotid artery plaque without significant stenosis.    Left internal carotid artery plaque without significant stenosis.       Laboratory Results:   Lab Results  "  Component Value Date    GLUCOSE 84 03/20/2024    CALCIUM 9.4 03/20/2024     03/20/2024    K 3.8 03/20/2024    CO2 24.0 03/20/2024     03/20/2024    BUN 12 03/20/2024    CREATININE 1.00 03/20/2024    EGFRIFAFRI >60 05/31/2017    EGFRIFNONA 59 (L) 05/25/2020    BCR 14.1 03/20/2024    ANIONGAP 11.0 03/20/2024     Lab Results   Component Value Date    WBC 7.73 03/20/2024    HGB 13.4 03/20/2024    HCT 41.2 03/20/2024    MCV 85.8 03/20/2024     03/20/2024     Lab Results   Component Value Date    CHOL 151 12/26/2023    CHOL 207 (H) 01/10/2023     Lab Results   Component Value Date    HDL 43 12/26/2023    HDL 40 01/10/2023     Lab Results   Component Value Date    LDL 84 12/26/2023     (H) 01/10/2023     Lab Results   Component Value Date    TRIG 136 12/26/2023    TRIG 204 (H) 01/10/2023     Lab Results   Component Value Date    HGBA1C 5.30 12/26/2023     Lab Results   Component Value Date    INR 1.05 03/20/2024    INR 1.10 01/09/2023    INR 1.20 (H) 05/25/2020    PROTIME 11.4 03/20/2024    PROTIME 11.3 01/09/2023    PROTIME 12.2 (H) 05/25/2020     Lab Results   Component Value Date    EBCQVQQI63 769 12/26/2023     No results found for: \"FOLATE\"  TSH          7/9/2023    04:40 12/26/2023    13:12   TSH   TSH 3.160  1.150           Physical Examination:  /85   Pulse 60   Temp 97.5 °F (36.4 °C)   Resp 19   Ht 182.9 cm (72\")   Wt 86.5 kg (190 lb 11.2 oz)   SpO2 98%   BMI 25.86 kg/m²   General Appearance:   Well developed, well nourished, well groomed, alert, and cooperative.  HEENT: Normocephalic.    Neck and Spine: Normal range of motion.  Normal alignment. No mass or tenderness. No bruits.    Extremities:    No edema or deformities. Normal joint ROM.   Skin:    No rashes or birth marks.    Neurological examination:  Higher Integrative  Function: Oriented to time, place and person. Normal registration, recall, attention span and concentration. Normal language including comprehension, " spontaneous speech, repetition, reading, writing, naming and vocabulary. No neglect with normal visual-spatial function and construction. Normal fund of knowledge and higher integrative function.  CN II:  Pupils are equal, round, and reactive to light.  Right upper quadrantanopsia from before..    CN III IV VI: Extraocular movements are full without nystagmus.   CN V:  Normal facial sensation and strength of muscles of mastication.  CN VII:  Facial movements are symmetric. No weakness.  CN VIII: Auditory acuity is normal.  CN IX & X: Symmetric palatal movement.  CN XI:  Sternocleidomastoid and trapezius are normal.  No weakness.  CN XII:  The tongue is midline.  No atrophy or fasciculations.  Motor:  Normal muscle strength, bulk and tone in upper and lower extremities.  No fasciculations, rigidity, spasticity, or abnormal movements.  Sensation: Normal to light touch, pinprick, vibration, temperature, and proprioception in arms and legs. Normal graphesthesia and no extinction on DSS.  Station and Gait: Normal gait and station.  He was able to get up and ambulate independently and the Romberg is negative.  Coordination:  Finger to nose test shows no dysmetria.    Heel to shin normal.    NIHSS:    Baseline  0-->Alert: keenly responsive  0-->Answers both questions correctly  0-->Performs both tasks correctly  0=normal  0=No visual loss  0=Normal symmetric movement  0-->No drift: limb holds 90 (or 45) degrees for full 10 secs  0-->No drift: limb holds 90 (or 45) degrees for full 10 secs  0-->No drift: limb holds 90 (or 45) degrees for full 10 secs  0-->No drift: limb holds 90 (or 45) degrees for full 10 secs  0=Absent  0=Normal; no sensory loss  0=No aphasia, normal  0=Normal  0=No abnormality    Total score: 0    Diagnoses / Discussion:  68 y.o. who presents with Sx of transient ischemic attack as he is back to baseline.    Plan:  Because he is taking a full dose of Eliquis 5 mg twice daily, to continue that and not to  take aspirin with it as chances of hemorrhage are higher with direct oral anticoagulant as such.  Hydrate  Neuro checks  Check lipid panel, Hgb A1C, TSH, sed rate, vitamin B12, folic acid levels  Lipitor 80 mg until the lipid levels are available for tomorrow.  Non-pharmacological DVT prophylaxis  TTE with bubble study to look for PFO  MRI of the brain with a stroke protocol for tomorrow has been requested  Telemetry Unit admission/observation to look for cardiac arrhythmias as the patient has a history of paroxysmal atrial fibrillation.  PT/OT/ST  Stroke Education  Blood pressure control : Keep the systolic blood pressure between 120 and 140 and the diastolic between 70 and 80 as this is usually a posterior circulation stroke.  Goal LDL <70-recommend high dose statins-   Serum glucose < 140  Body mass index: 26 kg/m² which is normal  Obstructive sleep apnea screening     Discussed signs and symptoms of stroke and when to call 911. Instructed to follow a low fat diet including the Mediterranean diet. Instructed to take all medications daily as prescribed. Encouraged 30 minutes of exercise 3-4 times a week as tolerated. Stay well hydrated. Discussed potential side effects of new medications and signs and symptoms to report.  Reviewed stroke risk factors and stroke prevention plan. Patient and family verbalizes understanding and agrees with plan.     I have discussed the above with the patient and family.  He will be followed up tomorrow by the inpatient neurohospitalist on service.  Time spent with patient: 70 minutes in face-to-face evaluation and management of the patient using the dedicated telemedicine device without any interruption with the help of the emergency room registered nurse with the patient located at the [facility] and myself at a remote location.    Electronically signed by Mundo García MD, 03/20/24, 9:24 PM EDT.    Dictated using Dragon dictation.

## 2024-03-21 NOTE — ED NOTES
At 2000 pt started to get nauseated; pt felt like his eyes were crossing. Pt also said he felt like he could pass out during this time. Then when pt stood up, he was off balance. Pt also reports a headache. Pt has a stroke last January.

## 2024-03-22 ENCOUNTER — READMISSION MANAGEMENT (OUTPATIENT)
Dept: CALL CENTER | Facility: HOSPITAL | Age: 69
End: 2024-03-22
Payer: MEDICARE

## 2024-03-22 VITALS
WEIGHT: 190 LBS | HEART RATE: 51 BPM | BODY MASS INDEX: 25.73 KG/M2 | RESPIRATION RATE: 14 BRPM | HEIGHT: 72 IN | DIASTOLIC BLOOD PRESSURE: 77 MMHG | SYSTOLIC BLOOD PRESSURE: 112 MMHG | OXYGEN SATURATION: 94 % | TEMPERATURE: 97.5 F

## 2024-03-22 LAB
BH CV ECHO MEAS - ACS: 2.14 CM
BH CV ECHO MEAS - AO MAX PG: 24.8 MMHG
BH CV ECHO MEAS - AO MEAN PG: 15.3 MMHG
BH CV ECHO MEAS - AO ROOT DIAM: 3.7 CM
BH CV ECHO MEAS - AO V2 MAX: 248.5 CM/SEC
BH CV ECHO MEAS - AO V2 VTI: 50 CM
BH CV ECHO MEAS - AVA(I,D): 1.35 CM2
BH CV ECHO MEAS - EDV(CUBED): 142.1 ML
BH CV ECHO MEAS - EDV(MOD-SP4): 109 ML
BH CV ECHO MEAS - EF(MOD-BP): 51 %
BH CV ECHO MEAS - EF(MOD-SP4): 50.8 %
BH CV ECHO MEAS - ESV(CUBED): 47.6 ML
BH CV ECHO MEAS - ESV(MOD-SP4): 53.6 ML
BH CV ECHO MEAS - FS: 30.5 %
BH CV ECHO MEAS - IVS/LVPW: 0.97 CM
BH CV ECHO MEAS - IVSD: 1 CM
BH CV ECHO MEAS - LA DIMENSION: 4.8 CM
BH CV ECHO MEAS - LV DIASTOLIC VOL/BSA (35-75): 52.3 CM2
BH CV ECHO MEAS - LV MASS(C)D: 200.7 GRAMS
BH CV ECHO MEAS - LV MAX PG: 4 MMHG
BH CV ECHO MEAS - LV MEAN PG: 2.21 MMHG
BH CV ECHO MEAS - LV SYSTOLIC VOL/BSA (12-30): 25.7 CM2
BH CV ECHO MEAS - LV V1 MAX: 99.8 CM/SEC
BH CV ECHO MEAS - LV V1 VTI: 20.1 CM
BH CV ECHO MEAS - LVIDD: 5.2 CM
BH CV ECHO MEAS - LVIDS: 3.6 CM
BH CV ECHO MEAS - LVOT AREA: 3.4 CM2
BH CV ECHO MEAS - LVOT DIAM: 2.07 CM
BH CV ECHO MEAS - LVPWD: 1.04 CM
BH CV ECHO MEAS - MR MAX PG: 109.7 MMHG
BH CV ECHO MEAS - MR MAX VEL: 523.6 CM/SEC
BH CV ECHO MEAS - MV A MAX VEL: 72.3 CM/SEC
BH CV ECHO MEAS - MV DEC SLOPE: 159.3 CM/SEC2
BH CV ECHO MEAS - MV DEC TIME: 0.41 SEC
BH CV ECHO MEAS - MV E MAX VEL: 65.6 CM/SEC
BH CV ECHO MEAS - MV E/A: 0.91
BH CV ECHO MEAS - MV MAX PG: 3.5 MMHG
BH CV ECHO MEAS - MV MEAN PG: 1.04 MMHG
BH CV ECHO MEAS - MV V2 VTI: 31.7 CM
BH CV ECHO MEAS - MVA(VTI): 2.13 CM2
BH CV ECHO MEAS - PA ACC TIME: 0.09 SEC
BH CV ECHO MEAS - PA V2 MAX: 95.3 CM/SEC
BH CV ECHO MEAS - PULM A REVS DUR: 0.08 SEC
BH CV ECHO MEAS - PULM A REVS VEL: 29 CM/SEC
BH CV ECHO MEAS - PULM DIAS VEL: 34.2 CM/SEC
BH CV ECHO MEAS - PULM S/D: 1.91
BH CV ECHO MEAS - PULM SYS VEL: 65.2 CM/SEC
BH CV ECHO MEAS - RAP SYSTOLE: 3 MMHG
BH CV ECHO MEAS - RV MAX PG: 1.94 MMHG
BH CV ECHO MEAS - RV V1 MAX: 69.6 CM/SEC
BH CV ECHO MEAS - RV V1 VTI: 15.7 CM
BH CV ECHO MEAS - RVSP: 23.3 MMHG
BH CV ECHO MEAS - SI(MOD-SP4): 26.5 ML/M2
BH CV ECHO MEAS - SV(LVOT): 67.6 ML
BH CV ECHO MEAS - SV(MOD-SP4): 55.3 ML
BH CV ECHO MEAS - TR MAX PG: 20.3 MMHG
BH CV ECHO MEAS - TR MAX VEL: 224.3 CM/SEC

## 2024-03-22 PROCEDURE — 94664 DEMO&/EVAL PT USE INHALER: CPT

## 2024-03-22 PROCEDURE — 94761 N-INVAS EAR/PLS OXIMETRY MLT: CPT

## 2024-03-22 PROCEDURE — 94799 UNLISTED PULMONARY SVC/PX: CPT

## 2024-03-22 PROCEDURE — G0378 HOSPITAL OBSERVATION PER HR: HCPCS

## 2024-03-22 RX ORDER — BUDESONIDE AND FORMOTEROL FUMARATE DIHYDRATE 160; 4.5 UG/1; UG/1
2 AEROSOL RESPIRATORY (INHALATION)
Status: DISCONTINUED | OUTPATIENT
Start: 2024-03-22 | End: 2024-03-22 | Stop reason: HOSPADM

## 2024-03-22 RX ORDER — IPRATROPIUM BROMIDE AND ALBUTEROL SULFATE 2.5; .5 MG/3ML; MG/3ML
3 SOLUTION RESPIRATORY (INHALATION) EVERY 4 HOURS PRN
Status: DISCONTINUED | OUTPATIENT
Start: 2024-03-22 | End: 2024-03-22 | Stop reason: HOSPADM

## 2024-03-22 RX ORDER — ATORVASTATIN CALCIUM 80 MG/1
80 TABLET, FILM COATED ORAL NIGHTLY
Qty: 30 TABLET | Refills: 0 | Status: SHIPPED | OUTPATIENT
Start: 2024-03-22 | End: 2024-04-21

## 2024-03-22 RX ADMIN — LISINOPRIL 20 MG: 20 TABLET ORAL at 08:20

## 2024-03-22 RX ADMIN — Medication 10 ML: at 08:21

## 2024-03-22 RX ADMIN — ASPIRIN 81 MG: 81 TABLET, COATED ORAL at 08:20

## 2024-03-22 RX ADMIN — AMLODIPINE BESYLATE 10 MG: 5 TABLET ORAL at 08:20

## 2024-03-22 RX ADMIN — BUDESONIDE AND FORMOTEROL FUMARATE DIHYDRATE 2 PUFF: 160; 4.5 AEROSOL RESPIRATORY (INHALATION) at 07:40

## 2024-03-22 RX ADMIN — APIXABAN 5 MG: 5 TABLET, FILM COATED ORAL at 08:20

## 2024-03-22 NOTE — CASE MANAGEMENT/SOCIAL WORK
Continued Stay Note  [facility]     Patient Name: Alejandro Bain  MRN: [id number]  Today's Date: 3/22/2024    Admit Date: 3/20/2024    Plan: home   Discharge Plan       Row Name 03/22/24 0832       Plan    Plan home    Plan Comments noted PT recommendations of home.                      Expected Discharge Date and Time       Expected Discharge Date Expected Discharge Time    Mar 22, 2024               Kathleen Wilcox RN

## 2024-03-22 NOTE — PROGRESS NOTES
[facility] Medicine Services  Discharge Summary      Date of Admission: 3/20/2024  Date of Discharge:  3/22/2024  Primary Care Physician: Rafat Petty MD    Presenting Problem/History of Present Illness:  TIA (transient ischemic attack) [G45.9]       Final Discharge Diagnoses:  Active Hospital Problems    Diagnosis     **TIA (transient ischemic attack)        Consults:   Consults       Date and Time Order Name Status Description    3/20/2024  9:42 PM Hospitalist (on-call MD unless specified)      3/20/2024  9:17 PM Inpatient Neurology Consult Stroke Completed     3/20/2024  8:33 PM Inpatient Neurology Consult Stroke Completed     3/20/2024  8:33 PM Inpatient Neurology Consult Stroke Completed             Procedures Performed:                 Pertinent Test Results:   Lab Results (most recent)       Procedure Component Value Units Date/Time    POC Glucose Once [[id number]]  (Normal) Collected: 03/21/24 1137    Specimen: Blood Updated: 03/21/24 1138     Glucose 90 mg/dL      Comment: Serial Number: [id number]:  [id number]       Vitamin B12 [[id number]]  (Normal) Collected: 03/21/24 0657    Specimen: Blood from Arm, Right Updated: 03/21/24 1058     Vitamin B-12 818 pg/mL     Narrative:      Results may be falsely increased if patient taking Biotin.      Folate [[id number]]  (Normal) Collected: 03/21/24 0657    Specimen: Blood from Arm, Right Updated: 03/21/24 1058     Folate >20.00 ng/mL     Narrative:      Results may be falsely increased if patient taking Biotin.      Lipid Panel [[id number]]  (Abnormal) Collected: 03/21/24 0657    Specimen: Blood from Arm, Right Updated: 03/21/24 0941     Total Cholesterol 138 mg/dL      Triglycerides 121 mg/dL      HDL Cholesterol 33 mg/dL      LDL Cholesterol  83 mg/dL      VLDL Cholesterol 22 mg/dL      LDL/HDL Ratio 2.45    Narrative:      Cholesterol Reference Ranges  (U.S. Department of Health and Human Services ATP III Classifications)    Desirable           <200 mg/dL  Borderline High    200-239 mg/dL  High Risk          >240 mg/dL      Triglyceride Reference Ranges  (U.S. Department of Health and Human Services ATP III Classifications)    Normal           <150 mg/dL  Borderline High  150-199 mg/dL  High             200-499 mg/dL  Very High        >500 mg/dL    HDL Reference Ranges  (U.S. Department of Health and Human Services ATP III Classifications)    Low     <40 mg/dl (major risk factor for CHD)  High    >60 mg/dl ('negative' risk factor for CHD)        LDL Reference Ranges  (U.S. Department of Health and Human Services ATP III Classifications)    Optimal          <100 mg/dL  Near Optimal     100-129 mg/dL  Borderline High  130-159 mg/dL  High             160-189 mg/dL  Very High        >189 mg/dL    Hemoglobin A1c [[id number]]  (Normal) Collected: 03/21/24 0657    Specimen: Blood from Arm, Right Updated: 03/21/24 0938     Hemoglobin A1C 5.40 %     TSH [[id number]]  (Normal) Collected: 03/21/24 0657    Specimen: Blood from Arm, Right Updated: 03/21/24 0739     TSH 3.310 uIU/mL     Comprehensive Metabolic Panel [[id number]]  (Abnormal) Collected: 03/21/24 0657    Specimen: Blood from Arm, Right Updated: 03/21/24 0733     Glucose 95 mg/dL      BUN 11 mg/dL      Creatinine 0.88 mg/dL      Sodium 141 mmol/L      Potassium 3.8 mmol/L      Chloride 107 mmol/L      CO2 24.0 mmol/L      Calcium 8.9 mg/dL      Total Protein 6.3 g/dL      Albumin 4.0 g/dL      ALT (SGPT) 13 U/L      AST (SGOT) 20 U/L      Alkaline Phosphatase 122 U/L      Total Bilirubin 0.3 mg/dL      Globulin 2.3 gm/dL      A/G Ratio 1.7 g/dL      BUN/Creatinine Ratio 12.5     Anion Gap 10.0 mmol/L      eGFR 94.8 mL/min/1.73     Narrative:      GFR Normal >60  Chronic Kidney Disease <60  Kidney Failure <15      Sedimentation Rate [[id number]]  (Normal) Collected: 03/21/24 0657    Specimen: Blood from Arm, Right Updated: 03/21/24 0719     Sed Rate 19 mm/hr     CBC (No Diff) [[id number]]  (Abnormal) Collected:  03/21/24 0657    Specimen: Blood from Arm, Right Updated: 03/21/24 0712     WBC 5.68 10*3/mm3      RBC 4.50 10*6/mm3      Hemoglobin 12.5 g/dL      Hematocrit 38.7 %      MCV 86.0 fL      MCH 27.8 pg      MCHC 32.3 g/dL      RDW 14.7 %      RDW-SD 46.5 fl      MPV 9.8 fL      Platelets 153 10*3/mm3     POC Glucose Q6H [[id number]]  (Normal) Collected: 03/21/24 0653    Specimen: Blood Updated: 03/21/24 0655     Glucose 84 mg/dL      Comment: Serial Number: [id number]:  [id number]       [place] Draw [[id number]] Collected: 03/20/24 2039    Specimen: Blood Updated: 03/20/24 2146    Narrative:      The following orders were created for panel order [place] Draw.  Procedure                               Abnormality         Status                     ---------                               -----------         ------                     Green Top (Gel)[[id number]]                                  Final result               Lavender Top[[id number]]                                     Final result               Gold Top - SST[[id number]]                                   Final result               Light Blue Top[[id number]]                                   Final result                 Please view results for these tests on the individual orders.    Lavender Top [[id number]] Collected: 03/20/24 2039    Specimen: Blood Updated: 03/20/24 2146     Extra Tube hold for add-on     Comment: Auto resulted       Gold Top - SST [[id number]] Collected: 03/20/24 2039    Specimen: Blood Updated: 03/20/24 2146     Extra Tube Hold for add-ons.     Comment: Auto resulted.       Light Blue Top [[id number]] Collected: 03/20/24 2039    Specimen: Blood Updated: 03/20/24 2146     Extra Tube Hold for add-ons.     Comment: Auto resulted       Green Top (Gel) [[id number]] Collected: 03/20/24 2039    Specimen: Blood Updated: 03/20/24 2117    Protime-INR [[id number]]  (Normal) Collected: 03/20/24 2039    Specimen: Blood Updated: 03/20/24 2106     Protime  11.4 Seconds      INR 1.05    aPTT [[id number]]  (Normal) Collected: 03/20/24 2039    Specimen: Blood Updated: 03/20/24 2106     PTT 26.7 seconds     Comprehensive Metabolic Panel [[id number]]  (Abnormal) Collected: 03/20/24 2039    Specimen: Blood Updated: 03/20/24 2102     Glucose 84 mg/dL      BUN 12 mg/dL      Creatinine 0.85 mg/dL      Sodium 138 mmol/L      Potassium 3.8 mmol/L      Chloride 103 mmol/L      CO2 24.0 mmol/L      Calcium 9.4 mg/dL      Total Protein 7.0 g/dL      Albumin 4.4 g/dL      ALT (SGPT) 14 U/L      AST (SGOT) 21 U/L      Alkaline Phosphatase 139 U/L      Total Bilirubin 0.3 mg/dL      Globulin 2.6 gm/dL      A/G Ratio 1.7 g/dL      BUN/Creatinine Ratio 14.1     Anion Gap 11.0 mmol/L      eGFR 95.8 mL/min/1.73     Narrative:      GFR Normal >60  Chronic Kidney Disease <60  Kidney Failure <15      Single High Sensitivity Troponin T [[id number]]  (Normal) Collected: 03/20/24 2039    Specimen: Blood Updated: 03/20/24 2102     HS Troponin T 16 ng/L     Narrative:      High Sensitive Troponin T Reference Range:  <14.0 ng/L- Negative Female for AMI  <22.0 ng/L- Negative Male for AMI  >=14 - Abnormal Female indicating possible myocardial injury.  >=22 - Abnormal Male indicating possible myocardial injury.   Clinicians would have to utilize clinical acumen, EKG, Troponin, and serial changes to determine if it is an Acute Myocardial Infarction or myocardial injury due to an underlying chronic condition.         CBC & Differential [[id number]]  (Abnormal) Collected: 03/20/24 2039    Specimen: Blood Updated: 03/20/24 2043    Narrative:      The following orders were created for panel order CBC & Differential.  Procedure                               Abnormality         Status                     ---------                               -----------         ------                     CBC Auto Differential[[id number]]        Abnormal            Final result                 Please view results for these  tests on the individual orders.    CBC Auto Differential [[id number]]  (Abnormal) Collected: 03/20/24 2039    Specimen: Blood Updated: 03/20/24 2043     WBC 7.73 10*3/mm3      RBC 4.80 10*6/mm3      Hemoglobin 13.4 g/dL      Hematocrit 41.2 %      MCV 85.8 fL      MCH 27.9 pg      MCHC 32.5 g/dL      RDW 14.6 %      RDW-SD 46.4 fl      MPV 9.6 fL      Platelets 179 10*3/mm3      Neutrophil % 71.7 %      Lymphocyte % 18.1 %      Monocyte % 7.6 %      Eosinophil % 1.8 %      Basophil % 0.5 %      Immature Grans % 0.3 %      Neutrophils, Absolute 5.54 10*3/mm3      Lymphocytes, Absolute 1.40 10*3/mm3      Monocytes, Absolute 0.59 10*3/mm3      Eosinophils, Absolute 0.14 10*3/mm3      Basophils, Absolute 0.04 10*3/mm3      Immature Grans, Absolute 0.02 10*3/mm3      nRBC 0.0 /100 WBC     POC Creatinine [[id number]]  (Normal) Collected: 03/20/24 2041    Specimen: Blood Updated: 03/20/24 2043     Creatinine 1.00 mg/dL      Comment: Serial Number: [id number]:  [id number]        eGFR 83.0 mL/min/1.73           Imaging Results (Most Recent)       Procedure Component Value Units Date/Time    MRI Brain Without Contrast [[id number]] Collected: 03/21/24 0829     Updated: 03/21/24 0844    Narrative:      MRI BRAIN WO CONTRAST    Date of Exam: 3/21/2024 7:55 AM EDT    Indication: Stroke, follow up.     Comparison: CT angiogram brain 3/20/2024, MRI brain 1/10/2023    Technique:  Routine multiplanar/multisequence sequence images of the brain were obtained without contrast administration.      Findings:  Diffusion weighted images demonstrate no evidence of acute infarct. There is no evidence of acute intracranial hemorrhage.    There is encephalomalacia involving the left occipital lobe compatible with old left PCA distribution infarct. No abnormal extra-axial fluid collection identified. No mass, mass effect, or hydrocephalus.    There is a short linear segment of abnormal increased T1 signal in the region of the P2 segment of the left  posterior cerebral artery compatible with known chronic occlusion. Other major intracranial vascular flow voids are preserved. Sella and   suprasellar cistern are within normal limits. Craniovertebral junction appears normal.    There is partial opacification of the bilateral mastoid air cells. There is mucosal thickening within the sinuses consistent with chronic sinusitis.    Globes and orbits are within normal limits.      Impression:      Impression:    1. No evidence of acute infarct or hemorrhage.  2. 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 There is resultant encephalomalacia within the left occipital lobe compatible with old infarct.  3. Chronic pansinusitis        Electronically Signed: Edy Walters MD    3/21/2024 8:42 AM EDT    Workstation ID: [id number]    XR Chest 1 View [[id number]] Collected: 03/20/24 2144     Updated: 03/20/24 2147    Narrative:      XR CHEST 1 VW    Date of Exam: 3/20/2024 9:39 PM EDT    Indication: Acute cerebrovascular accident    Comparison: 2/9/2024.    Findings:  The patient is had a previous CABG procedure. The pulmonary vascular markings are normal. The lungs and pleural spaces are clear of active disease. There are chronic age-related changes involving the bony thorax and thoracic aorta.      Impression:      Impression:  No active disease.      Electronically Signed: Chetan Lockhart MD    3/20/2024 9:45 PM EDT    Workstation ID: [id number]    CT Angiogram Head w AI Analysis of LVO [[id number]] Collected: 03/20/24 2054     Updated: 03/20/24 2132    Narrative:      CT ANGIOGRAM HEAD W AI ANALYSIS OF LVO, CT ANGIOGRAM NECK    Date of Exam: 3/20/2024 8:43 PM EDT    Indication: Suspected acute cerebrovascular accident, nausea and weakness.    Comparison: CT of the head performed on 3/20/2024 and previous CT angiogram of the head and neck from 1/10/2023.    Technique: CTA of the head was performed after the uneventful intravenous administration of  iodinated contrast. Reconstructed coronal and sagittal images were also obtained. In addition, a 3-D volume rendered image was created for interpretation.   Automated exposure control and iterative reconstruction methods were used.      Findings:  Vascular: There is a stable chronic occlusion of the left posterior cerebral artery. Only a short segment of the P1 segment on the left side is visualized. There is a stable focal stenosis in the mid right posterior cerebral artery. The anterior and   middle cerebral arteries are patent. The basilar artery is patent. There is no new large vessel occlusion or filling defect indicate acute cerebral thrombus. There is atherosclerotic vascular plaque in the supraclinoid segment of the right internal   carotid artery with no flow limiting stenosis. There is also atherosclerotic vascular plaque in the supraclinoid segment of the left internal carotid artery with no flow-limiting stenosis. There is no saccular aneurysm or arteriovenous malformation.   There is a calcified plaque at the right carotid bifurcation with no flow-limiting stenosis by NASCET criteria. There is calcified and noncalcified plaque at the left carotid bifurcation with no flow-limiting stenosis by NASCET criteria. The proximal   left internal carotid artery stenosis again measures approximately 25%. Both the right and left vertebral arteries are widely patent. The left vertebral artery originates directly from the thoracic aortic arch. There is a mild focal stenosis again noted   in the mid right vertebral artery. This is secondary to calcified plaque. There is a moderate to high-grade stenosis within the mid left vertebral artery secondary to calcified plaque. There is also a mild-moderate stenosis in the proximal left vertebral   artery just beyond the origin secondary to noncalcified plaque. There is plaque in the innominate artery with no flow-limiting stenosis. The right and left subclavian arteries are  widely patent.    Nonvascular: There are no enhancing intracranial lesions. There is chronic paranasal sinus disease with evidence of previous surgery. The parotid and submandibular glands are normal. The nasopharynx, palatine tonsils, vallecula, epiglottis, piriform   sinuses, and larynx are normal. The thyroid gland is unremarkable. The pulmonary apices are clear. There are degenerative changes within the cervical and thoracic spine. There are no suspicious osteolytic or sclerotic lesions.      Impression:      Impression:    1. Stable chronic occlusion of the left posterior cerebral artery. Only a short segment of the P1 segment on the left side is visualized similar to before. There is also a stable focal stenosis in the mid right posterior cerebral artery.  2. No large vessel occlusion or filling defect indicate acute cerebral thrombus.  3. Stable stenotic areas in the right and left vertebral arteries.  4. There is plaque at both carotid bifurcations. There is no flow-limiting stenosis by NASCET criteria as described.  5. Additional vascular and nonvascular findings as noted above.        Electronically Signed: Chetan Lockhart MD    3/20/2024 9:30 PM EDT    Workstation ID: [id number]    CT Angiogram Neck [[id number]] Collected: 03/20/24 2054     Updated: 03/20/24 2132    Narrative:      CT ANGIOGRAM HEAD W AI ANALYSIS OF LVO, CT ANGIOGRAM NECK    Date of Exam: 3/20/2024 8:43 PM EDT    Indication: Suspected acute cerebrovascular accident, nausea and weakness.    Comparison: CT of the head performed on 3/20/2024 and previous CT angiogram of the head and neck from 1/10/2023.    Technique: CTA of the head was performed after the uneventful intravenous administration of iodinated contrast. Reconstructed coronal and sagittal images were also obtained. In addition, a 3-D volume rendered image was created for interpretation.   Automated exposure control and iterative reconstruction methods were  used.      Findings:  Vascular: There is a stable chronic occlusion of the left posterior cerebral artery. Only a short segment of the P1 segment on the left side is visualized. There is a stable focal stenosis in the mid right posterior cerebral artery. The anterior and   middle cerebral arteries are patent. The basilar artery is patent. There is no new large vessel occlusion or filling defect indicate acute cerebral thrombus. There is atherosclerotic vascular plaque in the supraclinoid segment of the right internal   carotid artery with no flow limiting stenosis. There is also atherosclerotic vascular plaque in the supraclinoid segment of the left internal carotid artery with no flow-limiting stenosis. There is no saccular aneurysm or arteriovenous malformation.   There is a calcified plaque at the right carotid bifurcation with no flow-limiting stenosis by NASCET criteria. There is calcified and noncalcified plaque at the left carotid bifurcation with no flow-limiting stenosis by NASCET criteria. The proximal   left internal carotid artery stenosis again measures approximately 25%. Both the right and left vertebral arteries are widely patent. The left vertebral artery originates directly from the thoracic aortic arch. There is a mild focal stenosis again noted   in the mid right vertebral artery. This is secondary to calcified plaque. There is a moderate to high-grade stenosis within the mid left vertebral artery secondary to calcified plaque. There is also a mild-moderate stenosis in the proximal left vertebral   artery just beyond the origin secondary to noncalcified plaque. There is plaque in the innominate artery with no flow-limiting stenosis. The right and left subclavian arteries are widely patent.    Nonvascular: There are no enhancing intracranial lesions. There is chronic paranasal sinus disease with evidence of previous surgery. The parotid and submandibular glands are normal. The nasopharynx, palatine  tonsils, vallecula, epiglottis, piriform   sinuses, and larynx are normal. The thyroid gland is unremarkable. The pulmonary apices are clear. There are degenerative changes within the cervical and thoracic spine. There are no suspicious osteolytic or sclerotic lesions.      Impression:      Impression:    1. Stable chronic occlusion of the left posterior cerebral artery. Only a short segment of the P1 segment on the left side is visualized similar to before. There is also a stable focal stenosis in the mid right posterior cerebral artery.  2. No large vessel occlusion or filling defect indicate acute cerebral thrombus.  3. Stable stenotic areas in the right and left vertebral arteries.  4. There is plaque at both carotid bifurcations. There is no flow-limiting stenosis by NASCET criteria as described.  5. Additional vascular and nonvascular findings as noted above.        Electronically Signed: Chetan Lockhart MD    3/20/2024 9:30 PM EDT    Workstation ID: [id number]    CT Head Without Contrast Stroke Protocol [[id number]] Collected: 03/20/24 2039     Updated: 03/20/24 2046    Narrative:      CT HEAD WO CONTRAST STROKE PROTOCOL    Date of Exam: 3/20/2024 8:35 PM EDT    Indication: Acute neurologic deficit.    Comparison: 1/9/2023.    Technique: Axial CT images were obtained of the head without contrast administration.  Reconstructed coronal and sagittal images were also obtained. Automated exposure control and iterative construction methods were used.    Scan Time: 2039 hours  Results discussed with Dr. Pascal at 2044 hours.      Findings:  The sulci, fissures, ventricles, and basal cisterns are normal for age. There is been interval development of encephalomalacia in the left occipital lobe and medial left temporal lobe consistent with a prior left PCA distribution cerebral infarct. There   is some subtle areas of decreased density in the periventricular white matter likely due to chronic microvascular ischemia. The  gray-white matter differentiation is otherwise preserved. There is no acute hemorrhage, midline shift, or suspicious   extra-axial fluid collections. There are atherosclerotic vascular calcifications in the distal vertebral arteries as well as the cavernous and supraclinoid carotid arteries. The orbital contents are normal. There is mucosal thickening throughout the   paranasal sinuses indicating chronic sinusitis. The patient has had previous paranasal sinus surgery. The mastoid sinuses are clear.      Impression:      Impression:    1. No acute findings.  2. 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  3. Mild chronic periventricular white matter microvascular ischemia.  4. Chronic paranasal sinus disease with either previous sinus surgery.        Electronically Signed: Chetan Lockhart MD    3/20/2024 8:44 PM EDT    Workstation ID: [id number]            Chief Complaint on Day of Discharge: TIA    Hospital Course:  Alejandro Bain is a 68 y.o. male with a PMH of atrial fibrillation on Eliquis, CHF, CVA and aneurysm who presented to [facility] on 3/20/2024 with complaints of acute onset dizziness.  Patient also stated he had vision change (stated that his vision was crossed eyed) He also had diaphoresis associated with the dizziness.  Of note, patient has a history of occipital stroke about a year ago.  He stated that his presentation tonight is quite similar to his presentation last year when he had his stroke.  He states that he has been taking his medication as prescribed including his Eliquis.  Denies any fever, chills, nausea, vomiting, abdominal pain, chest pain or palpitations.     In the ED, vital signs were stable.  Labs unremarkable.  Head CT showed no acute findings.  Noted old PCA cerebral infarct.  Neurology was consulted.  The patient had a negative work-up for stroke, with no acute abnormalities on any labs or imaging.  He was started on high dose lipitor and deemed stable for discharge home on  "3/22.  He is to f/u with his PCP in 1 week.    Condition on Discharge:  stable    Physical Exam on Discharge:  /77 (BP Location: Left arm, Patient Position: Lying)   Pulse 51   Temp 97.5 °F (36.4 °C) (Oral)   Resp 14   Ht 182.9 cm (72\")   Wt 86.2 kg (190 lb)   SpO2 94%   BMI 25.77 kg/m²   Physical Exam  General: NAD, AAOx3  HEENT: AT NC, EOMI, PERRL  Neck: No JVD  CVS: S1/S2 present, RRR, no M/R/G  Lungs: CTA B/L  Abdomen: soft, NT, ND, BS+  Ext: no edema  Skin: no rashes, bruises or discolorations  Neuro: no focal deficits, CN II-XII  Psych: Not agitated       Discharge Disposition:  Home or Self Care    Discharge Medications:     Discharge Medications        New Medications        Instructions Start Date   atorvastatin 80 MG tablet  Commonly known as: LIPITOR   80 mg, Oral, Nightly             Continue These Medications        Instructions Start Date   ALIGN PREBIOTIC-PROBIOTIC PO   1 tablet, Oral, Daily      allopurinol 300 MG tablet  Commonly known as: ZYLOPRIM   Take 1 tablet by mouth Every Night.      amLODIPine 10 MG tablet  Commonly known as: NORVASC   10 mg, Oral, Daily      apixaban 5 MG tablet tablet  Commonly known as: ELIQUIS   5 mg, Oral, Every 12 Hours Scheduled      aspirin 81 MG EC tablet   81 mg, Oral, Daily      Cholecalciferol 25 MCG (1000 UT) capsule   1,000 Units, Oral, Daily      ipratropium-albuterol 0.5-2.5 mg/3 ml nebulizer  Commonly known as: DUO-NEB   3 mL, Nebulization, Every 4 Hours PRN      lisinopril 20 MG tablet  Commonly known as: PRINIVIL,ZESTRIL   20 mg, Oral, 2 Times Daily      multivitamin with minerals tablet tablet   1 tablet, Oral, Daily      NON FORMULARY   Biologic asthma injection .  One injection every 4th week.       NON FORMULARY   Nasal spray . 2 sprays in each nostril twice daily.      Symbicort 160-4.5 MCG/ACT inhaler  Generic drug: budesonide-formoterol   2 puffs, Inhalation, 2 Times Daily - RT      Ventolin  (90 Base) MCG/ACT inhaler  Generic " drug: albuterol sulfate HFA   2 puffs, Inhalation, Every 6 Hours PRN             Stop These Medications      rosuvastatin 20 MG tablet  Commonly known as: CRESTOR              Discharge Diet:  regular    Activity at Discharge:  as tolerated    Discharge Care Plan/Instructions: f/u PCP    Time: less than 30 minutes

## 2024-03-22 NOTE — SIGNIFICANT NOTE
03/22/24 0722   Vital Signs   Temp 97.5 °F (36.4 °C)   Temp src Oral   Heart Rate 53   Heart Rate Source Monitor   Resp 16   Resp Rate Source Monitor   /77   Noninvasive MAP (mmHg) 85   BP Location Left arm   BP Method Automatic   Patient Position Lying   Oxygen Therapy   SpO2 92 %   Pulse Oximetry Type Continuous   Device (Oxygen Therapy) room air     Discharge instructions given to pt to which he verbalized understanding. Belongings secured and with pt before discharge. VSS and appears to be in no acute distress.

## 2024-03-22 NOTE — OUTREACH NOTE
Prep Survey      Flowsheet Row Responses   [facility]? [facility]   Is LACE score < 7 ? No   Eligibility Readm Mgmt   Discharge diagnosis TIA (transient ischemic attack)   Does the patient have one of the following disease processes/diagnoses(primary or secondary)? Stroke   Prep survey completed? Yes            Alvina DOWELL - Registered Nurse

## 2024-03-28 ENCOUNTER — READMISSION MANAGEMENT (OUTPATIENT)
Dept: CALL CENTER | Facility: HOSPITAL | Age: 69
End: 2024-03-28
Payer: MEDICARE

## 2024-03-28 NOTE — OUTREACH NOTE
Stroke Week 1 Survey      Flowsheet Row Responses   [facility] from? [facility]   Does the patient have one of the following disease processes/diagnoses(primary or secondary)? Stroke   Week 1 attempt successful? No   Unsuccessful attempts Attempt 1            RANDELL GUERRA - Registered Nurse

## 2024-04-02 ENCOUNTER — READMISSION MANAGEMENT (OUTPATIENT)
Dept: CALL CENTER | Facility: HOSPITAL | Age: 69
End: 2024-04-02
Payer: MEDICARE

## 2024-04-02 NOTE — OUTREACH NOTE
Stroke Week 1 Survey      Flowsheet Row Responses   [facility] discharged from? [facility]   Does the patient have one of the following disease processes/diagnoses(primary or secondary)? Stroke   Week 1 attempt successful? No   Unsuccessful attempts Attempt 2            Shanelle MCDONNELL - Registered Nurse

## 2024-04-09 ENCOUNTER — TELEPHONE (OUTPATIENT)
Dept: CARDIOLOGY | Facility: CLINIC | Age: 69
End: 2024-04-09

## 2024-04-09 ENCOUNTER — READMISSION MANAGEMENT (OUTPATIENT)
Dept: CALL CENTER | Facility: HOSPITAL | Age: 69
End: 2024-04-09
Payer: MEDICARE

## 2024-04-09 NOTE — OUTREACH NOTE
Stroke Week 3 Survey      Flowsheet Row Responses   [facility]   Does the patient have one of the following disease processes/diagnoses(primary or secondary)? Stroke   Week 3 attempt successful? Yes   Call start time 1548   Call end time 1550   Discharge diagnosis TIA (transient ischemic attack)   Person spoke with today (if not patient) and relationship Wife   Does the patient have a primary care provider?  Yes   Does the patient have an appointment with their PCP within 7 days of discharge? Yes   Has the patient kept scheduled appointments due by today? Yes   Has home health visited the patient within 72 hours of discharge? N/A   Psychosocial issues? No   Does the patient have any residual symptoms from stroke/TIA? Yes   Residual symptoms comments Dizzy   What is the patient's perception of their health status since discharge? New symptoms unrelated to diagnosis  [Dizzy]            Norah COLLAZO - Registered Nurse

## 2024-04-26 ENCOUNTER — TELEPHONE (OUTPATIENT)
Dept: CARDIOLOGY | Facility: CLINIC | Age: 69
End: 2024-04-26
Payer: MEDICARE

## 2024-04-26 NOTE — TELEPHONE ENCOUNTER
Caller: Alejandro Bain    Relationship to patient: Self    Best call back number: [phone]    Patient is needing: PT WAS IN ED AT [facility] YESTERDAY WITH BLURRED VISION AND DIZZY SPELLS. THEY ADVISED HIM TO FOLLOW UP WITH DR. ROBER MCBRIDE. THEY ALSO ADDED HIM A NEW MEDICATION KEPPRA.

## 2024-05-28 RX ORDER — AMLODIPINE BESYLATE 10 MG/1
10 TABLET ORAL DAILY
Qty: 90 TABLET | Refills: 1 | Status: SHIPPED | OUTPATIENT
Start: 2024-05-28

## 2024-06-04 ENCOUNTER — OFFICE VISIT (OUTPATIENT)
Dept: CARDIOLOGY | Facility: CLINIC | Age: 69
End: 2024-06-04
Payer: MEDICARE

## 2024-06-04 VITALS
SYSTOLIC BLOOD PRESSURE: 115 MMHG | HEIGHT: 72 IN | WEIGHT: 195 LBS | OXYGEN SATURATION: 97 % | RESPIRATION RATE: 16 BRPM | HEART RATE: 57 BPM | DIASTOLIC BLOOD PRESSURE: 77 MMHG | BODY MASS INDEX: 26.41 KG/M2

## 2024-06-04 DIAGNOSIS — E78.2 MIXED HYPERLIPIDEMIA: Chronic | ICD-10-CM

## 2024-06-04 DIAGNOSIS — I48.0 PAROXYSMAL ATRIAL FIBRILLATION: Chronic | ICD-10-CM

## 2024-06-04 DIAGNOSIS — I10 ESSENTIAL HYPERTENSION: Chronic | ICD-10-CM

## 2024-06-04 DIAGNOSIS — I25.10 CORONARY ARTERY DISEASE INVOLVING NATIVE CORONARY ARTERY OF NATIVE HEART WITHOUT ANGINA PECTORIS: Primary | Chronic | ICD-10-CM

## 2024-06-04 PROCEDURE — 1160F RVW MEDS BY RX/DR IN RCRD: CPT | Performed by: NURSE PRACTITIONER

## 2024-06-04 PROCEDURE — 3078F DIAST BP <80 MM HG: CPT | Performed by: NURSE PRACTITIONER

## 2024-06-04 PROCEDURE — 99214 OFFICE O/P EST MOD 30 MIN: CPT | Performed by: NURSE PRACTITIONER

## 2024-06-04 PROCEDURE — 3074F SYST BP LT 130 MM HG: CPT | Performed by: NURSE PRACTITIONER

## 2024-06-04 PROCEDURE — 1159F MED LIST DOCD IN RCRD: CPT | Performed by: NURSE PRACTITIONER

## 2024-06-04 PROCEDURE — 93000 ELECTROCARDIOGRAM COMPLETE: CPT | Performed by: NURSE PRACTITIONER

## 2024-06-04 RX ORDER — PREDNISONE 10 MG/1
TABLET ORAL
COMMUNITY
Start: 2024-05-26 | End: 2024-06-04

## 2024-06-04 NOTE — PROGRESS NOTES
Cardiology Office Follow Up Visit      Primary Care Provider:  Rafat Petty MD    Reason for f/u:     Hospital follow-up  Coronary artery disease  Hypertension  Dyslipidemia  PAF  History of stroke  Recently noted to have small aneurysmOf the right internal carotid artery that measures approximately 1.5 mm      Subjective     CC:    Denies chest pain or dyspnea    History of Present Illness       Alejandro Bain is a 69 y.o. male.  Patient is a very pleasant 69-year-old man who routinely follows with Dr. Blank.He is known to have hypertension, coronary artery disease, previous abdominal aortic aneurysm, CABG with previous AVR by history and previous stroke.    Patient initially underwent bypass surgery in December 2016 with 5 vessel CABG.  He underwent AAA repair 4 weeks after his bypass surgery.    Patient's last stress test was in August 2021 which showed no evidence of ischemia.  Echocardiogram also at that time demonstrated normal LV size and function with EF of 55 to 60%.  Bioprosthetic aortic valve was functioning appropriately.    In January 2023 patient was admitted at [facility] with loss of peripheral vision and headache.  CT scan showed left posterior temporal lobe and left occipital lobe CVA.  He was noted to have vertebral artery and posterior cerebral stenosis on that left side.  He was treated with Eliquis.    Patient follows with neurology at [facility].  He has had previous event monitoring that showed no evidence of atrial fibrillation.  There were isolated PVCs and 1 4 beat run of wide-complex tachycardia.    The patient was in the emergency room through [facility] back in April.  At that time he presented with some blurred vision.  He also reported dizziness.  He attributes the symptoms to a biologic that he was taking and since stopping the medication those symptoms have improved.    The patient was seen by cardiologist in the [place] system but he reports he does not plan to follow  with him regularly.  There was some discussion about possible irregular heart rates but the patient tells me that these were old findings that were being discussed and he has not had any recent or recurrent event monitoring or testing.    The patient has scheduled follow-up with the [facility] in regards to his abnormal CTA of the neck that was done recently.    I have offered him referral to vascular surgery here but he would like to wait until he is seen by the [facility].    The patient denies any current or recent chest pain, worsening dyspnea, PND, orthopnea, palpitations, near syncope or feelings of his heart racing.  He reports compliance with prescribed medical therapy          ASSESSMENT/PLAN:      Diagnoses and all orders for this visit:    1. Coronary artery disease involving native coronary artery of native heart without angina pectoris (Primary)  Comments:  Currently with no signs or symptoms to suggest angina    2. Essential hypertension  Comments:  Stable on current medical therapy    3. Mixed hyperlipidemia  Comments:  Continue statin    4. Paroxysmal atrial fibrillation  Comments:  Currently in sinus rhythm.            MEDICAL DECISION MAKING:      Today I made no changes in his medical therapy.  His blood pressure stable.  EKG shows sinus bradycardia with a rate of 57.  The patient is not having any signs or symptoms to suggest unstable angina or heart failure.    Patient had stress Myoview and echocardiogram that were stable in 2021.    He has had no recurrent dizzy or lightheaded spells or vision blurred episodes since his ER visit.    He has upcoming follow-up with [facility] in regards to right carotid artery possible aneurysm.    I have scheduled the patient back for follow-up with Dr. Blank here in 4 months.  If there is any new or worsening problems of asked him to contact the office sooner.  The patient is in agreement to this plan      Past Medical History:   Diagnosis  Date    Acute on chronic diastolic heart failure 12/14/2022    Aneurysm     Anxiety     Asthma     Atrial fibrillation     Bronchitis     CHF (congestive heart failure)     Coronary artery disease     Depression     Encounter for laboratory testing for COVID-19 virus 08/22/2022    Gout     Hyperlipidemia     Hypertension     Intractable headache 01/09/2023    Myocardial infarction     Pneumonia     Pulmonary nodule     Familia Mountain spotted fever     Stroke        Past Surgical History:   Procedure Laterality Date    AORTIC VALVE REPAIR/REPLACEMENT  12/03/2016    CABG x 5/ tissue AVR by DR. PHAN    CARDIAC CATHETERIZATION      CARDIAC SURGERY      CARDIOVASCULAR STRESS TEST  2020    CAROTID STENT      CORONARY ARTERY BYPASS GRAFT  12/02/2016    X4  Dr Phan    HERNIA REPAIR  2010    ILIAC ARTERY ANEURYSM REPAIR  01/05/2017    abdominal and bilateral    NASAL POLYP EXCISION  2005    OTHER SURGICAL HISTORY      [facility]    REPAIR CHOANAL ATRESIA  2005    SINUS SURGERY      VASCULAR SURGERY           Current Outpatient Medications:     allopurinol (ZYLOPRIM) 300 MG tablet, Take 1 tablet by mouth Every Night., Disp: , Rfl:     amLODIPine (NORVASC) 10 MG tablet, TAKE 1 TABLET BY MOUTH EVERY DAY, Disp: 90 tablet, Rfl: 1    apixaban (ELIQUIS) 5 MG tablet tablet, Take 1 tablet by mouth Every 12 (Twelve) Hours. Indications: Atrial Fibrillation, Disp: 60 tablet, Rfl: 0    ascorbic acid (VITAMIN C) 250 MG tablet, Take 4 tablets by mouth Daily., Disp: , Rfl:     aspirin 81 MG EC tablet, Take 1 tablet by mouth Daily., Disp: , Rfl:     Bacillus Coagulans-Inulin (ALIGN PREBIOTIC-PROBIOTIC PO), Take 1 tablet by mouth Daily., Disp: , Rfl:     Cholecalciferol 25 MCG (1000 UT) capsule, Take 1 capsule by mouth Daily., Disp: , Rfl:     ipratropium-albuterol (DUO-NEB) 0.5-2.5 mg/3 ml nebulizer, Take 3 mL by nebulization Every 4 (Four) Hours As Needed for Wheezing., Disp: , Rfl:     lisinopril (PRINIVIL,ZESTRIL) 20 MG tablet, TAKE 1 TABLET  "BY MOUTH TWICE A DAY, Disp: 180 tablet, Rfl: 1    Multiple Vitamins-Minerals (MULTIVITAMIN ADULT PO), Take 1 tablet by mouth Daily., Disp: , Rfl:     NON FORMULARY, Biologic asthma injection .  One injection every 4th week., Disp: , Rfl:     NON FORMULARY, Nasal spray . 2 sprays in each nostril twice daily., Disp: , Rfl:     Olopatadine-Mometasone (Ryaltris) 665-25 MCG/ACT suspension, into the nostril(s) as directed by provider., Disp: , Rfl:     rosuvastatin (CRESTOR) 20 MG tablet, Take 1 tablet by mouth Daily., Disp: , Rfl:     SYMBICORT 160-4.5 MCG/ACT inhaler, Inhale 2 puffs 2 (Two) Times a Day., Disp: , Rfl:     VENTOLIN  (90 Base) MCG/ACT inhaler, Inhale 2 puffs Every 6 (Six) Hours As Needed., Disp: , Rfl:     zinc sulfate (ZINCATE) 220 (50 Zn) MG capsule, Take 50 mg by mouth Daily., Disp: , Rfl:     Social History     Socioeconomic History    Marital status:    Tobacco Use    Smoking status: Never     Passive exposure: Never    Smokeless tobacco: Never   Vaping Use    Vaping status: Never Used   Substance and Sexual Activity    Alcohol use: Not Currently     Comment: quit 2005    Drug use: No    Sexual activity: Defer       Family History   Problem Relation Age of Onset    Pulmonary fibrosis Mother     Heart disease Mother     Cancer Father         brain    Cancer Sister         lung    Heart disease Sister     Cancer Brother         pancreatic, lung    Sleep apnea Neg Hx        The following portions of the patient's history were reviewed and updated as appropriate: allergies, current medications, past family history, past medical history, past social history, past surgical history and problem list.    Review of Systems   All other systems reviewed and are negative.      Pertinent items are noted in HPI, all other systems reviewed and negative    /77 (BP Location: Right arm, Patient Position: Sitting, Cuff Size: Large Adult)   Pulse 57   Resp 16   Ht 182.9 cm (72.01\")   Wt 88.5 kg " (195 lb)   SpO2 97%   BMI 26.44 kg/m² .  Objective     Vitals reviewed.   Constitutional:       General: Not in acute distress.     Appearance: Normal appearance. Well-developed.   Eyes:      Pupils: Pupils are equal, round, and reactive to light.   HENT:      Head: Normocephalic and atraumatic.   Neck:      Vascular: No JVD.   Pulmonary:      Effort: Pulmonary effort is normal.      Breath sounds: Normal breath sounds.   Cardiovascular:      Normal rate. Regular rhythm.   Edema:     Peripheral edema absent.   Abdominal:      General: There is no distension.      Palpations: Abdomen is soft.      Tenderness: There is no abdominal tenderness.   Musculoskeletal: Normal range of motion.      Cervical back: Normal range of motion and neck supple. Skin:     General: Skin is warm and dry.   Neurological:      Mental Status: Alert and oriented to person, place, and time.             ECG 12 Lead    Date/Time: 6/4/2024 1:11 PM  Performed by: Cecilia Ibarra APRN    Authorized by: Cecilia Ibarra APRN  Comparison: compared with previous ECG   Similar to previous ECG  Rhythm: sinus bradycardia  Rate: bradycardic  BPM: 57  Other findings: non-specific ST-T wave changes    Clinical impression: non-specific ECG          EKG ordered by and reviewed by me in office

## 2024-06-18 RX ORDER — LISINOPRIL 20 MG/1
20 TABLET ORAL 2 TIMES DAILY
Qty: 180 TABLET | Refills: 1 | Status: SHIPPED | OUTPATIENT
Start: 2024-06-18

## 2024-06-26 ENCOUNTER — LAB (OUTPATIENT)
Dept: FAMILY MEDICINE CLINIC | Facility: CLINIC | Age: 69
End: 2024-06-26
Payer: MEDICARE

## 2024-06-26 ENCOUNTER — OFFICE VISIT (OUTPATIENT)
Dept: FAMILY MEDICINE CLINIC | Facility: CLINIC | Age: 69
End: 2024-06-26
Payer: MEDICARE

## 2024-06-26 VITALS
WEIGHT: 198 LBS | HEART RATE: 53 BPM | BODY MASS INDEX: 26.82 KG/M2 | HEIGHT: 72 IN | TEMPERATURE: 94.8 F | OXYGEN SATURATION: 97 % | SYSTOLIC BLOOD PRESSURE: 135 MMHG | DIASTOLIC BLOOD PRESSURE: 78 MMHG

## 2024-06-26 DIAGNOSIS — I10 ESSENTIAL HYPERTENSION: Primary | Chronic | ICD-10-CM

## 2024-06-26 DIAGNOSIS — Z12.11 SCREENING FOR COLON CANCER: ICD-10-CM

## 2024-06-26 DIAGNOSIS — I48.0 PAROXYSMAL ATRIAL FIBRILLATION: Chronic | ICD-10-CM

## 2024-06-26 DIAGNOSIS — Z12.5 ENCOUNTER FOR SCREENING FOR MALIGNANT NEOPLASM OF PROSTATE: ICD-10-CM

## 2024-06-26 DIAGNOSIS — E78.2 MIXED HYPERLIPIDEMIA: Chronic | ICD-10-CM

## 2024-06-26 LAB
ALBUMIN SERPL-MCNC: 4.6 G/DL (ref 3.5–5.2)
ALBUMIN/GLOB SERPL: 1.7 G/DL
ALP SERPL-CCNC: 130 U/L (ref 39–117)
ALT SERPL W P-5'-P-CCNC: 19 U/L (ref 1–41)
ANION GAP SERPL CALCULATED.3IONS-SCNC: 11 MMOL/L (ref 5–15)
AST SERPL-CCNC: 20 U/L (ref 1–40)
BILIRUB SERPL-MCNC: 0.4 MG/DL (ref 0–1.2)
BUN SERPL-MCNC: 18 MG/DL (ref 8–23)
BUN/CREAT SERPL: 18.9 (ref 7–25)
CALCIUM SPEC-SCNC: 9.5 MG/DL (ref 8.6–10.5)
CHLORIDE SERPL-SCNC: 104 MMOL/L (ref 98–107)
CHOLEST SERPL-MCNC: 172 MG/DL (ref 0–200)
CO2 SERPL-SCNC: 25 MMOL/L (ref 22–29)
CREAT SERPL-MCNC: 0.95 MG/DL (ref 0.76–1.27)
EGFRCR SERPLBLD CKD-EPI 2021: 87.7 ML/MIN/1.73
GLOBULIN UR ELPH-MCNC: 2.7 GM/DL
GLUCOSE SERPL-MCNC: 83 MG/DL (ref 65–99)
HCV AB SER QL: NORMAL
HDLC SERPL-MCNC: 41 MG/DL (ref 40–60)
LDLC SERPL CALC-MCNC: 96 MG/DL (ref 0–100)
LDLC/HDLC SERPL: 2.2 {RATIO}
POTASSIUM SERPL-SCNC: 4.5 MMOL/L (ref 3.5–5.2)
PROT SERPL-MCNC: 7.3 G/DL (ref 6–8.5)
PSA SERPL-MCNC: 0.29 NG/ML (ref 0–4)
SODIUM SERPL-SCNC: 140 MMOL/L (ref 136–145)
TRIGL SERPL-MCNC: 205 MG/DL (ref 0–150)
VLDLC SERPL-MCNC: 35 MG/DL (ref 5–40)

## 2024-06-26 PROCEDURE — 36415 COLL VENOUS BLD VENIPUNCTURE: CPT | Performed by: FAMILY MEDICINE

## 2024-06-26 PROCEDURE — 3078F DIAST BP <80 MM HG: CPT | Performed by: FAMILY MEDICINE

## 2024-06-26 PROCEDURE — G0009 ADMIN PNEUMOCOCCAL VACCINE: HCPCS | Performed by: FAMILY MEDICINE

## 2024-06-26 PROCEDURE — 80061 LIPID PANEL: CPT | Performed by: FAMILY MEDICINE

## 2024-06-26 PROCEDURE — 1160F RVW MEDS BY RX/DR IN RCRD: CPT | Performed by: FAMILY MEDICINE

## 2024-06-26 PROCEDURE — G0103 PSA SCREENING: HCPCS | Performed by: FAMILY MEDICINE

## 2024-06-26 PROCEDURE — 90677 PCV20 VACCINE IM: CPT | Performed by: FAMILY MEDICINE

## 2024-06-26 PROCEDURE — 99214 OFFICE O/P EST MOD 30 MIN: CPT | Performed by: FAMILY MEDICINE

## 2024-06-26 PROCEDURE — 1126F AMNT PAIN NOTED NONE PRSNT: CPT | Performed by: FAMILY MEDICINE

## 2024-06-26 PROCEDURE — 3075F SYST BP GE 130 - 139MM HG: CPT | Performed by: FAMILY MEDICINE

## 2024-06-26 PROCEDURE — 1159F MED LIST DOCD IN RCRD: CPT | Performed by: FAMILY MEDICINE

## 2024-06-26 PROCEDURE — 80053 COMPREHEN METABOLIC PANEL: CPT | Performed by: FAMILY MEDICINE

## 2024-06-26 PROCEDURE — 86803 HEPATITIS C AB TEST: CPT | Performed by: FAMILY MEDICINE

## 2024-06-26 RX ORDER — CICLOPIROX 80 MG/ML
SOLUTION TOPICAL NIGHTLY
Qty: 6 ML | Refills: 3 | Status: SHIPPED | OUTPATIENT
Start: 2024-06-26

## 2024-06-26 NOTE — PROGRESS NOTES
"Subjective   Alejandro Bain is a 69 y.o. male.     History of Present Illness  Alejandro Bain is in for follow up on his high blood pressure and high cholesterol.  He also sees cardiology for atrial fibrillation and has plans to visit a specialist in [place] soon for some carotid artery issues.  This is his first visit with me as his primary care physician, who used to be part of our general group, has left the area.. There is no history of chest pain or dyspnea. There is no history of issue with bowel or bladder dysfunction. There is no history of dizziness or lightheadedness. There is no history of issue with sleep or mood. There is no history of issue with present medication.       Hypertension  Pertinent negatives include no chest pain, headaches, neck pain or shortness of breath.          /78 (BP Location: Left arm, Patient Position: Sitting, Cuff Size: Large Adult)   Pulse 53   Temp 94.8 °F (34.9 °C) (Oral)   Ht 182.9 cm (72.01\")   Wt 89.8 kg (198 lb)   SpO2 97%   BMI 26.85 kg/m²       Chief Complaint   Patient presents with    Hypertension     [place] - next visit [facility] - seeing dr. [place] in August for his cardid artery            Current Outpatient Medications:     allopurinol (ZYLOPRIM) 300 MG tablet, Take 1 tablet by mouth Every Night., Disp: , Rfl:     amLODIPine (NORVASC) 10 MG tablet, TAKE 1 TABLET BY MOUTH EVERY DAY, Disp: 90 tablet, Rfl: 1    apixaban (ELIQUIS) 5 MG tablet tablet, Take 1 tablet by mouth Every 12 (Twelve) Hours. Indications: Atrial Fibrillation, Disp: 60 tablet, Rfl: 0    ascorbic acid (VITAMIN C) 250 MG tablet, Take 4 tablets by mouth Daily., Disp: , Rfl:     aspirin 81 MG EC tablet, Take 1 tablet by mouth Daily., Disp: , Rfl:     Bacillus Coagulans-Inulin (ALIGN PREBIOTIC-PROBIOTIC PO), Take 1 tablet by mouth Daily., Disp: , Rfl:     Cholecalciferol 25 MCG (1000 UT) capsule, Take 1 capsule by mouth Daily., Disp: , Rfl:     ipratropium-albuterol " (DUO-NEB) 0.5-2.5 mg/3 ml nebulizer, Take 3 mL by nebulization Every 4 (Four) Hours As Needed for Wheezing., Disp: , Rfl:     lisinopril (PRINIVIL,ZESTRIL) 20 MG tablet, TAKE 1 TABLET BY MOUTH TWICE A DAY, Disp: 180 tablet, Rfl: 1    Multiple Vitamins-Minerals (MULTIVITAMIN ADULT PO), Take 1 tablet by mouth Daily., Disp: , Rfl:     NON FORMULARY, Biologic asthma injection .  One injection every 4th week., Disp: , Rfl:     NON FORMULARY, Nasal spray . 2 sprays in each nostril twice daily., Disp: , Rfl:     Olopatadine-Mometasone (Ryaltris) 665-25 MCG/ACT suspension, into the nostril(s) as directed by provider., Disp: , Rfl:     rosuvastatin (CRESTOR) 20 MG tablet, Take 1 tablet by mouth Daily., Disp: , Rfl:     SYMBICORT 160-4.5 MCG/ACT inhaler, Inhale 2 puffs 2 (Two) Times a Day., Disp: , Rfl:     VENTOLIN  (90 Base) MCG/ACT inhaler, Inhale 2 puffs Every 6 (Six) Hours As Needed., Disp: , Rfl:     zinc sulfate (ZINCATE) 220 (50 Zn) MG capsule, Take 50 mg by mouth Daily., Disp: , Rfl:     ciclopirox (PENLAC) 8 % solution, Apply  topically to the appropriate area as directed Every Night., Disp: 6 mL, Rfl: 3            The following portions of the patient's history were reviewed and updated as appropriate: allergies, current medications, past family history, past medical history, past social history, past surgical history, and problem list.    Review of Systems   Constitutional:  Negative for activity change, fatigue and fever.   HENT:  Negative for congestion, sinus pressure, sinus pain, sore throat and trouble swallowing.    Eyes:  Negative for visual disturbance.   Respiratory:  Negative for chest tightness, shortness of breath and wheezing.    Cardiovascular:  Negative for chest pain.   Gastrointestinal:  Negative for abdominal distention, abdominal pain, constipation, diarrhea, nausea and vomiting.   Genitourinary:  Negative for difficulty urinating and dysuria.   Musculoskeletal:  Negative for arthralgias,  back pain, myalgias and neck pain.   Neurological:  Negative for dizziness, weakness, light-headedness and headaches.   Psychiatric/Behavioral:  Positive for decreased concentration. Negative for agitation, hallucinations, sleep disturbance and suicidal ideas.        Objective   Physical Exam  Vitals and nursing note reviewed.   Constitutional:       Appearance: Normal appearance.   HENT:      Right Ear: Tympanic membrane and ear canal normal.      Left Ear: Tympanic membrane and ear canal normal.      Nose: Nose normal.   Cardiovascular:      Rate and Rhythm: Bradycardia present. Rhythm irregular.      Heart sounds: Normal heart sounds. No murmur heard.  Pulmonary:      Effort: Pulmonary effort is normal.      Breath sounds: No wheezing or rales.   Abdominal:      General: Bowel sounds are normal.      Palpations: Abdomen is soft.      Tenderness: There is no abdominal tenderness. There is no guarding.   Musculoskeletal:      Cervical back: Neck supple.      Right lower leg: No edema.      Left lower leg: No edema.   Lymphadenopathy:      Cervical: No cervical adenopathy.   Skin:     Comments: Onychomycosis of multiple toenails   Neurological:      Mental Status: He is alert and oriented to person, place, and time. Mental status is at baseline.   Psychiatric:         Mood and Affect: Mood normal.           Assessment & Plan   Problems Addressed this Visit          Cardiac and Vasculature    Essential hypertension - Primary (Chronic)    Paroxysmal atrial fibrillation (Chronic)    Mixed hyperlipidemia (Chronic)    Relevant Orders    Hepatitis C Antibody    Comprehensive metabolic panel    Lipid panel     Other Visit Diagnoses       Screening for colon cancer        Relevant Orders    Cologuard - Stool, Per Rectum    Encounter for screening for malignant neoplasm of prostate        Relevant Orders    PSA SCREENING          Diagnoses         Codes Comments    Essential hypertension    -  Primary ICD-10-CM:  I10  ICD-9-CM: 401.9     Mixed hyperlipidemia     ICD-10-CM: E78.2  ICD-9-CM: 272.2     Paroxysmal atrial fibrillation     ICD-10-CM: I48.0  ICD-9-CM: 427.31     Screening for colon cancer     ICD-10-CM: Z12.11  ICD-9-CM: V76.51     Encounter for screening for malignant neoplasm of prostate     ICD-10-CM: Z12.5  ICD-9-CM: V76.44           I have will have him update some labs and I will see if adjustments are needed to his treatment plan  I have ordered a Cologuard and I am giving him pneumonia vaccine  I have asked him to be more active, especially with core exercise  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

## 2024-06-27 ENCOUNTER — PATIENT ROUNDING (BHMG ONLY) (OUTPATIENT)
Dept: FAMILY MEDICINE CLINIC | Facility: CLINIC | Age: 69
End: 2024-06-27
Payer: MEDICARE

## 2024-07-10 ENCOUNTER — OFFICE VISIT (OUTPATIENT)
Dept: FAMILY MEDICINE CLINIC | Facility: CLINIC | Age: 69
End: 2024-07-10
Payer: MEDICARE

## 2024-07-10 VITALS
DIASTOLIC BLOOD PRESSURE: 77 MMHG | TEMPERATURE: 97.7 F | SYSTOLIC BLOOD PRESSURE: 144 MMHG | OXYGEN SATURATION: 96 % | WEIGHT: 199 LBS | HEART RATE: 60 BPM | BODY MASS INDEX: 26.98 KG/M2

## 2024-07-10 DIAGNOSIS — M10.9 ACUTE GOUT INVOLVING TOE OF LEFT FOOT, UNSPECIFIED CAUSE: Primary | ICD-10-CM

## 2024-07-10 PROCEDURE — 1159F MED LIST DOCD IN RCRD: CPT | Performed by: NURSE PRACTITIONER

## 2024-07-10 PROCEDURE — 3078F DIAST BP <80 MM HG: CPT | Performed by: NURSE PRACTITIONER

## 2024-07-10 PROCEDURE — 1126F AMNT PAIN NOTED NONE PRSNT: CPT | Performed by: NURSE PRACTITIONER

## 2024-07-10 PROCEDURE — 99213 OFFICE O/P EST LOW 20 MIN: CPT | Performed by: NURSE PRACTITIONER

## 2024-07-10 PROCEDURE — 1160F RVW MEDS BY RX/DR IN RCRD: CPT | Performed by: NURSE PRACTITIONER

## 2024-07-10 PROCEDURE — 3077F SYST BP >= 140 MM HG: CPT | Performed by: NURSE PRACTITIONER

## 2024-07-10 RX ORDER — METHYLPREDNISOLONE 4 MG/1
TABLET ORAL
Qty: 21 TABLET | Refills: 0 | Status: SHIPPED | OUTPATIENT
Start: 2024-07-10

## 2024-07-10 RX ORDER — COLCHICINE 0.6 MG/1
TABLET ORAL
Qty: 9 TABLET | Refills: 0 | Status: SHIPPED | OUTPATIENT
Start: 2024-07-10

## 2024-07-10 NOTE — PATIENT INSTRUCTIONS
Complete steroid  Call if no improvement    Gout    Gout is painful swelling of your joints. Gout is a type of arthritis. It is caused by having too much uric acid in your body. Uric acid is a chemical that is made when your body breaks down substances called purines. If your body has too much uric acid, sharp crystals can form and build up in your joints. This causes pain and swelling.  Gout attacks can happen quickly and be very painful (acute gout). Over time, the attacks can affect more joints and happen more often (chronic gout).  What are the causes?  Gout is caused by too much uric acid in your blood. This can happen because:  Your kidneys do not remove enough uric acid from your blood.  Your body makes too much uric acid.  You eat too many foods that are high in purines. These foods include organ meats, some seafood, and beer.  Trauma or stress can bring on an attack.  What increases the risk?  Having a family history of gout.  Being male and middle-aged.  Being female and having gone through menopause.  Having an organ transplant.  Taking certain medicines.  Having certain conditions, such as:  Being very overweight (obese).  Lead poisoning.  Kidney disease.  A skin condition called psoriasis.  Other risks include:  Losing weight too quickly.  Not having enough water in the body (being dehydrated).  Drinking alcohol, especially beer.  Drinking beverages that are sweetened with a type of sugar called fructose.  What are the signs or symptoms?  An attack of acute gout often starts at night and usually happens in just one joint. The most common place is the big toe. Other joints that may be affected include joints of the feet, ankle, knee, fingers, wrist, or elbow. Symptoms may include:  Very bad pain.  Warmth.  Swelling.  Stiffness.  Tenderness. The affected joint may be very painful to touch.  Shiny, red, or purple skin.  Chills and fever.  Chronic gout may cause symptoms more often. More joints may be  involved. You may also have white or yellow lumps (tophi) on your hands or feet or in other areas near your joints.  How is this treated?  Treatment for an acute attack may include medicines for pain and swelling, such as:  NSAIDs, such as ibuprofen.  Steroids taken by mouth or injected into a joint.  Colchicine. This can be given by mouth or through an IV tube.  Treatment to prevent future attacks may include:  Taking small doses of NSAIDs or colchicine daily.  Using a medicine that reduces uric acid levels in your blood, such as allopurinol.  Making changes to your diet. You may need to see a food expert (dietitian) about what to eat and drink to prevent gout.  Follow these instructions at home:  During a gout attack    If told, put ice on the painful area. To do this:  Put ice in a plastic bag.  Place a towel between your skin and the bag.  Leave the ice on for 20 minutes, 2-3 times a day.  Take off the ice if your skin turns bright red. This is very important. If you cannot feel pain, heat, or cold, you have a greater risk of damage to the area.  Raise the painful joint above the level of your heart as often as you can.  Rest the joint as much as possible. If the joint is in your leg, you may be given crutches.  Follow instructions from your doctor about what you cannot eat or drink.  Avoiding future gout attacks  Eat a low-purine diet. Avoid foods and drinks such as:  Liver.  Kidney.  Anchovies.  Asparagus.  Herring.  Mushrooms.  Mussels.  Beer.  Stay at a healthy weight. If you want to lose weight, talk with your doctor. Do not lose weight too fast.  Start or continue an exercise plan as told by your doctor.  Eating and drinking  Avoid drinks sweetened by fructose.  Drink enough fluids to keep your pee (urine) pale yellow.  If you drink alcohol:  Limit how much you have to:  0-1 drink a day for women who are not pregnant.  0-2 drinks a day for men.  Know how much alcohol is in a drink. In the [place] one drink  equals one 12 oz bottle of beer (355 mL), one 5 oz glass of wine (148 mL), or one 1½ oz glass of hard liquor (44 mL).  General instructions  Take over-the-counter and prescription medicines only as told by your doctor.  Ask your doctor if you should avoid driving or using machines while you are taking your medicine.  Return to your normal activities when your doctor says that it is safe.  Keep all follow-up visits.  Where to find more information  National Institutes of Health: www.niams.nih.gov  Contact a doctor if:  You have another gout attack.  You still have symptoms of a gout attack after 10 days of treatment.  You have problems (side effects) because of your medicines.  You have chills or a fever.  You have burning pain when you pee (urinate).  You have pain in your lower back or belly.  Get help right away if:  You have very bad pain.  Your pain cannot be controlled.  You cannot pee.  Summary  Gout is painful swelling of the joints.  The most common site of pain is the big toe, but it can affect other joints.  Medicines and avoiding some foods can help to prevent and treat gout attacks.  This information is not intended to replace advice given to you by your health care provider. Make sure you discuss any questions you have with your health care provider.  Document Revised: 09/21/2022 Document Reviewed: 09/21/2022  Elsevier Patient Education © 2024 [employer]

## 2024-07-10 NOTE — PROGRESS NOTES
Subjective     Alejandro Bain is a 69 y.o. male.     History of Present Illness  Patient is here today with complaints of a gout flare.  This is a chronic issue.  He started getting gout after going on eliquis about a year ago.  He is currently on allopurinol 300 mg daily.  He stopped taking the allopurinol 10 days ago.   Three days ago his gout flared up again.   The pain is severe-a sheet on the bed triggers pain.   It is located on left MTP joint.   He has been using OTC tylenol, icing, and elevating.   Does not drink alcohol. Previous alcoholic, sober for 20 years.   Does not eat a lot of seafood or meat.              The following portions of the patient's history were reviewed and updated as appropriate: allergies, current medications, past family history, past medical history, past social history, past surgical history, and problem list.    Review of Systems   Constitutional:  Negative for chills, fatigue and fever.   Respiratory:  Negative for chest tightness, shortness of breath and wheezing.    Cardiovascular:  Negative for chest pain and palpitations.   Musculoskeletal:  Positive for arthralgias (left MTP joint) and joint swelling (left MTP joint).   Skin:  Positive for color change.        Swelling, erythema to left MTP joint   Neurological:  Negative for dizziness, light-headedness and headache.       Objective     /77 (BP Location: Left arm, Patient Position: Sitting, Cuff Size: Large Adult)   Pulse 60   Temp 97.7 °F (36.5 °C) (Tympanic)   Wt 90.3 kg (199 lb)   SpO2 96%   BMI 26.98 kg/m²     Current Outpatient Medications on File Prior to Visit   Medication Sig Dispense Refill    allopurinol (ZYLOPRIM) 300 MG tablet Take 1 tablet by mouth Every Night.      amLODIPine (NORVASC) 10 MG tablet TAKE 1 TABLET BY MOUTH EVERY DAY 90 tablet 1    apixaban (ELIQUIS) 5 MG tablet tablet Take 1 tablet by mouth Every 12 (Twelve) Hours. Indications: Atrial Fibrillation 60 tablet 0    ascorbic acid (VITAMIN  C) 250 MG tablet Take 4 tablets by mouth Daily.      aspirin 81 MG EC tablet Take 1 tablet by mouth Daily.      Bacillus Coagulans-Inulin (ALIGN PREBIOTIC-PROBIOTIC PO) Take 1 tablet by mouth Daily.      Cholecalciferol 25 MCG (1000 UT) capsule Take 1 capsule by mouth Daily.      ciclopirox (PENLAC) 8 % solution Apply  topically to the appropriate area as directed Every Night. 6 mL 3    ipratropium-albuterol (DUO-NEB) 0.5-2.5 mg/3 ml nebulizer Take 3 mL by nebulization Every 4 (Four) Hours As Needed for Wheezing.      lisinopril (PRINIVIL,ZESTRIL) 20 MG tablet TAKE 1 TABLET BY MOUTH TWICE A  tablet 1    Multiple Vitamins-Minerals (MULTIVITAMIN ADULT PO) Take 1 tablet by mouth Daily.      NON FORMULARY Biologic asthma injection .  One injection every 4th week.      NON FORMULARY Nasal spray . 2 sprays in each nostril twice daily.      Olopatadine-Mometasone (Ryaltris) 665-25 MCG/ACT suspension into the nostril(s) as directed by provider.      rosuvastatin (CRESTOR) 20 MG tablet Take 1 tablet by mouth Daily.      SYMBICORT 160-4.5 MCG/ACT inhaler Inhale 2 puffs 2 (Two) Times a Day.      VENTOLIN  (90 Base) MCG/ACT inhaler Inhale 2 puffs Every 6 (Six) Hours As Needed.      zinc sulfate (ZINCATE) 220 (50 Zn) MG capsule Take 50 mg by mouth Daily.       No current facility-administered medications on file prior to visit.                 Physical Exam  Constitutional:       General: He is not in acute distress.     Appearance: Normal appearance. He is not ill-appearing.   HENT:      Head: Normocephalic and atraumatic.   Eyes:      Extraocular Movements: Extraocular movements intact.   Cardiovascular:      Rate and Rhythm: Normal rate and regular rhythm.      Heart sounds: No murmur heard.  Pulmonary:      Effort: Pulmonary effort is normal. No respiratory distress.   Musculoskeletal:         General: Swelling (left MTP joint) present.   Skin:     General: Skin is warm and dry.      Findings: Erythema present.       Comments: Left MTP joint   Neurological:      General: No focal deficit present.      Mental Status: He is alert and oriented to person, place, and time.   Psychiatric:         Mood and Affect: Mood normal.         Behavior: Behavior normal.         Thought Content: Thought content normal.         Judgment: Judgment normal.           Assessment & Plan     Diagnoses and all orders for this visit:    1. Acute gout involving toe of left foot, unspecified cause (Primary)  Comments:  restart allopurinol  medrol pack today  colchicine for future flares  call if no improvement  Orders:  -     methylPREDNISolone (MEDROL) 4 MG dose pack; Take as directed on package instructions.  Dispense: 21 tablet; Refill: 0  -     colchicine 0.6 MG tablet; Take 1.2mg at onset of gout flare and then take 0.6mg once and hour later  Dispense: 9 tablet; Refill: 0

## 2024-07-24 ENCOUNTER — TELEPHONE (OUTPATIENT)
Dept: FAMILY MEDICINE CLINIC | Facility: CLINIC | Age: 69
End: 2024-07-24
Payer: MEDICARE

## 2024-07-24 NOTE — TELEPHONE ENCOUNTER
Caller: Alejandro Bain    Relationship to patient: Self    Best call back number: [phone]    Patient is needing:     PATIENT SAID HE HAD GOUT RECENTLY AND TOOK A STEROID FOR IT WHICH HELPED BUT HE SAID HE THINKS ITS COMING BACK AGAIN AND IS NOT SURE WHAT TO TAKE, HE TOOK THE ENTIRE PRESCRIPTION OF COLCHICINE FROM 07/10 AND IS WANTING TO SEE IF HE NEEDS TO CONTINUE ON THAT OR NOT

## 2024-07-25 DIAGNOSIS — M10.9 ACUTE GOUT INVOLVING TOE OF LEFT FOOT, UNSPECIFIED CAUSE: ICD-10-CM

## 2024-07-25 RX ORDER — COLCHICINE 0.6 MG/1
TABLET ORAL
Qty: 9 TABLET | Refills: 0 | Status: SHIPPED | OUTPATIENT
Start: 2024-07-25 | End: 2024-07-29

## 2024-07-25 NOTE — TELEPHONE ENCOUNTER
Spoke to Alejandro and gave him the information. He did not get the colchicine when is was called in, [facility] did not give him that when he picked up the medrol. He is going to call and see about getting that.

## 2024-07-29 ENCOUNTER — TELEPHONE (OUTPATIENT)
Dept: FAMILY MEDICINE CLINIC | Facility: CLINIC | Age: 69
End: 2024-07-29
Payer: MEDICARE

## 2024-07-29 NOTE — TELEPHONE ENCOUNTER
Caller: Alejandro Bain    Relationship: Self    Best call back number: [phone]     What medication are you requesting: PAXLOVID    What are your current symptoms: CONGESTION, COUGH, FATIGUE    How long have you been experiencing symptoms: 7/26/24    Have you had these symptoms before:    [] Yes  [] No    Have you been treated for these symptoms before:   [] Yes  [] No    If a prescription is needed, what is your preferred pharmacy and phone number: [facility] [place] [id number] [facility], [place] - [address] [facility] [facility] - [phone] [facility] [phone] FX     Additional notes: PATIENT TESTED POSITIVE FOR COVID ON 07/27/24.

## 2024-07-30 NOTE — TELEPHONE ENCOUNTER
Pt called on call provider concerned about having a low blood pressure reading of 98/60. He is also currently fighting off COVID, diagnosed 2 days ago. His blood pressure never runs this low. I attempted to call the pt back to see how he is feeling, but got no answer. I left a detailed voice message advising him to go to the ER to be evaluated if he is feeling worse, fever increasing, dizzy, lightheaded, shortness of breath, chest pain, or having palpitations. I also encouraged him to stay on top of his fluid intake. If he is not feeling any of what is stated above I advised him to follow up with his PCP tomorrow.  
negative...

## 2024-08-05 ENCOUNTER — OFFICE VISIT (OUTPATIENT)
Dept: FAMILY MEDICINE CLINIC | Facility: CLINIC | Age: 69
End: 2024-08-05
Payer: MEDICARE

## 2024-08-05 VITALS
OXYGEN SATURATION: 96 % | SYSTOLIC BLOOD PRESSURE: 116 MMHG | HEART RATE: 55 BPM | WEIGHT: 191 LBS | BODY MASS INDEX: 25.2 KG/M2 | DIASTOLIC BLOOD PRESSURE: 76 MMHG | TEMPERATURE: 98.6 F

## 2024-08-05 DIAGNOSIS — R05.1 ACUTE COUGH: Primary | ICD-10-CM

## 2024-08-05 PROCEDURE — G2211 COMPLEX E/M VISIT ADD ON: HCPCS | Performed by: NURSE PRACTITIONER

## 2024-08-05 PROCEDURE — 3074F SYST BP LT 130 MM HG: CPT | Performed by: NURSE PRACTITIONER

## 2024-08-05 PROCEDURE — 1159F MED LIST DOCD IN RCRD: CPT | Performed by: NURSE PRACTITIONER

## 2024-08-05 PROCEDURE — 99214 OFFICE O/P EST MOD 30 MIN: CPT | Performed by: NURSE PRACTITIONER

## 2024-08-05 PROCEDURE — 3078F DIAST BP <80 MM HG: CPT | Performed by: NURSE PRACTITIONER

## 2024-08-05 PROCEDURE — 1126F AMNT PAIN NOTED NONE PRSNT: CPT | Performed by: NURSE PRACTITIONER

## 2024-08-05 PROCEDURE — 1160F RVW MEDS BY RX/DR IN RCRD: CPT | Performed by: NURSE PRACTITIONER

## 2024-08-05 RX ORDER — AZITHROMYCIN 250 MG/1
TABLET, FILM COATED ORAL
Qty: 6 TABLET | Refills: 0 | Status: SHIPPED | OUTPATIENT
Start: 2024-08-05

## 2024-08-05 RX ORDER — PREDNISONE 20 MG/1
40 TABLET ORAL DAILY
Qty: 10 TABLET | Refills: 0 | Status: SHIPPED | OUTPATIENT
Start: 2024-08-05 | End: 2024-08-10

## 2024-08-05 NOTE — PROGRESS NOTES
Subjective     Alejandro Bain is a 69 y.o. male.     History of Present Illness  Patient is here today with complaints of cough, congestion, wheezing.  He has a history of asthma.  Pt states he tested positive for covid on 7/26.  He states he was sick for 4 days.  He is starting to feel better  He is able to get back walking  He continues to have some SOA and cough  The cough is keeping him up at night  Denies fevers  He is using his inhalers  He is worried that his asthma is worsening right now.            The following portions of the patient's history were reviewed and updated as appropriate: allergies, current medications, past family history, past medical history, past social history, past surgical history, and problem list.    Review of Systems   Constitutional:  Negative for chills and fatigue.   HENT:  Negative for congestion and sinus pressure.    Respiratory:  Positive for cough, chest tightness, shortness of breath and wheezing.    Cardiovascular:  Negative for chest pain and palpitations.   Neurological:  Negative for dizziness and headache.       Objective     /76 (BP Location: Left arm, Patient Position: Sitting, Cuff Size: Large Adult)   Pulse 55   Temp 98.6 °F (37 °C) (Tympanic)   Wt 86.6 kg (191 lb)   SpO2 96%   BMI 25.20 kg/m²     Current Outpatient Medications on File Prior to Visit   Medication Sig Dispense Refill    allopurinol (ZYLOPRIM) 300 MG tablet Take 1 tablet by mouth Every Night.      amLODIPine (NORVASC) 10 MG tablet TAKE 1 TABLET BY MOUTH EVERY DAY 90 tablet 1    apixaban (ELIQUIS) 5 MG tablet tablet Take 1 tablet by mouth Every 12 (Twelve) Hours. Indications: Atrial Fibrillation 60 tablet 0    ascorbic acid (VITAMIN C) 250 MG tablet Take 4 tablets by mouth Daily.      aspirin 81 MG EC tablet Take 1 tablet by mouth Daily.      Bacillus Coagulans-Inulin (ALIGN PREBIOTIC-PROBIOTIC PO) Take 1 tablet by mouth Daily.      Cholecalciferol 25 MCG (1000 UT) capsule Take 1 capsule by  mouth Daily.      ciclopirox (PENLAC) 8 % solution Apply  topically to the appropriate area as directed Every Night. 6 mL 3    guaiFENesin 200 MG tablet Take 2 tablets by mouth Every 6 (Six) Hours As Needed for Cough. 40 tablet 0    ipratropium-albuterol (DUO-NEB) 0.5-2.5 mg/3 ml nebulizer Take 3 mL by nebulization Every 4 (Four) Hours As Needed for Wheezing.      lisinopril (PRINIVIL,ZESTRIL) 20 MG tablet TAKE 1 TABLET BY MOUTH TWICE A  tablet 1    Multiple Vitamins-Minerals (MULTIVITAMIN ADULT PO) Take 1 tablet by mouth Daily.      Olopatadine-Mometasone (Ryaltris) 665-25 MCG/ACT suspension into the nostril(s) as directed by provider.      promethazine-dextromethorphan (PROMETHAZINE-DM) 6.25-15 MG/5ML syrup Take 5 mL by mouth 4 (Four) Times a Day As Needed for Cough. 180 mL 0    rosuvastatin (CRESTOR) 20 MG tablet Take 1 tablet by mouth Daily.      SYMBICORT 160-4.5 MCG/ACT inhaler Inhale 2 puffs 2 (Two) Times a Day.      VENTOLIN  (90 Base) MCG/ACT inhaler Inhale 2 puffs Every 6 (Six) Hours As Needed.      zinc sulfate (ZINCATE) 220 (50 Zn) MG capsule Take 50 mg by mouth Daily.      [DISCONTINUED] methylPREDNISolone (MEDROL) 4 MG dose pack Take as directed on package instructions. 21 tablet 0     No current facility-administered medications on file prior to visit.                 Physical Exam  Constitutional:       General: He is not in acute distress.     Appearance: Normal appearance. He is not ill-appearing.   HENT:      Head: Normocephalic and atraumatic.   Eyes:      Extraocular Movements: Extraocular movements intact.   Cardiovascular:      Rate and Rhythm: Normal rate and regular rhythm.   Pulmonary:      Effort: Pulmonary effort is normal. No respiratory distress.      Breath sounds: Wheezing (LLL) present.   Neurological:      General: No focal deficit present.      Mental Status: He is alert and oriented to person, place, and time.   Psychiatric:         Mood and Affect: Mood normal.          Behavior: Behavior normal.         Thought Content: Thought content normal.         Judgment: Judgment normal.           Assessment & Plan     Diagnoses and all orders for this visit:    1. Acute cough (Primary)  Comments:  concerned for asthma flare with poss PNA  had covid 10 days ago- concerned its turning bacteria  start steroid and z pack  call if no imp  cont inhalers  Orders:  -     azithromycin (Zithromax Z-Avery) 250 MG tablet; Take 2 tablets by mouth on day 1, then 1 tablet daily on days 2-5  Dispense: 6 tablet; Refill: 0  -     predniSONE (DELTASONE) 20 MG tablet; Take 2 tablets by mouth Daily for 5 days.  Dispense: 10 tablet; Refill: 0

## 2024-08-23 ENCOUNTER — TELEPHONE (OUTPATIENT)
Dept: FAMILY MEDICINE CLINIC | Facility: CLINIC | Age: 69
End: 2024-08-23
Payer: MEDICARE

## 2024-08-23 NOTE — TELEPHONE ENCOUNTER
Patient called due to having Covid 3 weeks ago. Still having SOA, Deep cough and coughing up phlem. Doesn't want to see any other Physican but Dr. Freeman.

## 2024-08-27 ENCOUNTER — APPOINTMENT (OUTPATIENT)
Dept: GENERAL RADIOLOGY | Facility: HOSPITAL | Age: 69
End: 2024-08-27
Payer: MEDICARE

## 2024-08-27 ENCOUNTER — HOSPITAL ENCOUNTER (OUTPATIENT)
Facility: HOSPITAL | Age: 69
Setting detail: OBSERVATION
Discharge: HOME | End: 2024-08-28
Attending: EMERGENCY MEDICINE | Admitting: EMERGENCY MEDICINE
Payer: MEDICARE

## 2024-08-27 ENCOUNTER — APPOINTMENT (OUTPATIENT)
Dept: MRI IMAGING | Facility: HOSPITAL | Age: 69
End: 2024-08-27
Payer: MEDICARE

## 2024-08-27 DIAGNOSIS — M54.2 NECK PAIN: ICD-10-CM

## 2024-08-27 DIAGNOSIS — M50.00 CERVICAL DISC DISEASE WITH MYELOPATHY: Primary | ICD-10-CM

## 2024-08-27 DIAGNOSIS — Z86.79 HISTORY OF VALVULAR HEART DISEASE: ICD-10-CM

## 2024-08-27 DIAGNOSIS — Z86.73 HISTORY OF STROKE: ICD-10-CM

## 2024-08-27 DIAGNOSIS — M54.9 UPPER BACK PAIN: ICD-10-CM

## 2024-08-27 DIAGNOSIS — Z95.1 HX OF CABG: ICD-10-CM

## 2024-08-27 LAB
ALBUMIN SERPL-MCNC: 4.2 G/DL (ref 3.5–5.2)
ALBUMIN/GLOB SERPL: 1.8 G/DL
ALP SERPL-CCNC: 105 U/L (ref 39–117)
ALT SERPL W P-5'-P-CCNC: 28 U/L (ref 1–41)
ANION GAP SERPL CALCULATED.3IONS-SCNC: 10.9 MMOL/L (ref 5–15)
AST SERPL-CCNC: 27 U/L (ref 1–40)
BASOPHILS # BLD AUTO: 0.03 10*3/MM3 (ref 0–0.2)
BASOPHILS NFR BLD AUTO: 0.3 % (ref 0–1.5)
BILIRUB SERPL-MCNC: 0.4 MG/DL (ref 0–1.2)
BUN SERPL-MCNC: 21 MG/DL (ref 8–23)
BUN/CREAT SERPL: 22.6 (ref 7–25)
CALCIUM SPEC-SCNC: 9.5 MG/DL (ref 8.6–10.5)
CHLORIDE SERPL-SCNC: 106 MMOL/L (ref 98–107)
CO2 SERPL-SCNC: 23.1 MMOL/L (ref 22–29)
CREAT SERPL-MCNC: 0.93 MG/DL (ref 0.76–1.27)
DEPRECATED RDW RBC AUTO: 49.4 FL (ref 37–54)
EGFRCR SERPLBLD CKD-EPI 2021: 90 ML/MIN/1.73
EOSINOPHIL # BLD AUTO: 0.65 10*3/MM3 (ref 0–0.4)
EOSINOPHIL NFR BLD AUTO: 7.1 % (ref 0.3–6.2)
ERYTHROCYTE [DISTWIDTH] IN BLOOD BY AUTOMATED COUNT: 15.9 % (ref 12.3–15.4)
GLOBULIN UR ELPH-MCNC: 2.3 GM/DL
GLUCOSE SERPL-MCNC: 103 MG/DL (ref 65–99)
HCT VFR BLD AUTO: 42.9 % (ref 37.5–51)
HGB BLD-MCNC: 14 G/DL (ref 13–17.7)
IMM GRANULOCYTES # BLD AUTO: 0.05 10*3/MM3 (ref 0–0.05)
IMM GRANULOCYTES NFR BLD AUTO: 0.5 % (ref 0–0.5)
LYMPHOCYTES # BLD AUTO: 1.41 10*3/MM3 (ref 0.7–3.1)
LYMPHOCYTES NFR BLD AUTO: 15.3 % (ref 19.6–45.3)
MCH RBC QN AUTO: 28 PG (ref 26.6–33)
MCHC RBC AUTO-ENTMCNC: 32.6 G/DL (ref 31.5–35.7)
MCV RBC AUTO: 85.8 FL (ref 79–97)
MONOCYTES # BLD AUTO: 0.54 10*3/MM3 (ref 0.1–0.9)
MONOCYTES NFR BLD AUTO: 5.9 % (ref 5–12)
NEUTROPHILS NFR BLD AUTO: 6.52 10*3/MM3 (ref 1.7–7)
NEUTROPHILS NFR BLD AUTO: 70.9 % (ref 42.7–76)
NRBC BLD AUTO-RTO: 0 /100 WBC (ref 0–0.2)
PLATELET # BLD AUTO: 120 10*3/MM3 (ref 140–450)
PMV BLD AUTO: 10.3 FL (ref 6–12)
POTASSIUM SERPL-SCNC: 3.8 MMOL/L (ref 3.5–5.2)
PROT SERPL-MCNC: 6.5 G/DL (ref 6–8.5)
RBC # BLD AUTO: 5 10*6/MM3 (ref 4.14–5.8)
SODIUM SERPL-SCNC: 140 MMOL/L (ref 136–145)
TROPONIN T SERPL HS-MCNC: 19 NG/L
WBC NRBC COR # BLD AUTO: 9.2 10*3/MM3 (ref 3.4–10.8)

## 2024-08-27 PROCEDURE — 84484 ASSAY OF TROPONIN QUANT: CPT | Performed by: EMERGENCY MEDICINE

## 2024-08-27 PROCEDURE — 85025 COMPLETE CBC W/AUTO DIFF WBC: CPT | Performed by: EMERGENCY MEDICINE

## 2024-08-27 PROCEDURE — 96374 THER/PROPH/DIAG INJ IV PUSH: CPT

## 2024-08-27 PROCEDURE — 71045 X-RAY EXAM CHEST 1 VIEW: CPT

## 2024-08-27 PROCEDURE — 99285 EMERGENCY DEPT VISIT HI MDM: CPT

## 2024-08-27 PROCEDURE — 93005 ELECTROCARDIOGRAM TRACING: CPT | Performed by: EMERGENCY MEDICINE

## 2024-08-27 PROCEDURE — G0378 HOSPITAL OBSERVATION PER HR: HCPCS

## 2024-08-27 PROCEDURE — 25010000002 DEXAMETHASONE SODIUM PHOSPHATE 10 MG/ML SOLUTION: Performed by: EMERGENCY MEDICINE

## 2024-08-27 PROCEDURE — 93005 ELECTROCARDIOGRAM TRACING: CPT

## 2024-08-27 PROCEDURE — 80053 COMPREHEN METABOLIC PANEL: CPT | Performed by: EMERGENCY MEDICINE

## 2024-08-27 PROCEDURE — 72141 MRI NECK SPINE W/O DYE: CPT

## 2024-08-27 RX ORDER — SODIUM CHLORIDE 0.9 % (FLUSH) 0.9 %
10 SYRINGE (ML) INJECTION AS NEEDED
Status: DISCONTINUED | OUTPATIENT
Start: 2024-08-27 | End: 2024-08-28 | Stop reason: HOSPADM

## 2024-08-27 RX ORDER — SODIUM CHLORIDE 0.9 % (FLUSH) 0.9 %
10 SYRINGE (ML) INJECTION EVERY 12 HOURS SCHEDULED
Status: DISCONTINUED | OUTPATIENT
Start: 2024-08-28 | End: 2024-08-28 | Stop reason: HOSPADM

## 2024-08-27 RX ORDER — DEXAMETHASONE SODIUM PHOSPHATE 4 MG/ML
4 INJECTION, SOLUTION INTRA-ARTICULAR; INTRALESIONAL; INTRAMUSCULAR; INTRAVENOUS; SOFT TISSUE ONCE
Status: COMPLETED | OUTPATIENT
Start: 2024-08-28 | End: 2024-08-28

## 2024-08-27 RX ORDER — ONDANSETRON 2 MG/ML
4 INJECTION INTRAMUSCULAR; INTRAVENOUS EVERY 6 HOURS PRN
Status: DISCONTINUED | OUTPATIENT
Start: 2024-08-27 | End: 2024-08-28 | Stop reason: HOSPADM

## 2024-08-27 RX ORDER — SODIUM CHLORIDE 9 MG/ML
40 INJECTION, SOLUTION INTRAVENOUS AS NEEDED
Status: DISCONTINUED | OUTPATIENT
Start: 2024-08-27 | End: 2024-08-28 | Stop reason: HOSPADM

## 2024-08-27 RX ORDER — ONDANSETRON 2 MG/ML
4 INJECTION INTRAMUSCULAR; INTRAVENOUS ONCE
Status: DISCONTINUED | OUTPATIENT
Start: 2024-08-27 | End: 2024-08-28 | Stop reason: HOSPADM

## 2024-08-27 RX ORDER — DEXAMETHASONE SODIUM PHOSPHATE 10 MG/ML
8 INJECTION, SOLUTION INTRAMUSCULAR; INTRAVENOUS ONCE
Status: COMPLETED | OUTPATIENT
Start: 2024-08-27 | End: 2024-08-27

## 2024-08-27 RX ADMIN — DEXAMETHASONE SODIUM PHOSPHATE 8 MG: 10 INJECTION, SOLUTION INTRAMUSCULAR; INTRAVENOUS at 20:47

## 2024-08-28 ENCOUNTER — READMISSION MANAGEMENT (OUTPATIENT)
Dept: CALL CENTER | Facility: HOSPITAL | Age: 69
End: 2024-08-28
Payer: MEDICARE

## 2024-08-28 VITALS
OXYGEN SATURATION: 98 % | HEART RATE: 57 BPM | TEMPERATURE: 97.5 F | RESPIRATION RATE: 16 BRPM | SYSTOLIC BLOOD PRESSURE: 122 MMHG | WEIGHT: 192 LBS | DIASTOLIC BLOOD PRESSURE: 85 MMHG | BODY MASS INDEX: 25.45 KG/M2 | HEIGHT: 73 IN

## 2024-08-28 LAB
ANION GAP SERPL CALCULATED.3IONS-SCNC: 10.1 MMOL/L (ref 5–15)
BASOPHILS # BLD AUTO: 0 10*3/MM3 (ref 0–0.2)
BASOPHILS NFR BLD AUTO: 0 % (ref 0–1.5)
BUN SERPL-MCNC: 22 MG/DL (ref 8–23)
BUN/CREAT SERPL: 23.2 (ref 7–25)
CALCIUM SPEC-SCNC: 9.4 MG/DL (ref 8.6–10.5)
CHLORIDE SERPL-SCNC: 108 MMOL/L (ref 98–107)
CO2 SERPL-SCNC: 20.9 MMOL/L (ref 22–29)
CREAT SERPL-MCNC: 0.95 MG/DL (ref 0.76–1.27)
DEPRECATED RDW RBC AUTO: 48 FL (ref 37–54)
EGFRCR SERPLBLD CKD-EPI 2021: 87.7 ML/MIN/1.73
EOSINOPHIL # BLD AUTO: 0.02 10*3/MM3 (ref 0–0.4)
EOSINOPHIL NFR BLD AUTO: 0.4 % (ref 0.3–6.2)
ERYTHROCYTE [DISTWIDTH] IN BLOOD BY AUTOMATED COUNT: 15.6 % (ref 12.3–15.4)
GLUCOSE SERPL-MCNC: 161 MG/DL (ref 65–99)
HCT VFR BLD AUTO: 41.6 % (ref 37.5–51)
HGB BLD-MCNC: 13.4 G/DL (ref 13–17.7)
IMM GRANULOCYTES # BLD AUTO: 0.03 10*3/MM3 (ref 0–0.05)
IMM GRANULOCYTES NFR BLD AUTO: 0.6 % (ref 0–0.5)
LYMPHOCYTES # BLD AUTO: 0.41 10*3/MM3 (ref 0.7–3.1)
LYMPHOCYTES NFR BLD AUTO: 8.1 % (ref 19.6–45.3)
MCH RBC QN AUTO: 27.2 PG (ref 26.6–33)
MCHC RBC AUTO-ENTMCNC: 32.2 G/DL (ref 31.5–35.7)
MCV RBC AUTO: 84.6 FL (ref 79–97)
MONOCYTES # BLD AUTO: 0.07 10*3/MM3 (ref 0.1–0.9)
MONOCYTES NFR BLD AUTO: 1.4 % (ref 5–12)
NEUTROPHILS NFR BLD AUTO: 4.55 10*3/MM3 (ref 1.7–7)
NEUTROPHILS NFR BLD AUTO: 89.5 % (ref 42.7–76)
NRBC BLD AUTO-RTO: 0 /100 WBC (ref 0–0.2)
PLATELET # BLD AUTO: 116 10*3/MM3 (ref 140–450)
PMV BLD AUTO: 10.6 FL (ref 6–12)
POTASSIUM SERPL-SCNC: 4.4 MMOL/L (ref 3.5–5.2)
QT INTERVAL: 426 MS
QTC INTERVAL: 438 MS
RBC # BLD AUTO: 4.92 10*6/MM3 (ref 4.14–5.8)
SODIUM SERPL-SCNC: 139 MMOL/L (ref 136–145)
TROPONIN T SERPL HS-MCNC: 14 NG/L
WBC NRBC COR # BLD AUTO: 5.08 10*3/MM3 (ref 3.4–10.8)

## 2024-08-28 PROCEDURE — 25010000002 DEXAMETHASONE PER 1 MG: Performed by: EMERGENCY MEDICINE

## 2024-08-28 PROCEDURE — 96376 TX/PRO/DX INJ SAME DRUG ADON: CPT

## 2024-08-28 PROCEDURE — 94799 UNLISTED PULMONARY SVC/PX: CPT

## 2024-08-28 PROCEDURE — 80048 BASIC METABOLIC PNL TOTAL CA: CPT | Performed by: EMERGENCY MEDICINE

## 2024-08-28 PROCEDURE — 85025 COMPLETE CBC W/AUTO DIFF WBC: CPT | Performed by: EMERGENCY MEDICINE

## 2024-08-28 PROCEDURE — 94761 N-INVAS EAR/PLS OXIMETRY MLT: CPT

## 2024-08-28 PROCEDURE — 94640 AIRWAY INHALATION TREATMENT: CPT

## 2024-08-28 PROCEDURE — 94664 DEMO&/EVAL PT USE INHALER: CPT

## 2024-08-28 PROCEDURE — 84484 ASSAY OF TROPONIN QUANT: CPT | Performed by: EMERGENCY MEDICINE

## 2024-08-28 PROCEDURE — G0378 HOSPITAL OBSERVATION PER HR: HCPCS

## 2024-08-28 RX ORDER — ALBUTEROL SULFATE 90 UG/1
2 AEROSOL, METERED RESPIRATORY (INHALATION) EVERY 4 HOURS PRN
Status: DISCONTINUED | OUTPATIENT
Start: 2024-08-28 | End: 2024-08-28 | Stop reason: HOSPADM

## 2024-08-28 RX ORDER — BUDESONIDE AND FORMOTEROL FUMARATE DIHYDRATE 160; 4.5 UG/1; UG/1
2 AEROSOL RESPIRATORY (INHALATION)
Status: DISCONTINUED | OUTPATIENT
Start: 2024-08-28 | End: 2024-08-28 | Stop reason: HOSPADM

## 2024-08-28 RX ORDER — ROSUVASTATIN CALCIUM 10 MG/1
20 TABLET, COATED ORAL DAILY
Status: DISCONTINUED | OUTPATIENT
Start: 2024-08-28 | End: 2024-08-28 | Stop reason: HOSPADM

## 2024-08-28 RX ORDER — CYCLOBENZAPRINE HCL 5 MG
5 TABLET ORAL 3 TIMES DAILY PRN
Qty: 30 TABLET | Refills: 0 | Status: SHIPPED | OUTPATIENT
Start: 2024-08-28

## 2024-08-28 RX ORDER — ASPIRIN 81 MG/1
81 TABLET ORAL DAILY
Status: DISCONTINUED | OUTPATIENT
Start: 2024-08-28 | End: 2024-08-28 | Stop reason: HOSPADM

## 2024-08-28 RX ORDER — ASCORBIC ACID 500 MG
1000 TABLET ORAL DAILY
Status: DISCONTINUED | OUTPATIENT
Start: 2024-08-28 | End: 2024-08-28 | Stop reason: HOSPADM

## 2024-08-28 RX ORDER — BUDESONIDE AND FORMOTEROL FUMARATE DIHYDRATE 160; 4.5 UG/1; UG/1
2 AEROSOL RESPIRATORY (INHALATION)
Qty: 6 G | Refills: 12 | Status: SHIPPED | OUTPATIENT
Start: 2024-08-28

## 2024-08-28 RX ORDER — LISINOPRIL 20 MG/1
20 TABLET ORAL 2 TIMES DAILY
Status: DISCONTINUED | OUTPATIENT
Start: 2024-08-28 | End: 2024-08-28 | Stop reason: HOSPADM

## 2024-08-28 RX ORDER — BUDESONIDE, GLYCOPYRROLATE, AND FORMOTEROL FUMARATE 160; 9; 4.8 UG/1; UG/1; UG/1
2 AEROSOL, METERED RESPIRATORY (INHALATION) 2 TIMES DAILY
COMMUNITY
End: 2024-08-28 | Stop reason: HOSPADM

## 2024-08-28 RX ORDER — IPRATROPIUM BROMIDE AND ALBUTEROL SULFATE 2.5; .5 MG/3ML; MG/3ML
3 SOLUTION RESPIRATORY (INHALATION) EVERY 4 HOURS PRN
Status: DISCONTINUED | OUTPATIENT
Start: 2024-08-28 | End: 2024-08-28 | Stop reason: HOSPADM

## 2024-08-28 RX ORDER — ALLOPURINOL 300 MG/1
300 TABLET ORAL NIGHTLY
Status: DISCONTINUED | OUTPATIENT
Start: 2024-08-28 | End: 2024-08-28 | Stop reason: HOSPADM

## 2024-08-28 RX ORDER — AMLODIPINE BESYLATE 5 MG/1
10 TABLET ORAL DAILY
Status: DISCONTINUED | OUTPATIENT
Start: 2024-08-28 | End: 2024-08-28 | Stop reason: HOSPADM

## 2024-08-28 RX ORDER — IPRATROPIUM BROMIDE AND ALBUTEROL SULFATE 2.5; .5 MG/3ML; MG/3ML
3 SOLUTION RESPIRATORY (INHALATION) EVERY 4 HOURS PRN
Status: DISCONTINUED | OUTPATIENT
Start: 2024-08-28 | End: 2024-08-28

## 2024-08-28 RX ADMIN — OXYCODONE HYDROCHLORIDE AND ACETAMINOPHEN 1000 MG: 500 TABLET ORAL at 08:48

## 2024-08-28 RX ADMIN — LISINOPRIL 20 MG: 20 TABLET ORAL at 08:48

## 2024-08-28 RX ADMIN — Medication 10 ML: at 06:41

## 2024-08-28 RX ADMIN — APIXABAN 5 MG: 5 TABLET, FILM COATED ORAL at 08:48

## 2024-08-28 RX ADMIN — DEXAMETHASONE SODIUM PHOSPHATE 4 MG: 4 INJECTION, SOLUTION INTRAMUSCULAR; INTRAVENOUS at 06:41

## 2024-08-28 RX ADMIN — AMLODIPINE BESYLATE 10 MG: 5 TABLET ORAL at 08:48

## 2024-08-28 RX ADMIN — ALBUTEROL SULFATE 2 PUFF: 108 AEROSOL, METERED RESPIRATORY (INHALATION) at 04:46

## 2024-08-28 RX ADMIN — ASPIRIN 81 MG: 81 TABLET, COATED ORAL at 08:48

## 2024-08-28 RX ADMIN — BUDESONIDE AND FORMOTEROL FUMARATE DIHYDRATE 2 PUFF: 160; 4.5 AEROSOL RESPIRATORY (INHALATION) at 08:35

## 2024-08-28 RX ADMIN — ROSUVASTATIN 20 MG: 10 TABLET, FILM COATED ORAL at 08:48

## 2024-08-28 NOTE — CONSULTS
[facility] NEUROSURGERY CONSULT NOTE    Patient name: Alejandro Bain  Referring Provider: Jarett Winters MD   Reason for Consultation:     Cervical degenerative disc disease with neuroforaminal involvement and bulging       Patient Care Team:  Yoshi Lyon MD as PCP - General (Family Medicine)    Chief complaint: Left arm numbness    Subjective .     History of present illness:    Patient is a 69 y.o. male with a history of heart attacks and stroke presents to [facility] after experiencing left arm numbness. Patient states that he experienced left arm numbness for approximately ten minutes and decided to come to the hospital due to his past history. Patient states that shortly after he arrived his left arm stopped being numb. Patient recently recovered from having COVID, and admits to some neck pain from coughing so much. Patient denies any pain, n/t or weakness in his upper or lower extremities, bowel/bladder incontinence, saddle anesthesia or difficulty walking.     Review of Systems  Review of Systems   Constitutional:  Positive for activity change.   Musculoskeletal:  Positive for neck pain.   Neurological:  Positive for numbness.       History  PAST MEDICAL HISTORY  Past Medical History:   Diagnosis Date    Acute on chronic diastolic heart failure 12/14/2022    Aneurysm     Anxiety     Asthma     Atrial fibrillation     Bronchitis     CHF (congestive heart failure)     Coronary artery disease     Depression     Encounter for laboratory testing for COVID-19 virus 08/22/2022    Gout     Hyperlipidemia     Hypertension     Intractable headache 01/09/2023    Myocardial infarction     Pneumonia     Pulmonary nodule     Familia Mountain spotted fever     Stroke        PAST SURGICAL HISTORY  Past Surgical History:   Procedure Laterality Date    AORTIC VALVE REPAIR/REPLACEMENT  12/03/2016    CABG x 5/ tissue AVR by DR. TORRES    CARDIAC CATHETERIZATION      CARDIAC SURGERY      CARDIOVASCULAR STRESS TEST   2020    CAROTID STENT      CORONARY ARTERY BYPASS GRAFT  12/02/2016    X4  Dr Phan    HERNIA REPAIR  2010    ILIAC ARTERY ANEURYSM REPAIR  01/05/2017    abdominal and bilateral    NASAL POLYP EXCISION  2005    OTHER SURGICAL HISTORY      [facility]CI    REPAIR CHOANAL ATRESIA  2005    SINUS SURGERY      VASCULAR SURGERY         FAMILY HISTORY  Family History   Problem Relation Age of Onset    Pulmonary fibrosis Mother     Heart disease Mother     Cancer Father         brain    Cancer Sister         lung    Heart disease Sister     Cancer Brother         pancreatic, lung    Sleep apnea Neg Hx        SOCIAL HISTORY  Social History     Tobacco Use    Smoking status: Never     Passive exposure: Never    Smokeless tobacco: Never   Vaping Use    Vaping status: Never Used   Substance Use Topics    Alcohol use: Not Currently     Comment: quit 2005    Drug use: No         Allergies:  Avelox [moxifloxacin], Penicillins, Sulfa antibiotics, and Levaquin [levofloxacin]    MEDICATIONS:  Medications Prior to Admission   Medication Sig Dispense Refill Last Dose    allopurinol (ZYLOPRIM) 300 MG tablet Take 1 tablet by mouth Every Night.   8/27/2024    amLODIPine (NORVASC) 10 MG tablet TAKE 1 TABLET BY MOUTH EVERY DAY 90 tablet 1 8/27/2024    apixaban (ELIQUIS) 5 MG tablet tablet Take 1 tablet by mouth Every 12 (Twelve) Hours. Indications: Atrial Fibrillation 60 tablet 0 8/27/2024 at 0900    ascorbic acid (VITAMIN C) 250 MG tablet Take 4 tablets by mouth Daily.   8/27/2024    aspirin 81 MG EC tablet Take 1 tablet by mouth Daily.   8/27/2024    Bacillus Coagulans-Inulin (ALIGN PREBIOTIC-PROBIOTIC PO) Take 1 tablet by mouth Daily.   8/27/2024    Budeson-Glycopyrrol-Formoterol (Breztri Aerosphere) 160-9-4.8 MCG/ACT aerosol inhaler Inhale 2 puffs 2 (Two) Times a Day.   8/27/2024    Cholecalciferol 25 MCG (1000 UT) capsule Take 1 capsule by mouth Daily.   8/27/2024    ipratropium-albuterol (DUO-NEB) 0.5-2.5 mg/3 ml nebulizer Take 3 mL by  nebulization Every 4 (Four) Hours As Needed for Wheezing.   8/27/2024    lisinopril (PRINIVIL,ZESTRIL) 20 MG tablet TAKE 1 TABLET BY MOUTH TWICE A  tablet 1 8/27/2024    Multiple Vitamins-Minerals (MULTIVITAMIN ADULT PO) Take 1 tablet by mouth Daily.   8/27/2024    Olopatadine-Mometasone (Ryaltris) 665-25 MCG/ACT suspension into the nostril(s) as directed by provider.   8/27/2024    rosuvastatin (CRESTOR) 20 MG tablet Take 1 tablet by mouth Daily.   8/27/2024    VENTOLIN  (90 Base) MCG/ACT inhaler Inhale 2 puffs Every 6 (Six) Hours As Needed.   Past Week    zinc sulfate (ZINCATE) 220 (50 Zn) MG capsule Take 50 mg by mouth Daily.   8/27/2024         Current Facility-Administered Medications:     albuterol sulfate HFA (PROVENTIL HFA;VENTOLIN HFA;PROAIR HFA) inhaler 2 puff, 2 puff, Inhalation, Q4H PRN, Jarett Winters MD, 2 puff at 08/28/24 0446    allopurinol (ZYLOPRIM) tablet 300 mg, 300 mg, Oral, Nightly, Barber Kumar PA-C    amLODIPine (NORVASC) tablet 10 mg, 10 mg, Oral, Daily, Barber Kumar PA-C    [Held by provider] apixaban (ELIQUIS) tablet 5 mg, 5 mg, Oral, Q12H, Barber Kumar PA-C    ascorbic acid (VITAMIN C) tablet 1,000 mg, 1,000 mg, Oral, Daily, Barber Kumar PA-C    [Held by provider] aspirin EC tablet 81 mg, 81 mg, Oral, Daily, Barber Kumar PA-C    budesonide-formoterol (SYMBICORT) 160-4.5 MCG/ACT inhaler 2 puff, 2 puff, Inhalation, BID - RT, Barber Kumar PA-C    HYDROmorphone (DILAUDID) injection 0.25 mg, 0.25 mg, Intravenous, Once, Jarett Winters MD    HYDROmorphone (DILAUDID) injection 0.5 mg, 0.5 mg, Intravenous, Q2H PRN, Jarett Winters MD    ipratropium-albuterol (DUO-NEB) nebulizer solution 3 mL, 3 mL, Nebulization, Q4H PRN, Barber Kumar PA-C    lisinopril (PRINIVIL,ZESTRIL) tablet 20 mg, 20 mg, Oral, BID, Barber Kumar PA-C    melatonin tablet 5 mg, 5 mg, Oral, Nightly PRN, Jarett Winters MD    ondansetron  (ZOFRAN) injection 4 mg, 4 mg, Intravenous, Once, Jarett Winters MD    ondansetron (ZOFRAN) injection 4 mg, 4 mg, Intravenous, Q6H PRN, Jarett Winters MD    rosuvastatin (CRESTOR) tablet 20 mg, 20 mg, Oral, Daily, Barber Kumar PA-C    sodium chloride 0.9 % flush 10 mL, 10 mL, Intravenous, Q12H, Jarett Winters MD, 10 mL at 08/28/24 0641    sodium chloride 0.9 % flush 10 mL, 10 mL, Intravenous, PRN, Jarett Winters MD    sodium chloride 0.9 % infusion 40 mL, 40 mL, Intravenous, PRN, Jarett Winters MD      Objective     Results Review:  LABS:  Results from last 7 days   Lab Units 08/28/24  0522 08/27/24  2030   WBC 10*3/mm3 5.08 9.20   HEMOGLOBIN g/dL 13.4 14.0   HEMATOCRIT % 41.6 42.9   PLATELETS 10*3/mm3 116* 120*     Results from last 7 days   Lab Units 08/28/24  0522 08/27/24  2030   SODIUM mmol/L 139 140   POTASSIUM mmol/L 4.4 3.8   CHLORIDE mmol/L 108* 106   CO2 mmol/L 20.9* 23.1   BUN mg/dL 22 21   CREATININE mg/dL 0.95 0.93   CALCIUM mg/dL 9.4 9.5   BILIRUBIN mg/dL  --  0.4   ALK PHOS U/L  --  105   ALT (SGPT) U/L  --  28   AST (SGOT) U/L  --  27   GLUCOSE mg/dL 161* 103*         DIAGNOSTICS:  MRI CERVICAL SPINE WO CONTRAST     Date of Exam: 8/27/2024 9:20 PM EDT     Indication: Radicular pain numbness and weakness left upper extremity.     Comparison: MRI cervical spine June 6, 2022     Technique:  Routine multiplanar/multisequence sequence images of the cervical spine were obtained without contrast administration.          Findings:  The signal within the marrow of the visualized cervical vertebra does not appear unusual. There is no intrinsic cord abnormality. The craniovertebral junction does not appear abnormal.     C2-3: There is a small central disc protrusion which has been suggested. The intervertebral foramina appear patent.     C3-4: No definite disc herniation or intervertebral foraminal stenosis.     C4-5: Minimal bulging of the annulus. There is no intervertebral foraminal  stenosis.     C5-6: There is some bulging of the annulus. The intervertebral foramina appear patent.     C6-7: Broad-based bulging of the annulus superimposed on probable osseous bar with moderate narrowing of the vertebral foramina.     C7-T1: No definite disc herniation or intervertebral foraminal stenosis.     IMPRESSION:  Impression:  1.Multilevel degenerative changes similar to the prior study.    Results Review:   I reviewed the patient's new clinical results.  I personally viewed patient's chart and imaging     Vital Signs   Temp:  [97.5 °F (36.4 °C)-97.8 °F (36.6 °C)] 97.5 °F (36.4 °C)  Heart Rate:  [51-61] 60  Resp:  [16-20] 16  BP: (122-168)/(73-95) 122/85    Physical Exam:  Physical Exam  Vitals reviewed.   Eyes:      Extraocular Movements: Extraocular movements intact.      Conjunctiva/sclera: Conjunctivae normal.      Pupils: Pupils are equal, round, and reactive to light.   Musculoskeletal:         General: Normal range of motion.      Cervical back: Normal range of motion.   Skin:     General: Skin is warm and dry.   Neurological:      General: No focal deficit present.      Mental Status: He is alert and oriented to person, place, and time.   Psychiatric:         Mood and Affect: Mood normal.         Speech: Speech normal.       Neurologic Exam     Mental Status   Oriented to person, place, and time.   Speech: speech is normal   Level of consciousness: alert    Cranial Nerves     CN III, IV, VI   Pupils are equal, round, and reactive to light.    Motor Exam   Muscle bulk: normal  Right arm tone: normal  Left arm tone: normal  Right leg tone: normal  Left leg tone: normal    Strength   Right neck flexion: 5/5  Left neck flexion: 5/5  Right neck extension: 5/5  Left neck extension: 5/5  Right deltoid: 5/5  Left deltoid: 5/5  Right biceps: 5/5  Left biceps: 5/5  Right triceps: 5/5  Left triceps: 5/5  Right wrist flexion: 5/5  Left wrist flexion: 5/5  Right wrist extension: 5/5  Left wrist extension:  5/5    Sensory Exam   Light touch normal.     Gait, Coordination, and Reflexes Not tested        Assessment & Plan       Neck pain      Problem List Items Addressed This Visit          Musculoskeletal and Injuries    * (Principal) Neck pain     Other Visit Diagnoses       Cervical disc disease with myelopathy    -  Primary    Upper back pain        History of valvular heart disease        Hx of CABG        History of stroke                 COMORBID CONDITIONS: History of heart attacks and stroke    Patient is a 69 year old male with a history of heart attacks and stroke who presented to [facility] after experiencing left arm numbness. Due to his history he came to the ER to be checked out.     Patient had an MRI of his cervical spine showing a disc bulge at C6-7 with moderate narrowing of the vertebral foramina. Patient states that shortly after he arrived his numbness stopped and he denies experiencing radicular symptoms otherwise. Patient has great strength on exam and has no Montes's sign or clonus.     Patient is neurologically intact and requires no surgical intervention at this time. I told the patient that he does not need to follow up, but if he begins to experience pain, n/t or weakness in his upper extremities to come to the office for further workup and treatment.     Patient and wife are agreeable to this plan. Neurosurgery will sign off. Please reach out with any questions or concerns.    PLAN:   Left arm numbness  - No surgical intervention indicated  - Patient can follow up as needed in clinic     I discussed the patient's findings and my recommendations with patient, family, nursing staff, and Dr. Mejia     During patient visit, I utilized appropriate personal protective equipment including gloves and mask.  Mask used was standard procedure mask. Appropriate PPE was worn during the entire visit.  Hand hygiene was completed before and after.     Tasneem Zuniga PA-C  08/28/24  07:02  "EDT    \"Dictated utilizing Dragon dictation\".    "

## 2024-08-28 NOTE — ED PROVIDER NOTES
Subjective   History of Present Illness  69-year-old male with cardiac history had an episode tonight where he had lower neck and upper back discomfort with a radicular sounding stripe of pain going down the posterior lateral aspect of his left arm.  The patient states that it later felt more of a stocking-like distribution.  The patient states that he had no resting shortness of breath.  He reports that he did not have anginal type chest pain.  He reports that he did not have palpitations.  Reports no recent fever chills or cough reports no recent unusual activity      Review of Systems   Constitutional:  Positive for fatigue. Negative for chills, diaphoresis and fever.   Respiratory:  Negative for shortness of breath.    Cardiovascular:  Negative for palpitations and leg swelling.   Musculoskeletal:  Positive for neck pain and neck stiffness.   Neurological:  Positive for numbness.       Past Medical History:   Diagnosis Date    Acute on chronic diastolic heart failure 12/14/2022    Aneurysm     Anxiety     Asthma     Atrial fibrillation     Bronchitis     CHF (congestive heart failure)     Coronary artery disease     Depression     Encounter for laboratory testing for COVID-19 virus 08/22/2022    Gout     Hyperlipidemia     Hypertension     Intractable headache 01/09/2023    Myocardial infarction     Pneumonia     Pulmonary nodule     Familia Mountain spotted fever     Stroke        Allergies   Allergen Reactions    Avelox [Moxifloxacin] Rash    Penicillins Rash    Sulfa Antibiotics Rash    Levaquin [Levofloxacin] GI Intolerance       Past Surgical History:   Procedure Laterality Date    AORTIC VALVE REPAIR/REPLACEMENT  12/03/2016    CABG x 5/ tissue AVR by DR. PHAN    CARDIAC CATHETERIZATION      CARDIAC SURGERY      CARDIOVASCULAR STRESS TEST  2020    CAROTID STENT      CORONARY ARTERY BYPASS GRAFT  12/02/2016    X4  Dr Phan    HERNIA REPAIR  2010    ILIAC ARTERY ANEURYSM REPAIR  01/05/2017    abdominal and  bilateral    NASAL POLYP EXCISION  2005    OTHER SURGICAL HISTORY      PTCI    REPAIR CHOANAL ATRESIA  2005    SINUS SURGERY      VASCULAR SURGERY         Family History   Problem Relation Age of Onset    Pulmonary fibrosis Mother     Heart disease Mother     Cancer Father         brain    Cancer Sister         lung    Heart disease Sister     Cancer Brother         pancreatic, lung    Sleep apnea Neg Hx        Social History     Socioeconomic History    Marital status:    Tobacco Use    Smoking status: Never     Passive exposure: Never    Smokeless tobacco: Never   Vaping Use    Vaping status: Never Used   Substance and Sexual Activity    Alcohol use: Not Currently     Comment: quit 2005    Drug use: No    Sexual activity: Not Currently           Objective   Physical Exam  Alert [place] Coma Scale 15 left hand dominant   HEENT: Pupils equal and reactive to light. Conjunctivae are not injected. Normal tympanic membranes. Oropharynx and nares are normal.   Neck: Supple. Midline trachea. No JVD. No goiter.  No tenderness over the trapezius or sternocleidomastoid muscle  Chest: Clear and equal breath sounds bilaterally, regular rate and rhythm without murmur or rub.  Posterior upper back discomfort no clinically apparent AC tenderness   Abdomen: Positive bowel sounds, nontender, nondistended. No rebound or peritoneal signs. No CVA tenderness.   Extremities no clubbing. cyanosis or edema. Motor sensory exam is normal. The full range of motion is intact.  Left hand dominant.  D10 reflexes are 2/4 and symmetrical in the upper extremity   Skin: Warm and dry, no rashes or petechia.   Lymphatic: No regional lymphadenopathy. No calf pain, swelling or Homans sign    Procedures           ED Course  ED Course as of 08/27/24 2319   Tue Aug 27, 2024   2239 ED overflow/overcrowding conditions [TH]      ED Course User Index  [TH] Jarett Winters MD                 Labs Reviewed   COMPREHENSIVE METABOLIC PANEL - Abnormal;  Notable for the following components:       Result Value    Glucose 103 (*)     All other components within normal limits    Narrative:     GFR Normal >60  Chronic Kidney Disease <60  Kidney Failure <15     CBC WITH AUTO DIFFERENTIAL - Abnormal; Notable for the following components:    RDW 15.9 (*)     Platelets 120 (*)     Lymphocyte % 15.3 (*)     Eosinophil % 7.1 (*)     Eosinophils, Absolute 0.65 (*)     All other components within normal limits   SINGLE HS TROPONIN T - Normal    Narrative:     High Sensitive Troponin T Reference Range:  <14.0 ng/L- Negative Female for AMI  <22.0 ng/L- Negative Male for AMI  >=14 - Abnormal Female indicating possible myocardial injury.  >=22 - Abnormal Male indicating possible myocardial injury.   Clinicians would have to utilize clinical acumen, EKG, Troponin, and serial changes to determine if it is an Acute Myocardial Infarction or myocardial injury due to an underlying chronic condition.        CBC AND DIFFERENTIAL    Narrative:     The following orders were created for panel order CBC & Differential.  Procedure                               Abnormality         Status                     ---------                               -----------         ------                     CBC Auto Differential[[id number]]        Abnormal            Final result               Scan Slide[[id number]]                                                                    Please view results for these tests on the individual orders.     Medications   HYDROmorphone (DILAUDID) injection 0.25 mg (has no administration in time range)   ondansetron (ZOFRAN) injection 4 mg (has no administration in time range)   dexAMETHasone sodium phosphate injection 8 mg (8 mg Intravenous Given 8/27/24 2047)     MRI Cervical Spine Without Contrast    Result Date: 8/27/2024  Impression: 1.Multilevel degenerative changes similar to the prior study. Electronically Signed: Landon Cardenas MD  8/27/2024 10:25 PM EDT  Workstation  ID: [id number]    XR Chest 1 View    Result Date: 8/27/2024  Impression: 1.Element of COPD suggested. 2.Atelectasis or scarring left basilar area. Electronically Signed: Landon Cardenas MD  8/27/2024 9:07 PM EDT  Workstation ID: [id number]                                 Medical Decision Making  Patient was injected with Decadron in the emergency department.  He had no additional episodes of discomfort while in the ER.  The patient had no ventricular irregularity or ectopy.  The patient did not complain of discrete chest pain or dyspnea at any point.  The patient will be observed tonight we will keep the patient on a cardiac monitor and follow-up dose of steroids in the morning we will treat pain and obtain consultation from spine.  The patient was agreeable to this plan of treatment    Amount and/or Complexity of Data Reviewed  Independent Historian: spouse  Labs: ordered. Decision-making details documented in ED Course.  Radiology: ordered and independent interpretation performed.  ECG/medicine tests: ordered and independent interpretation performed.     Details: Sinus rhythm with left axis deviation the patient has nonspecific repolarization abnormality anterior which is old and was apparent back in March 20, 2024    Risk  Prescription drug management.        Final diagnoses:   Cervical disc disease with myelopathy   Neck pain   Upper back pain   History of valvular heart disease   Hx of CABG   History of stroke       ED Disposition  ED Disposition       ED Disposition   Decision to Admit    Condition   --    Comment   --               No follow-up provider specified.       Medication List      No changes were made to your prescriptions during this visit.            Jarett Winters MD  08/27/24 7807

## 2024-08-28 NOTE — OUTREACH NOTE
Prep Survey      Flowsheet Row Responses   [facility] patient discharged from? [place]   Is LACE score < 7 ? No   Eligibility [facility]   Date of Admission 08/27/24   Date of Discharge 08/28/24   Discharge diagnosis Neck pain and left arm pain   Does the patient have one of the following disease processes/diagnoses(primary or secondary)? Other   Does the patient have Home health ordered? No   Is there a DME ordered? No   Prep survey completed? Yes            Estelle COLLAZO - Registered Nurse

## 2024-08-28 NOTE — DISCHARGE SUMMARY
[facility]    Yoshi Lyon MD    CHIEF COMPLAINT:     Neck, back and left arm pain    HISTORY OF PRESENT ILLNESS:    Obtained from admitting physician HPI on 8/27/2024:  69-year-old male with cardiac history had an episode tonight where he had lower neck and upper back discomfort with a radicular sounding stripe of pain going down the posterior lateral aspect of his left arm.  The patient states that it later felt more of a stocking-like distribution.  The patient states that he had no resting shortness of breath.  He reports that he did not have anginal type chest pain.  He reports that he did not have palpitations.  Reports no recent fever chills or cough reports no recent unusual activity    08/28/24:  Patient confirms the HPI noted above including that he has been having some chronic somewhat severe cough following COVID-19 infection approximately 1 month prior.  On the day of presentation he noted some severe coughing and began to experience somewhat severe pain in his neck on the left side which radiated down his left arm and into the left upper portion of his back.  Arm discomfort was described as a numbness with approximately 10 minutes of duration before resolving spontaneously.  Patient does note that he was recently started on a Breztri inhaler by allergist though he feels this has been less effective than his previously prescribed Symbicort.  Additionally patient reports that he was concerned about the pain as he knew that cardiac events can present with left arm pain though he notes that this pain was distinct from the type of pain he experienced with cardiac events in the past.  No dyspnea, fever, nausea, vomiting, peripheral edema or changes in bowel or bladder habits are noted.            Past Medical History:   Diagnosis Date    Acute on chronic diastolic heart failure 12/14/2022    Aneurysm     Anxiety     Asthma     Atrial fibrillation     Bronchitis     CHF  (congestive heart failure)     Coronary artery disease     Depression     Encounter for laboratory testing for COVID-19 virus 08/22/2022    Gout     Hyperlipidemia     Hypertension     Intractable headache 01/09/2023    Myocardial infarction     Pneumonia     Pulmonary nodule     Familia Mountain spotted fever     Stroke      Past Surgical History:   Procedure Laterality Date    AORTIC VALVE REPAIR/REPLACEMENT  12/03/2016    CABG x 5/ tissue AVR by DR. PHAN    CARDIAC CATHETERIZATION      CARDIAC SURGERY      CARDIOVASCULAR STRESS TEST  2020    CAROTID STENT      CORONARY ARTERY BYPASS GRAFT  12/02/2016    X4  Dr Phan    HERNIA REPAIR  2010    ILIAC ARTERY ANEURYSM REPAIR  01/05/2017    abdominal and bilateral    NASAL POLYP EXCISION  2005    OTHER SURGICAL HISTORY      [facility]    REPAIR CHOANAL ATRESIA  2005    SINUS SURGERY      VASCULAR SURGERY       Family History   Problem Relation Age of Onset    Pulmonary fibrosis Mother     Heart disease Mother     Cancer Father         brain    Cancer Sister         lung    Heart disease Sister     Cancer Brother         pancreatic, lung    Sleep apnea Neg Hx      Social History     Tobacco Use    Smoking status: Never     Passive exposure: Never    Smokeless tobacco: Never   Vaping Use    Vaping status: Never Used   Substance Use Topics    Alcohol use: Not Currently     Comment: quit 2005    Drug use: No     Medications Prior to Admission   Medication Sig Dispense Refill Last Dose    allopurinol (ZYLOPRIM) 300 MG tablet Take 1 tablet by mouth Every Night.   8/27/2024    amLODIPine (NORVASC) 10 MG tablet TAKE 1 TABLET BY MOUTH EVERY DAY 90 tablet 1 8/27/2024    apixaban (ELIQUIS) 5 MG tablet tablet Take 1 tablet by mouth Every 12 (Twelve) Hours. Indications: Atrial Fibrillation 60 tablet 0 8/27/2024 at 0900    ascorbic acid (VITAMIN C) 250 MG tablet Take 4 tablets by mouth Daily.   8/27/2024    aspirin 81 MG EC tablet Take 1 tablet by mouth Daily.   8/27/2024    Bacillus  Coagulans-Inulin (ALIGN PREBIOTIC-PROBIOTIC PO) Take 1 tablet by mouth Daily.   8/27/2024    Budeson-Glycopyrrol-Formoterol (Breztri Aerosphere) 160-9-4.8 MCG/ACT aerosol inhaler Inhale 2 puffs 2 (Two) Times a Day.   8/27/2024    Cholecalciferol 25 MCG (1000 UT) capsule Take 1 capsule by mouth Daily.   8/27/2024    ipratropium-albuterol (DUO-NEB) 0.5-2.5 mg/3 ml nebulizer Take 3 mL by nebulization Every 4 (Four) Hours As Needed for Wheezing.   8/27/2024    lisinopril (PRINIVIL,ZESTRIL) 20 MG tablet TAKE 1 TABLET BY MOUTH TWICE A  tablet 1 8/27/2024    Multiple Vitamins-Minerals (MULTIVITAMIN ADULT PO) Take 1 tablet by mouth Daily.   8/27/2024    Olopatadine-Mometasone (Ryaltris) 665-25 MCG/ACT suspension into the nostril(s) as directed by provider.   8/27/2024    rosuvastatin (CRESTOR) 20 MG tablet Take 1 tablet by mouth Daily.   8/27/2024    VENTOLIN  (90 Base) MCG/ACT inhaler Inhale 2 puffs Every 6 (Six) Hours As Needed.   Past Week    zinc sulfate (ZINCATE) 220 (50 Zn) MG capsule Take 50 mg by mouth Daily.   8/27/2024     Allergies:  Avelox [moxifloxacin], Penicillins, Sulfa antibiotics, and Levaquin [levofloxacin]    Immunization History   Administered Date(s) Administered    COVID-19 (PFIZER) Purple Cap Monovalent 01/24/2021, 02/24/2021, 10/08/2021    Flu Vaccine Split Quad 01/04/2020    Fluzone (or Fluarix & Flulaval for VFC) >6mos 11/21/2020, 10/05/2023    Pneumococcal Conjugate 13-Valent (PCV13) 08/31/2019    Pneumococcal Conjugate 20-Valent (PCV20) 06/26/2024           REVIEW OF SYSTEMS:    Review of Systems   Constitutional: Negative.   HENT: Negative.     Eyes: Negative.    Cardiovascular: Negative.    Respiratory: Negative.     Skin: Negative.    Musculoskeletal:  Positive for back pain, joint pain and neck pain.   Gastrointestinal: Negative.    Genitourinary: Negative.    Neurological:  Positive for numbness.   Psychiatric/Behavioral: Negative.         Vital Signs  Temp:  [97.5 °F (36.4  °C)-97.8 °F (36.6 °C)] 97.5 °F (36.4 °C)  Heart Rate:  [51-61] 57  Resp:  [16-20] 16  BP: (122-168)/(73-95) 122/85          Physical Exam:  Physical Exam  Vitals reviewed.   Constitutional:       General: He is not in acute distress.     Appearance: Normal appearance. He is normal weight. He is not ill-appearing, toxic-appearing or diaphoretic.   HENT:      Head: Normocephalic.      Right Ear: External ear normal.      Left Ear: External ear normal.      Nose: Nose normal.      Mouth/Throat:      Mouth: Mucous membranes are moist.   Eyes:      Extraocular Movements: Extraocular movements intact.   Cardiovascular:      Rate and Rhythm: Normal rate and regular rhythm.      Pulses: Normal pulses.      Heart sounds: Normal heart sounds.   Pulmonary:      Effort: Pulmonary effort is normal.      Breath sounds: Normal breath sounds.   Abdominal:      General: Bowel sounds are normal.      Palpations: Abdomen is soft.      Tenderness: There is no abdominal tenderness.   Musculoskeletal:         General: Normal range of motion.      Cervical back: Normal range of motion.      Right lower leg: No edema.      Left lower leg: No edema.   Skin:     General: Skin is warm and dry.      Capillary Refill: Capillary refill takes less than 2 seconds.   Neurological:      General: No focal deficit present.      Mental Status: He is alert and oriented to person, place, and time.   Psychiatric:         Mood and Affect: Mood normal.         Behavior: Behavior normal.         Thought Content: Thought content normal.         Judgment: Judgment normal.         Emotional Behavior:   Normal   Debilities:  None  Results Review:    I reviewed the patient's new clinical results.  Lab Results (most recent)       Procedure Component Value Units Date/Time    High Sensitivity Troponin T [[id number]]  (Normal) Collected: 08/28/24 0522    Specimen: Blood from Arm, Right Updated: 08/28/24 0620     HS Troponin T 14 ng/L     Narrative:      High Sensitive  Troponin T Reference Range:  <14.0 ng/L- Negative Female for AMI  <22.0 ng/L- Negative Male for AMI  >=14 - Abnormal Female indicating possible myocardial injury.  >=22 - Abnormal Male indicating possible myocardial injury.   Clinicians would have to utilize clinical acumen, EKG, Troponin, and serial changes to determine if it is an Acute Myocardial Infarction or myocardial injury due to an underlying chronic condition.         Basic Metabolic Panel [[id number]]  (Abnormal) Collected: 08/28/24 0522    Specimen: Blood from Arm, Right Updated: 08/28/24 0620     Glucose 161 mg/dL      BUN 22 mg/dL      Creatinine 0.95 mg/dL      Sodium 139 mmol/L      Potassium 4.4 mmol/L      Chloride 108 mmol/L      CO2 20.9 mmol/L      Calcium 9.4 mg/dL      BUN/Creatinine Ratio 23.2     Anion Gap 10.1 mmol/L      eGFR 87.7 mL/min/1.73     Narrative:      GFR Normal >60  Chronic Kidney Disease <60  Kidney Failure <15      CBC & Differential [[id number]]  (Abnormal) Collected: 08/28/24 0522    Specimen: Blood from Arm, Right Updated: 08/28/24 0603    Narrative:      The following orders were created for panel order CBC & Differential.  Procedure                               Abnormality         Status                     ---------                               -----------         ------                     CBC Auto Differential[[id number]]        Abnormal            Final result               Scan Slide[[id number]]                                                                    Please view results for these tests on the individual orders.    CBC Auto Differential [[id number]]  (Abnormal) Collected: 08/28/24 0522    Specimen: Blood from Arm, Right Updated: 08/28/24 0603     WBC 5.08 10*3/mm3      RBC 4.92 10*6/mm3      Hemoglobin 13.4 g/dL      Hematocrit 41.6 %      MCV 84.6 fL      MCH 27.2 pg      MCHC 32.2 g/dL      RDW 15.6 %      RDW-SD 48.0 fl      MPV 10.6 fL      Platelets 116 10*3/mm3      Neutrophil % 89.5 %      Lymphocyte %  8.1 %      Monocyte % 1.4 %      Eosinophil % 0.4 %      Basophil % 0.0 %      Immature Grans % 0.6 %      Neutrophils, Absolute 4.55 10*3/mm3      Lymphocytes, Absolute 0.41 10*3/mm3      Monocytes, Absolute 0.07 10*3/mm3      Eosinophils, Absolute 0.02 10*3/mm3      Basophils, Absolute 0.00 10*3/mm3      Immature Grans, Absolute 0.03 10*3/mm3      nRBC 0.0 /100 WBC     Comprehensive Metabolic Panel [[id number]]  (Abnormal) Collected: 08/27/24 2030    Specimen: Blood Updated: 08/27/24 2115     Glucose 103 mg/dL      BUN 21 mg/dL      Creatinine 0.93 mg/dL      Sodium 140 mmol/L      Potassium 3.8 mmol/L      Chloride 106 mmol/L      CO2 23.1 mmol/L      Calcium 9.5 mg/dL      Total Protein 6.5 g/dL      Albumin 4.2 g/dL      ALT (SGPT) 28 U/L      AST (SGOT) 27 U/L      Alkaline Phosphatase 105 U/L      Total Bilirubin 0.4 mg/dL      Globulin 2.3 gm/dL      A/G Ratio 1.8 g/dL      BUN/Creatinine Ratio 22.6     Anion Gap 10.9 mmol/L      eGFR 90.0 mL/min/1.73     Narrative:      GFR Normal >60  Chronic Kidney Disease <60  Kidney Failure <15      Single High Sensitivity Troponin T [[id number]]  (Normal) Collected: 08/27/24 2030    Specimen: Blood Updated: 08/27/24 2115     HS Troponin T 19 ng/L     Narrative:      High Sensitive Troponin T Reference Range:  <14.0 ng/L- Negative Female for AMI  <22.0 ng/L- Negative Male for AMI  >=14 - Abnormal Female indicating possible myocardial injury.  >=22 - Abnormal Male indicating possible myocardial injury.   Clinicians would have to utilize clinical acumen, EKG, Troponin, and serial changes to determine if it is an Acute Myocardial Infarction or myocardial injury due to an underlying chronic condition.         CBC & Differential [[id number]]  (Abnormal) Collected: 08/27/24 2030    Specimen: Blood Updated: 08/27/24 2041    Narrative:      The following orders were created for panel order CBC & Differential.  Procedure                               Abnormality         Status                      ---------                               -----------         ------                     CBC Auto Differential[[id number]]        Abnormal            Final result               Scan Slide[[id number]]                                                                    Please view results for these tests on the individual orders.    CBC Auto Differential [[id number]]  (Abnormal) Collected: 08/27/24 2030    Specimen: Blood Updated: 08/27/24 2041     WBC 9.20 10*3/mm3      RBC 5.00 10*6/mm3      Hemoglobin 14.0 g/dL      Hematocrit 42.9 %      MCV 85.8 fL      MCH 28.0 pg      MCHC 32.6 g/dL      RDW 15.9 %      RDW-SD 49.4 fl      MPV 10.3 fL      Platelets 120 10*3/mm3      Neutrophil % 70.9 %      Lymphocyte % 15.3 %      Monocyte % 5.9 %      Eosinophil % 7.1 %      Basophil % 0.3 %      Immature Grans % 0.5 %      Neutrophils, Absolute 6.52 10*3/mm3      Lymphocytes, Absolute 1.41 10*3/mm3      Monocytes, Absolute 0.54 10*3/mm3      Eosinophils, Absolute 0.65 10*3/mm3      Basophils, Absolute 0.03 10*3/mm3      Immature Grans, Absolute 0.05 10*3/mm3      nRBC 0.0 /100 WBC             Imaging Results (Most Recent)       Procedure Component Value Units Date/Time    MRI Cervical Spine Without Contrast [[id number]] Collected: 08/27/24 2220     Updated: 08/27/24 2227    Narrative:      MRI CERVICAL SPINE WO CONTRAST    Date of Exam: 8/27/2024 9:20 PM EDT    Indication: Radicular pain numbness and weakness left upper extremity.     Comparison: MRI cervical spine June 6, 2022    Technique:  Routine multiplanar/multisequence sequence images of the cervical spine were obtained without contrast administration.        Findings:  The signal within the marrow of the visualized cervical vertebra does not appear unusual. There is no intrinsic cord abnormality. The craniovertebral junction does not appear abnormal.    C2-3: There is a small central disc protrusion which has been suggested. The intervertebral foramina  appear patent.    C3-4: No definite disc herniation or intervertebral foraminal stenosis.    C4-5: Minimal bulging of the annulus. There is no intervertebral foraminal stenosis.    C5-6: There is some bulging of the annulus. The intervertebral foramina appear patent.    C6-7: Broad-based bulging of the annulus superimposed on probable osseous bar with moderate narrowing of the vertebral foramina.    C7-T1: No definite disc herniation or intervertebral foraminal stenosis.      Impression:      Impression:  1.Multilevel degenerative changes similar to the prior study.        Electronically Signed: Landon Cardenas MD    8/27/2024 10:25 PM EDT    Workstation ID: [id number]    XR Chest 1 View [[id number]] Collected: 08/27/24 2106     Updated: 08/27/24 2109    Narrative:      XR CHEST 1 VW    Date of Exam: 8/27/2024 8:55 PM EDT    Indication: History of recent COVID now with persistent cough    Comparison: July 27, 2024    Findings:  Sternotomy wires are noted. It looks like there is an element of COPD. There is some atelectasis or scarring at the left base. The right lung is clear. There are no pleural effusions. There is old left rib fracture deformity.      Impression:      Impression:  1.Element of COPD suggested.  2.Atelectasis or scarring left basilar area.      Electronically Signed: Landon Cardenas MD    8/27/2024 9:07 PM EDT    Workstation ID: [id number]          reviewed    ECG/EMG Results (most recent)       Procedure Component Value Units Date/Time    Telemetry Scan [[id number]] Resulted: 08/27/24     Updated: 08/27/24 2355    Telemetry Scan [[id number]] Resulted: 08/27/24     Updated: 08/28/24 0029    ECG 12 Lead Other; back pain left arm pain [[id number]] Collected: 08/27/24 1954     Updated: 08/28/24 0623     QT Interval 426 ms      QTC Interval 438 ms     Narrative:      HEART RATE=63  bpm  RR Szxanmni=764  ms  MI Rrktlrzz=304  ms  P Horizontal Axis=16  deg  P Front Axis=-4  deg  QRSD Interval=99  ms  QT Cimxfgyb=792   ms  BHxC=711  ms  QRS Axis=-22  deg  T Wave Axis=17  deg  - ABNORMAL ECG -  Sinus rhythm  Ant T wave change  Electronically Signed By: Jarett Winters (Rafael) 2024-08-28 06:22:56  Date and Time of Study:2024-08-27 19:54:56          reviewed    Results for orders placed during the hospital encounter of 03/20/24    Bilateral Carotid Duplex    Interpretation Summary    Right internal carotid artery demonstrates a less than 50% stenosis.    Left internal carotid artery demonstrates a less than 50% stenosis.      Results for orders placed during the hospital encounter of 03/20/24    Adult Transthoracic Echo Complete W/ Cont if Necessary Per Protocol (With Agitated Saline)    Interpretation Summary    Left ventricular systolic function is low normal. Left ventricular ejection fraction appears to be 55%.    Left ventricular diastolic function is consistent with (grade I) impaired relaxation.    There is a bioprosthetic aortic valve present.    Estimated right ventricular systolic pressure from tricuspid regurgitation is normal (<35 mmHg).    Mild hypokinesis of apex noted      Microbiology Results (last 10 days)       ** No results found for the last 240 hours. **            Assessment & Plan     Neck pain       Neck and left arm pain  -Troponin: 19, 14  -Chest x-ray showed elements of COPD and atelectasis as well as scarring  -EKG showed sinus rhythm at 63 without obvious acute changes or ectopy with a QTc of 438 ms  -MRI of cervical spine reported with multilevel degenerative changes similar to previous study  -Patient was started on IV dexamethasone and Dilaudid in the ED, continue  -Neurosurgery consulted in ED  -Will prescribe as needed Flexeril    Asthma  -Symbicort and DuoNeb    History of atrial fibrillation/COPD  -EKG showed sinus rhythm on this admission  -Hold Eliquis and aspirin temporarily pending surgical evaluation    Hypertension  -Well controlled   BP Readings from Last 1 Encounters:   08/28/24 122/85   -  Continue amlodipine and lisinopril  - Monitor while admitted    History of PE  -Patient currently off Eliquis due to cost prohibitive nature and he notes that he will be eligible for refill starting in October  -Discussed alternatives including Coumadin however patient reports he has discussed this with his hematologist and does not wish to pursue it now and would prefer to follow-up with them for further options which she is encouraged to do in short order      I discussed the patients findings and my recommendations with patient and nursing staff.     Discharge Diagnosis:      Neck pain      Hospital Course  Patient is a 69 y.o. male presented with neck and left arm pain with an HPI noted above.  Patient does have a history of CAD and troponins were assessed found to be 19, 14 with a chest x-ray showing elements of COPD as well as atelectasis and scarring.  EKG showed sinus rhythm at 63 without obvious acute changes and he was continued on telemetry without significant events reported.  MRI of cervical spine was obtained in the ED which showed multilevel degenerative changes similar to prior study and he was given IV Decadron as well as Dilaudid.  Anticoagulation was held temporarily and neurosurgery was consulted in the ED who reviewed case with patient noting complete resolution of symptoms since his admission and recommended no further intervention with outpatient follow-up as needed.  Discussed options with patient and wife and he will be prescribed a short course of as needed Flexeril.  At this time patient is felt to be in good condition for discharge with close follow-up with his PCP on an outpatient basis.  His full testing/results and plan were discussed with patient along with concerning/alarm symptoms for which to call 911/return to the ED. patient did request refill of his previously prescribed Symbicort which will be given and he has to follow-up with PCP and allergist for continuing care.  All  questions were answered and he verbalizes his understanding and agreement.    Past Medical History:     Past Medical History:   Diagnosis Date    Acute on chronic diastolic heart failure 12/14/2022    Aneurysm     Anxiety     Asthma     Atrial fibrillation     Bronchitis     CHF (congestive heart failure)     Coronary artery disease     Depression     Encounter for laboratory testing for COVID-19 virus 08/22/2022    Gout     Hyperlipidemia     Hypertension     Intractable headache 01/09/2023    Myocardial infarction     Pneumonia     Pulmonary nodule     Familia Mountain spotted fever     Stroke        Past Surgical History:     Past Surgical History:   Procedure Laterality Date    AORTIC VALVE REPAIR/REPLACEMENT  12/03/2016    CABG x 5/ tissue AVR by DR. PHAN    CARDIAC CATHETERIZATION      CARDIAC SURGERY      CARDIOVASCULAR STRESS TEST  2020    CAROTID STENT      CORONARY ARTERY BYPASS GRAFT  12/02/2016    X4  Dr Phan    HERNIA REPAIR  2010    ILIAC ARTERY ANEURYSM REPAIR  01/05/2017    abdominal and bilateral    NASAL POLYP EXCISION  2005    OTHER SURGICAL HISTORY      [facility]    REPAIR CHOANAL ATRESIA  2005    SINUS SURGERY      VASCULAR SURGERY         Social History:   Social History     Socioeconomic History    Marital status:    Tobacco Use    Smoking status: Never     Passive exposure: Never    Smokeless tobacco: Never   Vaping Use    Vaping status: Never Used   Substance and Sexual Activity    Alcohol use: Not Currently     Comment: quit 2005    Drug use: No    Sexual activity: Not Currently       Procedures Performed         Consults:   Consults       Date and Time Order Name Status Description    8/27/2024 11:47 PM Inpatient Spine Surgery Consult Completed             Condition on Discharge:     Stable    Discharge Disposition  Home or Self Care    Discharge Medications     Discharge Medications        New Medications        Instructions Start Date   budesonide-formoterol 160-4.5 MCG/ACT  inhaler  Commonly known as: SYMBICORT   2 puffs, Inhalation, 2 Times Daily - RT      cyclobenzaprine 5 MG tablet  Commonly known as: FLEXERIL   5 mg, Oral, 3 Times Daily PRN             Continue These Medications        Instructions Start Date   ALIGN PREBIOTIC-PROBIOTIC PO   1 tablet, Oral, Daily      allopurinol 300 MG tablet  Commonly known as: ZYLOPRIM   Take 1 tablet by mouth Every Night.      amLODIPine 10 MG tablet  Commonly known as: NORVASC   10 mg, Oral, Daily      apixaban 5 MG tablet tablet  Commonly known as: ELIQUIS   5 mg, Oral, Every 12 Hours Scheduled      ascorbic acid 250 MG tablet  Commonly known as: VITAMIN C   1,000 mg, Oral, Daily      aspirin 81 MG EC tablet   81 mg, Oral, Daily      Cholecalciferol 25 MCG (1000 UT) capsule   1,000 Units, Oral, Daily      ipratropium-albuterol 0.5-2.5 mg/3 ml nebulizer  Commonly known as: DUO-NEB   3 mL, Nebulization, Every 4 Hours PRN      lisinopril 20 MG tablet  Commonly known as: PRINIVIL,ZESTRIL   20 mg, Oral, 2 Times Daily      multivitamin with minerals tablet tablet   1 tablet, Oral, Daily      rosuvastatin 20 MG tablet  Commonly known as: CRESTOR   20 mg, Oral, Daily      Ryaltris 665-25 MCG/ACT suspension  Generic drug: Olopatadine-Mometasone   Nasal      Ventolin  (90 Base) MCG/ACT inhaler  Generic drug: albuterol sulfate HFA   2 puffs, Inhalation, Every 6 Hours PRN      zinc sulfate 220 (50 Zn) MG capsule  Commonly known as: ZINCATE   50 mg, Oral, Daily             Stop These Medications      Breztri Aerosphere 160-9-4.8 MCG/ACT aerosol inhaler  Generic drug: Budeson-Glycopyrrol-Formoterol              Discharge Diet:     Activity at Discharge:     Follow-up Appointments  Future Appointments   Date Time Provider Department Center   10/3/2024  2:30 PM Maciej Blank MD [facility]   12/5/2024  1:45 PM Maciej Blank MD [facility]   12/30/2024  1:15 PM Yoshi Lyon MD [facility]     Additional Instructions  for the Follow-ups that You Need to Schedule       Discharge Follow-up with PCP   As directed       Currently Documented PCP:    Yoshi Lyon MD    PCP Phone Number:    [phone]     Follow Up Details: 5 to 7 days                Test Results Pending at Discharge       Risk for Readmission (LACE) Score: 8 (8/28/2024  6:00 AM)      Greater than 30 minutes spent in discharge activities for this patient    Signature:Electronically signed by Barber Kumar PA-C, 08/28/24, 9:16 AM EDT.

## 2024-08-28 NOTE — PLAN OF CARE
Problem: Adult Inpatient Plan of Care  Goal: Plan of Care Review  Outcome: Met  Goal: Patient-Specific Goal (Individualized)  Outcome: Met  Goal: Absence of Hospital-Acquired Illness or Injury  Outcome: Met  Intervention: Identify and Manage Fall Risk  Recent Flowsheet Documentation  Taken 8/28/2024 1000 by Dolores Spencer RN  Safety Promotion/Fall Prevention:   nonskid shoes/slippers when out of bed   toileting scheduled   safety round/check completed   clutter free environment maintained   assistive device/personal items within reach  Taken 8/28/2024 0800 by Dolores Spencer RN  Safety Promotion/Fall Prevention:   nonskid shoes/slippers when out of bed   room organization consistent   safety round/check completed   clutter free environment maintained   assistive device/personal items within reach  Intervention: Prevent Infection  Recent Flowsheet Documentation  Taken 8/28/2024 1000 by Dolores Spencer RN  Infection Prevention: environmental surveillance performed  Taken 8/28/2024 0800 by Dolores Spencer RN  Infection Prevention: single patient room provided  Goal: Optimal Comfort and Wellbeing  Outcome: Met  Goal: Readiness for Transition of Care  Outcome: Met     Problem: COPD (Chronic Obstructive Pulmonary Disease) Comorbidity  Goal: Maintenance of COPD Symptom Control  Outcome: Met  Intervention: Maintain COPD-Symptom Control  Recent Flowsheet Documentation  Taken 8/28/2024 1000 by Dolores Spencer RN  Medication Review/Management: medications reviewed  Taken 8/28/2024 0800 by Dolores Spencer RN  Medication Review/Management: medications reviewed     Problem: Hypertension Comorbidity  Goal: Blood Pressure in Desired Range  Outcome: Met  Intervention: Maintain Blood Pressure Management  Recent Flowsheet Documentation  Taken 8/28/2024 1000 by Dolores Spencer RN  Medication Review/Management: medications reviewed  Taken 8/28/2024 0800 by Dolores Spencer RN  Medication  Review/Management: medications reviewed     Problem: Pain Acute  Goal: Acceptable Pain Control and Functional Ability  Outcome: Met  Intervention: Prevent or Manage Pain  Recent Flowsheet Documentation  Taken 8/28/2024 1000 by Dolores Spencer RN  Medication Review/Management: medications reviewed  Taken 8/28/2024 0800 by Dolores Spencer RN  Medication Review/Management: medications reviewed     Problem: Fall Injury Risk  Goal: Absence of Fall and Fall-Related Injury  Outcome: Met  Intervention: Identify and Manage Contributors  Recent Flowsheet Documentation  Taken 8/28/2024 1000 by Dolores Spencer RN  Medication Review/Management: medications reviewed  Taken 8/28/2024 0800 by Dolores Spencer RN  Medication Review/Management: medications reviewed  Intervention: Promote Injury-Free Environment  Recent Flowsheet Documentation  Taken 8/28/2024 1000 by Dolores Spencer RN  Safety Promotion/Fall Prevention:   nonskid shoes/slippers when out of bed   toileting scheduled   safety round/check completed   clutter free environment maintained   assistive device/personal items within reach  Taken 8/28/2024 0800 by Dolores Spencer RN  Safety Promotion/Fall Prevention:   nonskid shoes/slippers when out of bed   room organization consistent   safety round/check completed   clutter free environment maintained   assistive device/personal items within reach   Goal Outcome Evaluation:

## 2024-08-29 ENCOUNTER — TRANSITIONAL CARE MANAGEMENT TELEPHONE ENCOUNTER (OUTPATIENT)
Dept: CALL CENTER | Facility: HOSPITAL | Age: 69
End: 2024-08-29
Payer: MEDICARE

## 2024-08-29 NOTE — OUTREACH NOTE
Call Center TCM Note      Flowsheet Row Responses   [facility] patient discharged from? [facility]   Does the patient have one of the following disease processes/diagnoses(primary or secondary)? Other   TCM attempt successful? Yes   Call start time 0811   Call end time 0817   Discharge diagnosis Neck pain and left arm pain   Meds reviewed with patient/caregiver? Yes   Is the patient having any side effects they believe may be caused by any medication additions or changes? No   Does the patient have all medications ordered at discharge? Yes   Is the patient taking all medications as directed (includes completed medication regime)? Yes   Does the patient have an appointment with their PCP within 7-14 days of discharge? No   Nursing Interventions Patient declined scheduling/rescheduling appointment at this time   Has home health visited the patient within 72 hours of discharge? N/A   Psychosocial issues? No   Did the patient receive a copy of their discharge instructions? Yes   Nursing interventions Reviewed instructions with patient   What is the patient's perception of their health status since discharge? Improving   Is the patient/caregiver able to teach back signs and symptoms related to disease process for when to call PCP? Yes   Is the patient/caregiver able to teach back signs and symptoms related to disease process for when to call 911? Yes   Is the patient/caregiver able to teach back the hierarchy of who to call/visit for symptoms/problems? PCP, Specialist, Home health nurse, Urgent Care, ED, 911 Yes   If the patient is a current smoker, are they able to teach back resources for cessation? Not a smoker   TCM call completed? Yes   Call end time 0817   Would this patient benefit from a Referral to Amb Social Work? No   Is the patient interested in additional calls from an ambulatory ? No            Barbara Nascimento LPN    8/29/2024, 08:21 EDT

## 2024-09-11 ENCOUNTER — OFFICE VISIT (OUTPATIENT)
Dept: FAMILY MEDICINE CLINIC | Facility: CLINIC | Age: 69
End: 2024-09-11
Payer: MEDICARE

## 2024-09-11 VITALS
HEIGHT: 73 IN | BODY MASS INDEX: 25.58 KG/M2 | TEMPERATURE: 98.4 F | HEART RATE: 64 BPM | SYSTOLIC BLOOD PRESSURE: 143 MMHG | DIASTOLIC BLOOD PRESSURE: 83 MMHG | OXYGEN SATURATION: 95 % | WEIGHT: 193 LBS

## 2024-09-11 DIAGNOSIS — J45.40 MODERATE PERSISTENT ASTHMA, UNSPECIFIED WHETHER COMPLICATED: Primary | Chronic | ICD-10-CM

## 2024-09-11 PROCEDURE — 3077F SYST BP >= 140 MM HG: CPT | Performed by: NURSE PRACTITIONER

## 2024-09-11 PROCEDURE — 3079F DIAST BP 80-89 MM HG: CPT | Performed by: NURSE PRACTITIONER

## 2024-09-11 PROCEDURE — 1126F AMNT PAIN NOTED NONE PRSNT: CPT | Performed by: NURSE PRACTITIONER

## 2024-09-11 PROCEDURE — G2211 COMPLEX E/M VISIT ADD ON: HCPCS | Performed by: NURSE PRACTITIONER

## 2024-09-11 PROCEDURE — 99214 OFFICE O/P EST MOD 30 MIN: CPT | Performed by: NURSE PRACTITIONER

## 2024-09-11 RX ORDER — PREDNISONE 10 MG/1
TABLET ORAL
COMMUNITY
Start: 2024-08-16 | End: 2024-09-11

## 2024-09-11 RX ORDER — METHYLPREDNISOLONE 4 MG
TABLET, DOSE PACK ORAL
Qty: 21 TABLET | Refills: 0 | Status: SHIPPED | OUTPATIENT
Start: 2024-09-11

## 2024-09-11 RX ORDER — AZITHROMYCIN 250 MG/1
TABLET, FILM COATED ORAL
Qty: 6 TABLET | Refills: 0 | Status: SHIPPED | OUTPATIENT
Start: 2024-09-11

## 2024-09-11 RX ORDER — MONTELUKAST SODIUM 10 MG/1
10 TABLET ORAL NIGHTLY
Qty: 30 TABLET | Refills: 0 | Status: SHIPPED | OUTPATIENT
Start: 2024-09-11

## 2024-09-11 NOTE — PROGRESS NOTES
Chief Complaint  Cough (Lingering cough from covid 7 weeks ago , dry cough sometimes , laying down cough get worse and when active ), Asthma (Does have asthma ), and referral  (For lung and Asthma )    Subjective        Alejandro Bain presents to [facility]  History of Present Illness  Alejandro is a 69-year-old male presenting today with complaints of a lingering cough.  He was diagnosed with COVID at the end of July.  He has been on 3 rounds of steroids, but his cough and shortness of breath continues.  His nebulizer helps some, but is not lasting as long as it used to.  Cough is productive with clear sputum.  Chest x-ray on 8/27/2024 showed possible COPD and atelectasis or scarring in the left basilar area.  He tried Breztri about 3 weeks ago and developed back and left arm pain/numbness, so he stopped using it.        The following portions of the patient's history were reviewed and updated as appropriate: allergies, current medications, past family history, past medical history, past social history, past surgical history and problem list.    Allergies   Allergen Reactions    Avelox [Moxifloxacin] Rash    Penicillins Rash    Sulfa Antibiotics Rash    Levaquin [Levofloxacin] GI Intolerance       Patient Active Problem List   Diagnosis    S/P CABG x 5 by Dr. Phan    S/P AVR (tissue) by Dr. Phan    AAA (abdominal aortic aneurysm) without rupture    Mass of lingula of lung--left    S/P AAA repair    Aneurysm of abdominal vessel    Essential hypertension    Aneurysm of femoral artery    Aneurysm of iliac artery    Paroxysmal atrial fibrillation    Coronary artery disease involving native coronary artery of native heart without angina pectoris    Mixed hyperlipidemia    Palpitations    Shortness of breath    Acute upper back pain    Asthma    Thrombocytopenia    Encounter for laboratory testing for COVID-19 virus    COVID    Leukocytosis    Elevated LFTs    Acute renal insufficiency  "   Acute on chronic diastolic heart failure    Acute respiratory distress    CVA (cerebral vascular accident)    Intractable headache    TIA (transient ischemic attack)    Neck pain       Current Outpatient Medications   Medication Instructions    allopurinol (ZYLOPRIM) 300 MG tablet Take 1 tablet by mouth Every Night.    amLODIPine (NORVASC) 10 mg, Oral, Daily    apixaban (ELIQUIS) 5 mg, Oral, Every 12 Hours Scheduled    ascorbic acid (VITAMIN C) 1,000 mg, Oral, Daily    aspirin 81 mg, Oral, Daily    azithromycin (Zithromax Z-Avery) 250 MG tablet Take 2 tablets by mouth on day 1, then 1 tablet daily on days 2-5    Bacillus Coagulans-Inulin (ALIGN PREBIOTIC-PROBIOTIC PO) 1 tablet, Oral, Daily    budesonide-formoterol (SYMBICORT) 160-4.5 MCG/ACT inhaler 2 puffs, Inhalation, 2 Times Daily - RT    Cholecalciferol 1,000 Units, Oral, Daily    cyclobenzaprine (FLEXERIL) 5 mg, Oral, 3 Times Daily PRN    ipratropium-albuterol (DUO-NEB) 0.5-2.5 mg/3 ml nebulizer 3 mL, Nebulization, Every 4 Hours PRN    lisinopril (PRINIVIL,ZESTRIL) 20 mg, Oral, 2 Times Daily    methylPREDNISolone (MEDROL) 4 MG dose pack Take as directed on package instructions.    montelukast (SINGULAIR) 10 mg, Oral, Nightly    Multiple Vitamins-Minerals (MULTIVITAMIN ADULT PO) 1 tablet, Oral, Daily    Olopatadine-Mometasone (Ryaltris) 665-25 MCG/ACT suspension Nasal    rosuvastatin (CRESTOR) 20 mg, Oral, Daily    VENTOLIN  (90 Base) MCG/ACT inhaler 2 puffs, Inhalation, Every 6 Hours PRN    zinc sulfate (ZINCATE) 50 mg, Oral, Daily          Objective   Vital Signs:  /83 (BP Location: Left arm, Patient Position: Sitting, Cuff Size: Large Adult)   Pulse 64   Temp 98.4 °F (36.9 °C) (Temporal)   Ht 185.4 cm (73\")   Wt 87.5 kg (193 lb)   SpO2 95%   BMI 25.46 kg/m²   Estimated body mass index is 25.46 kg/m² as calculated from the following:    Height as of this encounter: 185.4 cm (73\").    Weight as of this encounter: 87.5 kg (193 lb).         "       Review of Systems   Constitutional:  Negative for appetite change, chills, fatigue and fever.   HENT:  Negative for congestion, ear pain, postnasal drip, rhinorrhea, sinus pressure, sinus pain, sneezing, sore throat and tinnitus.    Eyes:  Negative for redness.   Respiratory:  Positive for cough, chest tightness, shortness of breath and wheezing.    Cardiovascular:  Negative for chest pain.   Gastrointestinal:  Negative for nausea and vomiting.   Musculoskeletal:  Negative for myalgias.   Allergic/Immunologic: Negative for environmental allergies.   Neurological:  Negative for dizziness, syncope, weakness, light-headedness and headaches.        Physical Exam  Constitutional:       Appearance: Normal appearance.   HENT:      Nose: Nose normal.      Mouth/Throat:      Mouth: Mucous membranes are moist.      Pharynx: Oropharynx is clear.   Cardiovascular:      Rate and Rhythm: Normal rate and regular rhythm.      Heart sounds: Normal heart sounds.   Pulmonary:      Effort: Pulmonary effort is normal.      Breath sounds: Decreased air movement present. Examination of the right-upper field reveals decreased breath sounds. Examination of the left-upper field reveals decreased breath sounds. Examination of the right-lower field reveals decreased breath sounds and rhonchi. Examination of the left-lower field reveals decreased breath sounds and rhonchi. Decreased breath sounds and rhonchi present.   Skin:     General: Skin is warm and dry.      Capillary Refill: Capillary refill takes less than 2 seconds.   Neurological:      Mental Status: He is alert and oriented to person, place, and time.   Psychiatric:         Mood and Affect: Mood normal.         Behavior: Behavior normal.         Thought Content: Thought content normal.         Judgment: Judgment normal.        Result Review :                   Assessment and Plan   Diagnoses and all orders for this visit:    1. Moderate persistent asthma, unspecified whether  complicated (Primary)  Comments:  Start medrol dose pack and azithromycin.  Will start Singulair.  If no improvement with Singulair, will add on Trelegy inhaler.  Referral to pulmonology  Orders:  -     Ambulatory Referral to Pulmonology  -     montelukast (Singulair) 10 MG tablet; Take 1 tablet by mouth Every Night.  Dispense: 30 tablet; Refill: 0    Other orders  -     azithromycin (Zithromax Z-Avery) 250 MG tablet; Take 2 tablets by mouth on day 1, then 1 tablet daily on days 2-5  Dispense: 6 tablet; Refill: 0  -     methylPREDNISolone (MEDROL) 4 MG dose pack; Take as directed on package instructions.  Dispense: 21 tablet; Refill: 0             Follow Up   No follow-ups on file.  Patient was given instructions and counseling regarding his condition or for health maintenance advice. Please see specific information pulled into the AVS if appropriate.

## 2024-09-13 ENCOUNTER — TELEPHONE (OUTPATIENT)
Dept: FAMILY MEDICINE CLINIC | Facility: CLINIC | Age: 69
End: 2024-09-13
Payer: MEDICARE

## 2024-09-13 NOTE — TELEPHONE ENCOUNTER
Alejandro stopped at office today 09/13 and was wanting a sample of Trelegy inhaler. We did see your office notes on 09/11 saying to start Singulair. If no improvement with Singulair, will add on Trelegy inhaler but there were no other notes about having a sample ready for him or on his medication list so I told him I would send a phone note to you and after I heard something from you , I would contact him.

## 2024-09-16 ENCOUNTER — TELEPHONE (OUTPATIENT)
Dept: FAMILY MEDICINE CLINIC | Facility: CLINIC | Age: 69
End: 2024-09-16
Payer: MEDICARE

## 2024-09-16 RX ORDER — FLUTICASONE FUROATE, UMECLIDINIUM BROMIDE AND VILANTEROL TRIFENATATE 100; 62.5; 25 UG/1; UG/1; UG/1
1 POWDER RESPIRATORY (INHALATION)
Qty: 28 EACH | Refills: 0 | COMMUNITY
Start: 2024-09-16

## 2024-10-02 RX ORDER — FLUTICASONE FUROATE, UMECLIDINIUM BROMIDE AND VILANTEROL TRIFENATATE 100; 62.5; 25 UG/1; UG/1; UG/1
1 POWDER RESPIRATORY (INHALATION)
Qty: 28 EACH | Refills: 5 | COMMUNITY
Start: 2024-10-02 | End: 2024-10-04 | Stop reason: SDUPTHER

## 2024-10-02 NOTE — TELEPHONE ENCOUNTER
Caller: Alejandro Bain    Relationship: Self    Requested Prescriptions:   Requested Prescriptions     Pending Prescriptions Disp Refills    Fluticasone-Umeclidin-Vilant (Trelegy Ellipta) 100-62.5-25 MCG/ACT inhaler 28 each 0     Sig: Inhale 1 puff Daily.      Pharmacy where request should be sent:  [facility] [id number] [place] [address] [phone] [facility] [phone]      Last office visit with prescribing clinician: 6/26/2024   Last telemedicine visit with prescribing clinician: Visit date not found   Next office visit with prescribing clinician: 12/30/2024     Additional details provided by patient: PATIENT STATES HE REALLY LIKED THE 10 DAY SAMPLE HE GOT OF THIS MEDICATION AND WOULD LIKE TO HAVE A PRESCRIPTION FOR IT.         Isidro Heard Rep   10/02/24 11:13 EDT

## 2024-10-04 RX ORDER — FLUTICASONE FUROATE, UMECLIDINIUM BROMIDE AND VILANTEROL TRIFENATATE 100; 62.5; 25 UG/1; UG/1; UG/1
1 POWDER RESPIRATORY (INHALATION)
Qty: 84 EACH | Refills: 1 | Status: SHIPPED | OUTPATIENT
Start: 2024-10-04

## 2024-10-04 RX ORDER — ALLOPURINOL 300 MG/1
300 TABLET ORAL NIGHTLY
Qty: 90 TABLET | Refills: 1 | Status: SHIPPED | OUTPATIENT
Start: 2024-10-04

## 2024-10-04 NOTE — TELEPHONE ENCOUNTER
Caller: OdellAlejandro    Relationship: Self    Best call back number: [phone]    Requested Prescriptions:   Requested Prescriptions     Pending Prescriptions Disp Refills    allopurinol (ZYLOPRIM) 300 MG tablet       Sig: Take 1 tablet by mouth Every Night.        Pharmacy where request should be sent: [facility] [id number] [place] [facility] [place]  [address] [facility] [phone] [facility] [phone]      Last office visit with prescribing clinician: 6/26/2024   Last telemedicine visit with prescribing clinician: Visit date not found   Next office visit with prescribing clinician: 12/30/2024     Additional details provided by patient: WILL NEED THIS REFILLED, 90 DAY SUPPLY WITH REFILLS, PREVIOUSLY GIVEN BY DR WILLIS BREAUX    Does the patient have less than a 3 day supply:  [x] Yes  [] No    Would you like a call back once the refill request has been completed: [] Yes [x] No    If the office needs to give you a call back, can they leave a voicemail: [] Yes [x] No    Asia Le   10/04/24 11:48 EDT

## 2024-10-08 DIAGNOSIS — J45.40 MODERATE PERSISTENT ASTHMA, UNSPECIFIED WHETHER COMPLICATED: Chronic | ICD-10-CM

## 2024-10-08 RX ORDER — MONTELUKAST SODIUM 10 MG/1
10 TABLET ORAL NIGHTLY
Qty: 30 TABLET | Refills: 5 | Status: SHIPPED | OUTPATIENT
Start: 2024-10-08

## 2024-10-24 ENCOUNTER — OFFICE VISIT (OUTPATIENT)
Dept: PULMONOLOGY | Facility: HOSPITAL | Age: 69
End: 2024-10-24
Payer: MEDICARE

## 2024-10-24 VITALS
DIASTOLIC BLOOD PRESSURE: 75 MMHG | OXYGEN SATURATION: 98 % | HEART RATE: 54 BPM | HEIGHT: 73 IN | WEIGHT: 193 LBS | RESPIRATION RATE: 14 BRPM | BODY MASS INDEX: 25.58 KG/M2 | SYSTOLIC BLOOD PRESSURE: 139 MMHG

## 2024-10-24 DIAGNOSIS — U09.9 POST-COVID CHRONIC COUGH: ICD-10-CM

## 2024-10-24 DIAGNOSIS — R06.02 SHORTNESS OF BREATH: ICD-10-CM

## 2024-10-24 DIAGNOSIS — R05.3 POST-COVID CHRONIC COUGH: ICD-10-CM

## 2024-10-24 DIAGNOSIS — I48.0 PAROXYSMAL ATRIAL FIBRILLATION: Chronic | ICD-10-CM

## 2024-10-24 DIAGNOSIS — J45.40 MODERATE PERSISTENT ASTHMA, UNSPECIFIED WHETHER COMPLICATED: Primary | Chronic | ICD-10-CM

## 2024-10-24 PROCEDURE — G0463 HOSPITAL OUTPT CLINIC VISIT: HCPCS

## 2024-10-24 RX ORDER — DOXYCYCLINE HYCLATE 100 MG
100 TABLET ORAL 2 TIMES DAILY
Qty: 20 TABLET | Refills: 0 | Status: SHIPPED | OUTPATIENT
Start: 2024-10-24

## 2024-10-24 RX ORDER — IPRATROPIUM BROMIDE AND ALBUTEROL SULFATE 2.5; .5 MG/3ML; MG/3ML
3 SOLUTION RESPIRATORY (INHALATION) 4 TIMES DAILY PRN
Qty: 120 ML | Refills: 11 | Status: SHIPPED | OUTPATIENT
Start: 2024-10-24 | End: 2024-11-23

## 2024-10-24 RX ORDER — PREDNISONE 10 MG/1
TABLET ORAL
Qty: 31 TABLET | Refills: 0 | Status: SHIPPED | OUTPATIENT
Start: 2024-10-24

## 2024-10-24 NOTE — PROGRESS NOTES
SLEEP/PULMONARY  CLINIC NOTE      PATIENT IDENTIFICATION:  Name: Alejandro Bain  Age: 69 y.o.  Sex: male  :  1955  MRN: [id number]    DATE OF CONSULTATION:  10/24/2024     Referring physician:    Yoshi Lyon MD            CHIEF COMPLAINT: SOB    History of Present Illness:   Alejandro Bain is a 69 y.o. male patient chronic history of asthma, he has COVID multiple times, has history of CVA, in  and July he has a COVID post COVID he continued have significant shortness of breath coughing and wheezing treated multiple times with antibiotics and steroids without significant improvement, reporting to me that the patient is feeling that he is getting used to it, but is still coughing and wheezing no fever no chills notices no change in his weight,        Review of Systems:   Constitutional: As above   Eyes: negative   ENT/oropharynx: negative   Cardiovascular: negative   Respiratory: As above   Gastrointestinal: negative   Genitourinary: negative   Neurological: negative   Musculoskeletal: negative   Integument/breast: negative   Endocrine: negative   Allergic/Immunologic: negative     Past Medical History:  Past Medical History:   Diagnosis Date    Acute on chronic diastolic heart failure 2022    Allergic rhinitis [id number]    Aneurysm     Anxiety     Asthma     Atrial fibrillation     Bronchitis     CHF (congestive heart failure)     Coronary artery disease     Depression     Encounter for laboratory testing for COVID-19 virus 2022    Gout     Hyperlipidemia     Hypertension     Intractable headache 2023    Myocardial infarction     Pneumonia     Pulmonary nodule     Familia Mountain spotted fever     Stroke        Past Surgical History:  Past Surgical History:   Procedure Laterality Date    AORTIC VALVE REPAIR/REPLACEMENT  2016    CABG x 5/ tissue AVR by DR. TORRES    CARDIAC CATHETERIZATION      CARDIAC SURGERY      CARDIOVASCULAR STRESS TEST      CAROTID STENT       CORONARY ARTERY BYPASS GRAFT  12/02/2016    X4  Dr Phan    HERNIA REPAIR  2010    ILIAC ARTERY ANEURYSM REPAIR  01/05/2017    abdominal and bilateral    NASAL POLYP EXCISION  2005    OTHER SURGICAL HISTORY      [facility]    REPAIR CHOANAL ATRESIA  2005    SINUS SURGERY      VASCULAR SURGERY          Family History:  Family History   Problem Relation Age of Onset    Pulmonary fibrosis Mother     Heart disease Mother     Cancer Father         brain    Cancer Sister         lung    Heart disease Sister     Cancer Brother         pancreatic, lung    Sleep apnea Neg Hx         Social History:   Social History     Tobacco Use    Smoking status: Never     Passive exposure: Never    Smokeless tobacco: Never   Substance Use Topics    Alcohol use: Not Currently     Comment: quit 2005        Allergies:  Allergies   Allergen Reactions    Avelox [Moxifloxacin] Rash    Penicillins Rash    Sulfa Antibiotics Rash    Levaquin [Levofloxacin] GI Intolerance       Home Meds:  (Not in a hospital admission)      Objective:    Vitals Ranges:   Heart Rate:  [54] 54  Resp:  [14] 14  BP: (139)/(75) 139/75  Body mass index is 25.46 kg/m².     Exam:  General Appearance:  WDWN    HEENT:   without obvious abnormality,  Conjunctiva/corneas clear,  Normal external ear canals, no drainage    Clear orsalmucosa,  Mallampati score 3    Neck:  Supple, symmetrical, trachea midline. No JVD.  Lungs:   Bilateral basal rhonchi bilaterally, respirations unlabored symmetrical wall movement.    Chest wall:  No tenderness or deformity.    Heart:  Regular rate and rhythm, S1 and S2 normal.  Extremities: Trace edema no clubbing or Cyanosis        Data Review:  All labs (24hrs): No results found for this or any previous visit (from the past 24 hours).     Imaging:  MRI Cervical Spine Without Contrast  Narrative: MRI CERVICAL SPINE WO CONTRAST    Date of Exam: 8/27/2024 9:20 PM EDT    Indication: Radicular pain numbness and weakness left upper extremity.      Comparison: MRI cervical spine June 6, 2022    Technique:  Routine multiplanar/multisequence sequence images of the cervical spine were obtained without contrast administration.      Findings:  The signal within the marrow of the visualized cervical vertebra does not appear unusual. There is no intrinsic cord abnormality. The craniovertebral junction does not appear abnormal.    C2-3: There is a small central disc protrusion which has been suggested. The intervertebral foramina appear patent.    C3-4: No definite disc herniation or intervertebral foraminal stenosis.    C4-5: Minimal bulging of the annulus. There is no intervertebral foraminal stenosis.    C5-6: There is some bulging of the annulus. The intervertebral foramina appear patent.    C6-7: Broad-based bulging of the annulus superimposed on probable osseous bar with moderate narrowing of the vertebral foramina.    C7-T1: No definite disc herniation or intervertebral foraminal stenosis.  Impression: Impression:  1.Multilevel degenerative changes similar to the prior study.    Electronically Signed: Landon Cardenas MD    8/27/2024 10:25 PM EDT    Workstation ID: [id number]  XR Chest 1 View  Narrative: XR CHEST 1 VW    Date of Exam: 8/27/2024 8:55 PM EDT    Indication: History of recent COVID now with persistent cough    Comparison: July 27, 2024    Findings:  Sternotomy wires are noted. It looks like there is an element of COPD. There is some atelectasis or scarring at the left base. The right lung is clear. There are no pleural effusions. There is old left rib fracture deformity.  Impression: Impression:  1.Element of COPD suggested.  2.Atelectasis or scarring left basilar area.    Electronically Signed: Landon Cardenas MD    8/27/2024 9:07 PM EDT    Workstation ID: [id number]       ASSESSMENT:  Diagnoses and all orders for this visit:    Moderate persistent asthma, unspecified whether complicated    Shortness of breath    Post-COVID chronic cough    Paroxysmal  atrial fibrillation    Other orders  -     doxycycline (VIBRAMYICN) 100 MG tablet; Take 1 tablet by mouth 2 (Two) Times a Day.  -     predniSONE (DELTASONE) 10 MG tablet; Take 4 tabs daily x 3 days, then take 3 tabs daily x 3 days, then take 2 tabs daily x 3 days, then take 1 tab daily x 3 days    Acute bronchitis    PLAN:  Will try course of doxycycline and prednisone if not improving plan to proceed with bronchoscopy  Bronchodilator inhaled corticosteroid    Education how to use inhalers    Encouraged to use incentive spirometer    Continue to exercise slowly as tolerated    Monitor for any change in the color of the sputum    Avoid any exposure to fumes, gas or any irritant        Follow-up 3 weeks    Dharmesh Eugene MD. D, ABSM.  10/24/2024  12:52 EDT

## 2024-11-19 ENCOUNTER — OFFICE VISIT (OUTPATIENT)
Dept: FAMILY MEDICINE CLINIC | Facility: CLINIC | Age: 69
End: 2024-11-19
Payer: MEDICARE

## 2024-11-19 VITALS
HEART RATE: 52 BPM | TEMPERATURE: 98.2 F | DIASTOLIC BLOOD PRESSURE: 74 MMHG | HEIGHT: 73 IN | OXYGEN SATURATION: 96 % | BODY MASS INDEX: 26.11 KG/M2 | WEIGHT: 197 LBS | SYSTOLIC BLOOD PRESSURE: 118 MMHG

## 2024-11-19 DIAGNOSIS — J98.8 RESPIRATORY INFECTION: Primary | ICD-10-CM

## 2024-11-19 LAB
EXPIRATION DATE: NORMAL
FLUAV AG UPPER RESP QL IA.RAPID: NOT DETECTED
FLUBV AG UPPER RESP QL IA.RAPID: NOT DETECTED
INTERNAL CONTROL: NORMAL
Lab: NORMAL
SARS-COV-2 AG UPPER RESP QL IA.RAPID: NOT DETECTED

## 2024-11-19 PROCEDURE — 99214 OFFICE O/P EST MOD 30 MIN: CPT | Performed by: NURSE PRACTITIONER

## 2024-11-19 PROCEDURE — 87428 SARSCOV & INF VIR A&B AG IA: CPT | Performed by: NURSE PRACTITIONER

## 2024-11-19 PROCEDURE — 1126F AMNT PAIN NOTED NONE PRSNT: CPT | Performed by: NURSE PRACTITIONER

## 2024-11-19 PROCEDURE — 3078F DIAST BP <80 MM HG: CPT | Performed by: NURSE PRACTITIONER

## 2024-11-19 PROCEDURE — 3074F SYST BP LT 130 MM HG: CPT | Performed by: NURSE PRACTITIONER

## 2024-11-19 RX ORDER — METHYLPREDNISOLONE 4 MG/1
TABLET ORAL
Qty: 21 TABLET | Refills: 0 | Status: SHIPPED | OUTPATIENT
Start: 2024-11-19

## 2024-11-19 RX ORDER — AZITHROMYCIN 250 MG/1
TABLET, FILM COATED ORAL
Qty: 6 TABLET | Refills: 0 | Status: SHIPPED | OUTPATIENT
Start: 2024-11-19

## 2024-11-19 NOTE — PROGRESS NOTES
Chief Complaint  URI and Cough (A few days , son has bronchitis )    Subjective        Alejandro Bain presents to [facility]  History of Present Illness  Alejandro is a 69-year-old male presenting today with cough and congestion.  Symptoms started a couple days ago.  He reports a productive cough with clear sputum, sore throat, and headaches.  When he lays down his chest rattles when he breathes.  He has been using cough medications and a nebulizer.  He says he has been around his son who was diagnosed with bronchitis.      The following portions of the patient's history were reviewed and updated as appropriate: allergies, current medications, past family history, past medical history, past social history, past surgical history and problem list.    Allergies   Allergen Reactions    Avelox [Moxifloxacin] Rash    Penicillins Rash    Sulfa Antibiotics Rash    Levaquin [Levofloxacin] GI Intolerance       Patient Active Problem List   Diagnosis    S/P CABG x 5 by Dr. Phan    S/P AVR (tissue) by Dr. Phan    AAA (abdominal aortic aneurysm) without rupture    Mass of lingula of lung--left    S/P AAA repair    Aneurysm of abdominal vessel    Essential hypertension    Aneurysm of femoral artery    Aneurysm of iliac artery    Paroxysmal atrial fibrillation    Coronary artery disease involving native coronary artery of native heart without angina pectoris    Mixed hyperlipidemia    Palpitations    Shortness of breath    Acute upper back pain    Asthma    Thrombocytopenia    Encounter for laboratory testing for COVID-19 virus    COVID    Leukocytosis    Elevated LFTs    Acute renal insufficiency    Acute on chronic diastolic heart failure    Acute respiratory distress    CVA (cerebral vascular accident)    Intractable headache    TIA (transient ischemic attack)    Neck pain    Post-COVID chronic cough       Current Outpatient Medications   Medication Instructions    allopurinol (ZYLOPRIM) 300  "mg, Oral, Nightly    amLODIPine (NORVASC) 10 mg, Oral, Daily    apixaban (ELIQUIS) 5 mg, Oral, Every 12 Hours Scheduled    ascorbic acid (VITAMIN C) 1,000 mg, Daily    aspirin 81 mg, Daily    azithromycin (Zithromax Z-Avery) 250 MG tablet Take 2 tablets by mouth on day 1, then 1 tablet daily on days 2-5    Bacillus Coagulans-Inulin (ALIGN PREBIOTIC-PROBIOTIC PO) 1 tablet, Daily    Cholecalciferol 1,000 Units, Daily    cyclobenzaprine (FLEXERIL) 5 mg, Oral, 3 Times Daily PRN    Fluticasone-Umeclidin-Vilant (Trelegy Ellipta) 100-62.5-25 MCG/ACT inhaler 1 puff, Inhalation, Daily - RT    ipratropium-albuterol (DUO-NEB) 0.5-2.5 mg/3 ml nebulizer 3 mL, Every 4 Hours PRN    ipratropium-albuterol (DUO-NEB) 0.5-2.5 mg/3 ml nebulizer 3 mL, Nebulization, 4 Times Daily PRN    lisinopril (PRINIVIL,ZESTRIL) 20 mg, Oral, 2 Times Daily    methylPREDNISolone (MEDROL) 4 MG dose pack Take as directed on package instructions.    montelukast (SINGULAIR) 10 mg, Oral, Nightly    Multiple Vitamins-Minerals (MULTIVITAMIN ADULT PO) 1 tablet, Daily    Olopatadine-Mometasone (Ryaltris) 665-25 MCG/ACT suspension into the nostril(s) as directed by provider.    rosuvastatin (CRESTOR) 20 mg, Daily    VENTOLIN  (90 Base) MCG/ACT inhaler 2 puffs, Every 6 Hours PRN    zinc sulfate (ZINCATE) 50 mg, Daily          Objective   Vital Signs:  /74 (BP Location: Left arm, Patient Position: Sitting, Cuff Size: Large Adult)   Pulse 52   Temp 98.2 °F (36.8 °C) (Temporal)   Ht 185.4 cm (73\")   Wt 89.4 kg (197 lb)   SpO2 96%   BMI 25.99 kg/m²   Estimated body mass index is 25.99 kg/m² as calculated from the following:    Height as of this encounter: 185.4 cm (73\").    Weight as of this encounter: 89.4 kg (197 lb).             Review of Systems   Constitutional:  Positive for fatigue. Negative for appetite change, chills and fever.   HENT:  Positive for congestion and sore throat. Negative for ear pain, postnasal drip, rhinorrhea, sinus pressure, " sinus pain, sneezing and tinnitus.    Eyes:  Negative for redness.   Respiratory:  Positive for cough and shortness of breath. Negative for chest tightness and wheezing.    Cardiovascular:  Negative for chest pain.   Gastrointestinal:  Negative for constipation, diarrhea, nausea and vomiting.   Musculoskeletal:  Positive for myalgias.   Allergic/Immunologic: Negative for environmental allergies.   Neurological:  Positive for headaches. Negative for dizziness, syncope, weakness and light-headedness.        Physical Exam  Constitutional:       Appearance: Normal appearance.   HENT:      Head: Normocephalic and atraumatic.      Right Ear: Tympanic membrane, ear canal and external ear normal.      Left Ear: Tympanic membrane, ear canal and external ear normal.      Nose: Nose normal.      Mouth/Throat:      Mouth: Mucous membranes are moist.      Pharynx: Oropharynx is clear.   Eyes:      Extraocular Movements: Extraocular movements intact.      Conjunctiva/sclera: Conjunctivae normal.      Pupils: Pupils are equal, round, and reactive to light.   Cardiovascular:      Rate and Rhythm: Normal rate and regular rhythm.   Pulmonary:      Effort: Pulmonary effort is normal.      Breath sounds: Normal breath sounds.   Musculoskeletal:      Cervical back: Normal range of motion and neck supple.   Skin:     General: Skin is warm and dry.   Neurological:      Mental Status: He is alert and oriented to person, place, and time.   Psychiatric:         Mood and Affect: Mood normal.         Behavior: Behavior normal.         Thought Content: Thought content normal.         Judgment: Judgment normal.        Result Review :                   Assessment and Plan   Diagnoses and all orders for this visit:    1. Respiratory infection (Primary)  Comments:  COVID and flu negative.  Start Medrol and azithromycin.  Increase water intake.  Continue Mucinex.  RTO if symptoms persist  Orders:  -     POCT SARS-CoV-2 Antigen TONI + Flu    Other  orders  -     methylPREDNISolone (MEDROL) 4 MG dose pack; Take as directed on package instructions.  Dispense: 21 tablet; Refill: 0  -     azithromycin (Zithromax Z-Avery) 250 MG tablet; Take 2 tablets by mouth on day 1, then 1 tablet daily on days 2-5  Dispense: 6 tablet; Refill: 0             Follow Up   No follow-ups on file.  Patient was given instructions and counseling regarding his condition or for health maintenance advice. Please see specific information pulled into the AVS if appropriate.

## 2024-12-03 RX ORDER — LISINOPRIL 20 MG/1
20 TABLET ORAL 2 TIMES DAILY
Qty: 180 TABLET | Refills: 1 | Status: SHIPPED | OUTPATIENT
Start: 2024-12-03

## 2024-12-03 RX ORDER — FLUTICASONE FUROATE, UMECLIDINIUM BROMIDE AND VILANTEROL TRIFENATATE 100; 62.5; 25 UG/1; UG/1; UG/1
1 POWDER RESPIRATORY (INHALATION)
Qty: 60 EACH | Refills: 1 | Status: SHIPPED | OUTPATIENT
Start: 2024-12-03 | End: 2024-12-05

## 2024-12-05 ENCOUNTER — OFFICE VISIT (OUTPATIENT)
Dept: CARDIOLOGY | Facility: CLINIC | Age: 69
End: 2024-12-05
Payer: MEDICARE

## 2024-12-05 ENCOUNTER — OFFICE VISIT (OUTPATIENT)
Dept: PULMONOLOGY | Facility: HOSPITAL | Age: 69
End: 2024-12-05
Payer: MEDICARE

## 2024-12-05 VITALS
BODY MASS INDEX: 25.84 KG/M2 | OXYGEN SATURATION: 96 % | HEIGHT: 73 IN | DIASTOLIC BLOOD PRESSURE: 83 MMHG | RESPIRATION RATE: 16 BRPM | SYSTOLIC BLOOD PRESSURE: 124 MMHG | WEIGHT: 195 LBS | HEART RATE: 55 BPM

## 2024-12-05 VITALS
WEIGHT: 195 LBS | RESPIRATION RATE: 14 BRPM | BODY MASS INDEX: 25.84 KG/M2 | SYSTOLIC BLOOD PRESSURE: 117 MMHG | DIASTOLIC BLOOD PRESSURE: 72 MMHG | HEIGHT: 73 IN | HEART RATE: 57 BPM | OXYGEN SATURATION: 96 %

## 2024-12-05 DIAGNOSIS — I25.10 CORONARY ARTERY DISEASE INVOLVING NATIVE CORONARY ARTERY OF NATIVE HEART WITHOUT ANGINA PECTORIS: Chronic | ICD-10-CM

## 2024-12-05 DIAGNOSIS — J45.40 MODERATE PERSISTENT ASTHMA, UNSPECIFIED WHETHER COMPLICATED: Primary | Chronic | ICD-10-CM

## 2024-12-05 DIAGNOSIS — R05.3 POST-COVID CHRONIC COUGH: ICD-10-CM

## 2024-12-05 DIAGNOSIS — Z95.1 S/P CABG X 5: ICD-10-CM

## 2024-12-05 DIAGNOSIS — I10 ESSENTIAL HYPERTENSION: Chronic | ICD-10-CM

## 2024-12-05 DIAGNOSIS — I48.0 PAROXYSMAL ATRIAL FIBRILLATION: Primary | Chronic | ICD-10-CM

## 2024-12-05 DIAGNOSIS — U09.9 POST-COVID CHRONIC COUGH: ICD-10-CM

## 2024-12-05 DIAGNOSIS — I71.40 ABDOMINAL AORTIC ANEURYSM (AAA) WITHOUT RUPTURE, UNSPECIFIED PART: ICD-10-CM

## 2024-12-05 DIAGNOSIS — J30.89 NON-SEASONAL ALLERGIC RHINITIS DUE TO OTHER ALLERGIC TRIGGER: ICD-10-CM

## 2024-12-05 DIAGNOSIS — Z95.2 S/P AVR: ICD-10-CM

## 2024-12-05 PROBLEM — J30.9 ALLERGIC RHINITIS: Status: ACTIVE | Noted: 2024-12-05

## 2024-12-05 PROCEDURE — G0463 HOSPITAL OUTPT CLINIC VISIT: HCPCS

## 2024-12-05 NOTE — PROGRESS NOTES
Date of Office Visit: 2024  Encounter Provider: Dr. Maciej Blank  Place of Service: [facility] [place]  Patient Name: Alejandro Bain  :1955  Yoshi Lyon MD    Chief Complaint   Patient presents with    Atrial Fibrillation    Hypertension    Follow-up     History of Present Illness    Alejandro Bain is a 69 y.o. male.  Patient is a very pleasant 69-year-old man who routinely follows with Dr. Blank.He is known to have hypertension, coronary artery disease, previous abdominal aortic aneurysm, CABG with previous AVR by history and previous stroke.    Patient initially underwent bypass surgery in 2016 with 5 vessel CABG.  He underwent AAA repair 4 weeks after his bypass surgery.    Patient's last stress test was in 2021 which showed no evidence of ischemia.  Echocardiogram also at that time demonstrated normal LV size and function with EF of 55 to 60%.  Bioprosthetic aortic valve was functioning appropriately.    In 2023 patient was admitted at [facility] with loss of peripheral vision and headache.  CT scan showed left posterior temporal lobe and left occipital lobe CVA.  He was noted to have vertebral artery and posterior cerebral stenosis on that left side.  He was treated with Eliquis.    Patient follows with neurology at [facility].  He has had previous event monitoring that showed no evidence of atrial fibrillation.  There were isolated PVCs and 14 beat run of wide-complex tachycardia.    In 2024, patient was admitted at [facility] with blurry vision and was noted to have occipital CVA.  Patient was noted to have abnormal CTA.  Patient follows with neurology at [facility].    Patient came today for follow-up.  Patient denies any symptom of chest pain or tightness or heaviness.  Patient denies any orthopnea, PND, syncope or presyncope.  No leg edema noted.    EKG showed normal sinus rhythm with a heart rate of 55 bpm    At this  stage I am overall pleased with the patient condition.  I would consider that patient should have stress test next year.  I would also proceed with echocardiogram at that time.  Patient is advised to increase aerobic activity as well as resistance exercise for toning of the muscles.  Fall precaution discussed.  Repeat lipid panel testing and consider statin therapy      Past Medical History:   Diagnosis Date    Acute on chronic diastolic heart failure 12/14/2022    Allergic     Allergic rhinitis [id number]    Aneurysm     Anxiety     Asthma     Atrial fibrillation     Bronchitis     CHF (congestive heart failure)     Coronary artery disease     Depression     Encounter for laboratory testing for COVID-19 virus 08/22/2022    Gout     Heart murmur [id number]    Hyperlipidemia     Hypertension     Intractable headache 01/09/2023    Myocardial infarction     Pneumonia     Pulmonary nodule     Familia Mountain spotted fever     Stroke          Past Surgical History:   Procedure Laterality Date    AORTIC VALVE REPAIR/REPLACEMENT  12/03/2016    CABG x 5/ tissue AVR by DR. PHAN    CARDIAC CATHETERIZATION      CARDIAC SURGERY      CARDIOVASCULAR STRESS TEST  2020    CAROTID STENT      CORONARY ARTERY BYPASS GRAFT  12/02/2016    X4  Dr Phan    CORONARY STENT PLACEMENT  2009    HERNIA REPAIR  2010    ILIAC ARTERY ANEURYSM REPAIR  01/05/2017    abdominal and bilateral    NASAL POLYP EXCISION  2005    OTHER SURGICAL HISTORY      [facility]    REPAIR CHOANAL ATRESIA  2005    SINUS SURGERY      VASCULAR SURGERY             Current Outpatient Medications:     allopurinol (ZYLOPRIM) 300 MG tablet, Take 1 tablet by mouth Every Night., Disp: 90 tablet, Rfl: 1    amLODIPine (NORVASC) 10 MG tablet, TAKE 1 TABLET BY MOUTH EVERY DAY, Disp: 90 tablet, Rfl: 1    apixaban (ELIQUIS) 5 MG tablet tablet, Take 1 tablet by mouth Every 12 (Twelve) Hours. Indications: Atrial Fibrillation, Disp: 60 tablet, Rfl: 0    ascorbic acid (VITAMIN C) 250 MG tablet, Take  4 tablets by mouth Daily., Disp: , Rfl:     aspirin 81 MG EC tablet, Take 1 tablet by mouth Daily., Disp: , Rfl:     Cholecalciferol 25 MCG (1000 UT) capsule, Take 1 capsule by mouth Daily., Disp: , Rfl:     Fluticasone-Umeclidin-Vilant (TRELEGY ELLIPTA) 200-62.5-25 MCG/ACT inhaler, Inhale 1 puff Daily., Disp: 1 each, Rfl: 11    ipratropium-albuterol (DUO-NEB) 0.5-2.5 mg/3 ml nebulizer, Take 3 mL by nebulization Every 4 (Four) Hours As Needed for Wheezing., Disp: , Rfl:     lisinopril (PRINIVIL,ZESTRIL) 20 MG tablet, TAKE 1 TABLET BY MOUTH TWICE A DAY, Disp: 180 tablet, Rfl: 1    Multiple Vitamins-Minerals (MULTIVITAMIN ADULT PO), Take 1 tablet by mouth Daily., Disp: , Rfl:     Olopatadine-Mometasone (Ryaltris) 665-25 MCG/ACT suspension, into the nostril(s) as directed by provider., Disp: , Rfl:     rosuvastatin (CRESTOR) 20 MG tablet, Take 1 tablet by mouth Daily., Disp: , Rfl:     VENTOLIN  (90 Base) MCG/ACT inhaler, Inhale 2 puffs Every 6 (Six) Hours As Needed., Disp: , Rfl:     zinc sulfate (ZINCATE) 220 (50 Zn) MG capsule, Take 50 mg by mouth Daily., Disp: , Rfl:       Social History     Socioeconomic History    Marital status:    Tobacco Use    Smoking status: Never     Passive exposure: Never    Smokeless tobacco: Never   Vaping Use    Vaping status: Never Used   Substance and Sexual Activity    Alcohol use: Not Currently     Comment: quit 2005    Drug use: Yes     Types: Marijuana    Sexual activity: Yes     Partners: Female         Review of Systems   Constitutional: Negative for chills and fever.   HENT:  Negative for ear discharge and nosebleeds.    Eyes:  Negative for discharge and redness.   Cardiovascular:  Negative for chest pain, orthopnea, palpitations, paroxysmal nocturnal dyspnea and syncope.   Respiratory:  Negative for cough, shortness of breath and wheezing.    Endocrine: Negative for heat intolerance.   Skin:  Negative for rash.   Musculoskeletal:  Negative for arthritis and  "myalgias.   Gastrointestinal:  Negative for abdominal pain, melena, nausea and vomiting.   Genitourinary:  Negative for dysuria and hematuria.   Neurological:  Negative for dizziness, light-headedness, numbness and tremors.   Psychiatric/Behavioral:  Negative for depression. The patient is not nervous/anxious.        Procedures      ECG 12 Lead    Date/Time: 12/5/2024 3:40 PM  Performed by: Maciej Blank MD    Authorized by: Maciej Blank MD  Comparison: compared with previous ECG   Similar to previous ECG  Rhythm: sinus rhythm  Other findings: non-specific ST-T wave changes    Clinical impression: normal ECG          ECG 12 Lead    (Results Pending)           Objective:    /83 (BP Location: Right arm, Patient Position: Sitting, Cuff Size: Large Adult)   Pulse 55   Resp 16   Ht 185 cm (72.84\")   Wt 88.5 kg (195 lb)   SpO2 96%   BMI 25.84 kg/m²         Constitutional:       Appearance: Well-developed.   Eyes:      General: No scleral icterus.        Right eye: No discharge.   HENT:      Head: Normocephalic and atraumatic.   Neck:      Thyroid: No thyromegaly.      Lymphadenopathy: No cervical adenopathy.   Pulmonary:      Effort: Pulmonary effort is normal. No respiratory distress.      Breath sounds: Normal breath sounds. No wheezing. No rales.   Cardiovascular:      Normal rate. Regular rhythm.      No gallop.    Edema:     Peripheral edema absent.   Abdominal:      Tenderness: There is no abdominal tenderness.   Skin:     Findings: No erythema or rash.   Neurological:      Mental Status: Alert and oriented to person, place, and time.             Assessment:       Diagnosis Plan   1. Paroxysmal atrial fibrillation  ECG 12 Lead      2. Essential hypertension  ECG 12 Lead      3. Coronary artery disease involving native coronary artery of native heart without angina pectoris        4. S/P CABG x 5 by Dr. Phan        5. S/P AVR (tissue) by Dr. Phan        6. Abdominal aortic aneurysm (AAA) without " rupture, unspecified part                 Plan:       MDM:    1.  Paroxysmal atrial fibrillation:    Patient is maintaining sinus rhythm patient is on Eliquis    2.  CAD/CABG:    Consider stress test next year    3.  Status post AVR:    I would consider echocardiogram on next year.    4.  Mixed hyperlipidemia:    Patient is on no statin therapy.  Recent LDL is 96.  Consider statin therapy in future    5.  Hypertension:    Blood pressure is controlled

## 2024-12-05 NOTE — PROGRESS NOTES
SLEEP/PULMONARY  CLINIC NOTE      PATIENT IDENTIFICATION:  Name: Alejandro Bain  Age: 69 y.o.  Sex: male  :  1955  MRN: [id number]    DATE OF CONSULTATION:  2024     Referring physician:    Yoshi Lyon MD            CHIEF COMPLAINT: Asthma    History of Present Illness:   Alejandro Bain is a 69 y.o. male patient with persistent asthma he treated with oral steroid for the last 3 months twice, still have intermittent shortness of breath coughing no sputum notices no fever no chills he is using his Trelegy 100 mg and albuterol as needed  Patient was seen by allergist before and he treated but did not notice any significant improvement  Patient testing more runny nose and postnasal drip    Review of Systems:   Constitutional: As above   Eyes: negative   ENT/oropharynx: negative   Cardiovascular: negative   Respiratory: As above   Gastrointestinal: negative   Genitourinary: negative   Neurological: negative   Musculoskeletal: negative   Integument/breast: negative   Endocrine: negative   Allergic/Immunologic: negative     Past Medical History:  Past Medical History:   Diagnosis Date    Acute on chronic diastolic heart failure 2022    Allergic     Allergic rhinitis [id number]    Aneurysm     Anxiety     Asthma     Atrial fibrillation     Bronchitis     CHF (congestive heart failure)     Coronary artery disease     Depression     Encounter for laboratory testing for COVID-19 virus 2022    Gout     Heart murmur [id number]    Hyperlipidemia     Hypertension     Intractable headache 2023    Myocardial infarction     Pneumonia     Pulmonary nodule     Familia Mountain spotted fever     Stroke        Past Surgical History:  Past Surgical History:   Procedure Laterality Date    AORTIC VALVE REPAIR/REPLACEMENT  2016    CABG x 5/ tissue AVR by DR. TORRES    CARDIAC CATHETERIZATION      CARDIAC SURGERY      CARDIOVASCULAR STRESS TEST      CAROTID STENT      CORONARY ARTERY BYPASS  GRAFT  12/02/2016    X4  Dr Phan    CORONARY STENT PLACEMENT  2009    HERNIA REPAIR  2010    ILIAC ARTERY ANEURYSM REPAIR  01/05/2017    abdominal and bilateral    NASAL POLYP EXCISION  2005    OTHER SURGICAL HISTORY      [facility]    REPAIR CHOANAL ATRESIA  2005    SINUS SURGERY      VASCULAR SURGERY          Family History:  Family History   Problem Relation Age of Onset    Pulmonary fibrosis Mother     Heart disease Mother     Cancer Father         brain    Cancer Sister         lung    Heart disease Sister     Cancer Brother         pancreatic, lung    Sleep apnea Neg Hx         Social History:   Social History     Tobacco Use    Smoking status: Never     Passive exposure: Never    Smokeless tobacco: Never   Substance Use Topics    Alcohol use: Not Currently     Comment: quit 2005        Allergies:  Allergies   Allergen Reactions    Avelox [Moxifloxacin] Rash    Penicillins Rash    Sulfa Antibiotics Rash    Levaquin [Levofloxacin] GI Intolerance       Home Meds:  (Not in a hospital admission)      Objective:    Vitals Ranges:   Heart Rate:  [57] 57  Resp:  [14-16] 16  BP: (117)/(72) 117/72  Body mass index is 25.73 kg/m².     Exam:  General Appearance:  WDWN    HEENT:   without obvious abnormality,  Conjunctiva/corneas clear,  Normal external ear canals, no drainage    Clear orsalmucosa,  Mallampati score 3    Neck:  Supple, symmetrical, trachea midline. No JVD.  Lungs:   Bilateral basal rhonchi bilaterally, respirations unlabored symmetrical wall movement.    Chest wall:  No tenderness or deformity.    Heart:  Regular rate and rhythm, S1 and S2 normal.  Extremities: Trace edema no clubbing or Cyanosis        Data Review:  All labs (24hrs): No results found for this or any previous visit (from the past 24 hours).     Imaging:  MRI Cervical Spine Without Contrast  Narrative: MRI CERVICAL SPINE WO CONTRAST    Date of Exam: 8/27/2024 9:20 PM EDT    Indication: Radicular pain numbness and weakness left upper  extremity.     Comparison: MRI cervical spine June 6, 2022    Technique:  Routine multiplanar/multisequence sequence images of the cervical spine were obtained without contrast administration.      Findings:  The signal within the marrow of the visualized cervical vertebra does not appear unusual. There is no intrinsic cord abnormality. The craniovertebral junction does not appear abnormal.    C2-3: There is a small central disc protrusion which has been suggested. The intervertebral foramina appear patent.    C3-4: No definite disc herniation or intervertebral foraminal stenosis.    C4-5: Minimal bulging of the annulus. There is no intervertebral foraminal stenosis.    C5-6: There is some bulging of the annulus. The intervertebral foramina appear patent.    C6-7: Broad-based bulging of the annulus superimposed on probable osseous bar with moderate narrowing of the vertebral foramina.    C7-T1: No definite disc herniation or intervertebral foraminal stenosis.  Impression: Impression:  1.Multilevel degenerative changes similar to the prior study.    Electronically Signed: Landon Cardenas MD    8/27/2024 10:25 PM EDT    Workstation ID: [id number]  XR Chest 1 View  Narrative: XR CHEST 1 VW    Date of Exam: 8/27/2024 8:55 PM EDT    Indication: History of recent COVID now with persistent cough    Comparison: July 27, 2024    Findings:  Sternotomy wires are noted. It looks like there is an element of COPD. There is some atelectasis or scarring at the left base. The right lung is clear. There are no pleural effusions. There is old left rib fracture deformity.  Impression: Impression:  1.Element of COPD suggested.  2.Atelectasis or scarring left basilar area.    Electronically Signed: Landon Cardenas MD    8/27/2024 9:07 PM EDT    Workstation ID: [id number]       ASSESSMENT:  Diagnoses and all orders for this visit:    Moderate persistent asthma, unspecified whether complicated    Post-COVID chronic cough    Non-seasonal allergic  rhinitis due to other allergic trigger    Other orders  -     Fluticasone-Umeclidin-Vilant (TRELEGY ELLIPTA) 200-62.5-25 MCG/ACT inhaler; Inhale 1 puff Daily.        PLAN:  Follow-up with ENT  Bronchodilator inhaled corticosteroid    Education how to use inhalers    Encouraged to use incentive spirometer    Continue to exercise slowly as tolerated    Monitor for any change in the color of the sputum    Avoid any exposure to fumes, gas or any irritant        Follow-up 3 months    Dharmesh Eugene MD. D, ABSM.  12/5/2024  13:22 EST

## 2024-12-30 ENCOUNTER — OFFICE VISIT (OUTPATIENT)
Dept: FAMILY MEDICINE CLINIC | Facility: CLINIC | Age: 69
End: 2024-12-30
Payer: MEDICARE

## 2024-12-30 ENCOUNTER — LAB (OUTPATIENT)
Dept: FAMILY MEDICINE CLINIC | Facility: CLINIC | Age: 69
End: 2024-12-30
Payer: MEDICARE

## 2024-12-30 VITALS
TEMPERATURE: 98.2 F | BODY MASS INDEX: 26.11 KG/M2 | OXYGEN SATURATION: 98 % | WEIGHT: 197 LBS | SYSTOLIC BLOOD PRESSURE: 144 MMHG | HEIGHT: 73 IN | DIASTOLIC BLOOD PRESSURE: 89 MMHG | HEART RATE: 55 BPM

## 2024-12-30 DIAGNOSIS — Z00.00 WELCOME TO MEDICARE PREVENTIVE VISIT: Primary | ICD-10-CM

## 2024-12-30 DIAGNOSIS — E78.2 MIXED HYPERLIPIDEMIA: ICD-10-CM

## 2024-12-30 DIAGNOSIS — I10 ESSENTIAL HYPERTENSION: ICD-10-CM

## 2024-12-30 DIAGNOSIS — I48.0 PAROXYSMAL ATRIAL FIBRILLATION: ICD-10-CM

## 2024-12-30 LAB
ALBUMIN SERPL-MCNC: 4.3 G/DL (ref 3.5–5.2)
ALBUMIN/GLOB SERPL: 1.7 G/DL
ALP SERPL-CCNC: 114 U/L (ref 39–117)
ALT SERPL W P-5'-P-CCNC: 21 U/L (ref 1–41)
ANION GAP SERPL CALCULATED.3IONS-SCNC: 8 MMOL/L (ref 5–15)
AST SERPL-CCNC: 25 U/L (ref 1–40)
BASOPHILS # BLD AUTO: 0.03 10*3/MM3 (ref 0–0.2)
BASOPHILS NFR BLD AUTO: 0.3 % (ref 0–1.5)
BILIRUB SERPL-MCNC: 0.4 MG/DL (ref 0–1.2)
BUN SERPL-MCNC: 15 MG/DL (ref 8–23)
BUN/CREAT SERPL: 14.6 (ref 7–25)
CALCIUM SPEC-SCNC: 9.2 MG/DL (ref 8.6–10.5)
CHLORIDE SERPL-SCNC: 107 MMOL/L (ref 98–107)
CHOLEST SERPL-MCNC: 178 MG/DL (ref 0–200)
CO2 SERPL-SCNC: 24 MMOL/L (ref 22–29)
CREAT SERPL-MCNC: 1.03 MG/DL (ref 0.76–1.27)
DEPRECATED RDW RBC AUTO: 41.5 FL (ref 37–54)
EGFRCR SERPLBLD CKD-EPI 2021: 79.6 ML/MIN/1.73
EOSINOPHIL # BLD AUTO: 0.21 10*3/MM3 (ref 0–0.4)
EOSINOPHIL NFR BLD AUTO: 2.3 % (ref 0.3–6.2)
ERYTHROCYTE [DISTWIDTH] IN BLOOD BY AUTOMATED COUNT: 13.4 % (ref 12.3–15.4)
GLOBULIN UR ELPH-MCNC: 2.5 GM/DL
GLUCOSE SERPL-MCNC: 78 MG/DL (ref 65–99)
HCT VFR BLD AUTO: 43.3 % (ref 37.5–51)
HDLC SERPL-MCNC: 44 MG/DL (ref 40–60)
HGB BLD-MCNC: 14.6 G/DL (ref 13–17.7)
IMM GRANULOCYTES # BLD AUTO: 0.03 10*3/MM3 (ref 0–0.05)
IMM GRANULOCYTES NFR BLD AUTO: 0.3 % (ref 0–0.5)
LDLC SERPL CALC-MCNC: 96 MG/DL (ref 0–100)
LDLC/HDLC SERPL: 2.03 {RATIO}
LYMPHOCYTES # BLD AUTO: 1.11 10*3/MM3 (ref 0.7–3.1)
LYMPHOCYTES NFR BLD AUTO: 12.3 % (ref 19.6–45.3)
MCH RBC QN AUTO: 28.9 PG (ref 26.6–33)
MCHC RBC AUTO-ENTMCNC: 33.7 G/DL (ref 31.5–35.7)
MCV RBC AUTO: 85.7 FL (ref 79–97)
MONOCYTES # BLD AUTO: 0.7 10*3/MM3 (ref 0.1–0.9)
MONOCYTES NFR BLD AUTO: 7.8 % (ref 5–12)
NEUTROPHILS NFR BLD AUTO: 6.91 10*3/MM3 (ref 1.7–7)
NEUTROPHILS NFR BLD AUTO: 77 % (ref 42.7–76)
NRBC BLD AUTO-RTO: 0 /100 WBC (ref 0–0.2)
PLATELET # BLD AUTO: 164 10*3/MM3 (ref 140–450)
PMV BLD AUTO: 12.8 FL (ref 6–12)
POTASSIUM SERPL-SCNC: 3.9 MMOL/L (ref 3.5–5.2)
PROT SERPL-MCNC: 6.8 G/DL (ref 6–8.5)
RBC # BLD AUTO: 5.05 10*6/MM3 (ref 4.14–5.8)
SODIUM SERPL-SCNC: 139 MMOL/L (ref 136–145)
TRIGL SERPL-MCNC: 224 MG/DL (ref 0–150)
VLDLC SERPL-MCNC: 38 MG/DL (ref 5–40)
WBC NRBC COR # BLD AUTO: 8.99 10*3/MM3 (ref 3.4–10.8)

## 2024-12-30 PROCEDURE — 1125F AMNT PAIN NOTED PAIN PRSNT: CPT | Performed by: FAMILY MEDICINE

## 2024-12-30 PROCEDURE — 36415 COLL VENOUS BLD VENIPUNCTURE: CPT | Performed by: FAMILY MEDICINE

## 2024-12-30 PROCEDURE — 3079F DIAST BP 80-89 MM HG: CPT | Performed by: FAMILY MEDICINE

## 2024-12-30 PROCEDURE — G0438 PPPS, INITIAL VISIT: HCPCS | Performed by: FAMILY MEDICINE

## 2024-12-30 PROCEDURE — 3077F SYST BP >= 140 MM HG: CPT | Performed by: FAMILY MEDICINE

## 2024-12-30 PROCEDURE — 80053 COMPREHEN METABOLIC PANEL: CPT | Performed by: FAMILY MEDICINE

## 2024-12-30 PROCEDURE — 85025 COMPLETE CBC W/AUTO DIFF WBC: CPT | Performed by: FAMILY MEDICINE

## 2024-12-30 PROCEDURE — 80061 LIPID PANEL: CPT | Performed by: FAMILY MEDICINE

## 2024-12-30 PROCEDURE — 99213 OFFICE O/P EST LOW 20 MIN: CPT | Performed by: FAMILY MEDICINE

## 2024-12-30 RX ORDER — AMLODIPINE BESYLATE 10 MG/1
10 TABLET ORAL DAILY
Qty: 90 TABLET | Refills: 3 | Status: SHIPPED | OUTPATIENT
Start: 2024-12-30

## 2024-12-30 NOTE — PROGRESS NOTES
Subjective   The ABCs of the Annual Wellness Visit  Medicare Wellness Visit      Alejandro Bain is a 69 y.o. patient who presents for a Medicare Wellness Visit.    The following portions of the patient's history were reviewed and   updated as appropriate: allergies, current medications, past family history, past medical history, past social history, past surgical history, and problem list.    Compared to one year ago, the patient's physical   health is the same.  Compared to one year ago, the patient's mental   health is the same.    Recent Hospitalizations:  This patient has had a [facility] admission record on file within the last 365 days.  Current Medical Providers:  Patient Care Team:  Yoshi Lyon MD as PCP - General (Family Medicine)    Outpatient Medications Prior to Visit   Medication Sig Dispense Refill    allopurinol (ZYLOPRIM) 300 MG tablet Take 1 tablet by mouth Every Night. 90 tablet 1    amLODIPine (NORVASC) 10 MG tablet TAKE 1 TABLET BY MOUTH EVERY DAY 90 tablet 1    apixaban (ELIQUIS) 5 MG tablet tablet Take 1 tablet by mouth Every 12 (Twelve) Hours. Indications: Atrial Fibrillation 60 tablet 0    ascorbic acid (VITAMIN C) 250 MG tablet Take 4 tablets by mouth Daily.      aspirin 81 MG EC tablet Take 1 tablet by mouth Daily.      Cholecalciferol 25 MCG (1000 UT) capsule Take 1 capsule by mouth Daily.      Fluticasone-Umeclidin-Vilant (TRELEGY ELLIPTA) 200-62.5-25 MCG/ACT inhaler Inhale 1 puff Daily. 1 each 11    ipratropium-albuterol (DUO-NEB) 0.5-2.5 mg/3 ml nebulizer Take 3 mL by nebulization Every 4 (Four) Hours As Needed for Wheezing.      lisinopril (PRINIVIL,ZESTRIL) 20 MG tablet TAKE 1 TABLET BY MOUTH TWICE A  tablet 1    Multiple Vitamins-Minerals (MULTIVITAMIN ADULT PO) Take 1 tablet by mouth Daily.      Olopatadine-Mometasone (Ryaltris) 665-25 MCG/ACT suspension into the nostril(s) as directed by provider.      rosuvastatin (CRESTOR) 20 MG tablet Take 1 tablet by  mouth Daily.      VENTOLIN  (90 Base) MCG/ACT inhaler Inhale 2 puffs Every 6 (Six) Hours As Needed.      zinc sulfate (ZINCATE) 220 (50 Zn) MG capsule Take 50 mg by mouth Daily.       No facility-administered medications prior to visit.     No opioid medication identified on active medication list. I have reviewed chart for other potential  high risk medication/s and harmful drug interactions in the elderly.      Aspirin is on active medication list. Aspirin use is indicated based on review of current medical condition/s. Pros and cons of this therapy have been discussed today. Benefits of this medication outweigh potential harm.  Patient has been encouraged to continue taking this medication.  .      Patient Active Problem List   Diagnosis    S/P CABG x 5 by Dr. Phan    S/P AVR (tissue) by Dr. Phan    AAA (abdominal aortic aneurysm) without rupture    Mass of lingula of lung--left    S/P AAA repair    Aneurysm of abdominal vessel    Essential hypertension    Aneurysm of femoral artery    Aneurysm of iliac artery    Paroxysmal atrial fibrillation    Coronary artery disease involving native coronary artery of native heart without angina pectoris    Mixed hyperlipidemia    Palpitations    Shortness of breath    Acute upper back pain    Asthma    Thrombocytopenia    Encounter for laboratory testing for COVID-19 virus    COVID    Leukocytosis    Elevated LFTs    Acute renal insufficiency    Acute on chronic diastolic heart failure    Acute respiratory distress    CVA (cerebral vascular accident)    Intractable headache    TIA (transient ischemic attack)    Neck pain    Post-COVID chronic cough    Allergic rhinitis     Advance Care Planning Advance Directive is not on file.  ACP discussion was held with the patient during this visit. Patient does not have an advance directive, information provided.            Objective   Vitals:    12/30/24 1240   BP: 144/89   BP Location: Left arm   Patient Position: Sitting  "  Cuff Size: Large Adult   Pulse: 55   Temp: 98.2 °F (36.8 °C)   TempSrc: Temporal   SpO2: 98%   Weight: 89.4 kg (197 lb)   Height: 185 cm (72.84\")   PainSc: 10-Worst pain ever   PainLoc: Neck       Estimated body mass index is 26.11 kg/m² as calculated from the following:    Height as of this encounter: 185 cm (72.84\").    Weight as of this encounter: 89.4 kg (197 lb).    BMI is >= 25 and <30. (Overweight) The following options were offered after discussion;: exercise counseling/recommendations and nutrition counseling/recommendations           Does the patient have evidence of cognitive impairment? No                                                                                                Health  Risk Assessment    Smoking Status:  Social History     Tobacco Use   Smoking Status Never    Passive exposure: Never   Smokeless Tobacco Never     Alcohol Consumption:  Social History     Substance and Sexual Activity   Alcohol Use Not Currently    Comment: quit        Fall Risk Screen  LAVELLE Fall Risk Assessment was completed, and patient is at LOW risk for falls.Assessment completed on:2024    Depression Screening   Little interest or pleasure in doing things? Not at all   Feeling down, depressed, or hopeless? Not at all   PHQ-2 Total Score 0      Health Habits and Functional and Cognitive Screenin/28/2024     8:38 AM   Functional & Cognitive Status   Do you have difficulty preparing food and eating? No    Do you have difficulty bathing yourself, getting dressed or grooming yourself? No    Do you have difficulty using the toilet? No    Do you have difficulty moving around from place to place? No    Do you have trouble with steps or getting out of a bed or a chair? No    Current Diet Limited Junk Food    Dental Exam Not up to date    Eye Exam Up to date    Exercise (times per week) 6 times per week    Current Exercises Include Aerobics;Cardiovascular Workout;Home Exercise Program (TV, Computer, " Etc.);Walking    Do you need help using the phone?  No    Are you deaf or do you have serious difficulty hearing?  No    Do you need help to go to places out of walking distance? No    Do you need help shopping? No    Do you need help preparing meals?  No    Do you need help with housework?  No    Do you need help with laundry? No    Do you need help taking your medications? No    Do you need help managing money? No    Do you ever drive or ride in a car without wearing a seat belt? No    Have you felt unusual stress, anger or loneliness in the last month? No    Who do you live with? Spouse    If you need help, do you have trouble finding someone available to you? No    Have you been bothered in the last four weeks by sexual problems? No    Do you have difficulty concentrating, remembering or making decisions? No        Patient-reported           Age-appropriate Screening Schedule:  Refer to the list below for future screening recommendations based on patient's age, sex and/or medical conditions. Orders for these recommended tests are listed in the plan section. The patient has been provided with a written plan.    Health Maintenance List  Health Maintenance   Topic Date Due    ZOSTER VACCINE (1 of 2) Never done    ANNUAL WELLNESS VISIT  03/14/2024    BMI FOLLOWUP  09/28/2024    COVID-19 Vaccine (5 - 2024-25 season) 08/25/2025 (Originally 9/1/2024)    TDAP/TD VACCINES (1 - Tdap) 11/19/2025 (Originally 5/10/1974)    LIPID PANEL  06/26/2025    COLORECTAL CANCER SCREENING  07/17/2027    HEPATITIS C SCREENING  Completed    INFLUENZA VACCINE  Completed    Pneumococcal Vaccine 65+  Completed                                                                                                                                                CMS Preventative Services Quick Reference  Risk Factors Identified During Encounter  Dental Screening Recommended  Vision Screening Recommended    The above risks/problems have been discussed  with the patient.  Pertinent information has been shared with the patient in the After Visit Summary.  An After Visit Summary and PPPS were made available to the patient.    Follow Up:   Next Medicare Wellness visit to be scheduled in 1 year.         Additional E&M Note during same encounter follows:  Patient has additional, significant, and separately identifiable condition(s)/problem(s) that require work above and beyond the Medicare Wellness Visit     Chief Complaint  Medicare Wellness-Initial Visit and Neck Pain (Can't turn his head toward the right it pulls like )    Subjective   HPI  Alejandro is also being seen today for additional medical problem/s.  He has high blood pressure and high cholesterol that we monitor and manage.  He has had bypass surgery in the past and has had a stroke in the past.  He still sees specialty care up at [facility].  His neck has been a little sore otherwise he has not felt poorly at all.  He is still able to do over that he wants to do, he just has been a little more sedentary of late of his own choice.    Review of Systems   Constitutional:  Negative for activity change and fatigue.   HENT:  Negative for congestion, postnasal drip, rhinorrhea, trouble swallowing and voice change.    Eyes:  Negative for photophobia and visual disturbance.   Respiratory:  Negative for cough, shortness of breath and wheezing.    Cardiovascular:  Negative for chest pain and palpitations.   Gastrointestinal:  Negative for abdominal pain, constipation, diarrhea, nausea and vomiting.   Genitourinary:  Negative for difficulty urinating, frequency and urgency.   Musculoskeletal:  Positive for arthralgias and neck pain. Negative for back pain, gait problem and myalgias.   Neurological:  Negative for dizziness, light-headedness and numbness.   Hematological:  Does not bruise/bleed easily.   Psychiatric/Behavioral:  Negative for dysphoric mood. The patient is not nervous/anxious.      "          Objective   Vital Signs:  /89 (BP Location: Left arm, Patient Position: Sitting, Cuff Size: Large Adult)   Pulse 55   Temp 98.2 °F (36.8 °C) (Temporal)   Ht 185 cm (72.84\")   Wt 89.4 kg (197 lb)   SpO2 98%   BMI 26.11 kg/m²   Physical Exam  Vitals and nursing note reviewed.   Constitutional:       Appearance: Normal appearance.   HENT:      Right Ear: Tympanic membrane and ear canal normal.      Left Ear: Tympanic membrane and ear canal normal.   Cardiovascular:      Rate and Rhythm: Normal rate and regular rhythm.      Heart sounds: Normal heart sounds. No murmur heard.  Pulmonary:      Effort: Pulmonary effort is normal.      Breath sounds: No wheezing or rales.   Abdominal:      General: Bowel sounds are normal.      Palpations: Abdomen is soft.      Tenderness: There is no abdominal tenderness. There is no guarding or rebound.      Hernia: No hernia is present.   Musculoskeletal:      Cervical back: Neck supple. No rigidity.      Right lower leg: No edema.      Left lower leg: No edema.   Lymphadenopathy:      Cervical: No cervical adenopathy.   Neurological:      Mental Status: He is alert and oriented to person, place, and time. Mental status is at baseline.   Psychiatric:         Mood and Affect: Mood normal.         The following data was reviewed by: Yoshi Lyon MD on 12/30/2024:  Data reviewed : n/a  Common labs          6/26/2024    14:49 8/27/2024    20:30 8/28/2024    05:22   Common Labs   Glucose 83  103  161    BUN 18  21  22    Creatinine 0.95  0.93  0.95    Sodium 140  140  139    Potassium 4.5  3.8  4.4    Chloride 104  106  108    Calcium 9.5  9.5  9.4    Albumin 4.6  4.2     Total Bilirubin 0.4  0.4     Alkaline Phosphatase 130  105     AST (SGOT) 20  27     ALT (SGPT) 19  28     WBC  9.20  5.08    Hemoglobin  14.0  13.4    Hematocrit  42.9  41.6    Platelets  120  116    Total Cholesterol 172      Triglycerides 205      HDL Cholesterol 41      LDL Cholesterol  " 96      PSA 0.293                Assessment and Plan Additional age appropriate preventative wellness advice topics were discussed during today's preventative wellness exam(some topics already addressed during AWV portion of the note above):   Nutrition: Discussed nutrition plan with patient. Information shared in after visit summary. Goal is for a well balanced diet to enhance overall health.     Healthy Weight: Discussed current and goal BMI with patient. Steps to attain this goal discussed. Information shared in after visit summary.           Welcome to Medicare preventive visit         Essential hypertension  Hypertension is stable and controlled  Continue current treatment regimen.  Blood pressure will be reassessed in 6 months.         Mixed hyperlipidemia   Lipid abnormalities are stable    Plan:  Continue same medication/s without change.      Discussed medication dosage, use, side effects, and goals of treatment in detail.    Counseled patient on lifestyle modifications to help control hyperlipidemia.     Patient Treatment Goals:   LDL goal is less than 70    Followup in 6 months.         Paroxysmal atrial fibrillation               I spent 35 minutes caring for Alejandro on this date of service. This time includes time spent by me in the following activities:preparing for the visit, obtaining and/or reviewing a separately obtained history, performing a medically appropriate examination and/or evaluation , counseling and educating the patient/family/caregiver, ordering medications, tests, or procedures, and documenting information in the medical record  Follow Up   No follow-ups on file.  Patient was given instructions and counseling regarding his condition or for health maintenance advice. Please see specific information pulled into the AVS if appropriate.    I asked him to stay active and be more active than he has been  I encouraged him to pickup some hobbies that he has let go  I encouraged him to  continue his exercise efforts but to focus on some core exercise as well  I asked him to get some labs  I asked him to see me again in 6 months, sooner if there are new issues

## 2025-01-16 ENCOUNTER — OFFICE VISIT (OUTPATIENT)
Dept: FAMILY MEDICINE CLINIC | Facility: CLINIC | Age: 70
End: 2025-01-16
Payer: MEDICARE

## 2025-01-16 VITALS
BODY MASS INDEX: 26.11 KG/M2 | HEART RATE: 54 BPM | OXYGEN SATURATION: 98 % | HEIGHT: 73 IN | TEMPERATURE: 97.8 F | DIASTOLIC BLOOD PRESSURE: 82 MMHG | SYSTOLIC BLOOD PRESSURE: 143 MMHG | WEIGHT: 197 LBS

## 2025-01-16 DIAGNOSIS — R53.83 FATIGUE, UNSPECIFIED TYPE: ICD-10-CM

## 2025-01-16 DIAGNOSIS — J01.40 ACUTE NON-RECURRENT PANSINUSITIS: Primary | ICD-10-CM

## 2025-01-16 DIAGNOSIS — M54.2 NECK PAIN: ICD-10-CM

## 2025-01-16 PROCEDURE — 1160F RVW MEDS BY RX/DR IN RCRD: CPT

## 2025-01-16 PROCEDURE — 1159F MED LIST DOCD IN RCRD: CPT

## 2025-01-16 PROCEDURE — 3079F DIAST BP 80-89 MM HG: CPT

## 2025-01-16 PROCEDURE — 3077F SYST BP >= 140 MM HG: CPT

## 2025-01-16 PROCEDURE — 1125F AMNT PAIN NOTED PAIN PRSNT: CPT

## 2025-01-16 PROCEDURE — 99213 OFFICE O/P EST LOW 20 MIN: CPT

## 2025-01-16 RX ORDER — METHYLPREDNISOLONE 4 MG/1
TABLET ORAL
Qty: 21 TABLET | Refills: 0 | Status: SHIPPED | OUTPATIENT
Start: 2025-01-16

## 2025-01-16 RX ORDER — DOXYCYCLINE 100 MG/1
100 CAPSULE ORAL 2 TIMES DAILY
Qty: 20 CAPSULE | Refills: 0 | Status: SHIPPED | OUTPATIENT
Start: 2025-01-16 | End: 2025-01-26

## 2025-01-16 NOTE — PROGRESS NOTES
"Chief Complaint  Headache (Pressure, started 5 days ago    ), Facial Pain (Sinus drainage yellow 5 days ago), Fatigue (Has been sleeping a lot for 2 days), and Neck Pain (And shoulders for 2 days)    Subjective        Alejandro Bain presents to [facility]  History of Present Illness    Pt presents today for sinus pressure, facial pain, congestion, fatigue. Blowing thick yellow mucus out of her nose. OTC decongestant helped some, but made his BP go up. He reports chills. He denies fever, malaise. His chest doesn't feel as congested as his head does.    He has also been having neck and shoulder pain for 2 days. He helped many neighbors shovel the driveway.    Objective   Vital Signs:  /82   Pulse 54   Temp 97.8 °F (36.6 °C) (Temporal)   Ht 185 cm (72.84\")   Wt 89.4 kg (197 lb)   SpO2 98%   BMI 26.11 kg/m²   Estimated body mass index is 26.11 kg/m² as calculated from the following:    Height as of this encounter: 185 cm (72.84\").    Weight as of this encounter: 89.4 kg (197 lb).                  Physical Exam  Vitals reviewed.   Constitutional:       General: He is not in acute distress.     Appearance: Normal appearance. He is ill-appearing. He is not toxic-appearing or diaphoretic.   HENT:      Nose: Congestion present.      Right Sinus: Maxillary sinus tenderness and frontal sinus tenderness present.      Left Sinus: Maxillary sinus tenderness and frontal sinus tenderness present.   Cardiovascular:      Rate and Rhythm: Normal rate and regular rhythm.      Pulses: Normal pulses.      Heart sounds: Normal heart sounds.   Pulmonary:      Effort: Pulmonary effort is normal. No respiratory distress.      Breath sounds: Rhonchi present.   Musculoskeletal:      Right lower leg: No edema.      Left lower leg: No edema.   Skin:     General: Skin is warm and dry.      Capillary Refill: Capillary refill takes less than 2 seconds.   Neurological:      General: No focal deficit " present.      Mental Status: He is alert and oriented to person, place, and time.   Psychiatric:         Mood and Affect: Mood normal.         Behavior: Behavior normal.         Thought Content: Thought content normal.         Judgment: Judgment normal.        Result Review :                Assessment and Plan   Diagnoses and all orders for this visit:    1. Acute non-recurrent pansinusitis (Primary)  -     doxycycline (VIBRAMYCIN) 100 MG capsule; Take 1 capsule by mouth 2 (Two) Times a Day for 10 days.  Dispense: 20 capsule; Refill: 0  -     methylPREDNISolone (MEDROL) 4 MG dose pack; Take as directed on package instructions.  Dispense: 21 tablet; Refill: 0    2. Neck pain  -     methylPREDNISolone (MEDROL) 4 MG dose pack; Take as directed on package instructions.  Dispense: 21 tablet; Refill: 0    3. Fatigue, unspecified type             Follow Up   Return if symptoms worsen or fail to improve.  Patient was given instructions and counseling regarding his condition or for health maintenance advice. Please see specific information pulled into the AVS if appropriate.

## 2025-01-23 RX ORDER — ALLOPURINOL 300 MG/1
300 TABLET ORAL
Qty: 90 TABLET | Refills: 1 | Status: SHIPPED | OUTPATIENT
Start: 2025-01-23

## 2025-02-25 NOTE — TELEPHONE ENCOUNTER
Caller: Alejandro Bain    Relationship: Self    Best call back number: [phone]    Requested Prescriptions:   Requested Prescriptions     Pending Prescriptions Disp Refills    apixaban (ELIQUIS) 5 MG tablet tablet 60 tablet 0     Sig: Take 1 tablet by mouth Every 12 (Twelve) Hours. Indications: Atrial Fibrillation        Pharmacy where request should be sent: [facility] [id number] [place] [facility] [place]  [address] [facility] [phone] [facility] [phone]      Last office visit with prescribing clinician: 12/30/2024   Last telemedicine visit with prescribing clinician: Visit date not found   Next office visit with prescribing clinician: 7/7/2025     Additional details provided by patient: MISSED ONE DOSE 2/25/25    Does the patient have less than a 3 day supply:  [x] Yes  [] No    Would you like a call back once the refill request has been completed: [] Yes [x] No    If the office needs to give you a call back, can they leave a voicemail: [] Yes [x] No    Asia Le   02/25/25 09:45 EST

## 2025-03-04 RX ORDER — ROSUVASTATIN CALCIUM 20 MG/1
20 TABLET, COATED ORAL DAILY
Qty: 90 TABLET | Refills: 0 | Status: SHIPPED | OUTPATIENT
Start: 2025-03-04

## 2025-03-04 NOTE — TELEPHONE ENCOUNTER
Caller: Alejandro Bain    Relationship: Self    Best call back number: [phone]     Requested Prescriptions:   Requested Prescriptions     Pending Prescriptions Disp Refills    rosuvastatin (CRESTOR) 20 MG tablet 90 tablet      Sig: Take 1 tablet by mouth Daily.        Pharmacy where request should be sent: [facility] [id number] [place] [facility] [place]  [address] [facility] [phone] [facility] [phone]      Last office visit with prescribing clinician: 12/30/2024   Last telemedicine visit with prescribing clinician: Visit date not found   Next office visit with prescribing clinician: 7/7/2025     Additional details provided by patient:     Does the patient have less than a 3 day supply:  [x] Yes  [] No    Would you like a call back once the refill request has been completed: [] Yes [] No    If the office needs to give you a call back, can they leave a voicemail: [] Yes [] No    April Isidro Altman Rep   03/04/25 11:44 EST

## 2025-03-12 ENCOUNTER — OFFICE VISIT (OUTPATIENT)
Dept: FAMILY MEDICINE CLINIC | Facility: CLINIC | Age: 70
End: 2025-03-12
Payer: MEDICARE

## 2025-03-12 ENCOUNTER — LAB (OUTPATIENT)
Dept: FAMILY MEDICINE CLINIC | Facility: CLINIC | Age: 70
End: 2025-03-12
Payer: MEDICARE

## 2025-03-12 VITALS
WEIGHT: 194 LBS | OXYGEN SATURATION: 97 % | HEIGHT: 73 IN | BODY MASS INDEX: 25.71 KG/M2 | HEART RATE: 51 BPM | TEMPERATURE: 97.9 F

## 2025-03-12 DIAGNOSIS — Z13.1 SCREENING FOR DIABETES MELLITUS: ICD-10-CM

## 2025-03-12 DIAGNOSIS — R53.83 FATIGUE, UNSPECIFIED TYPE: ICD-10-CM

## 2025-03-12 DIAGNOSIS — J45.41 MODERATE PERSISTENT ASTHMA WITH EXACERBATION: Primary | ICD-10-CM

## 2025-03-12 DIAGNOSIS — J06.9 UPPER RESPIRATORY TRACT INFECTION, UNSPECIFIED TYPE: ICD-10-CM

## 2025-03-12 LAB
EXPIRATION DATE: NORMAL
FLUAV AG UPPER RESP QL IA.RAPID: NOT DETECTED
FLUBV AG UPPER RESP QL IA.RAPID: NOT DETECTED
HBA1C MFR BLD: 5.3 % (ref 4.8–5.6)
INTERNAL CONTROL: NORMAL
Lab: NORMAL
SARS-COV-2 AG UPPER RESP QL IA.RAPID: NOT DETECTED
TESTOST SERPL-MCNC: 756 NG/DL (ref 193–740)

## 2025-03-12 PROCEDURE — 83036 HEMOGLOBIN GLYCOSYLATED A1C: CPT

## 2025-03-12 PROCEDURE — 1125F AMNT PAIN NOTED PAIN PRSNT: CPT

## 2025-03-12 PROCEDURE — 1159F MED LIST DOCD IN RCRD: CPT

## 2025-03-12 PROCEDURE — 1160F RVW MEDS BY RX/DR IN RCRD: CPT

## 2025-03-12 PROCEDURE — 36415 COLL VENOUS BLD VENIPUNCTURE: CPT

## 2025-03-12 PROCEDURE — 87428 SARSCOV & INF VIR A&B AG IA: CPT

## 2025-03-12 PROCEDURE — 99214 OFFICE O/P EST MOD 30 MIN: CPT

## 2025-03-12 PROCEDURE — 84403 ASSAY OF TOTAL TESTOSTERONE: CPT

## 2025-03-12 RX ORDER — PREDNISONE 20 MG/1
20 TABLET ORAL 2 TIMES DAILY
Qty: 10 TABLET | Refills: 0 | Status: SHIPPED | OUTPATIENT
Start: 2025-03-12 | End: 2025-03-17

## 2025-03-12 RX ORDER — CEPHALEXIN 500 MG/1
500 CAPSULE ORAL 2 TIMES DAILY
Qty: 20 CAPSULE | Refills: 0 | Status: SHIPPED | OUTPATIENT
Start: 2025-03-12 | End: 2025-03-22

## 2025-03-12 NOTE — PROGRESS NOTES
"Chief Complaint  Cough (Started 1 mo ago     feels worse the last 2  days), Generalized Body Aches, URI, and Shortness of Breath (For 2 days)    Subjective        Alejandro Bain presents to [facility]  History of Present Illness    Patient presents today for a cough, body aches, shortness of breath that have been going on for about a month.  However, his symptoms worsened 2 days ago. Worse at night. He is achey. He is the only one sick at home. Appetite is normal. He has seen an allergist before. His asthmas makes him susceptible to this and few times a year.    He is also concerned about the weight in his stomach. Since his heart surgery he has overhauled his diet, lifestyle and has lost weight everywhere but his midsections. He is wondering if he could be insulin resistant. Energy is low as well.    Objective   Vital Signs:  Pulse 51   Temp 97.9 °F (36.6 °C) (Temporal)   Ht 185 cm (72.84\")   Wt 88 kg (194 lb)   SpO2 97%   BMI 25.71 kg/m²   Estimated body mass index is 25.71 kg/m² as calculated from the following:    Height as of this encounter: 185 cm (72.84\").    Weight as of this encounter: 88 kg (194 lb).                  Physical Exam  Constitutional:       General: He is not in acute distress.     Appearance: Normal appearance. He is overweight. He is ill-appearing. He is not toxic-appearing or diaphoretic.   HENT:      Nose: No congestion or rhinorrhea.   Cardiovascular:      Rate and Rhythm: Normal rate and regular rhythm.      Pulses: Normal pulses.      Heart sounds: Normal heart sounds.   Pulmonary:      Effort: Pulmonary effort is normal. No respiratory distress.      Breath sounds: Rhonchi and rales present.   Skin:     General: Skin is warm and dry.      Capillary Refill: Capillary refill takes less than 2 seconds.   Neurological:      General: No focal deficit present.      Mental Status: He is alert and oriented to person, place, and time.   Psychiatric:        "  Mood and Affect: Mood normal.         Behavior: Behavior normal.         Thought Content: Thought content normal.         Judgment: Judgment normal.        Result Review :                Assessment and Plan   Diagnoses and all orders for this visit:    1. Moderate persistent asthma with exacerbation (Primary)  -     predniSONE (DELTASONE) 20 MG tablet; Take 1 tablet by mouth 2 (Two) Times a Day for 5 days.  Dispense: 10 tablet; Refill: 0    2. Upper respiratory tract infection, unspecified type  -     POCT SARS-CoV-2 Antigen TONI + Flu  -     cephalexin (KEFLEX) 500 MG capsule; Take 1 capsule by mouth 2 (Two) Times a Day for 10 days.  Dispense: 20 capsule; Refill: 0    3. Screening for diabetes mellitus  -     Hemoglobin A1c; Future    4. Fatigue, unspecified type  -     Hemoglobin A1c; Future  -     Testosterone; Future             Follow Up   Return if symptoms worsen or fail to improve.  Patient was given instructions and counseling regarding his condition or for health maintenance advice. Please see specific information pulled into the AVS if appropriate.

## 2025-03-17 ENCOUNTER — TELEPHONE (OUTPATIENT)
Dept: FAMILY MEDICINE CLINIC | Facility: CLINIC | Age: 70
End: 2025-03-17
Payer: MEDICARE

## 2025-03-17 NOTE — TELEPHONE ENCOUNTER
Caller: Alejandro Bain    Relationship: Self    Best call back number: [phone]    What is the best time to reach you: ANYTIME    Who are you requesting to speak with (clinical staff, provider,  specific staff member): CLINICAL     What was the call regarding: PATIENT IS CALLING IN TO SEE IF HIS 45 YEAR OLD SON CAN START SEEING DR. FRENCH AS WELL.     PLEASE CALL TO CONFIRM OR DENY      Is it okay if the provider responds through MyChart: NO

## 2025-04-24 ENCOUNTER — APPOINTMENT (OUTPATIENT)
Dept: CT IMAGING | Facility: HOSPITAL | Age: 70
End: 2025-04-24
Payer: MEDICARE

## 2025-04-24 ENCOUNTER — HOSPITAL ENCOUNTER (OUTPATIENT)
Facility: HOSPITAL | Age: 70
Setting detail: OBSERVATION
Discharge: HOME | End: 2025-04-26
Attending: EMERGENCY MEDICINE | Admitting: EMERGENCY MEDICINE
Payer: MEDICARE

## 2025-04-24 ENCOUNTER — APPOINTMENT (OUTPATIENT)
Dept: GENERAL RADIOLOGY | Facility: HOSPITAL | Age: 70
End: 2025-04-24
Payer: MEDICARE

## 2025-04-24 DIAGNOSIS — R07.9 CHEST PAIN, UNSPECIFIED TYPE: Primary | ICD-10-CM

## 2025-04-24 DIAGNOSIS — R11.0 NAUSEA: ICD-10-CM

## 2025-04-24 DIAGNOSIS — R06.02 SHORTNESS OF BREATH: ICD-10-CM

## 2025-04-24 LAB
ALBUMIN SERPL-MCNC: 4.4 G/DL (ref 3.5–5.2)
ALBUMIN/GLOB SERPL: 1.8 G/DL
ALP SERPL-CCNC: 131 U/L (ref 39–117)
ALT SERPL W P-5'-P-CCNC: 17 U/L (ref 1–41)
ANION GAP SERPL CALCULATED.3IONS-SCNC: 11.9 MMOL/L (ref 5–15)
AST SERPL-CCNC: 26 U/L (ref 1–40)
BASOPHILS # BLD AUTO: 0.03 10*3/MM3 (ref 0–0.2)
BASOPHILS NFR BLD AUTO: 0.3 % (ref 0–1.5)
BILIRUB SERPL-MCNC: 0.4 MG/DL (ref 0–1.2)
BUN SERPL-MCNC: 20 MG/DL (ref 8–23)
BUN/CREAT SERPL: 19.8 (ref 7–25)
CALCIUM SPEC-SCNC: 9.8 MG/DL (ref 8.6–10.5)
CHLORIDE SERPL-SCNC: 107 MMOL/L (ref 98–107)
CO2 SERPL-SCNC: 21.1 MMOL/L (ref 22–29)
CREAT SERPL-MCNC: 1.01 MG/DL (ref 0.76–1.27)
DEPRECATED RDW RBC AUTO: 43.8 FL (ref 37–54)
EGFRCR SERPLBLD CKD-EPI 2021: 81.5 ML/MIN/1.73
EOSINOPHIL # BLD AUTO: 0.3 10*3/MM3 (ref 0–0.4)
EOSINOPHIL NFR BLD AUTO: 3.1 % (ref 0.3–6.2)
ERYTHROCYTE [DISTWIDTH] IN BLOOD BY AUTOMATED COUNT: 14.2 % (ref 12.3–15.4)
GEN 5 1HR TROPONIN T REFLEX: 18 NG/L
GLOBULIN UR ELPH-MCNC: 2.4 GM/DL
GLUCOSE SERPL-MCNC: 118 MG/DL (ref 65–99)
HCT VFR BLD AUTO: 43.4 % (ref 37.5–51)
HGB BLD-MCNC: 14.2 G/DL (ref 13–17.7)
HOLD SPECIMEN: NORMAL
HOLD SPECIMEN: NORMAL
IMM GRANULOCYTES # BLD AUTO: 0.04 10*3/MM3 (ref 0–0.05)
IMM GRANULOCYTES NFR BLD AUTO: 0.4 % (ref 0–0.5)
LYMPHOCYTES # BLD AUTO: 1.09 10*3/MM3 (ref 0.7–3.1)
LYMPHOCYTES NFR BLD AUTO: 11.3 % (ref 19.6–45.3)
MAGNESIUM SERPL-MCNC: 2 MG/DL (ref 1.6–2.4)
MCH RBC QN AUTO: 27.7 PG (ref 26.6–33)
MCHC RBC AUTO-ENTMCNC: 32.7 G/DL (ref 31.5–35.7)
MCV RBC AUTO: 84.8 FL (ref 79–97)
MONOCYTES # BLD AUTO: 0.59 10*3/MM3 (ref 0.1–0.9)
MONOCYTES NFR BLD AUTO: 6.1 % (ref 5–12)
NEUTROPHILS NFR BLD AUTO: 7.57 10*3/MM3 (ref 1.7–7)
NEUTROPHILS NFR BLD AUTO: 78.8 % (ref 42.7–76)
NRBC BLD AUTO-RTO: 0 /100 WBC (ref 0–0.2)
NT-PROBNP SERPL-MCNC: 283 PG/ML (ref 0–900)
PLATELET # BLD AUTO: 159 10*3/MM3 (ref 140–450)
PMV BLD AUTO: 10.1 FL (ref 6–12)
POTASSIUM SERPL-SCNC: 3.9 MMOL/L (ref 3.5–5.2)
PROT SERPL-MCNC: 6.8 G/DL (ref 6–8.5)
RBC # BLD AUTO: 5.12 10*6/MM3 (ref 4.14–5.8)
SODIUM SERPL-SCNC: 140 MMOL/L (ref 136–145)
TROPONIN T NUMERIC DELTA: -1 NG/L
TROPONIN T SERPL HS-MCNC: 19 NG/L
TSH SERPL DL<=0.05 MIU/L-ACNC: 2.49 UIU/ML (ref 0.27–4.2)
WBC NRBC COR # BLD AUTO: 9.62 10*3/MM3 (ref 3.4–10.8)
WHOLE BLOOD HOLD COAG: NORMAL
WHOLE BLOOD HOLD SPECIMEN: NORMAL

## 2025-04-24 PROCEDURE — G0378 HOSPITAL OBSERVATION PER HR: HCPCS

## 2025-04-24 PROCEDURE — 70450 CT HEAD/BRAIN W/O DYE: CPT

## 2025-04-24 PROCEDURE — 36415 COLL VENOUS BLD VENIPUNCTURE: CPT

## 2025-04-24 PROCEDURE — 80061 LIPID PANEL: CPT | Performed by: NURSE PRACTITIONER

## 2025-04-24 PROCEDURE — 84443 ASSAY THYROID STIM HORMONE: CPT

## 2025-04-24 PROCEDURE — 71045 X-RAY EXAM CHEST 1 VIEW: CPT

## 2025-04-24 PROCEDURE — 83735 ASSAY OF MAGNESIUM: CPT

## 2025-04-24 PROCEDURE — 84439 ASSAY OF FREE THYROXINE: CPT | Performed by: NURSE PRACTITIONER

## 2025-04-24 PROCEDURE — 84484 ASSAY OF TROPONIN QUANT: CPT

## 2025-04-24 PROCEDURE — 80053 COMPREHEN METABOLIC PANEL: CPT

## 2025-04-24 PROCEDURE — 83036 HEMOGLOBIN GLYCOSYLATED A1C: CPT | Performed by: NURSE PRACTITIONER

## 2025-04-24 PROCEDURE — 93005 ELECTROCARDIOGRAM TRACING: CPT | Performed by: EMERGENCY MEDICINE

## 2025-04-24 PROCEDURE — 99285 EMERGENCY DEPT VISIT HI MDM: CPT

## 2025-04-24 PROCEDURE — 93005 ELECTROCARDIOGRAM TRACING: CPT

## 2025-04-24 PROCEDURE — 83880 ASSAY OF NATRIURETIC PEPTIDE: CPT

## 2025-04-24 PROCEDURE — 85025 COMPLETE CBC W/AUTO DIFF WBC: CPT

## 2025-04-24 RX ORDER — SODIUM CHLORIDE 0.9 % (FLUSH) 0.9 %
10 SYRINGE (ML) INJECTION AS NEEDED
Status: DISCONTINUED | OUTPATIENT
Start: 2025-04-24 | End: 2025-04-26 | Stop reason: HOSPADM

## 2025-04-24 RX ORDER — SODIUM CHLORIDE 0.9 % (FLUSH) 0.9 %
10 SYRINGE (ML) INJECTION EVERY 12 HOURS SCHEDULED
Status: DISCONTINUED | OUTPATIENT
Start: 2025-04-24 | End: 2025-04-26 | Stop reason: HOSPADM

## 2025-04-24 RX ORDER — BISACODYL 10 MG
10 SUPPOSITORY, RECTAL RECTAL DAILY PRN
Status: DISCONTINUED | OUTPATIENT
Start: 2025-04-24 | End: 2025-04-26 | Stop reason: HOSPADM

## 2025-04-24 RX ORDER — ACETAMINOPHEN 325 MG/1
650 TABLET ORAL EVERY 4 HOURS PRN
Status: DISCONTINUED | OUTPATIENT
Start: 2025-04-24 | End: 2025-04-26 | Stop reason: HOSPADM

## 2025-04-24 RX ORDER — NALOXONE HCL 0.4 MG/ML
0.4 VIAL (ML) INJECTION
Status: DISCONTINUED | OUTPATIENT
Start: 2025-04-24 | End: 2025-04-26 | Stop reason: HOSPADM

## 2025-04-24 RX ORDER — GUAIFENESIN 600 MG/1
600 TABLET, EXTENDED RELEASE ORAL EVERY 12 HOURS SCHEDULED
Status: DISCONTINUED | OUTPATIENT
Start: 2025-04-25 | End: 2025-04-26 | Stop reason: HOSPADM

## 2025-04-24 RX ORDER — ACETAMINOPHEN 160 MG/5ML
650 SOLUTION ORAL EVERY 4 HOURS PRN
Status: DISCONTINUED | OUTPATIENT
Start: 2025-04-24 | End: 2025-04-26 | Stop reason: HOSPADM

## 2025-04-24 RX ORDER — AMOXICILLIN 250 MG
2 CAPSULE ORAL 2 TIMES DAILY PRN
Status: DISCONTINUED | OUTPATIENT
Start: 2025-04-24 | End: 2025-04-26 | Stop reason: HOSPADM

## 2025-04-24 RX ORDER — ASPIRIN 81 MG/1
324 TABLET, CHEWABLE ORAL ONCE
Status: COMPLETED | OUTPATIENT
Start: 2025-04-24 | End: 2025-04-24

## 2025-04-24 RX ORDER — SODIUM CHLORIDE 9 MG/ML
40 INJECTION, SOLUTION INTRAVENOUS AS NEEDED
Status: DISCONTINUED | OUTPATIENT
Start: 2025-04-24 | End: 2025-04-26 | Stop reason: HOSPADM

## 2025-04-24 RX ORDER — BISACODYL 5 MG/1
5 TABLET, DELAYED RELEASE ORAL DAILY PRN
Status: DISCONTINUED | OUTPATIENT
Start: 2025-04-24 | End: 2025-04-26 | Stop reason: HOSPADM

## 2025-04-24 RX ORDER — CETIRIZINE HYDROCHLORIDE 10 MG/1
10 TABLET ORAL DAILY
Status: DISCONTINUED | OUTPATIENT
Start: 2025-04-25 | End: 2025-04-26 | Stop reason: HOSPADM

## 2025-04-24 RX ORDER — ACETAMINOPHEN 650 MG/1
650 SUPPOSITORY RECTAL EVERY 4 HOURS PRN
Status: DISCONTINUED | OUTPATIENT
Start: 2025-04-24 | End: 2025-04-26 | Stop reason: HOSPADM

## 2025-04-24 RX ORDER — NITROGLYCERIN 0.4 MG/1
0.4 TABLET SUBLINGUAL
Status: DISCONTINUED | OUTPATIENT
Start: 2025-04-24 | End: 2025-04-26 | Stop reason: HOSPADM

## 2025-04-24 RX ORDER — POLYETHYLENE GLYCOL 3350 17 G/17G
17 POWDER, FOR SOLUTION ORAL DAILY PRN
Status: DISCONTINUED | OUTPATIENT
Start: 2025-04-24 | End: 2025-04-26 | Stop reason: HOSPADM

## 2025-04-24 RX ORDER — HYDROCODONE BITARTRATE AND ACETAMINOPHEN 5; 325 MG/1; MG/1
1 TABLET ORAL EVERY 6 HOURS PRN
Refills: 0 | Status: DISCONTINUED | OUTPATIENT
Start: 2025-04-24 | End: 2025-04-26 | Stop reason: HOSPADM

## 2025-04-24 RX ADMIN — Medication 10 ML: at 23:55

## 2025-04-24 RX ADMIN — GUAIFENESIN 600 MG: 600 TABLET, EXTENDED RELEASE ORAL at 23:55

## 2025-04-24 RX ADMIN — ASPIRIN 81 MG CHEWABLE TABLET 324 MG: 81 TABLET CHEWABLE at 16:05

## 2025-04-24 NOTE — ED PROVIDER NOTES
Subjective     Chief Complaint   Patient presents with    Chest Pain     Chest Pain, pt states he feels like he had a heart attack, he had numbness to left arm and across shoulder blade.  Pt denies soa/nausea.       History of Present Illness  Chief complaint: Chest pain    Context: Patient is 69-year-old  male with history of MI, pneumonia, stroke, CAD, A-fib, aneurysm, and diastolic heart failure, who presents emergency department with an episode of chest pain.  Patient states episode of chest pain was at approximately 1530 hrs. today and lasted approximately 2 minutes; patient states pain was on the right side of his chest and radiated up into his left arm and shoulder.  Also endorses feeling flushed; states Eliquis twice daily so feeling flushed is very unusual for him.  Patient states that only minor episode that was similar 4 days prior.  Patient denies any shortness of breath, abdominal pain including nausea, vomiting, diarrhea, or headache at this time.    PCP: Yoshi Lyon MD  CARDIO: Abhay    Review of Systems   Cardiovascular:  Positive for chest pain.       Past Medical History:   Diagnosis Date    Acute on chronic diastolic heart failure 12/14/2022    Allergic     Allergic rhinitis [id number]    Aneurysm     Anxiety     Asthma     Atrial fibrillation     Bronchitis     CHF (congestive heart failure)     Coronary artery disease     Depression     Encounter for laboratory testing for COVID-19 virus 08/22/2022    Gout     Heart murmur [id number]    Hyperlipidemia     Hypertension     Intractable headache 01/09/2023    Myocardial infarction     Pneumonia     Pulmonary nodule     Fam[place] Mountain spotted fever     Stroke        Allergies   Allergen Reactions    Avelox [Moxifloxacin] Rash    Penicillins Rash    Sulfa Antibiotics Rash    Levaquin [Levofloxacin] GI Intolerance       Past Surgical History:   Procedure Laterality Date    AORTIC VALVE REPAIR/REPLACEMENT  12/03/2016    CABG x 5/ tissue  AVR by DR. PHAN    CARDIAC CATHETERIZATION      CARDIAC SURGERY      CARDIOVASCULAR STRESS TEST  2020    CAROTID STENT      CORONARY ARTERY BYPASS GRAFT  12/02/2016    X4  Dr Phan    CORONARY STENT PLACEMENT  2009    HERNIA REPAIR  2010    ILIAC ARTERY ANEURYSM REPAIR  01/05/2017    abdominal and bilateral    NASAL POLYP EXCISION  2005    OTHER SURGICAL HISTORY      [facility]    REPAIR CHOANAL ATRESIA  2005    SINUS SURGERY      VASCULAR SURGERY         Family History   Problem Relation Age of Onset    Pulmonary fibrosis Mother     Heart disease Mother     Cancer Father         brain    Cancer Sister         lung    Heart disease Sister     Cancer Brother         pancreatic, lung    Sleep apnea Neg Hx        Social History     Socioeconomic History    Marital status:    Tobacco Use    Smoking status: Never     Passive exposure: Never    Smokeless tobacco: Never   Vaping Use    Vaping status: Never Used   Substance and Sexual Activity    Alcohol use: Not Currently     Comment: quit 2005    Drug use: Yes     Types: Marijuana    Sexual activity: Defer     Partners: Female           Objective   Physical Exam  Vitals and nursing note reviewed.   Constitutional:       General: He is not in acute distress.     Appearance: Normal appearance. He is normal weight. He is not ill-appearing or toxic-appearing.   HENT:      Head: Normocephalic and atraumatic.      Nose: No congestion or rhinorrhea.      Mouth/Throat:      Mouth: Mucous membranes are moist.      Pharynx: Oropharynx is clear.   Eyes:      Extraocular Movements: Extraocular movements intact.      Pupils: Pupils are equal, round, and reactive to light.   Cardiovascular:      Rate and Rhythm: Normal rate and regular rhythm.      Pulses: Normal pulses.      Heart sounds: Normal heart sounds.      Comments: Patient had no complaints at time of primary assessment and initial exam nor at time of repeat exam.  Description of patient's chest pain and HPI.  Pulmonary:     "  Effort: Pulmonary effort is normal. No respiratory distress.      Breath sounds: Normal breath sounds.   Abdominal:      General: Abdomen is flat. Bowel sounds are normal. There is no distension.      Palpations: Abdomen is soft. There is no mass.      Tenderness: There is no abdominal tenderness.   Musculoskeletal:         General: No swelling or tenderness. Normal range of motion.      Cervical back: Normal range of motion and neck supple. No tenderness.      Right lower leg: No edema.      Left lower leg: No edema.   Skin:     General: Skin is warm.      Capillary Refill: Capillary refill takes less than 2 seconds.      Coloration: Skin is not jaundiced or pale.   Neurological:      General: No focal deficit present.      Mental Status: He is alert. Mental status is at baseline.   Psychiatric:         Mood and Affect: Mood normal.         Thought Content: Thought content normal.         Judgment: Judgment normal.         Procedures           ED Course      /78   Pulse 53   Temp 98.1 °F (36.7 °C) (Oral)   Resp 18   Ht 185.4 cm (73\")   Wt 88.5 kg (195 lb)   SpO2 96%   BMI 25.73 kg/m²   Labs Reviewed   COMPREHENSIVE METABOLIC PANEL - Abnormal; Notable for the following components:       Result Value    Glucose 118 (*)     CO2 21.1 (*)     Alkaline Phosphatase 131 (*)     All other components within normal limits    Narrative:     GFR Categories in Chronic Kidney Disease (CKD)      GFR Category          GFR (mL/min/1.73)    Interpretation  G1                     90 or greater         Normal or high (1)  G2                      60-89                Mild decrease (1)  G3a                   45-59                Mild to moderate decrease  G3b                   30-44                Moderate to severe decrease  G4                    15-29                Severe decrease  G5                    14 or less           Kidney failure          (1)In the absence of evidence of kidney disease, neither GFR category G1 " or G2 fulfill the criteria for CKD.    eGFR calculation 2021 CKD-EPI creatinine equation, which does not include race as a factor   CBC WITH AUTO DIFFERENTIAL - Abnormal; Notable for the following components:    Neutrophil % 78.8 (*)     Lymphocyte % 11.3 (*)     Neutrophils, Absolute 7.57 (*)     All other components within normal limits   BNP (IN-HOUSE) - Normal    Narrative:     This assay is used as an aid in the diagnosis of individuals suspected of having heart failure. It can be used as an aid in the diagnosis of acute decompensated heart failure (ADHF) in patients presenting with signs and symptoms of ADHF to the emergency department (ED). In addition, NT-proBNP of <300 pg/mL indicates ADHF is not likely.    Age Range Result Interpretation  NT-proBNP Concentration (pg/mL:      <50             Positive            >450                   Gray                 300-450                    Negative             <300    50-75           Positive            >900                  Gray                300-900                  Negative            <300      >75             Positive            >1800                  Gray                300-1800                  Negative            <300   TROPONIN - Normal    Narrative:     High Sensitive Troponin T Reference Range:  <14.0 ng/L- Negative Female for AMI  <22.0 ng/L- Negative Male for AMI  >=14 - Abnormal Female indicating possible myocardial injury.  >=22 - Abnormal Male indicating possible myocardial injury.   Clinicians would have to utilize clinical acumen, EKG, Troponin, and serial changes to determine if it is an Acute Myocardial Infarction or myocardial injury due to an underlying chronic condition.        TSH RFX ON ABNORMAL TO FREE T4 - Normal   MAGNESIUM - Normal   HIGH SENSITIVITIY TROPONIN T 1HR - Normal    Narrative:     High Sensitive Troponin T Reference Range:  <14.0 ng/L- Negative Female for AMI  <22.0 ng/L- Negative Male for AMI  >=14 - Abnormal Female  indicating possible myocardial injury.  >=22 - Abnormal Male indicating possible myocardial injury.   Clinicians would have to utilize clinical acumen, EKG, Troponin, and serial changes to determine if it is an Acute Myocardial Infarction or myocardial injury due to an underlying chronic condition.        RAINBOW DRAW    Narrative:     The following orders were created for panel order Joice Draw.  Procedure                               Abnormality         Status                     ---------                               -----------         ------                     Green Top (Gel)[[id number]]                                  Final result               Lavender Top[[id number]]                                     Final result               Gold Top - SST[[id number]]                                   Final result               Light Blue Top[[id number]]                                   Final result                 Please view results for these tests on the individual orders.   GREEN TOP   LAVENDER TOP   GOLD TOP - SST   LIGHT BLUE TOP   CBC AND DIFFERENTIAL    Narrative:     The following orders were created for panel order CBC & Differential.  Procedure                               Abnormality         Status                     ---------                               -----------         ------                     CBC Auto Differential[[id number]]        Abnormal            Final result                 Please view results for these tests on the individual orders.     Medications   sodium chloride 0.9 % flush 10 mL (has no administration in time range)   sodium chloride 0.9 % flush 10 mL (has no administration in time range)   sodium chloride 0.9 % infusion 40 mL (has no administration in time range)   nitroglycerin (NITROSTAT) SL tablet 0.4 mg (has no administration in time range)   Potassium Replacement - Follow Nurse / BPA Driven Protocol (has no administration in time range)   Magnesium Standard Dose Replacement -  Follow Nurse / BPA Driven Protocol (has no administration in time range)   Phosphorus Replacement - Follow Nurse / BPA Driven Protocol (has no administration in time range)   Calcium Replacement - Follow Nurse / BPA Driven Protocol (has no administration in time range)   acetaminophen (TYLENOL) tablet 650 mg (has no administration in time range)     Or   acetaminophen (TYLENOL) 160 MG/5ML oral solution 650 mg (has no administration in time range)     Or   acetaminophen (TYLENOL) suppository 650 mg (has no administration in time range)   HYDROcodone-acetaminophen (NORCO) 5-325 MG per tablet 1 tablet (has no administration in time range)   HYDROmorphone (DILAUDID) injection 0.5 mg (has no administration in time range)     And   naloxone (NARCAN) injection 0.4 mg (has no administration in time range)   sennosides-docusate (PERICOLACE) 8.6-50 MG per tablet 2 tablet (has no administration in time range)     And   polyethylene glycol (MIRALAX) packet 17 g (has no administration in time range)     And   bisacodyl (DULCOLAX) EC tablet 5 mg (has no administration in time range)     And   bisacodyl (DULCOLAX) suppository 10 mg (has no administration in time range)   aspirin chewable tablet 324 mg (324 mg Oral Given 4/24/25 1605)     CT Head Without Contrast  Result Date: 4/24/2025  Impression: No acute intracranial findings. Chronic and senescent changes as above. Pansinus disease. Electronically Signed: Barber Sumner MD  4/24/2025 6:08 PM EDT  Workstation ID: [id number]    XR Chest 1 View  Result Date: 4/24/2025  Impression: No acute chest finding. Chronic left basilar scarring. Electronically Signed: Kelin Pond MD  4/24/2025 4:10 PM EDT  Workstation ID: [id number]                HEART Score: 6   Shared Decision Making  I discussed the findings with the patient/patient representative who is in agreement with the treatment plan and the final disposition.  Risks and benefits of discharge and/or observation/admission were  "discussed: Yes                                      Medical Decision Making  /76   Pulse 52   Temp 98.1 °F (36.7 °C) (Oral)   Resp 18   Ht 185.4 cm (73\")   Wt 88.5 kg (195 lb)   SpO2 93%   BMI 25.73 kg/m²      Chart review: Cardiac history reviewed.    Patient is 69-year-old male who presents emergency department with complaints of chest pain.  See full HPI above.    Primary assessment and initial physical exam noted above.    Patient is placed in the ED bed and gown for assessment.  IV access is maintained for labs and medication administration if indicated.  Primary differentials include but are not limited to ACS, pneumonia, unstable angina, electrolyte derangement.    Comorbidities:  has a past medical history of Acute on chronic diastolic heart failure (12/14/2022), Allergic, Allergic rhinitis (529321), Aneurysm, Anxiety, Asthma, Atrial fibrillation, Bronchitis, CHF (congestive heart failure), Coronary artery disease, Depression, Encounter for laboratory testing for COVID-19 virus (08/22/2022), Gout, Heart murmur ([id number]), Hyperlipidemia, Hypertension, Intractable headache (01/09/2023), Myocardial infarction, Pneumonia, Pulmonary nodule, [place] Mountain spotted fever, and Stroke.    Discussion with provider: Dr. Reji Weston    Radiology interpretation:  X-rays reviewed by me and interpreted by radiologist,   XR Chest 1 View  Result Date: 4/24/2025  Impression: No acute chest finding. Chronic left basilar scarring. Electronically Signed: Kelin Pond MD  4/24/2025 4:10 PM EDT  Workstation ID: [id number]    Lab interpretation:  Labs unremarkable with no electrolyte derangement or kidney injury on CMP, no leukocytosis or cytopenia on CBC, and troponin, BMP, and magnesium all within normal limits.    Patient's initial EKG interpreted by Dr. Viera and myself as sinus rhythm with ST depression in the anterior and lateral leads; rate 65, , QTc 427.  EKG compared to previous from 8/27/2024.  EKG " at that time was sinus rhythm with an anterior T wave abnormality; rate 63, , QTc 438.  EKG prior to patient leaving the ED was sinus bradycardia with LVH and resolution of ST depression; rate 50, , QTc 427.    Medications given in ED and ordered for observation stay:  sodium chloride 0.9 % flush 10 mL (has no administration in time range)  sodium chloride 0.9 % flush 10 mL (has no administration in time range)  sodium chloride 0.9 % infusion 40 mL (has no administration in time range)  nitroglycerin (NITROSTAT) SL tablet 0.4 mg (has no administration in time range)  Potassium Replacement - Follow Nurse / BPA Driven Protocol (has no administration in time range)  Magnesium Standard Dose Replacement - Follow Nurse / BPA Driven Protocol (has no administration in time range)  Phosphorus Replacement - Follow Nurse / BPA Driven Protocol (has no administration in time range)  Calcium Replacement - Follow Nurse / BPA Driven Protocol (has no administration in time range)  acetaminophen (TYLENOL) tablet 650 mg (has no administration in time range)    Or  acetaminophen (TYLENOL) 160 MG/5ML oral solution 650 mg (has no administration in time range)    Or  acetaminophen (TYLENOL) suppository 650 mg (has no administration in time range)  HYDROcodone-acetaminophen (NORCO) 5-325 MG per tablet 1 tablet (has no administration in time range)  HYDROmorphone (DILAUDID) injection 0.5 mg (has no administration in time range)    And  naloxone (NARCAN) injection 0.4 mg (has no administration in time range)  sennosides-docusate (PERICOLACE) 8.6-50 MG per tablet 2 tablet (has no administration in time range)    And  polyethylene glycol (MIRALAX) packet 17 g (has no administration in time range)    And  bisacodyl (DULCOLAX) EC tablet 5 mg (has no administration in time range)    And  bisacodyl (DULCOLAX) suppository 10 mg (has no administration in time range)  aspirin chewable tablet 324 mg (324 mg Oral Given 4/24/25  1605)    Patient and wife informed of all results and updated on plan of care.  Repeat physical exam performed at this time.  Exam is unchanged from previous; patient remains alert and oriented, nontoxic in appearance GCS 15.  Patient and wife informed the plan of care will be to place patient into observation status for a follow-up consult with Dr. Blank with cardiology tomorrow morning.  Patient verbalizes understanding of and support for this plan of care.  All orders will be placed for patient's observation stay prior to him leaving the ED for the observation unit.    Appropriate PPE worn during exam.    i discussed findings with patient who voices understanding of discharge instructions, signs and symptoms requiring return to ED; discharged improved and in stable condition with follow up for re-evaluation.  This document is intended for medical expert use only. Reading of this document by patients and/or patient's family without participating medical staff guidance may result in misinterpretation and unintended morbidity.  Any interpretation of such data is the responsibility of the patient and/or family member responsible for the patient in concert with their primary or specialist providers, not to be left for sources of online searches such as [employer], [employer] or similar queries. Relying on these approaches to knowledge may result in misinterpretation, misguided goals of care and even death should patients or family members try recommendations outside of the realm of professional medical care in a supervised inpatient environment.       Problems Addressed:  Chest pain, unspecified type: complicated acute illness or injury  Nausea: complicated acute illness or injury  Shortness of breath: complicated acute illness or injury    Amount and/or Complexity of Data Reviewed  Labs: ordered.  Radiology: ordered.    Risk  OTC drugs.  Decision regarding hospitalization.        Final diagnoses:   Chest pain, unspecified type    Shortness of breath   Nausea       ED Disposition  ED Disposition       ED Disposition   Decision to Admit    Condition   --    Comment   --               No follow-up provider specified.       Medication List      No changes were made to your prescriptions during this visit.            Castillo Conde PA-C  04/24/25 5545

## 2025-04-24 NOTE — CASE MANAGEMENT/SOCIAL WORK
Discharge Planning Assessment   Nic     Patient Name: Alejandro Bain  MRN: [id number]  Today's Date: 4/24/2025    Admit Date: 4/24/2025    Plan: Needs full case management assessment       Discharge Plan       Row Name 04/24/25 1915       Plan    Plan Needs full case management assessment    Plan Comments Attempted to see patient for case management assessment, but he was out of the room. Noone present in room. Will need full case management assessment                  Patient Forms       Row Name 04/24/25 1918       Patient Forms    Important Message from Medicare ([facility]) --  MIRAMONTES 4/14/25 per registration    Patient Observation Letter Delivered  MIRAMONTES 4/14/25 per registration                  Meryl Gomes RN, [facility]  Office: [phone]  Fax: [phone]  [email or web address]      Phone communication or documentation only - no physical contact with patient or family.    Meryl Gomes RN

## 2025-04-25 ENCOUNTER — TELEPHONE (OUTPATIENT)
Dept: CARDIOLOGY | Facility: CLINIC | Age: 70
End: 2025-04-25

## 2025-04-25 ENCOUNTER — APPOINTMENT (OUTPATIENT)
Dept: NUCLEAR MEDICINE | Facility: HOSPITAL | Age: 70
End: 2025-04-25
Payer: MEDICARE

## 2025-04-25 ENCOUNTER — APPOINTMENT (OUTPATIENT)
Dept: CARDIOLOGY | Facility: HOSPITAL | Age: 70
End: 2025-04-25
Payer: MEDICARE

## 2025-04-25 LAB
ALBUMIN SERPL-MCNC: 3.8 G/DL (ref 3.5–5.2)
ALBUMIN/GLOB SERPL: 1.7 G/DL
ALP SERPL-CCNC: 114 U/L (ref 39–117)
ALT SERPL W P-5'-P-CCNC: 13 U/L (ref 1–41)
ANION GAP SERPL CALCULATED.3IONS-SCNC: 12.4 MMOL/L (ref 5–15)
AORTIC DIMENSIONLESS INDEX: 0.37 (DI)
AST SERPL-CCNC: 21 U/L (ref 1–40)
AV MEAN PRESS GRAD SYS DOP V1V2: 13 MMHG
AV VMAX SYS DOP: 252 CM/SEC
BASOPHILS # BLD AUTO: 0.03 10*3/MM3 (ref 0–0.2)
BASOPHILS NFR BLD AUTO: 0.4 % (ref 0–1.5)
BH CV ECHO MEAS - AO MAX PG: 25.4 MMHG
BH CV ECHO MEAS - AO V2 VTI: 56.7 CM
BH CV ECHO MEAS - AVA(I,D): 1.19 CM2
BH CV ECHO MEAS - EDV(CUBED): 110.6 ML
BH CV ECHO MEAS - EDV(MOD-SP4): 122 ML
BH CV ECHO MEAS - EF(MOD-SP4): 53.8 %
BH CV ECHO MEAS - ESV(CUBED): 42.9 ML
BH CV ECHO MEAS - ESV(MOD-SP4): 56.4 ML
BH CV ECHO MEAS - FS: 27.1 %
BH CV ECHO MEAS - IVS/LVPW: 1 CM
BH CV ECHO MEAS - IVSD: 1.2 CM
BH CV ECHO MEAS - LA DIMENSION: 3.6 CM
BH CV ECHO MEAS - LAT PEAK E' VEL: 12.7 CM/SEC
BH CV ECHO MEAS - LV DIASTOLIC VOL/BSA (35-75): 57.3 CM2
BH CV ECHO MEAS - LV MASS(C)D: 219.1 GRAMS
BH CV ECHO MEAS - LV MAX PG: 3.5 MMHG
BH CV ECHO MEAS - LV MEAN PG: 2 MMHG
BH CV ECHO MEAS - LV SYSTOLIC VOL/BSA (12-30): 26.5 CM2
BH CV ECHO MEAS - LV V1 MAX: 93.2 CM/SEC
BH CV ECHO MEAS - LV V1 VTI: 21.5 CM
BH CV ECHO MEAS - LVIDD: 4.8 CM
BH CV ECHO MEAS - LVIDS: 3.5 CM
BH CV ECHO MEAS - LVOT AREA: 3.1 CM2
BH CV ECHO MEAS - LVOT DIAM: 2 CM
BH CV ECHO MEAS - LVPWD: 1.2 CM
BH CV ECHO MEAS - MED PEAK E' VEL: 7 CM/SEC
BH CV ECHO MEAS - MR MAX PG: 105.3 MMHG
BH CV ECHO MEAS - MR MAX VEL: 513 CM/SEC
BH CV ECHO MEAS - MV A DUR: 0.11 SEC
BH CV ECHO MEAS - MV A MAX VEL: 48 CM/SEC
BH CV ECHO MEAS - MV DEC SLOPE: 315 CM/SEC2
BH CV ECHO MEAS - MV DEC TIME: 0.25 SEC
BH CV ECHO MEAS - MV E MAX VEL: 57.5 CM/SEC
BH CV ECHO MEAS - MV E/A: 1.2
BH CV ECHO MEAS - MV MAX PG: 3.2 MMHG
BH CV ECHO MEAS - MV MEAN PG: 1 MMHG
BH CV ECHO MEAS - MV P1/2T: 80.5 MSEC
BH CV ECHO MEAS - MV V2 VTI: 33.9 CM
BH CV ECHO MEAS - MVA(P1/2T): 2.7 CM2
BH CV ECHO MEAS - MVA(VTI): 1.99 CM2
BH CV ECHO MEAS - PA ACC TIME: 0.14 SEC
BH CV ECHO MEAS - PA V2 MAX: 89.8 CM/SEC
BH CV ECHO MEAS - RV MAX PG: 1.89 MMHG
BH CV ECHO MEAS - RV V1 MAX: 68.7 CM/SEC
BH CV ECHO MEAS - RV V1 VTI: 16.2 CM
BH CV ECHO MEAS - SV(LVOT): 67.5 ML
BH CV ECHO MEAS - SV(MOD-SP4): 65.6 ML
BH CV ECHO MEAS - SVI(LVOT): 31.7 ML/M2
BH CV ECHO MEAS - SVI(MOD-SP4): 30.8 ML/M2
BH CV ECHO MEAS - TAPSE (>1.6): 1.63 CM
BH CV ECHO MEASUREMENTS AVERAGE E/E' RATIO: 5.84
BH CV XLRA - TDI S': 6.9 CM/SEC
BILIRUB SERPL-MCNC: 0.3 MG/DL (ref 0–1.2)
BUN SERPL-MCNC: 18 MG/DL (ref 8–23)
BUN/CREAT SERPL: 20.7 (ref 7–25)
CALCIUM SPEC-SCNC: 8.8 MG/DL (ref 8.6–10.5)
CHLORIDE SERPL-SCNC: 105 MMOL/L (ref 98–107)
CHOLEST SERPL-MCNC: 151 MG/DL (ref 0–200)
CO2 SERPL-SCNC: 20.6 MMOL/L (ref 22–29)
CREAT SERPL-MCNC: 0.87 MG/DL (ref 0.76–1.27)
DEPRECATED RDW RBC AUTO: 46.1 FL (ref 37–54)
EGFRCR SERPLBLD CKD-EPI 2021: 94.6 ML/MIN/1.73
EOSINOPHIL # BLD AUTO: 0.29 10*3/MM3 (ref 0–0.4)
EOSINOPHIL NFR BLD AUTO: 4.2 % (ref 0.3–6.2)
ERYTHROCYTE [DISTWIDTH] IN BLOOD BY AUTOMATED COUNT: 14.6 % (ref 12.3–15.4)
GLOBULIN UR ELPH-MCNC: 2.3 GM/DL
GLUCOSE SERPL-MCNC: 94 MG/DL (ref 65–99)
HBA1C MFR BLD: 5.35 % (ref 4.8–5.6)
HCT VFR BLD AUTO: 40.2 % (ref 37.5–51)
HDLC SERPL-MCNC: 36 MG/DL (ref 40–60)
HGB BLD-MCNC: 12.5 G/DL (ref 13–17.7)
IMM GRANULOCYTES # BLD AUTO: 0.02 10*3/MM3 (ref 0–0.05)
IMM GRANULOCYTES NFR BLD AUTO: 0.3 % (ref 0–0.5)
LDLC SERPL CALC-MCNC: 81 MG/DL (ref 0–100)
LDLC/HDLC SERPL: 2.08 {RATIO}
LEFT ATRIUM VOLUME INDEX: 39.8 ML/M2
LYMPHOCYTES # BLD AUTO: 1.2 10*3/MM3 (ref 0.7–3.1)
LYMPHOCYTES NFR BLD AUTO: 17.4 % (ref 19.6–45.3)
MCH RBC QN AUTO: 27.1 PG (ref 26.6–33)
MCHC RBC AUTO-ENTMCNC: 31.1 G/DL (ref 31.5–35.7)
MCV RBC AUTO: 87.2 FL (ref 79–97)
MONOCYTES # BLD AUTO: 0.67 10*3/MM3 (ref 0.1–0.9)
MONOCYTES NFR BLD AUTO: 9.7 % (ref 5–12)
NEUTROPHILS NFR BLD AUTO: 4.68 10*3/MM3 (ref 1.7–7)
NEUTROPHILS NFR BLD AUTO: 68 % (ref 42.7–76)
NRBC BLD AUTO-RTO: 0 /100 WBC (ref 0–0.2)
PLATELET # BLD AUTO: 139 10*3/MM3 (ref 140–450)
PMV BLD AUTO: 11 FL (ref 6–12)
POTASSIUM SERPL-SCNC: 3.8 MMOL/L (ref 3.5–5.2)
PROT SERPL-MCNC: 6.1 G/DL (ref 6–8.5)
QT INTERVAL: 410 MS
QT INTERVAL: 467 MS
QTC INTERVAL: 427 MS
QTC INTERVAL: 427 MS
RBC # BLD AUTO: 4.61 10*6/MM3 (ref 4.14–5.8)
SINUS: 3.5 CM
SODIUM SERPL-SCNC: 138 MMOL/L (ref 136–145)
STJ: 3.3 CM
T4 FREE SERPL-MCNC: 0.94 NG/DL (ref 0.93–1.7)
TRIGL SERPL-MCNC: 201 MG/DL (ref 0–150)
TROPONIN T SERPL HS-MCNC: 17 NG/L
URATE SERPL-MCNC: 5.8 MG/DL (ref 3.4–7)
VLDLC SERPL-MCNC: 34 MG/DL (ref 5–40)
WBC NRBC COR # BLD AUTO: 6.89 10*3/MM3 (ref 3.4–10.8)

## 2025-04-25 PROCEDURE — 93306 TTE W/DOPPLER COMPLETE: CPT

## 2025-04-25 PROCEDURE — G0378 HOSPITAL OBSERVATION PER HR: HCPCS

## 2025-04-25 PROCEDURE — 93018 CV STRESS TEST I&R ONLY: CPT | Performed by: INTERNAL MEDICINE

## 2025-04-25 PROCEDURE — 84550 ASSAY OF BLOOD/URIC ACID: CPT | Performed by: NURSE PRACTITIONER

## 2025-04-25 PROCEDURE — 78452 HT MUSCLE IMAGE SPECT MULT: CPT | Performed by: INTERNAL MEDICINE

## 2025-04-25 PROCEDURE — 93017 CV STRESS TEST TRACING ONLY: CPT

## 2025-04-25 PROCEDURE — 94799 UNLISTED PULMONARY SVC/PX: CPT

## 2025-04-25 PROCEDURE — 63710000001 REVEFENACIN 175 MCG/3ML SOLUTION: Performed by: NURSE PRACTITIONER

## 2025-04-25 PROCEDURE — 99214 OFFICE O/P EST MOD 30 MIN: CPT | Performed by: INTERNAL MEDICINE

## 2025-04-25 PROCEDURE — 94640 AIRWAY INHALATION TREATMENT: CPT

## 2025-04-25 PROCEDURE — 80053 COMPREHEN METABOLIC PANEL: CPT | Performed by: EMERGENCY MEDICINE

## 2025-04-25 PROCEDURE — 84484 ASSAY OF TROPONIN QUANT: CPT | Performed by: EMERGENCY MEDICINE

## 2025-04-25 PROCEDURE — 94761 N-INVAS EAR/PLS OXIMETRY MLT: CPT

## 2025-04-25 PROCEDURE — 85025 COMPLETE CBC W/AUTO DIFF WBC: CPT | Performed by: EMERGENCY MEDICINE

## 2025-04-25 PROCEDURE — 94664 DEMO&/EVAL PT USE INHALER: CPT

## 2025-04-25 PROCEDURE — 93016 CV STRESS TEST SUPVJ ONLY: CPT | Performed by: INTERNAL MEDICINE

## 2025-04-25 PROCEDURE — A9502 TC99M TETROFOSMIN: HCPCS | Performed by: EMERGENCY MEDICINE

## 2025-04-25 PROCEDURE — 93306 TTE W/DOPPLER COMPLETE: CPT | Performed by: INTERNAL MEDICINE

## 2025-04-25 PROCEDURE — 78452 HT MUSCLE IMAGE SPECT MULT: CPT

## 2025-04-25 PROCEDURE — 34310000005 TECHNETIUM TETROFOSMIN KIT: Performed by: EMERGENCY MEDICINE

## 2025-04-25 RX ORDER — LISINOPRIL 20 MG/1
20 TABLET ORAL 2 TIMES DAILY
Status: DISCONTINUED | OUTPATIENT
Start: 2025-04-25 | End: 2025-04-26 | Stop reason: HOSPADM

## 2025-04-25 RX ORDER — IPRATROPIUM BROMIDE AND ALBUTEROL SULFATE 2.5; .5 MG/3ML; MG/3ML
3 SOLUTION RESPIRATORY (INHALATION) EVERY 4 HOURS PRN
Status: DISCONTINUED | OUTPATIENT
Start: 2025-04-25 | End: 2025-04-26 | Stop reason: HOSPADM

## 2025-04-25 RX ORDER — ROSUVASTATIN CALCIUM 10 MG/1
20 TABLET, COATED ORAL DAILY
Status: DISCONTINUED | OUTPATIENT
Start: 2025-04-25 | End: 2025-04-26 | Stop reason: HOSPADM

## 2025-04-25 RX ORDER — AMLODIPINE BESYLATE 5 MG/1
10 TABLET ORAL DAILY
Status: DISCONTINUED | OUTPATIENT
Start: 2025-04-25 | End: 2025-04-26 | Stop reason: HOSPADM

## 2025-04-25 RX ORDER — ASPIRIN 81 MG/1
81 TABLET ORAL DAILY
Status: DISCONTINUED | OUTPATIENT
Start: 2025-04-25 | End: 2025-04-26 | Stop reason: HOSPADM

## 2025-04-25 RX ORDER — ARFORMOTEROL TARTRATE 15 UG/2ML
15 SOLUTION RESPIRATORY (INHALATION)
Status: DISCONTINUED | OUTPATIENT
Start: 2025-04-25 | End: 2025-04-26 | Stop reason: HOSPADM

## 2025-04-25 RX ORDER — ALLOPURINOL 300 MG/1
300 TABLET ORAL DAILY
Status: DISCONTINUED | OUTPATIENT
Start: 2025-04-25 | End: 2025-04-26 | Stop reason: HOSPADM

## 2025-04-25 RX ORDER — BUDESONIDE 0.5 MG/2ML
0.5 INHALANT ORAL
Status: DISCONTINUED | OUTPATIENT
Start: 2025-04-25 | End: 2025-04-26 | Stop reason: HOSPADM

## 2025-04-25 RX ADMIN — ARFORMOTEROL TARTRATE 15 MCG: 15 SOLUTION RESPIRATORY (INHALATION) at 09:26

## 2025-04-25 RX ADMIN — BUDESONIDE 0.5 MG: 0.5 SUSPENSION RESPIRATORY (INHALATION) at 19:15

## 2025-04-25 RX ADMIN — Medication 10 ML: at 08:32

## 2025-04-25 RX ADMIN — ARFORMOTEROL TARTRATE 15 MCG: 15 SOLUTION RESPIRATORY (INHALATION) at 19:10

## 2025-04-25 RX ADMIN — ASPIRIN 81 MG: 81 TABLET, COATED ORAL at 13:47

## 2025-04-25 RX ADMIN — TETROFOSMIN 1 DOSE: 1.38 INJECTION, POWDER, LYOPHILIZED, FOR SOLUTION INTRAVENOUS at 10:15

## 2025-04-25 RX ADMIN — LISINOPRIL 20 MG: 20 TABLET ORAL at 08:32

## 2025-04-25 RX ADMIN — ROSUVASTATIN CALCIUM 20 MG: 10 TABLET, FILM COATED ORAL at 13:47

## 2025-04-25 RX ADMIN — BUDESONIDE 0.5 MG: 0.5 SUSPENSION RESPIRATORY (INHALATION) at 09:26

## 2025-04-25 RX ADMIN — AMLODIPINE BESYLATE 10 MG: 5 TABLET ORAL at 08:32

## 2025-04-25 RX ADMIN — Medication 10 ML: at 20:16

## 2025-04-25 RX ADMIN — REVEFENACIN 175 MCG: 175 SOLUTION RESPIRATORY (INHALATION) at 09:26

## 2025-04-25 RX ADMIN — ALLOPURINOL 300 MG: 300 TABLET ORAL at 13:47

## 2025-04-25 RX ADMIN — GUAIFENESIN 600 MG: 600 TABLET, EXTENDED RELEASE ORAL at 20:15

## 2025-04-25 RX ADMIN — GUAIFENESIN 600 MG: 600 TABLET, EXTENDED RELEASE ORAL at 13:47

## 2025-04-25 NOTE — PLAN OF CARE
Problem: Adult Inpatient Plan of Care  Goal: Plan of Care Review  Outcome: Progressing  Flowsheets (Taken 4/25/2025 0539)  Progress: improving  Plan of Care Reviewed With: patient  Goal: Patient-Specific Goal (Individualized)  Outcome: Progressing  Goal: Absence of Hospital-Acquired Illness or Injury  Outcome: Progressing  Intervention: Identify and Manage Fall Risk  Recent Flowsheet Documentation  Taken 4/25/2025 0417 by Fernanda Escobar RN  Safety Promotion/Fall Prevention: safety round/check completed  Taken 4/25/2025 0200 by Fernanda Escobar RN  Safety Promotion/Fall Prevention: safety round/check completed  Taken 4/25/2025 0011 by Fernanda Escobar RN  Safety Promotion/Fall Prevention: safety round/check completed  Taken 4/24/2025 2233 by Fernanda Escobar RN  Safety Promotion/Fall Prevention: safety round/check completed  Taken 4/24/2025 2010 by Fernanda Escobar RN  Safety Promotion/Fall Prevention: safety round/check completed  Intervention: Prevent and Manage VTE (Venous Thromboembolism) Risk  Recent Flowsheet Documentation  Taken 4/24/2025 2010 by Fernanda Escobar RN  VTE Prevention/Management: SCDs (sequential compression devices) off  Goal: Optimal Comfort and Wellbeing  Outcome: Progressing  Intervention: Provide Person-Centered Care  Recent Flowsheet Documentation  Taken 4/24/2025 2010 by Fernanda Escoabr RN  Trust Relationship/Rapport: care explained  Goal: Readiness for Transition of Care  Outcome: Progressing  Intervention: Mutually Develop Transition Plan  Recent Flowsheet Documentation  Taken 4/24/2025 2002 by Fernanda Escobar RN  Equipment Currently Used at Home: nebulizer  Transportation Anticipated: family or friend will provide  Patient/Family Anticipated Services at Transition: none  Patient/Family Anticipates Transition to: home with family  Taken 4/24/2025 2001 by Fernanda Escobar RN  Equipment Currently Used at Home: nebulizer  Goal: Plan of Care Review  Outcome:  Progressing  Flowsheets (Taken 4/25/2025 0539)  Progress: improving  Plan of Care Reviewed With: patient  Goal: Patient-Specific Goal (Individualized)  Outcome: Progressing  Goal: Absence of Hospital-Acquired Illness or Injury  Outcome: Progressing  Intervention: Identify and Manage Fall Risk  Recent Flowsheet Documentation  Taken 4/25/2025 0417 by Fernanda Escobar RN  Safety Promotion/Fall Prevention: safety round/check completed  Taken 4/25/2025 0200 by Fernanda Escobar RN  Safety Promotion/Fall Prevention: safety round/check completed  Taken 4/25/2025 0011 by Fernanda Escobar RN  Safety Promotion/Fall Prevention: safety round/check completed  Taken 4/24/2025 2233 by Fernanda Escobar RN  Safety Promotion/Fall Prevention: safety round/check completed  Taken 4/24/2025 2010 by Fernanda Escobar RN  Safety Promotion/Fall Prevention: safety round/check completed  Intervention: Prevent and Manage VTE (Venous Thromboembolism) Risk  Recent Flowsheet Documentation  Taken 4/24/2025 2010 by Fernanda Escobar RN  VTE Prevention/Management: SCDs (sequential compression devices) off  Goal: Optimal Comfort and Wellbeing  Outcome: Progressing  Intervention: Provide Person-Centered Care  Recent Flowsheet Documentation  Taken 4/24/2025 2010 by Fernanda Escobar RN  Trust Relationship/Rapport: care explained  Goal: Readiness for Transition of Care  Outcome: Progressing  Intervention: Mutually Develop Transition Plan  Recent Flowsheet Documentation  Taken 4/24/2025 2002 by Fernanda Escobar RN  Equipment Currently Used at Home: nebulizer  Transportation Anticipated: family or friend will provide  Patient/Family Anticipated Services at Transition: none  Patient/Family Anticipates Transition to: home with family  Taken 4/24/2025 2001 by Fernanda Escobar RN  Equipment Currently Used at Home: nebulizer     Problem: Chest Pain  Goal: Resolution of Chest Pain Symptoms  Outcome: Progressing     Problem: Comorbidity  Management  Goal: Maintenance of Asthma Control  Outcome: Progressing   Goal Outcome Evaluation:  Plan of Care Reviewed With: patient        Progress: improving

## 2025-04-25 NOTE — PLAN OF CARE
Problem: Adult Inpatient Plan of Care  Goal: Plan of Care Review  Outcome: Not Progressing  Flowsheets (Taken 4/25/2025 1913)  Progress: improving  Outcome Evaluation: no c/o chest pain at this time, cardiology following, pending myoview in the AM, NPO at midnight, VSS, plan of care continues  Plan of Care Reviewed With:   patient   family  Goal: Patient-Specific Goal (Individualized)  Outcome: Not Progressing  Goal: Absence of Hospital-Acquired Illness or Injury  Outcome: Not Progressing  Goal: Optimal Comfort and Wellbeing  Outcome: Not Progressing  Goal: Readiness for Transition of Care  Outcome: Not Progressing  Goal: Plan of Care Review  Outcome: Not Progressing  Flowsheets (Taken 4/25/2025 1913)  Progress: improving  Outcome Evaluation: no c/o chest pain at this time, cardiology following, pending myoview in the AM, NPO at midnight, VSS, plan of care continues  Plan of Care Reviewed With:   patient   family  Goal: Patient-Specific Goal (Individualized)  Outcome: Not Progressing  Goal: Absence of Hospital-Acquired Illness or Injury  Outcome: Not Progressing  Goal: Optimal Comfort and Wellbeing  Outcome: Not Progressing  Goal: Readiness for Transition of Care  Outcome: Not Progressing     Problem: Chest Pain  Goal: Resolution of Chest Pain Symptoms  Outcome: Not Progressing     Problem: Comorbidity Management  Goal: Maintenance of Asthma Control  Outcome: Not Progressing   Goal Outcome Evaluation:  Plan of Care Reviewed With: patient, family        Progress: improving  Outcome Evaluation: no c/o chest pain at this time, cardiology following, pending myoview in the AM, NPO at midnight, VSS, plan of care continues

## 2025-04-25 NOTE — PLAN OF CARE
Problem: Adult Inpatient Plan of Care  Goal: Plan of Care Review  Outcome: Progressing  Flowsheets (Taken 4/25/2025 1727)  Progress: improving  Plan of Care Reviewed With: patient  Goal: Patient-Specific Goal (Individualized)  Outcome: Progressing  Goal: Absence of Hospital-Acquired Illness or Injury  Outcome: Progressing  Intervention: Identify and Manage Fall Risk  Description: Perform standard risk assessment on admission using a validated tool or comprehensive approach appropriate to the patient; reassess fall risk frequently, with change in status or transfer to another level of care.Communicate risk to interprofessional healthcare team; ensure fall risk visible cue.Determine need for increased observation, equipment and environmental modification, as well as use of supportive, nonskid footwear.Adjust safety measures to individual needs and identified risk factors.Reinforce the importance of active participation with fall risk prevention, safety, and physical activity with the patient and family.Perform regular intentional rounding to assess need for position change, pain assessment and personal needs, including assistance with toileting.  Recent Flowsheet Documentation  Taken 4/25/2025 1400 by Talat Reis RN  Safety Promotion/Fall Prevention: safety round/check completed  Taken 4/25/2025 1200 by Talat Reis RN  Safety Promotion/Fall Prevention: safety round/check completed  Taken 4/25/2025 1000 by Talat Reis RN  Safety Promotion/Fall Prevention: safety round/check completed  Taken 4/25/2025 0800 by Talat Reis RN  Safety Promotion/Fall Prevention: safety round/check completed  Intervention: Prevent Infection  Description: Maintain skin and mucous membrane integrity; promote hand, oral and pulmonary hygiene.Optimize fluid balance, nutrition, sleep and glycemic control to maximize infection resistance.Identify potential sources of infection early to prevent or mitigate progression of  infection (e.g., wound, lines, devices).Evaluate ongoing need for invasive devices; remove promptly when no longer indicated.Review vaccination status.  Recent Flowsheet Documentation  Taken 4/25/2025 1400 by Talat Reis RN  Infection Prevention: single patient room provided  Taken 4/25/2025 1200 by Talat Reis RN  Infection Prevention: single patient room provided  Taken 4/25/2025 1000 by Talat Reis RN  Infection Prevention: single patient room provided  Taken 4/25/2025 0800 by Talat Reis RN  Infection Prevention: single patient room provided  Goal: Optimal Comfort and Wellbeing  Outcome: Progressing  Goal: Readiness for Transition of Care  Outcome: Progressing  Goal: Plan of Care Review  Outcome: Progressing  Flowsheets (Taken 4/25/2025 1727)  Progress: improving  Plan of Care Reviewed With: patient  Goal: Patient-Specific Goal (Individualized)  Outcome: Progressing  Goal: Absence of Hospital-Acquired Illness or Injury  Outcome: Progressing  Intervention: Identify and Manage Fall Risk  Description: Perform standard risk assessment on admission using a validated tool or comprehensive approach appropriate to the patient; reassess fall risk frequently, with change in status or transfer to another level of care.Communicate risk to interprofessional healthcare team; ensure fall risk visible cue.Determine need for increased observation, equipment and environmental modification, as well as use of supportive, nonskid footwear.Adjust safety measures to individual needs and identified risk factors.Reinforce the importance of active participation with fall risk prevention, safety, and physical activity with the patient and family.Perform regular intentional rounding to assess need for position change, pain assessment and personal needs, including assistance with toileting.  Recent Flowsheet Documentation  Taken 4/25/2025 1400 by Talat Reis RN  Safety Promotion/Fall Prevention: safety  round/check completed  Taken 4/25/2025 1200 by Talat Reis RN  Safety Promotion/Fall Prevention: safety round/check completed  Taken 4/25/2025 1000 by Talat Reis RN  Safety Promotion/Fall Prevention: safety round/check completed  Taken 4/25/2025 0800 by Talat Reis RN  Safety Promotion/Fall Prevention: safety round/check completed  Intervention: Prevent Infection  Description: Maintain skin and mucous membrane integrity; promote hand, oral and pulmonary hygiene.Optimize fluid balance, nutrition, sleep and glycemic control to maximize infection resistance.Identify potential sources of infection early to prevent or mitigate progression of infection (e.g., wound, lines, devices).Evaluate ongoing need for invasive devices; remove promptly when no longer indicated.Review vaccination status.  Recent Flowsheet Documentation  Taken 4/25/2025 1400 by Talat Reis RN  Infection Prevention: single patient room provided  Taken 4/25/2025 1200 by Talat Reis RN  Infection Prevention: single patient room provided  Taken 4/25/2025 1000 by Talat Reis RN  Infection Prevention: single patient room provided  Taken 4/25/2025 0800 by Talat Reis RN  Infection Prevention: single patient room provided  Goal: Optimal Comfort and Wellbeing  Outcome: Progressing  Goal: Readiness for Transition of Care  Outcome: Progressing     Problem: Chest Pain  Goal: Resolution of Chest Pain Symptoms  Outcome: Progressing     Problem: Comorbidity Management  Goal: Maintenance of Asthma Control  Outcome: Progressing   Goal Outcome Evaluation:  Plan of Care Reviewed With: patient        Progress: improving

## 2025-04-25 NOTE — H&P
Pending sale to [facility] H&P    Patient Name: Alejandro Bain  : 1955  MRN: [id number]  Primary Care Physician: Yoshi Lyon MD  Date of admission: 2025     Patient Care Team:  Yoshi Lyon MD as PCP - General (Family Medicine)          Subjective   History Present Illness     Chief Complaint:   Chief Complaint   Patient presents with    Chest Pain     Chest Pain, pt states he feels like he had a heart attack, he had numbness to left arm and across shoulder blade.  Pt denies soa/nausea.       Chest Pain     Mr. Bain is a 69 y.o.  presents to [facility] complaining of chest pain      History of Present Illness    ED 25: Patient is 69-year-old  male with history of MI, pneumonia, stroke, CAD, A-fib, aneurysm, and diastolic heart failure, who presents emergency department with an episode of chest pain. Patient states episode of chest pain was at approximately 1530 hrs. today and lasted approximately 2 minutes; patient states pain was on the right side of his chest and radiated up into his left arm and shoulder. Also endorses feeling flushed; states Eliquis twice daily so feeling flushed is very unusual for him. Patient states that only minor episode that was similar 4 days prior. Patient denies any shortness of breath, abdominal pain including nausea, vomiting, diarrhea, or headache at this time.     Observation 25: Patient is a 69-year-old male presenting to the hospital with complaints of left arm numbness and weakness that radiated to his left shoulder.  Patient states he did not feel shortness of breath nausea or vomiting.  Patient states he did start having blurry vision about 2 days ago but has since resolved.  Patient did report some feelings of diaphoresis and feeling hot yesterday as well which he normally does not due to his Eliquis.  Pending Myoview and echocardiogram.    Review of Systems   Constitutional: Positive for diaphoresis and  malaise/fatigue. Negative for fever.   HENT: Negative.     Eyes:  Positive for blurred vision.   Cardiovascular:  Positive for chest pain. Negative for dyspnea on exertion, leg swelling, near-syncope, palpitations and syncope.   Respiratory:  Negative for shortness of breath.    Endocrine: Negative.    Skin: Negative.    Musculoskeletal:  Positive for joint pain.   Gastrointestinal:  Negative for nausea and vomiting.   Genitourinary: Negative.    Neurological:  Positive for weakness.   Psychiatric/Behavioral: Negative.             Personal History     Past Medical History:   Past Medical History:   Diagnosis Date    Acute on chronic diastolic heart failure 12/14/2022    Allergic     Allergic rhinitis [id number]    Aneurysm     Anxiety     Asthma     Atrial fibrillation     Bronchitis     CHF (congestive heart failure)     Coronary artery disease     Depression     Encounter for laboratory testing for COVID-19 virus 08/22/2022    Gout     Heart murmur [id number]    Hyperlipidemia     Hypertension     Intractable headache 01/09/2023    Myocardial infarction     Pneumonia     Pulmonary nodule     Familia Mountain spotted fever     Stroke        Surgical History:      Past Surgical History:   Procedure Laterality Date    AORTIC VALVE REPAIR/REPLACEMENT  12/03/2016    CABG x 5/ tissue AVR by DR. PHAN    CARDIAC CATHETERIZATION      CARDIAC SURGERY      CARDIOVASCULAR STRESS TEST  2020    CAROTID STENT      CORONARY ARTERY BYPASS GRAFT  12/02/2016    X4  Dr Phan    CORONARY STENT PLACEMENT  2009    HERNIA REPAIR  2010    ILIAC ARTERY ANEURYSM REPAIR  01/05/2017    abdominal and bilateral    NASAL POLYP EXCISION  2005    OTHER SURGICAL HISTORY      [facility]    REPAIR CHOANAL ATRESIA  2005    SINUS SURGERY      VASCULAR SURGERY             Family History: family history includes Cancer in his brother, father, and sister; Heart disease in his mother and sister; Pulmonary fibrosis in his mother. Otherwise pertinent FHx was reviewed and  unremarkable.     Social History:  reports that he has never smoked. He has never been exposed to tobacco smoke. He has never used smokeless tobacco. He reports that he does not currently use alcohol. He reports current drug use. Drug: Marijuana.      Medications:  Prior to Admission medications    Medication Sig Start Date End Date Taking? Authorizing Provider   allopurinol (ZYLOPRIM) 300 MG tablet TAKE 1 TABLET BY MOUTH EVERY DAY AT NIGHT 1/23/25  Yes Yoshi Lyon MD   amLODIPine (NORVASC) 10 MG tablet TAKE 1 TABLET BY MOUTH EVERY DAY 12/30/24  Yes Maciej Blank MD   apixaban (ELIQUIS) 5 MG tablet tablet Take 1 tablet by mouth Every 12 (Twelve) Hours. Indications: Atrial Fibrillation 2/25/25  Yes Yoshi Lyon MD   ascorbic acid (VITAMIN C) 250 MG tablet Take 1 tablet by mouth Daily.   Yes ProviderChasity MD   aspirin 81 MG EC tablet Take 1 tablet by mouth Daily.   Yes Emergency, Nurse Epic, RN   Cholecalciferol 25 MCG (1000 UT) capsule Take 1 capsule by mouth Daily.   Yes ProviderChasity MD   Fluticasone-Umeclidin-Vilant (TRELEGY ELLIPTA) 200-62.5-25 MCG/ACT inhaler Inhale 1 puff Daily. 12/5/24  Yes Dharmesh Eugene MD   lisinopril (PRINIVIL,ZESTRIL) 20 MG tablet TAKE 1 TABLET BY MOUTH TWICE A DAY 12/3/24  Yes Maciej Blank MD   Multiple Vitamins-Minerals (MULTIVITAMIN ADULT PO) Take 1 tablet by mouth Daily.   Yes ProviderChasity MD   Olopatadine-Mometasone (Ryaltris) 665-25 MCG/ACT suspension Administer 1 spray into the nostril(s) as directed by provider 2 (Two) Times a Day.   Yes Chasity Turner MD   rosuvastatin (CRESTOR) 20 MG tablet Take 1 tablet by mouth Daily. 3/4/25  Yes Yoshi Lyon MD   ipratropium-albuterol (DUO-NEB) 0.5-2.5 mg/3 ml nebulizer Take 3 mL by nebulization Every 4 (Four) Hours As Needed for Wheezing.    ProviderChasity MD   VENTOLIN  (90 Base) MCG/ACT inhaler Inhale 2 puffs Every 6 (Six) Hours As Needed. 1/22/20    Provider, MD Chasity   zinc sulfate (ZINCATE) 220 (50 Zn) MG capsule Take 50 mg by mouth Daily.  Patient not taking: Reported on 4/24/2025    Provider, MD Chasity       Allergies:    Allergies   Allergen Reactions    Avelox [Moxifloxacin] Rash    Penicillins Rash    Sulfa Antibiotics Rash    Levaquin [Levofloxacin] GI Intolerance       Objective   Objective     Vital Signs  Temp:  [97 °F (36.1 °C)-98.1 °F (36.7 °C)] 97 °F (36.1 °C)  Heart Rate:  [47-69] 49  Resp:  [13-19] 19  BP: (107-152)/(68-86) 132/79  SpO2:  [91 %-97 %] 96 %  on   ;   Device (Oxygen Therapy): room air  Body mass index is 25.73 kg/m².    Physical Exam  Vitals and nursing note reviewed.   Constitutional:       Appearance: Normal appearance.   HENT:      Head: Normocephalic and atraumatic.      Right Ear: External ear normal.      Left Ear: External ear normal.      Nose: Nose normal.      Mouth/Throat:      Pharynx: Oropharynx is clear.   Eyes:      Extraocular Movements: Extraocular movements intact.      Conjunctiva/sclera: Conjunctivae normal.      Pupils: Pupils are equal, round, and reactive to light.   Cardiovascular:      Rate and Rhythm: Regular rhythm. Bradycardia present.      Pulses: Normal pulses.   Pulmonary:      Effort: Pulmonary effort is normal.   Abdominal:      General: Bowel sounds are normal.   Musculoskeletal:         General: Tenderness present.      Cervical back: Normal range of motion.   Skin:     General: Skin is warm.      Capillary Refill: Capillary refill takes less than 2 seconds.   Neurological:      Mental Status: He is alert and oriented to person, place, and time.   Psychiatric:         Mood and Affect: Mood normal.         Behavior: Behavior normal.         Thought Content: Thought content normal.         Judgment: Judgment normal.           Results Review:  I have personally reviewed most recent cardiac tracings, lab results, and radiology images and interpretations and agree with findings, most  notably: CBC, CMP, echocardiogram.    Results from last 7 days   Lab Units 04/25/25  0021   WBC 10*3/mm3 6.89   HEMOGLOBIN g/dL 12.5*   HEMATOCRIT % 40.2   PLATELETS 10*3/mm3 139*     Results from last 7 days   Lab Units 04/25/25  0021 04/24/25  1657 04/24/25  1552   SODIUM mmol/L 138  --  140   POTASSIUM mmol/L 3.8  --  3.9   CHLORIDE mmol/L 105  --  107   CO2 mmol/L 20.6*  --  21.1*   BUN mg/dL 18  --  20   CREATININE mg/dL 0.87  --  1.01   GLUCOSE mg/dL 94  --  118*   CALCIUM mg/dL 8.8  --  9.8   ALK PHOS U/L 114  --  131*   ALT (SGPT) U/L 13  --  17   AST (SGOT) U/L 21  --  26   HSTROP T ng/L 17 18 19   PROBNP pg/mL  --   --  283.0     Estimated Creatinine Clearance: 103.1 mL/min (by C-G formula based on SCr of 0.87 mg/dL).  Brief Urine Lab Results       None            Microbiology Results (last 10 days)       ** No results found for the last 240 hours. **            ECG/EMG Results (most recent)       Procedure Component Value Units Date/Time    Telemetry Scan [[id number]] Resulted: 04/24/25     Updated: 04/24/25 2259    Telemetry Scan [[id number]] Resulted: 04/24/25     Updated: 04/24/25 2324    Telemetry Scan [[id number]] Resulted: 04/24/25     Updated: 04/25/25 0451    Adult Transthoracic Echo Complete W/ Cont if Necessary Per Protocol [[id number]] Resulted: 04/25/25 0547     Updated: 04/25/25 0547     LVIDd 4.8 cm      LVIDs 3.5 cm      IVSd 1.20 cm      LVPWd 1.20 cm      FS 27.1 %      IVS/LVPW 1.00 cm      ESV(cubed) 42.9 ml      LV Sys Vol (BSA corrected) 26.5 cm2      EDV(cubed) 110.6 ml      LV [place] Vol (BSA corrected) 57.3 cm2      LV mass(C)d 219.1 grams      LVOT area 3.1 cm2      LVOT diam 2.00 cm      EDV(MOD-sp4) 122.0 ml      ESV(MOD-sp4) 56.4 ml      SV(MOD-sp4) 65.6 ml      SVi(MOD-SP4) 30.8 ml/m2      SVi (LVOT) 31.7 ml/m2      EF(MOD-sp4) 53.8 %      MV E max judy 57.5 cm/sec      MV A max judy 48.0 cm/sec      MV dec time 0.25 sec      MV E/A 1.20     MV A dur 0.11 sec      LA ESV Index (BP)  39.8 ml/m2      Med Peak E' Vivek 7.0 cm/sec      Lat Peak E' Vivek 12.7 cm/sec      Avg E/e' ratio 5.84     SV(LVOT) 67.5 ml      TAPSE (>1.6) 1.63 cm      RV S' 6.9 cm/sec      LA dimension (2D)  3.6 cm      LV V1 max 93.2 cm/sec      LV V1 max PG 3.5 mmHg      LV V1 mean PG 2.00 mmHg      LV V1 VTI 21.5 cm      Ao pk vivek 252.0 cm/sec      Ao max PG 25.4 mmHg      Ao mean PG 13.0 mmHg      Ao V2 VTI 56.7 cm      MAT(I,D) 1.19 cm2      Dimensionless Index 0.37 (DI)      MV max PG 3.2 mmHg      MV mean PG 1.00 mmHg      MV V2 VTI 33.9 cm      MV P1/2t 80.5 msec      MVA(P1/2t) 2.7 cm2      MVA(VTI) 1.99 cm2      MV dec slope 315.0 cm/sec2      MR max vivek 513.0 cm/sec      MR max .3 mmHg      RV V1 max PG 1.89 mmHg      RV V1 max 68.7 cm/sec      RV V1 VTI 16.2 cm      PA V2 max 89.8 cm/sec      PA acc time 0.14 sec      Sinus 3.5 cm      STJ 3.3 cm     Narrative:        Left ventricular ejection fraction appears to be 46 - 50%.      ECG 12 Lead Chest Pain [[id number]] Collected: 04/24/25 1539     Updated: 04/25/25 1013     QT Interval 410 ms      QTC Interval 427 ms     Narrative:      HEART RATE=65  bpm  RR Aozimqpl=297  ms  NV Pftiarpf=014  ms  P Horizontal Axis=14  deg  P Front Axis=19  deg  QRSD Interval=95  ms  QT Pszdxgpn=363  ms  ZRuB=413  ms  QRS Axis=-40  deg  T Wave Axis=14  deg  - ABNORMAL ECG -  Sinus rhythm  Left anterior fascicular block  When compared with ECG of 27-Aug-2024 19:54:56,  No significant change  Electronically Signed By: Reji Weston ([facility]) 2025-04-25 10:08:29  Date and Time of Study:2025-04-24 15:39:29    ECG 12 Lead Chest Pain [[id number]] Collected: 04/24/25 1815     Updated: 04/25/25 1014     QT Interval 467 ms      QTC Interval 427 ms     Narrative:      HEART RATE=50  bpm  RR Ohstrhnx=0732  ms  NV Aipxrfyk=304  ms  P Horizontal Axis=24  deg  P Front Axis=-10  deg  QRSD Ovzxpvcm=066  ms  QT Fgihalys=664  ms  ULgQ=423  ms  QRS Axis=-38  deg  T Wave Axis=-23  deg  - ABNORMAL ECG  -  Sinus bradycardia  Left anterior fascicular block  Left ventricular hypertrophy  When compared with ECG of 24-Apr-2025 15:39:29,  No significant change  Electronically Signed By: Reji Weston ([facility]) 2025-04-25 10:08:53  Date and Time of Study:2025-04-24 18:15:39            Results for orders placed during the hospital encounter of 03/20/24    Bilateral Carotid Duplex    Interpretation Summary    Right internal carotid artery demonstrates a less than 50% stenosis.    Left internal carotid artery demonstrates a less than 50% stenosis.      Results for orders placed during the hospital encounter of 04/24/25    Adult Transthoracic Echo Complete W/ Cont if Necessary Per Protocol    Interpretation Summary    Left ventricular ejection fraction appears to be 46 - 50%.      CT Head Without Contrast  Result Date: 4/24/2025  Impression: No acute intracranial findings. Chronic and senescent changes as above. Pansinus disease. Electronically Signed: Barber Sumner MD  4/24/2025 6:08 PM EDT  Workstation ID: [id number]    XR Chest 1 View  Result Date: 4/24/2025  Impression: No acute chest finding. Chronic left basilar scarring. Electronically Signed: Kelin Pond MD  4/24/2025 4:10 PM EDT  Workstation ID: [id number]        Estimated Creatinine Clearance: 103.1 mL/min (by C-G formula based on SCr of 0.87 mg/dL).    Assessment & Plan   Assessment/Plan       Active Hospital Problems    Diagnosis  POA    **Chest pain [R07.9]  Yes      Resolved Hospital Problems   No resolved problems to display.     Chest pain  Lab Results   Component Value Date    TROPONINT 17 04/25/2025    TROPONINT 18 04/24/2025    TROPONINT 19 04/24/2025   -History of CABG x5 (2016), AAA, aortic valve replacement, CAD  -Hold Eliquis for possible further intervention needed   -Chest X-ray: No acute cardiopulmonary findings with chronic left lower lobe scarring  -A1c, thyroid panel within normal limits  -Total cholesterol 151, HDL 36, LDL 81, triglycerides  201  -EKG: Sinus rhythm, LAFB  -Stress Test and echocardiogram ordered  -Telemetry  -Cardiology consulted     Hypertension/hyperlipidemia  BP Readings from Last 1 Encounters:   04/25/25 132/79   - Continue aspirin, statin, lisinopril, amlodipine  - Monitor while admitted    History of occipital CVA  - CT head shows no acute intercranial findings with trace fluid and mastoid air cells with opacification of paranasal sinuses, scattered white matter hypodensities with no acute infarct  - Aspirin and statin    AECOPD  Lab Results   Component Value Date    WBC 6.89 04/25/2025    EOSABS 0.29 04/25/2025   -Continue Radha Hardy  -Telemetry and pulse oximetry      VTE Prophylaxis - Documented VTE Prophylaxis Not Indicated  Reason:        Start        04/24/25 1805  VTE Prophylaxis Not Indicated: Low Risk; Heart / Respiratory Failure (1), Acute MI / Stroke (1)  Once                              CODE STATUS:    Code Status and Medical Interventions: CPR (Attempt to Resuscitate); Full Support   Ordered at: 04/24/25 1806     Code Status (Patient has no pulse and is not breathing):    CPR (Attempt to Resuscitate)     Medical Interventions (Patient has pulse or is breathing):    Full Support     Level Of Support Discussed With:    Patient       This patient has been examined wearing personal protective equipment.     I discussed the patient's findings and my recommendations with patient, family, nursing staff, primary care team, and consulting provider.      Signature:Electronically signed by GARY Workman, 04/25/25, 11:25 AM EDT.      I spent 35 minutes caring for Alejandro on this date of service. This time includes time spent by me in the following activities: reviewing tests, performing a medically appropriate examination and/or evaluation, counseling and educating the patient/family/caregiver, referring and communicating with other health care professionals, documenting information in the medical record, independently  interpreting results and communicating that information with the patient/family/caregiver, care coordination, ordering medications, ordering test(s), ordering procedure(s), obtaining a separately obtained history, and reviewing a separately obtained history.

## 2025-04-25 NOTE — CONSULTS
Referring Provider: Reji Weston MD    Reason for Consultation:      Chest pain  Coronary artery disease  Previous CABG  Hypertension  Dyslipidemia      Patient Care Team:  Yoshi Lyon MD as PCP - General (Family Medicine)      SUBJECTIVE     Chief Complaint: Chest pain    History of present illness:  Alejandro Bain is a 69 y.o. male with a history of CAD who presented to [facility] with complaint of chest.    Patient presented to the emergency room at [facility] on April 24, 2025 from home.  He reports he was sitting at the kitchen table and eating an apple.  While doing this he began to have pain in his chest into his back and radiating into his left arm.  He reports he felt flushed.  He denies associated shortness of breath, near syncope or nausea vomiting.  The patient said the pain lasted for several minutes and eased without further intervention.  He came onto the emergency room for evaluation.    Patient is known to have coronary artery disease, previous aortic valve replacement, CABG, abdominal aortic aneurysm.    In 2016 patient had 5 vessel CABG.  He also underwent abdominal aortic aneurysm repair in 2016.    In 2021 the patient had a stress test that showed no evidence of reversible ischemia.  Echocardiogram at that time showed his EF to be 55 to 60%.  Bioprosthetic aortic valve was functioning appropriately.    In January 2023 patient had an occipital lobe CVA.  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    Patient takes Eliquis at home and his last dose was yesterday morning on April 24, 2025 around 8 AM.    Evaluation in the emergency room includes high-sensitivity [id number], 18, 17.  proBNP 283.  BUN and creatinine 18 and 0.8 TSH 2.4 hemoglobin A1c 5.35.  Total cholesterol 151 HDL 36 LDL 81 triglycerides 201 hemoglobin 12.5 platelet count 139.  Chest x-ray showed no acute chest finding.   CT of his head showed no acute intracranial findings.    EKG shows sinus bradycardia with inferior T wave inversion.    Echocardiogram was completed this morning.  His ejection fraction was 45 to 50%.  It was a technically difficult study.    Review of Systems   Constitutional: Negative for chills and fever.   HENT:  Negative for ear discharge and nosebleeds.    Eyes:  Negative for discharge and redness.   Cardiovascular:  Positive for chest pain. Negative for orthopnea, palpitations, paroxysmal nocturnal dyspnea and syncope.   Respiratory:  Negative for cough, shortness of breath and wheezing.    Endocrine: Negative for heat intolerance.   Skin:  Negative for rash.   Musculoskeletal:  Negative for arthritis and myalgias.   Gastrointestinal:  Negative for abdominal pain, melena, nausea and vomiting.   Genitourinary:  Negative for dysuria and hematuria.   Neurological:  Positive for numbness. Negative for dizziness, light-headedness and tremors.   Psychiatric/Behavioral:  Negative for depression. The patient is not nervous/anxious.        Review of systems:    Constitutional: No weakness, fatigue, fever, rigors, chills   Eyes: No vision changes, eye pain   ENT/oropharynx: No difficulty swallowing, sore throat, epistaxis, changes in hearing   Cardiovascular: C/o chest pain, chest tightness, palpitations, paroxysmal nocturnal dyspnea, orthopnea, diaphoresis, dizziness / syncopal episode   Respiratory: No shortness of breath, dyspnea on exertion, cough, wheezing, hemoptysis   Gastrointestinal: No abdominal pain, nausea, vomiting, diarrhea, bloody stools   Genitourinary: No hematuria, dysuria   Neurological: No headache, tremors, numbness, one-sided weakness    Musculoskeletal: No cramps, myalgias, joint pain, joint swelling   Integument: No rash, edema        Personal History:      Past Medical History:   Diagnosis Date    Acute on chronic diastolic heart failure 12/14/2022    Allergic     Allergic rhinitis [id number]     Aneurysm     Anxiety     Asthma     Atrial fibrillation     Bronchitis     CHF (congestive heart failure)     Coronary artery disease     Depression     Encounter for laboratory testing for COVID-19 virus 08/22/2022    Gout     Heart murmur [id number]    Hyperlipidemia     Hypertension     Intractable headache 01/09/2023    Myocardial infarction     Pneumonia     Pulmonary nodule     Familia Mountain spotted fever     Stroke        Past Surgical History:   Procedure Laterality Date    AORTIC VALVE REPAIR/REPLACEMENT  12/03/2016    CABG x 5/ tissue AVR by DR. PHAN    CARDIAC CATHETERIZATION      CARDIAC SURGERY      CARDIOVASCULAR STRESS TEST  2020    CAROTID STENT      CORONARY ARTERY BYPASS GRAFT  12/02/2016    X4  Dr Phan    CORONARY STENT PLACEMENT  2009    HERNIA REPAIR  2010    ILIAC ARTERY ANEURYSM REPAIR  01/05/2017    abdominal and bilateral    NASAL POLYP EXCISION  2005    OTHER SURGICAL HISTORY      [facility]    REPAIR CHOANAL ATRESIA  2005    SINUS SURGERY      VASCULAR SURGERY         Family History   Problem Relation Age of Onset    Pulmonary fibrosis Mother     Heart disease Mother     Cancer Father         brain    Cancer Sister         lung    Heart disease Sister     Cancer Brother         pancreatic, lung    Sleep apnea Neg Hx        Social History     Tobacco Use    Smoking status: Never     Passive exposure: Never    Smokeless tobacco: Never   Vaping Use    Vaping status: Never Used   Substance Use Topics    Alcohol use: Not Currently     Comment: quit 2005    Drug use: Yes     Types: Marijuana        Home meds:  Prior to Admission medications    Medication Sig Start Date End Date Taking? Authorizing Provider   allopurinol (ZYLOPRIM) 300 MG tablet TAKE 1 TABLET BY MOUTH EVERY DAY AT NIGHT 1/23/25  Yes Yoshi Lyon MD   amLODIPine (NORVASC) 10 MG tablet TAKE 1 TABLET BY MOUTH EVERY DAY 12/30/24  Yes Maciej Blank MD   apixaban (ELIQUIS) 5 MG tablet tablet Take 1 tablet by mouth Every 12  (Twelve) Hours. Indications: Atrial Fibrillation 2/25/25  Yes Yoshi Lyon MD   ascorbic acid (VITAMIN C) 250 MG tablet Take 1 tablet by mouth Daily.   Yes Chasity Turner MD   aspirin 81 MG EC tablet Take 1 tablet by mouth Daily.   Yes Emergency, Nurse Anthony, RN   Cholecalciferol 25 MCG (1000 UT) capsule Take 1 capsule by mouth Daily.   Yes Chasity Turner MD   Fluticasone-Umeclidin-Vilant (TRELEGY ELLIPTA) 200-62.5-25 MCG/ACT inhaler Inhale 1 puff Daily. 12/5/24  Yes Dharmesh Eugene MD   lisinopril (PRINIVIL,ZESTRIL) 20 MG tablet TAKE 1 TABLET BY MOUTH TWICE A DAY 12/3/24  Yes Maciej Blank MD   Multiple Vitamins-Minerals (MULTIVITAMIN ADULT PO) Take 1 tablet by mouth Daily.   Yes Chasity Turner MD   Olopatadine-Mometasone (Ryaltris) 665-25 MCG/ACT suspension Administer 1 spray into the nostril(s) as directed by provider 2 (Two) Times a Day.   Yes Chasity Turner MD   rosuvastatin (CRESTOR) 20 MG tablet Take 1 tablet by mouth Daily. 3/4/25  Yes Yoshi Lyon MD   ipratropium-albuterol (DUO-NEB) 0.5-2.5 mg/3 ml nebulizer Take 3 mL by nebulization Every 4 (Four) Hours As Needed for Wheezing.    Chasity Turner MD   VENTOLIN  (90 Base) MCG/ACT inhaler Inhale 2 puffs Every 6 (Six) Hours As Needed. 1/22/20   Chasity Turner MD   zinc sulfate (ZINCATE) 220 (50 Zn) MG capsule Take 50 mg by mouth Daily.  Patient not taking: Reported on 4/24/2025    Chasity Turner MD       Allergies:     Avelox [moxifloxacin], Penicillins, Sulfa antibiotics, and Levaquin [levofloxacin]    Scheduled Meds:allopurinol, 300 mg, Oral, Daily  amLODIPine, 10 mg, Oral, Daily  [Held by provider] apixaban, 5 mg, Oral, Q12H  arformoterol, 15 mcg, Nebulization, BID - RT   And  budesonide, 0.5 mg, Nebulization, BID - RT   And  revefenacin, 175 mcg, Nebulization, Daily - RT  aspirin, 81 mg, Oral, Daily  cetirizine, 10 mg, Oral, Daily  guaiFENesin, 600 mg, Oral, Q12H  lisinopril, 20  "mg, Oral, BID  rosuvastatin, 20 mg, Oral, Daily  sodium chloride, 10 mL, Intravenous, Q12H      Continuous Infusions:   PRN Meds:  acetaminophen **OR** acetaminophen **OR** acetaminophen    senna-docusate sodium **AND** polyethylene glycol **AND** bisacodyl **AND** bisacodyl    Calcium Replacement - Follow Nurse / BPA Driven Protocol    HYDROcodone-acetaminophen    HYDROmorphone **AND** naloxone    ipratropium-albuterol    Magnesium Standard Dose Replacement - Follow Nurse / BPA Driven Protocol    nitroglycerin    Phosphorus Replacement - Follow Nurse / BPA Driven Protocol    Potassium Replacement - Follow Nurse / BPA Driven Protocol    sodium chloride    sodium chloride      OBJECTIVE    Vital Signs  Vitals:    04/24/25 1948 04/25/25 0015 04/25/25 0429 04/25/25 0821   BP: 124/74 123/85 117/77 132/79   BP Location: Right arm Right arm Right arm Right arm   Patient Position: Sitting Lying Lying Lying   Pulse:  50 59 (!) 49   Resp: 18 17 13 13   Temp: 97.9 °F (36.6 °C) 97.7 °F (36.5 °C) 97.7 °F (36.5 °C) 97 °F (36.1 °C)   TempSrc: Oral Oral Oral Oral   SpO2: 96% 92% 94% 95%   Weight:       Height:           Flowsheet Rows      Flowsheet Row First Filed Value   Admission Height 185.4 cm (73\") Documented at 04/24/2025 1534   Admission Weight 88.5 kg (195 lb) Documented at 04/24/2025 1534            No intake or output data in the 24 hours ending 04/25/25 0912     Telemetry: Sinus bradycardia    Physical Exam:  The patient is alert, oriented and in no distress.  Vital signs as noted above.  Head and neck revealed no carotid bruits or jugular venous distention.   Lungs clear.  No wheezing.  Breath sounds are normal bilaterally.  Heart: Normal first and second heart sounds. No murmur.  No precordial rub is present.  No gallop is present.  Abdomen: Soft and nontender.   Extremities with good peripheral pulses without any pedal edema.  Skin: Warm and dry.  Musculoskeletal system is grossly normal.  CNS grossly normal. "       Results Review:  I have personally reviewed the results from the time of this admission to 4/25/2025 09:12 EDT and agree with these findings:  []  Laboratory  []  Microbiology  []  Radiology  []  EKG/Telemetry   []  Cardiology/Vascular   []  Pathology  []  Old records  []  Other:    Most notable findings include:     Lab Results (last 24 hours)       Procedure Component Value Units Date/Time    Uric Acid [[id number]] Collected: 04/25/25 0021    Specimen: Blood from Arm, Right Updated: 04/25/25 0723    Comprehensive Metabolic Panel [[id number]]  (Abnormal) Collected: 04/25/25 0021    Specimen: Blood from Arm, Right Updated: 04/25/25 0332     Glucose 94 mg/dL      BUN 18 mg/dL      Creatinine 0.87 mg/dL      Sodium 138 mmol/L      Potassium 3.8 mmol/L      Chloride 105 mmol/L      CO2 20.6 mmol/L      Calcium 8.8 mg/dL      Total Protein 6.1 g/dL      Albumin 3.8 g/dL      ALT (SGPT) 13 U/L      AST (SGOT) 21 U/L      Alkaline Phosphatase 114 U/L      Total Bilirubin 0.3 mg/dL      Globulin 2.3 gm/dL      A/G Ratio 1.7 g/dL      BUN/Creatinine Ratio 20.7     Anion Gap 12.4 mmol/L      eGFR 94.6 mL/min/1.73     Narrative:      GFR Categories in Chronic Kidney Disease (CKD)      GFR Category          GFR (mL/min/1.73)    Interpretation  G1                     90 or greater         Normal or high (1)  G2                      60-89                Mild decrease (1)  G3a                   45-59                Mild to moderate decrease  G3b                   30-44                Moderate to severe decrease  G4                    15-29                Severe decrease  G5                    14 or less           Kidney failure          (1)In the absence of evidence of kidney disease, neither GFR category G1 or G2 fulfill the criteria for CKD.    eGFR calculation 2021 CKD-EPI creatinine equation, which does not include race as a factor    High Sensitivity Troponin T [[id number]]  (Normal) Collected: 04/25/25 0021    Specimen:  Blood from Arm, Right Updated: 04/25/25 0332     HS Troponin T 17 ng/L     Narrative:      High Sensitive Troponin T Reference Range:  <14.0 ng/L- Negative Female for AMI  <22.0 ng/L- Negative Male for AMI  >=14 - Abnormal Female indicating possible myocardial injury.  >=22 - Abnormal Male indicating possible myocardial injury.   Clinicians would have to utilize clinical acumen, EKG, Troponin, and serial changes to determine if it is an Acute Myocardial Infarction or myocardial injury due to an underlying chronic condition.         CBC Auto Differential [[id number]]  (Abnormal) Collected: 04/25/25 0021    Specimen: Blood from Arm, Right Updated: 04/25/25 0306     WBC 6.89 10*3/mm3      RBC 4.61 10*6/mm3      Hemoglobin 12.5 g/dL      Hematocrit 40.2 %      MCV 87.2 fL      MCH 27.1 pg      MCHC 31.1 g/dL      RDW 14.6 %      RDW-SD 46.1 fl      MPV 11.0 fL      Platelets 139 10*3/mm3      Neutrophil % 68.0 %      Lymphocyte % 17.4 %      Monocyte % 9.7 %      Eosinophil % 4.2 %      Basophil % 0.4 %      Immature Grans % 0.3 %      Neutrophils, Absolute 4.68 10*3/mm3      Lymphocytes, Absolute 1.20 10*3/mm3      Monocytes, Absolute 0.67 10*3/mm3      Eosinophils, Absolute 0.29 10*3/mm3      Basophils, Absolute 0.03 10*3/mm3      Immature Grans, Absolute 0.02 10*3/mm3      nRBC 0.0 /100 WBC     T4, Free [[id number]]  (Normal) Collected: 04/24/25 1657    Specimen: Blood Updated: 04/25/25 0007     Free T4 0.94 ng/dL     Lipid Panel [[id number]]  (Abnormal) Collected: 04/24/25 1657    Specimen: Blood Updated: 04/25/25 0007     Total Cholesterol 151 mg/dL      Triglycerides 201 mg/dL      HDL Cholesterol 36 mg/dL      LDL Cholesterol  81 mg/dL      VLDL Cholesterol 34 mg/dL      LDL/HDL Ratio 2.08    Narrative:      Cholesterol Reference Ranges  (U.S. Department of Health and Human Services ATP III Classifications)    Desirable          <200 mg/dL  Borderline High    200-239 mg/dL  High Risk          >240  mg/dL      Triglyceride Reference Ranges  (U.S. Department of Health and Human Services ATP III Classifications)    Normal           <150 mg/dL  Borderline High  150-199 mg/dL  High             200-499 mg/dL  Very High        >500 mg/dL    HDL Reference Ranges  (U.S. Department of Health and Human Services ATP III Classifications)    Low     <40 mg/dl (major risk factor for CHD)  High    >60 mg/dl ('negative' risk factor for CHD)        LDL Reference Ranges  (U.S. Department of Health and Human Services ATP III Classifications)    Optimal          <100 mg/dL  Near Optimal     100-129 mg/dL  Borderline High  130-159 mg/dL  High             160-189 mg/dL  Very High        >189 mg/dL    LDL is calculated using the NIH LDL-C calculation.      Hemoglobin A1c [[id number]]  (Normal) Collected: 04/24/25 1552    Specimen: Blood from Arm, Right Updated: 04/25/25 0002     Hemoglobin A1C 5.35 %     High Sensitivity Troponin T 1Hr [[id number]]  (Normal) Collected: 04/24/25 1657    Specimen: Blood Updated: 04/24/25 1731     HS Troponin T 18 ng/L      Troponin T Numeric Delta -1 ng/L     Narrative:      High Sensitive Troponin T Reference Range:  <14.0 ng/L- Negative Female for AMI  <22.0 ng/L- Negative Male for AMI  >=14 - Abnormal Female indicating possible myocardial injury.  >=22 - Abnormal Male indicating possible myocardial injury.   Clinicians would have to utilize clinical acumen, EKG, Troponin, and serial changes to determine if it is an Acute Myocardial Infarction or myocardial injury due to an underlying chronic condition.         Comprehensive Metabolic Panel [[id number]]  (Abnormal) Collected: 04/24/25 1552    Specimen: Blood from Arm, Right Updated: 04/24/25 1633     Glucose 118 mg/dL      BUN 20 mg/dL      Creatinine 1.01 mg/dL      Sodium 140 mmol/L      Potassium 3.9 mmol/L      Chloride 107 mmol/L      CO2 21.1 mmol/L      Calcium 9.8 mg/dL      Total Protein 6.8 g/dL      Albumin 4.4 g/dL      ALT (SGPT) 17 U/L       AST (SGOT) 26 U/L      Alkaline Phosphatase 131 U/L      Total Bilirubin 0.4 mg/dL      Globulin 2.4 gm/dL      A/G Ratio 1.8 g/dL      BUN/Creatinine Ratio 19.8     Anion Gap 11.9 mmol/L      eGFR 81.5 mL/min/1.73     Narrative:      GFR Categories in Chronic Kidney Disease (CKD)      GFR Category          GFR (mL/min/1.73)    Interpretation  G1                     90 or greater         Normal or high (1)  G2                      60-89                Mild decrease (1)  G3a                   45-59                Mild to moderate decrease  G3b                   30-44                Moderate to severe decrease  G4                    15-29                Severe decrease  G5                    14 or less           Kidney failure          (1)In the absence of evidence of kidney disease, neither GFR category G1 or G2 fulfill the criteria for CKD.    eGFR calculation 2021 CKD-EPI creatinine equation, which does not include race as a factor    BNP [[id number]]  (Normal) Collected: 04/24/25 1552    Specimen: Blood from Arm, Right Updated: 04/24/25 1633     proBNP 283.0 pg/mL     Narrative:      This assay is used as an aid in the diagnosis of individuals suspected of having heart failure. It can be used as an aid in the diagnosis of acute decompensated heart failure (ADHF) in patients presenting with signs and symptoms of ADHF to the emergency department (ED). In addition, NT-proBNP of <300 pg/mL indicates ADHF is not likely.    Age Range Result Interpretation  NT-proBNP Concentration (pg/mL:      <50             Positive            >450                   Gray                 300-450                    Negative             <300    50-75           Positive            >900                  Gray                300-900                  Negative            <300      >75             Positive            >1800                  Gray                300-1800                  Negative            <300    High Sensitivity Troponin T  [[id number]]  (Normal) Collected: 04/24/25 1552    Specimen: Blood from Arm, Right Updated: 04/24/25 1633     HS Troponin T 19 ng/L     Narrative:      High Sensitive Troponin T Reference Range:  <14.0 ng/L- Negative Female for AMI  <22.0 ng/L- Negative Male for AMI  >=14 - Abnormal Female indicating possible myocardial injury.  >=22 - Abnormal Male indicating possible myocardial injury.   Clinicians would have to utilize clinical acumen, EKG, Troponin, and serial changes to determine if it is an Acute Myocardial Infarction or myocardial injury due to an underlying chronic condition.         TSH Rfx On Abnormal To Free T4 [[id number]]  (Normal) Collected: 04/24/25 1552    Specimen: Blood from Arm, Right Updated: 04/24/25 1633     TSH 2.490 uIU/mL     Magnesium [[id number]]  (Normal) Collected: 04/24/25 1552    Specimen: Blood from Arm, Right Updated: 04/24/25 1633     Magnesium 2.0 mg/dL     CBC & Differential [[id number]]  (Abnormal) Collected: 04/24/25 1552    Specimen: Blood from Arm, Right Updated: 04/24/25 1607    Narrative:      The following orders were created for panel order CBC & Differential.  Procedure                               Abnormality         Status                     ---------                               -----------         ------                     CBC Auto Differential[[id number]]        Abnormal            Final result                 Please view results for these tests on the individual orders.    CBC Auto Differential [[id number]]  (Abnormal) Collected: 04/24/25 1552    Specimen: Blood from Arm, Right Updated: 04/24/25 1607     WBC 9.62 10*3/mm3      RBC 5.12 10*6/mm3      Hemoglobin 14.2 g/dL      Hematocrit 43.4 %      MCV 84.8 fL      MCH 27.7 pg      MCHC 32.7 g/dL      RDW 14.2 %      RDW-SD 43.8 fl      MPV 10.1 fL      Platelets 159 10*3/mm3      Neutrophil % 78.8 %      Lymphocyte % 11.3 %      Monocyte % 6.1 %      Eosinophil % 3.1 %      Basophil % 0.3 %      Immature Grans % 0.4 %       Neutrophils, Absolute 7.57 10*3/mm3      Lymphocytes, Absolute 1.09 10*3/mm3      Monocytes, Absolute 0.59 10*3/mm3      Eosinophils, Absolute 0.30 10*3/mm3      Basophils, Absolute 0.03 10*3/mm3      Immature Grans, Absolute 0.04 10*3/mm3      nRBC 0.0 /100 WBC     [place] Draw [[id number]] Collected: 04/24/25 1552    Specimen: Blood from Arm, Right Updated: 04/24/25 1600    Narrative:      The following orders were created for panel order [place] Draw.  Procedure                               Abnormality         Status                     ---------                               -----------         ------                     Green Top (Gel)[[id number]]                                  Final result               Lavender Top[[id number]]                                     Final result               Gold Top - SST[[id number]]                                   Final result               Light Blue Top[[id number]]                                   Final result                 Please view results for these tests on the individual orders.    Green Top (Gel) [[id number]] Collected: 04/24/25 1552    Specimen: Blood from Arm, Right Updated: 04/24/25 1600     Extra Tube Hold for add-ons.     Comment: Auto resulted.       Lavender Top [[id number]] Collected: 04/24/25 1552    Specimen: Blood from Arm, Right Updated: 04/24/25 1600     Extra Tube hold for add-on     Comment: Auto resulted       Gold Top - SST [[id number]] Collected: 04/24/25 1552    Specimen: Blood from Arm, Right Updated: 04/24/25 1600     Extra Tube Hold for add-ons.     Comment: Auto resulted.       Light Blue Top [[id number]] Collected: 04/24/25 1552    Specimen: Blood from Arm, Right Updated: 04/24/25 1600     Extra Tube Hold for add-ons.     Comment: Auto resulted               Imaging Results (Last 24 Hours)       Procedure Component Value Units Date/Time    CT Head Without Contrast [[id number]] Collected: 04/24/25 1802     Updated: 04/24/25 1810    Narrative:       CT HEAD WO CONTRAST    Date of Exam: 4/24/2025 5:35 PM EDT    Indication: transient confusion with hx of occipital cva.    Comparison: Brain MRI 3/21/2024, head CT 3/20/2024    Technique: Axial CT images were obtained of the head without contrast administration.  Coronal reconstructions were performed.  Automated exposure control and iterative reconstruction methods were used.      Findings:  No acute intracranial hemorrhage. No acute large territory infarct. Redemonstration of chronic infarct in the left PCA territory. There are scattered white matter hypodensities which are nonspecific and can be seen in the setting of chronic small vessel   ischemic change. No extra-axial collections. No midline shift or herniation. Normal size and configuration of the ventricles. There is intracranial atherosclerosis. Normal appearance of the orbits. There is near complete opacification of the paranasal   sinuses. Trace fluid in the mastoid air cells. No acute or suspicious bony findings.      Impression:      Impression:  No acute intracranial findings.      Chronic and senescent changes as above.      Pansinus disease.        Electronically Signed: Barber Sumner MD    4/24/2025 6:08 PM EDT    Workstation ID: [id number]    XR Chest 1 View [[id number]] Collected: 04/24/25 1609     Updated: 04/24/25 1612    Narrative:      XR CHEST 1 VW    Date of Exam: 4/24/2025 4:03 PM EDT    Indication: CP    Comparison: AP chest 8/27/2024.    Findings:  Chronic scarring at the left lower lobe overlying the diaphragm. No acute airspace disease. Normal heart size. Median sternotomy changes are present. Old left rib fracture. No pleural effusion or pneumothorax or acute osseous abnormality      Impression:      Impression:  No acute chest finding. Chronic left basilar scarring.      Electronically Signed: Kelin Pond MD    4/24/2025 4:10 PM EDT    Workstation ID: [id number]            LAB RESULTS (LAST 7 DAYS)    CBC  Results from last 7  days   Lab Units 04/25/25  0021 04/24/25  1552   WBC 10*3/mm3 6.89 9.62   RBC 10*6/mm3 4.61 5.12   HEMOGLOBIN g/dL 12.5* 14.2   HEMATOCRIT % 40.2 43.4   MCV fL 87.2 84.8   PLATELETS 10*3/mm3 139* 159       BMP  Results from last 7 days   Lab Units 04/25/25  0021 04/24/25  1552   SODIUM mmol/L 138 140   POTASSIUM mmol/L 3.8 3.9   CHLORIDE mmol/L 105 107   CO2 mmol/L 20.6* 21.1*   BUN mg/dL 18 20   CREATININE mg/dL 0.87 1.01   GLUCOSE mg/dL 94 118*   MAGNESIUM mg/dL  --  2.0       CMP   Results from last 7 days   Lab Units 04/25/25  0021 04/24/25  1552   SODIUM mmol/L 138 140   POTASSIUM mmol/L 3.8 3.9   CHLORIDE mmol/L 105 107   CO2 mmol/L 20.6* 21.1*   BUN mg/dL 18 20   CREATININE mg/dL 0.87 1.01   GLUCOSE mg/dL 94 118*   ALBUMIN g/dL 3.8 4.4   BILIRUBIN mg/dL 0.3 0.4   ALK PHOS U/L 114 131*   AST (SGOT) U/L 21 26   ALT (SGPT) U/L 13 17       BNP        TROPONIN  Results from last 7 days   Lab Units 04/25/25  0021   HSTROP T ng/L 17       CoAg        Creatinine Clearance  Estimated Creatinine Clearance: 103.1 mL/min (by C-G formula based on SCr of 0.87 mg/dL).    ABG          Radiology  CT Head Without Contrast  Result Date: 4/24/2025  Impression: No acute intracranial findings. Chronic and senescent changes as above. Pansinus disease. Electronically Signed: Barber Sumner MD  4/24/2025 6:08 PM EDT  Workstation ID: [id number]    XR Chest 1 View  Result Date: 4/24/2025  Impression: No acute chest finding. Chronic left basilar scarring. Electronically Signed: Kelin Pond MD  4/24/2025 4:10 PM EDT  Workstation ID: [id number]        EKG  I personally viewed and interpreted the patient's EKG/Telemetry data:  ECG 12 Lead Chest Pain   Preliminary Result   HEART RATE=50  bpm   RR Llodqfrr=9614  ms   WI Wanlwora=272  ms   P Horizontal Axis=24  deg   P Front Axis=-10  deg   QRSD Jfkclypm=368  ms   QT Kaqjwyad=852  ms   EJuC=829  ms   QRS Axis=-38  deg   T Wave Axis=-23  deg   - ABNORMAL ECG -   Sinus bradycardia   Left  ventricular hypertrophy   Date and Time of Study:2025-04-24 18:15:39      ECG 12 Lead Chest Pain   Preliminary Result   HEART RATE=65  bpm   RR Hnbpzios=846  ms   MO Pudyohdq=946  ms   P Horizontal Axis=14  deg   P Front Axis=19  deg   QRSD Interval=95  ms   QT Lhqyrwbp=207  ms   YVtX=040  ms   QRS Axis=-40  deg   T Wave Axis=14  deg   - ABNORMAL ECG -   Sinus rhythm   Left anterior fascicular block   Date and Time of Study:2025-04-24 15:39:29      Telemetry Scan   Final Result      Telemetry Scan   Final Result      Telemetry Scan   Final Result                  Echocardiogram:    Results for orders placed during the hospital encounter of 04/24/25    Adult Transthoracic Echo Complete W/ Cont if Necessary Per Protocol    Interpretation Summary    Left ventricular ejection fraction appears to be 46 - 50%.        Stress Test:  Results for orders placed in visit on 08/03/22    Treadmill Stress Test    Interpretation Summary  · Arrhythmias were not significant during stress.        Conclusion:    1.  Maximal treadmill exercise stress test with no electrocardiographic evidence of ischemia  2.  Exercise capacity is fair (patient walked for total of 8 minutes and achieved workload of 9.7 METS )  3.  Normal hemodynamic response to exercise  4.  Normal chronotropic response to exercise  5.  No arrhythmia was noted on peak exercise or during recovery    Impression:    This is normal treadmill exercise stress test without evidence of ischemia or provoked arrhythmia.  Clinical correlation recommended        Cardiac Catheterization:  No results found for this or any previous visit.        Other:      ASSESSMENT & PLAN:    Principal Problem:    Chest pain    Chest pain  MI has been ruled out  EKG without acute ST or T wave segment abnormalities  No recurrent chest pain since admission  No previous exertional component to his chest pain  Occurred while he was eating an apple.  Stress Myoview will be performed to rule out ischemic  burden  Patient is on Eliquis and last dose was 4/24/2025 at 8 AM    Coronary artery disease  Previous 5 vessel CABG in 2016    Valvular heart disease  Previous aortic valve replacement  Repeat echocardiogram is pending    Hypertension  Stable on current medical therapy    Dyslipidemia   LDL cholesterol is 81    History of CVA      Stress Myoview to rule out ischemic burden  Echocardiogram to be reviewed  Would consider outpatient GI evaluation due to complaints of some coughing with swallowing and chest pain that occurred while he was eating an apple.  If recurrent chest pain or abnormal stress Myoview would consider cardiac catheterization after Eliquis held for 36 to 48 hours  Also reports some symptoms concerning for possible sleep apnea such as snoring respirations.  Would consider outpatient sleep study  Additional recommendations per Dr. Blank    Patient is seen and examined and findings are verified.  All data is reviewed by me personally.  Assessment and plan formulated by APC was done after discussion with attending.  I spent more than 50% of time in taking care of the patient.    Patient is admitted with chest pain post eating.    Hemodynamics are stable    Normal S1 and S2.  No pericardial rub or murmur abdominal exam is benign no leg edema noted.    At this stage I would recommend that patient should proceed with stress test with Cardiolite imaging.  If this test is negative patient can be discharged.  Current treatment will be continued.    Electronically signed by Maciej Blank MD, 04/26/25, 12:20 PM EDT.        GARY Gonsalez  04/25/25  09:12 EDT

## 2025-04-25 NOTE — TELEPHONE ENCOUNTER
Caller: TIFFANY DANIELS    Relationship: Emergency Contact    Best call back number: [phone]    What is the medical concern/diagnosis: CONSTRICTION AND TIGHTNESS IN SHOULDER, ARMS AND BACK    What is the office location: [place]    Any additional details: PT IS CALLING TO SEE IF DR. RICHTER THINKS PT SHOULD SEE A VASCULAR DOCTOR? SHE ASKED IF HE HAD ANY RECOMMENDATIONS

## 2025-04-25 NOTE — CASE MANAGEMENT/SOCIAL WORK
Discharge Planning Assessment   Nic     Patient Name: Alejandro Bain  MRN: [id number]  Today's Date: 4/25/2025    Admit Date: 4/24/2025    Plan: Routine home w/ spouse. Wife Priti for DC transport.   Discharge Needs Assessment       Row Name 04/25/25 1430       Living Environment    People in Home spouse;child(dariela), adult    Name(s) of People in Home wife Priti and adult son    Current Living Arrangements home    Potentially Unsafe Housing Conditions none    In the past 12 months has the electric, gas, oil, or water company threatened to shut off services in your home? No    Primary Care Provided by self    Provides Primary Care For no one    Family Caregiver if Needed spouse    Family Caregiver Names Priti    Quality of Family Relationships helpful;involved;supportive    Able to Return to Prior Arrangements yes       Resource/Environmental Concerns    Resource/Environmental Concerns none    Transportation Concerns none       Transportation Needs    In the past 12 months, has lack of transportation kept you from medical appointments or from getting medications? no    In the past 12 months, has lack of transportation kept you from meetings, work, or from getting things needed for daily living? No       Food Insecurity    Within the past 12 months, you worried that your food would run out before you got the money to buy more. Never true    Within the past 12 months, the food you bought just didn't last and you didn't have money to get more. Never true       Transition Planning    Patient/Family Anticipates Transition to home with family    Patient/Family Anticipated Services at Transition none    Transportation Anticipated car, drives self;family or friend will provide       Discharge Needs Assessment    Readmission Within the Last 30 Days no previous admission in last 30 days    Equipment Currently Used at Home nebulizer;pulse ox;bp cuff    Concerns to be Addressed denies needs/concerns at this time    Do you  want help finding or keeping work or a job? I do not need or want help    Do you want help with school or training? For example, starting or completing job training or getting a high school diploma, GED or equivalent No    Anticipated Changes Related to Illness none    Equipment Needed After Discharge none                   Discharge Plan       Row Name 04/25/25 1431       Plan    Plan Routine home w/ spouse. Wife Priti for DC transport.    Plan Comments CM met with patient at the bedside. Confirmed PCP, insurance, and pharmacy. M2B n/a. Patient denies any difficulty affording medications. Patient is not current with any HHC/OPPT/OT services. Patient lives at home with his wife and adult son, is independent with ADLS/IADLS, and drives. Wife Priti for DC transport. DC Barriers: myoview pending.                    Expected Discharge Date and Time       Expected Discharge Date Expected Discharge Time    Apr 25, 2025            Demographic Summary       Row Name 04/25/25 1430       General Information    Admission Type observation    Arrived From emergency department    Required Notices Provided Observation Status Notice    Referral Source admission list    Reason for Consult discharge planning    Preferred Language English       Contact Information    Permission Granted to Share Info With     Contact Information Obtained for                    Functional Status       Row Name 04/25/25 1430       Functional Status    Usual Activity Tolerance good    Current Activity Tolerance good       Functional Status, IADL    Medications independent    Meal Preparation independent    Housekeeping independent    Laundry independent    Shopping independent           Oz Wallace RN      Cell number [phone]  Office number [phone]

## 2025-04-26 ENCOUNTER — READMISSION MANAGEMENT (OUTPATIENT)
Dept: CALL CENTER | Facility: HOSPITAL | Age: 70
End: 2025-04-26
Payer: MEDICARE

## 2025-04-26 VITALS
RESPIRATION RATE: 16 BRPM | DIASTOLIC BLOOD PRESSURE: 71 MMHG | BODY MASS INDEX: 25.84 KG/M2 | TEMPERATURE: 97.3 F | SYSTOLIC BLOOD PRESSURE: 110 MMHG | HEART RATE: 55 BPM | HEIGHT: 73 IN | WEIGHT: 195 LBS | OXYGEN SATURATION: 92 %

## 2025-04-26 LAB
ANION GAP SERPL CALCULATED.3IONS-SCNC: 11.3 MMOL/L (ref 5–15)
BASOPHILS # BLD AUTO: 0.04 10*3/MM3 (ref 0–0.2)
BASOPHILS NFR BLD AUTO: 0.5 % (ref 0–1.5)
BH CV REST NUCLEAR ISOTOPE DOSE: 9.3 MCI
BH CV STRESS BP STAGE 1: NORMAL
BH CV STRESS BP STAGE 2: NORMAL
BH CV STRESS BP STAGE 3: NORMAL
BH CV STRESS DURATION MIN STAGE 1: 3
BH CV STRESS DURATION MIN STAGE 2: 3
BH CV STRESS DURATION MIN STAGE 3: 1
BH CV STRESS DURATION SEC STAGE 1: 0
BH CV STRESS DURATION SEC STAGE 2: 0
BH CV STRESS DURATION SEC STAGE 3: 23
BH CV STRESS GRADE STAGE 1: 10
BH CV STRESS GRADE STAGE 2: 12
BH CV STRESS GRADE STAGE 3: 14
BH CV STRESS HR STAGE 1: 92
BH CV STRESS HR STAGE 2: 119
BH CV STRESS HR STAGE 3: 132
BH CV STRESS METS STAGE 1: 5
BH CV STRESS METS STAGE 2: 7.5
BH CV STRESS METS STAGE 3: 10
BH CV STRESS NUCLEAR ISOTOPE DOSE: 33 MCI
BH CV STRESS PROTOCOL 1: NORMAL
BH CV STRESS RECOVERY BP: NORMAL MMHG
BH CV STRESS RECOVERY HR: 75 BPM
BH CV STRESS SPEED STAGE 1: 1.7
BH CV STRESS SPEED STAGE 2: 2.5
BH CV STRESS SPEED STAGE 3: 3.4
BH CV STRESS STAGE 1: 1
BH CV STRESS STAGE 2: 2
BH CV STRESS STAGE 3: 3
BUN SERPL-MCNC: 19 MG/DL (ref 8–23)
BUN/CREAT SERPL: 21.8 (ref 7–25)
CALCIUM SPEC-SCNC: 8.9 MG/DL (ref 8.6–10.5)
CHLORIDE SERPL-SCNC: 106 MMOL/L (ref 98–107)
CO2 SERPL-SCNC: 20.7 MMOL/L (ref 22–29)
CREAT SERPL-MCNC: 0.87 MG/DL (ref 0.76–1.27)
DEPRECATED RDW RBC AUTO: 43.8 FL (ref 37–54)
EGFRCR SERPLBLD CKD-EPI 2021: 94.6 ML/MIN/1.73
EOSINOPHIL # BLD AUTO: 0.3 10*3/MM3 (ref 0–0.4)
EOSINOPHIL NFR BLD AUTO: 3.5 % (ref 0.3–6.2)
ERYTHROCYTE [DISTWIDTH] IN BLOOD BY AUTOMATED COUNT: 14.2 % (ref 12.3–15.4)
GLUCOSE SERPL-MCNC: 102 MG/DL (ref 65–99)
HCT VFR BLD AUTO: 41.9 % (ref 37.5–51)
HGB BLD-MCNC: 13.7 G/DL (ref 13–17.7)
IMM GRANULOCYTES # BLD AUTO: 0.05 10*3/MM3 (ref 0–0.05)
IMM GRANULOCYTES NFR BLD AUTO: 0.6 % (ref 0–0.5)
LYMPHOCYTES # BLD AUTO: 1.01 10*3/MM3 (ref 0.7–3.1)
LYMPHOCYTES NFR BLD AUTO: 11.6 % (ref 19.6–45.3)
MAXIMAL PREDICTED HEART RATE: 153 BPM
MCH RBC QN AUTO: 28 PG (ref 26.6–33)
MCHC RBC AUTO-ENTMCNC: 32.7 G/DL (ref 31.5–35.7)
MCV RBC AUTO: 85.5 FL (ref 79–97)
MONOCYTES # BLD AUTO: 0.82 10*3/MM3 (ref 0.1–0.9)
MONOCYTES NFR BLD AUTO: 9.5 % (ref 5–12)
NEUTROPHILS NFR BLD AUTO: 6.45 10*3/MM3 (ref 1.7–7)
NEUTROPHILS NFR BLD AUTO: 74.3 % (ref 42.7–76)
NRBC BLD AUTO-RTO: 0 /100 WBC (ref 0–0.2)
PERCENT MAX PREDICTED HR: 86.27 %
PLATELET # BLD AUTO: 135 10*3/MM3 (ref 140–450)
PMV BLD AUTO: 11 FL (ref 6–12)
POTASSIUM SERPL-SCNC: 4.1 MMOL/L (ref 3.5–5.2)
RBC # BLD AUTO: 4.9 10*6/MM3 (ref 4.14–5.8)
RBC MORPH BLD: NORMAL
SMALL PLATELETS BLD QL SMEAR: NORMAL
SODIUM SERPL-SCNC: 138 MMOL/L (ref 136–145)
SPECT HRT GATED+EF W RNC IV: 61 %
STRESS BASELINE BP: NORMAL MMHG
STRESS BASELINE HR: 57 BPM
STRESS PERCENT HR: 101 %
STRESS POST PEAK BP: NORMAL MMHG
STRESS POST PEAK HR: 132 BPM
STRESS TARGET HR: 130 BPM
WBC MORPH BLD: NORMAL
WBC NRBC COR # BLD AUTO: 8.67 10*3/MM3 (ref 3.4–10.8)

## 2025-04-26 PROCEDURE — G0378 HOSPITAL OBSERVATION PER HR: HCPCS

## 2025-04-26 PROCEDURE — 80048 BASIC METABOLIC PNL TOTAL CA: CPT | Performed by: EMERGENCY MEDICINE

## 2025-04-26 PROCEDURE — 63710000001 REVEFENACIN 175 MCG/3ML SOLUTION: Performed by: NURSE PRACTITIONER

## 2025-04-26 PROCEDURE — 85025 COMPLETE CBC W/AUTO DIFF WBC: CPT | Performed by: EMERGENCY MEDICINE

## 2025-04-26 PROCEDURE — 85007 BL SMEAR W/DIFF WBC COUNT: CPT | Performed by: EMERGENCY MEDICINE

## 2025-04-26 PROCEDURE — A9502 TC99M TETROFOSMIN: HCPCS | Performed by: EMERGENCY MEDICINE

## 2025-04-26 PROCEDURE — 99214 OFFICE O/P EST MOD 30 MIN: CPT | Performed by: INTERNAL MEDICINE

## 2025-04-26 PROCEDURE — 94799 UNLISTED PULMONARY SVC/PX: CPT

## 2025-04-26 PROCEDURE — 94664 DEMO&/EVAL PT USE INHALER: CPT

## 2025-04-26 PROCEDURE — 34310000005 TECHNETIUM TETROFOSMIN KIT: Performed by: EMERGENCY MEDICINE

## 2025-04-26 PROCEDURE — 94761 N-INVAS EAR/PLS OXIMETRY MLT: CPT

## 2025-04-26 RX ORDER — LISINOPRIL 20 MG/1
10 TABLET ORAL 2 TIMES DAILY
Qty: 30 TABLET | Refills: 0 | Status: SHIPPED | OUTPATIENT
Start: 2025-04-26 | End: 2025-05-26

## 2025-04-26 RX ADMIN — ARFORMOTEROL TARTRATE 15 MCG: 15 SOLUTION RESPIRATORY (INHALATION) at 06:59

## 2025-04-26 RX ADMIN — BUDESONIDE 0.5 MG: 0.5 SUSPENSION RESPIRATORY (INHALATION) at 07:04

## 2025-04-26 RX ADMIN — REVEFENACIN 175 MCG: 175 SOLUTION RESPIRATORY (INHALATION) at 06:55

## 2025-04-26 RX ADMIN — TETROFOSMIN 1 DOSE: 1.38 INJECTION, POWDER, LYOPHILIZED, FOR SOLUTION INTRAVENOUS at 11:15

## 2025-04-26 RX ADMIN — Medication 10 ML: at 12:25

## 2025-04-26 RX ADMIN — AMLODIPINE BESYLATE 10 MG: 5 TABLET ORAL at 12:22

## 2025-04-26 RX ADMIN — ASPIRIN 81 MG: 81 TABLET, COATED ORAL at 12:23

## 2025-04-26 RX ADMIN — ALLOPURINOL 300 MG: 300 TABLET ORAL at 12:22

## 2025-04-26 RX ADMIN — ROSUVASTATIN CALCIUM 20 MG: 10 TABLET, FILM COATED ORAL at 12:23

## 2025-04-26 RX ADMIN — GUAIFENESIN 600 MG: 600 TABLET, EXTENDED RELEASE ORAL at 12:23

## 2025-04-26 NOTE — DISCHARGE SUMMARY
[facility]    Yoshi Lyon MD    CHIEF COMPLAINT:     Chest Pain     HISTORY OF PRESENT ILLNESS:    [facility]    ED 4/24/25: Patient is 69-year-old  male with history of MI, pneumonia, stroke, CAD, A-fib, aneurysm, and diastolic heart failure, who presents emergency department with an episode of chest pain. Patient states episode of chest pain was at approximately 1530 hrs. today and lasted approximately 2 minutes; patient states pain was on the right side of his chest and radiated up into his left arm and shoulder. Also endorses feeling flushed; states Eliquis twice daily so feeling flushed is very unusual for him. Patient states that only minor episode that was similar 4 days prior. Patient denies any shortness of breath, abdominal pain including nausea, vomiting, diarrhea, or headache at this time.      Observation 4/25/25: Patient is a 69-year-old male presenting to the hospital with complaints of left arm numbness and weakness that radiated to his left shoulder.  Patient states he did not feel shortness of breath nausea or vomiting.  Patient states he did start having blurry vision about 2 days ago but has since resolved.  Patient did report some feelings of diaphoresis and feeling hot yesterday as well which he normally does not due to his Eliquis.  Pending Myoview and echocardiogram.    Past Medical History:   Diagnosis Date    Acute on chronic diastolic heart failure 12/14/2022    Allergic     Allergic rhinitis [id number]    Aneurysm     Anxiety     Asthma     Atrial fibrillation     Bronchitis     CHF (congestive heart failure)     Coronary artery disease     Depression     Encounter for laboratory testing for COVID-19 virus 08/22/2022    Gout     Heart murmur [id number]    Hyperlipidemia     Hypertension     Intractable headache 01/09/2023    Myocardial infarction     Pneumonia     Pulmonary nodule     Familia Mountain spotted fever     Stroke      Past Surgical History:    Procedure Laterality Date    AORTIC VALVE REPAIR/REPLACEMENT  12/03/2016    CABG x 5/ tissue AVR by DR. PHAN    CARDIAC CATHETERIZATION      CARDIAC SURGERY      CARDIOVASCULAR STRESS TEST  2020    CAROTID STENT      CORONARY ARTERY BYPASS GRAFT  12/02/2016    X4  Dr Phan    CORONARY STENT PLACEMENT  2009    HERNIA REPAIR  2010    ILIAC ARTERY ANEURYSM REPAIR  01/05/2017    abdominal and bilateral    NASAL POLYP EXCISION  2005    OTHER SURGICAL HISTORY      [facility]    REPAIR CHOANAL ATRESIA  2005    SINUS SURGERY      VASCULAR SURGERY       Family History   Problem Relation Age of Onset    Pulmonary fibrosis Mother     Heart disease Mother     Cancer Father         brain    Cancer Sister         lung    Heart disease Sister     Cancer Brother         pancreatic, lung    Sleep apnea Neg Hx      Social History     Tobacco Use    Smoking status: Never     Passive exposure: Never    Smokeless tobacco: Never   Vaping Use    Vaping status: Never Used   Substance Use Topics    Alcohol use: Not Currently     Comment: quit 2005    Drug use: Yes     Types: Marijuana     Medications Prior to Admission   Medication Sig Dispense Refill Last Dose/Taking    allopurinol (ZYLOPRIM) 300 MG tablet TAKE 1 TABLET BY MOUTH EVERY DAY AT NIGHT 90 tablet 1 4/23/2025    amLODIPine (NORVASC) 10 MG tablet TAKE 1 TABLET BY MOUTH EVERY DAY 90 tablet 3 4/24/2025    apixaban (ELIQUIS) 5 MG tablet tablet Take 1 tablet by mouth Every 12 (Twelve) Hours. Indications: Atrial Fibrillation 60 tablet 3 4/24/2025    ascorbic acid (VITAMIN C) 250 MG tablet Take 1 tablet by mouth Daily.   4/24/2025    aspirin 81 MG EC tablet Take 1 tablet by mouth Daily.   4/24/2025    Cholecalciferol 25 MCG (1000 UT) capsule Take 1 capsule by mouth Daily.   4/24/2025    Fluticasone-Umeclidin-Vilant (TRELEGY ELLIPTA) 200-62.5-25 MCG/ACT inhaler Inhale 1 puff Daily. 1 each 11 4/24/2025    lisinopril (PRINIVIL,ZESTRIL) 20 MG tablet TAKE 1 TABLET BY MOUTH TWICE A   tablet 1 4/24/2025    Multiple Vitamins-Minerals (MULTIVITAMIN ADULT PO) Take 1 tablet by mouth Daily.   4/24/2025    Olopatadine-Mometasone (Ryaltris) 665-25 MCG/ACT suspension Administer 1 spray into the nostril(s) as directed by provider 2 (Two) Times a Day.   4/24/2025    rosuvastatin (CRESTOR) 20 MG tablet Take 1 tablet by mouth Daily. 90 tablet 0 4/24/2025    ipratropium-albuterol (DUO-NEB) 0.5-2.5 mg/3 ml nebulizer Take 3 mL by nebulization Every 4 (Four) Hours As Needed for Wheezing.   Unknown    VENTOLIN  (90 Base) MCG/ACT inhaler Inhale 2 puffs Every 6 (Six) Hours As Needed.   Unknown    zinc sulfate (ZINCATE) 220 (50 Zn) MG capsule Take 50 mg by mouth Daily. (Patient not taking: Reported on 4/24/2025)   Not Taking     Allergies:  Avelox [moxifloxacin], Penicillins, Sulfa antibiotics, and Levaquin [levofloxacin]    Immunization History   Administered Date(s) Administered    COVID-19 (PFIZER) Purple Cap Monovalent 01/24/2021, 02/24/2021, 03/26/2021, 10/08/2021    Flu Vaccine Split Quad 01/04/2020    Fluzone (or Fluarix & Flulaval for VFC) >6mos 11/21/2020, 10/05/2023    Fluzone High-Dose 65+YRS 10/20/2024    Pneumococcal Conjugate 13-Valent (PCV13) 08/31/2019    Pneumococcal Conjugate 20-Valent (PCV20) 06/26/2024           REVIEW OF SYSTEMS:    Review of Systems   Constitutional: Positive for malaise/fatigue.   HENT: Negative.     Eyes: Negative.    Cardiovascular:  Positive for chest pain. Negative for dyspnea on exertion.   Respiratory: Negative.     Endocrine: Negative.    Hematologic/Lymphatic: Negative.    Musculoskeletal: Negative.    Gastrointestinal: Negative.    Genitourinary: Negative.    Neurological: Negative.    Psychiatric/Behavioral:  The patient is nervous/anxious.        Vital Signs  Temp:  [97.3 °F (36.3 °C)-98 °F (36.7 °C)] 97.3 °F (36.3 °C)  Heart Rate:  [51-67] 55  Resp:  [12-20] 16  BP: (105-124)/(68-82) 110/71          Physical Exam:  Physical Exam  Vitals and nursing note  reviewed.   Constitutional:       Appearance: Normal appearance.   HENT:      Head: Normocephalic and atraumatic.      Right Ear: External ear normal.      Left Ear: External ear normal.      Nose: Nose normal.      Mouth/Throat:      Pharynx: Oropharynx is clear.   Eyes:      Extraocular Movements: Extraocular movements intact.      Conjunctiva/sclera: Conjunctivae normal.      Pupils: Pupils are equal, round, and reactive to light.   Cardiovascular:      Rate and Rhythm: Regular rhythm.      Heart sounds: Murmur heard.   Pulmonary:      Effort: Pulmonary effort is normal.   Abdominal:      General: Bowel sounds are normal.   Musculoskeletal:         General: Normal range of motion.      Cervical back: Normal range of motion.   Skin:     General: Skin is warm.      Capillary Refill: Capillary refill takes less than 2 seconds.   Neurological:      Mental Status: He is alert and oriented to person, place, and time.   Psychiatric:         Mood and Affect: Mood is anxious.         Behavior: Behavior normal.         Thought Content: Thought content normal.         Judgment: Judgment normal.         Emotional Behavior:    wnl   Debilities:   none  Results Review:    I reviewed the patient's new clinical results.  Lab Results (most recent)       Procedure Component Value Units Date/Time    CBC & Differential [[id number]]  (Abnormal) Collected: 04/26/25 0359    Specimen: Blood from Arm, Right Updated: 04/26/25 0546    Narrative:      The following orders were created for panel order CBC & Differential.  Procedure                               Abnormality         Status                     ---------                               -----------         ------                     CBC Auto Differential[[id number]]        Abnormal            Final result               Scan Slide[[id number]]                                       Final result                 Please view results for these tests on the individual orders.    Scan Slide  [[id number]] Collected: 04/26/25 0359    Specimen: Blood from Arm, Right Updated: 04/26/25 0546     RBC Morphology Normal     WBC Morphology Normal     Platelet Estimate Decreased    CBC Auto Differential [[id number]]  (Abnormal) Collected: 04/26/25 0359    Specimen: Blood from Arm, Right Updated: 04/26/25 0546     WBC 8.67 10*3/mm3      RBC 4.90 10*6/mm3      Hemoglobin 13.7 g/dL      Hematocrit 41.9 %      MCV 85.5 fL      MCH 28.0 pg      MCHC 32.7 g/dL      RDW 14.2 %      RDW-SD 43.8 fl      MPV 11.0 fL      Platelets 135 10*3/mm3      Neutrophil % 74.3 %      Lymphocyte % 11.6 %      Monocyte % 9.5 %      Eosinophil % 3.5 %      Basophil % 0.5 %      Immature Grans % 0.6 %      Neutrophils, Absolute 6.45 10*3/mm3      Lymphocytes, Absolute 1.01 10*3/mm3      Monocytes, Absolute 0.82 10*3/mm3      Eosinophils, Absolute 0.30 10*3/mm3      Basophils, Absolute 0.04 10*3/mm3      Immature Grans, Absolute 0.05 10*3/mm3      nRBC 0.0 /100 WBC     Basic Metabolic Panel [[id number]]  (Abnormal) Collected: 04/26/25 0359    Specimen: Blood from Arm, Right Updated: 04/26/25 0536     Glucose 102 mg/dL      BUN 19 mg/dL      Creatinine 0.87 mg/dL      Sodium 138 mmol/L      Potassium 4.1 mmol/L      Chloride 106 mmol/L      CO2 20.7 mmol/L      Calcium 8.9 mg/dL      BUN/Creatinine Ratio 21.8     Anion Gap 11.3 mmol/L      eGFR 94.6 mL/min/1.73     Narrative:      GFR Categories in Chronic Kidney Disease (CKD)      GFR Category          GFR (mL/min/1.73)    Interpretation  G1                     90 or greater         Normal or high (1)  G2                      60-89                Mild decrease (1)  G3a                   45-59                Mild to moderate decrease  G3b                   30-44                Moderate to severe decrease  G4                    15-29                Severe decrease  G5                    14 or less           Kidney failure          (1)In the absence of evidence of kidney disease, neither GFR  category G1 or G2 fulfill the criteria for CKD.    eGFR calculation 2021 CKD-EPI creatinine equation, which does not include race as a factor    Uric Acid [[id number]]  (Normal) Collected: 04/25/25 0021    Specimen: Blood from Arm, Right Updated: 04/25/25 1029     Uric Acid 5.8 mg/dL     Comprehensive Metabolic Panel [[id number]]  (Abnormal) Collected: 04/25/25 0021    Specimen: Blood from Arm, Right Updated: 04/25/25 0332     Glucose 94 mg/dL      BUN 18 mg/dL      Creatinine 0.87 mg/dL      Sodium 138 mmol/L      Potassium 3.8 mmol/L      Chloride 105 mmol/L      CO2 20.6 mmol/L      Calcium 8.8 mg/dL      Total Protein 6.1 g/dL      Albumin 3.8 g/dL      ALT (SGPT) 13 U/L      AST (SGOT) 21 U/L      Alkaline Phosphatase 114 U/L      Total Bilirubin 0.3 mg/dL      Globulin 2.3 gm/dL      A/G Ratio 1.7 g/dL      BUN/Creatinine Ratio 20.7     Anion Gap 12.4 mmol/L      eGFR 94.6 mL/min/1.73     Narrative:      GFR Categories in Chronic Kidney Disease (CKD)      GFR Category          GFR (mL/min/1.73)    Interpretation  G1                     90 or greater         Normal or high (1)  G2                      60-89                Mild decrease (1)  G3a                   45-59                Mild to moderate decrease  G3b                   30-44                Moderate to severe decrease  G4                    15-29                Severe decrease  G5                    14 or less           Kidney failure          (1)In the absence of evidence of kidney disease, neither GFR category G1 or G2 fulfill the criteria for CKD.    eGFR calculation 2021 CKD-EPI creatinine equation, which does not include race as a factor    High Sensitivity Troponin T [[id number]]  (Normal) Collected: 04/25/25 0021    Specimen: Blood from Arm, Right Updated: 04/25/25 0332     HS Troponin T 17 ng/L     Narrative:      High Sensitive Troponin T Reference Range:  <14.0 ng/L- Negative Female for AMI  <22.0 ng/L- Negative Male for AMI  >=14 - Abnormal  Female indicating possible myocardial injury.  >=22 - Abnormal Male indicating possible myocardial injury.   Clinicians would have to utilize clinical acumen, EKG, Troponin, and serial changes to determine if it is an Acute Myocardial Infarction or myocardial injury due to an underlying chronic condition.         CBC Auto Differential [[id number]]  (Abnormal) Collected: 04/25/25 0021    Specimen: Blood from Arm, Right Updated: 04/25/25 0306     WBC 6.89 10*3/mm3      RBC 4.61 10*6/mm3      Hemoglobin 12.5 g/dL      Hematocrit 40.2 %      MCV 87.2 fL      MCH 27.1 pg      MCHC 31.1 g/dL      RDW 14.6 %      RDW-SD 46.1 fl      MPV 11.0 fL      Platelets 139 10*3/mm3      Neutrophil % 68.0 %      Lymphocyte % 17.4 %      Monocyte % 9.7 %      Eosinophil % 4.2 %      Basophil % 0.4 %      Immature Grans % 0.3 %      Neutrophils, Absolute 4.68 10*3/mm3      Lymphocytes, Absolute 1.20 10*3/mm3      Monocytes, Absolute 0.67 10*3/mm3      Eosinophils, Absolute 0.29 10*3/mm3      Basophils, Absolute 0.03 10*3/mm3      Immature Grans, Absolute 0.02 10*3/mm3      nRBC 0.0 /100 WBC     T4, Free [[id number]]  (Normal) Collected: 04/24/25 1657    Specimen: Blood Updated: 04/25/25 0007     Free T4 0.94 ng/dL     Lipid Panel [[id number]]  (Abnormal) Collected: 04/24/25 1657    Specimen: Blood Updated: 04/25/25 0007     Total Cholesterol 151 mg/dL      Triglycerides 201 mg/dL      HDL Cholesterol 36 mg/dL      LDL Cholesterol  81 mg/dL      VLDL Cholesterol 34 mg/dL      LDL/HDL Ratio 2.08    Narrative:      Cholesterol Reference Ranges  (U.S. Department of Health and Human Services ATP III Classifications)    Desirable          <200 mg/dL  Borderline High    200-239 mg/dL  High Risk          >240 mg/dL      Triglyceride Reference Ranges  (U.S. Department of Health and Human Services ATP III Classifications)    Normal           <150 mg/dL  Borderline High  150-199 mg/dL  High             200-499 mg/dL  Very High        >500  mg/dL    HDL Reference Ranges  (U.S. Department of Health and Human Services ATP III Classifications)    Low     <40 mg/dl (major risk factor for CHD)  High    >60 mg/dl ('negative' risk factor for CHD)        LDL Reference Ranges  (U.S. Department of Health and Human Services ATP III Classifications)    Optimal          <100 mg/dL  Near Optimal     100-129 mg/dL  Borderline High  130-159 mg/dL  High             160-189 mg/dL  Very High        >189 mg/dL    LDL is calculated using the NIH LDL-C calculation.      Hemoglobin A1c [[id number]]  (Normal) Collected: 04/24/25 1552    Specimen: Blood from Arm, Right Updated: 04/25/25 0002     Hemoglobin A1C 5.35 %     High Sensitivity Troponin T 1Hr [[id number]]  (Normal) Collected: 04/24/25 1657    Specimen: Blood Updated: 04/24/25 1731     HS Troponin T 18 ng/L      Troponin T Numeric Delta -1 ng/L     Narrative:      High Sensitive Troponin T Reference Range:  <14.0 ng/L- Negative Female for AMI  <22.0 ng/L- Negative Male for AMI  >=14 - Abnormal Female indicating possible myocardial injury.  >=22 - Abnormal Male indicating possible myocardial injury.   Clinicians would have to utilize clinical acumen, EKG, Troponin, and serial changes to determine if it is an Acute Myocardial Infarction or myocardial injury due to an underlying chronic condition.         Comprehensive Metabolic Panel [[id number]]  (Abnormal) Collected: 04/24/25 1552    Specimen: Blood from Arm, Right Updated: 04/24/25 1633     Glucose 118 mg/dL      BUN 20 mg/dL      Creatinine 1.01 mg/dL      Sodium 140 mmol/L      Potassium 3.9 mmol/L      Chloride 107 mmol/L      CO2 21.1 mmol/L      Calcium 9.8 mg/dL      Total Protein 6.8 g/dL      Albumin 4.4 g/dL      ALT (SGPT) 17 U/L      AST (SGOT) 26 U/L      Alkaline Phosphatase 131 U/L      Total Bilirubin 0.4 mg/dL      Globulin 2.4 gm/dL      A/G Ratio 1.8 g/dL      BUN/Creatinine Ratio 19.8     Anion Gap 11.9 mmol/L      eGFR 81.5 mL/min/1.73     Narrative:       GFR Categories in Chronic Kidney Disease (CKD)      GFR Category          GFR (mL/min/1.73)    Interpretation  G1                     90 or greater         Normal or high (1)  G2                      60-89                Mild decrease (1)  G3a                   45-59                Mild to moderate decrease  G3b                   30-44                Moderate to severe decrease  G4                    15-29                Severe decrease  G5                    14 or less           Kidney failure          (1)In the absence of evidence of kidney disease, neither GFR category G1 or G2 fulfill the criteria for CKD.    eGFR calculation 2021 CKD-EPI creatinine equation, which does not include race as a factor    BNP [[id number]]  (Normal) Collected: 04/24/25 1552    Specimen: Blood from Arm, Right Updated: 04/24/25 1633     proBNP 283.0 pg/mL     Narrative:      This assay is used as an aid in the diagnosis of individuals suspected of having heart failure. It can be used as an aid in the diagnosis of acute decompensated heart failure (ADHF) in patients presenting with signs and symptoms of ADHF to the emergency department (ED). In addition, NT-proBNP of <300 pg/mL indicates ADHF is not likely.    Age Range Result Interpretation  NT-proBNP Concentration (pg/mL:      <50             Positive            >450                   Gray                 300-450                    Negative             <300    50-75           Positive            >900                  Gray                300-900                  Negative            <300      >75             Positive            >1800                  Gray                300-1800                  Negative            <300    High Sensitivity Troponin T [[id number]]  (Normal) Collected: 04/24/25 1552    Specimen: Blood from Arm, Right Updated: 04/24/25 1633     HS Troponin T 19 ng/L     Narrative:      High Sensitive Troponin T Reference Range:  <14.0 ng/L- Negative Female for  AMI  <22.0 ng/L- Negative Male for AMI  >=14 - Abnormal Female indicating possible myocardial injury.  >=22 - Abnormal Male indicating possible myocardial injury.   Clinicians would have to utilize clinical acumen, EKG, Troponin, and serial changes to determine if it is an Acute Myocardial Infarction or myocardial injury due to an underlying chronic condition.         TSH Rfx On Abnormal To Free T4 [[id number]]  (Normal) Collected: 04/24/25 1552    Specimen: Blood from Arm, Right Updated: 04/24/25 1633     TSH 2.490 uIU/mL     Magnesium [[id number]]  (Normal) Collected: 04/24/25 1552    Specimen: Blood from Arm, Right Updated: 04/24/25 1633     Magnesium 2.0 mg/dL     CBC & Differential [[id number]]  (Abnormal) Collected: 04/24/25 1552    Specimen: Blood from Arm, Right Updated: 04/24/25 1607    Narrative:      The following orders were created for panel order CBC & Differential.  Procedure                               Abnormality         Status                     ---------                               -----------         ------                     CBC Auto Differential[[id number]]        Abnormal            Final result                 Please view results for these tests on the individual orders.    [place] Draw [[id number]] Collected: 04/24/25 1552    Specimen: Blood from Arm, Right Updated: 04/24/25 1600    Narrative:      The following orders were created for panel order [place] Draw.  Procedure                               Abnormality         Status                     ---------                               -----------         ------                     Green Top (Gel)[[id number]]                                  Final result               Lavender Top[[id number]]                                     Final result               Gold Top - SST[[id number]]                                   Final result               Light Blue Top[[id number]]                                   Final result                 Please view  results for these tests on the individual orders.    Green Top (Gel) [[id number]] Collected: 04/24/25 1552    Specimen: Blood from Arm, Right Updated: 04/24/25 1600     Extra Tube Hold for add-ons.     Comment: Auto resulted.       Lavender Top [[id number]] Collected: 04/24/25 1552    Specimen: Blood from Arm, Right Updated: 04/24/25 1600     Extra Tube hold for add-on     Comment: Auto resulted       Gold Top - SST [[id number]] Collected: 04/24/25 1552    Specimen: Blood from Arm, Right Updated: 04/24/25 1600     Extra Tube Hold for add-ons.     Comment: Auto resulted.       Light Blue Top [[id number]] Collected: 04/24/25 1552    Specimen: Blood from Arm, Right Updated: 04/24/25 1600     Extra Tube Hold for add-ons.     Comment: Auto resulted               Imaging Results (Most Recent)       Procedure Component Value Units Date/Time    CT Head Without Contrast [[id number]] Collected: 04/24/25 1802     Updated: 04/24/25 1810    Narrative:      CT HEAD WO CONTRAST    Date of Exam: 4/24/2025 5:35 PM EDT    Indication: transient confusion with hx of occipital cva.    Comparison: Brain MRI 3/21/2024, head CT 3/20/2024    Technique: Axial CT images were obtained of the head without contrast administration.  Coronal reconstructions were performed.  Automated exposure control and iterative reconstruction methods were used.      Findings:  No acute intracranial hemorrhage. No acute large territory infarct. Redemonstration of chronic infarct in the left PCA territory. There are scattered white matter hypodensities which are nonspecific and can be seen in the setting of chronic small vessel   ischemic change. No extra-axial collections. No midline shift or herniation. Normal size and configuration of the ventricles. There is intracranial atherosclerosis. Normal appearance of the orbits. There is near complete opacification of the paranasal   sinuses. Trace fluid in the mastoid air cells. No acute or suspicious bony findings.       Impression:      Impression:  No acute intracranial findings.      Chronic and senescent changes as above.      Pansinus disease.        Electronically Signed: Barber Sumner MD    4/24/2025 6:08 PM EDT    Workstation ID: [id number]    XR Chest 1 View [[id number]] Collected: 04/24/25 1609     Updated: 04/24/25 1612    Narrative:      XR CHEST 1 VW    Date of Exam: 4/24/2025 4:03 PM EDT    Indication: CP    Comparison: AP chest 8/27/2024.    Findings:  Chronic scarring at the left lower lobe overlying the diaphragm. No acute airspace disease. Normal heart size. Median sternotomy changes are present. Old left rib fracture. No pleural effusion or pneumothorax or acute osseous abnormality      Impression:      Impression:  No acute chest finding. Chronic left basilar scarring.      Electronically Signed: Kelin Pond MD    4/24/2025 4:10 PM EDT    Workstation ID: [id number]          reviewed    ECG/EMG Results (most recent)       Procedure Component Value Units Date/Time    Telemetry Scan [[id number]] Resulted: 04/24/25     Updated: 04/24/25 2259    Telemetry Scan [[id number]] Resulted: 04/24/25     Updated: 04/24/25 2324    Telemetry Scan [[id number]] Resulted: 04/24/25     Updated: 04/25/25 0451    Adult Transthoracic Echo Complete W/ Cont if Necessary Per Protocol [[id number]] Resulted: 04/25/25 0547     Updated: 04/25/25 0547     LVIDd 4.8 cm      LVIDs 3.5 cm      IVSd 1.20 cm      LVPWd 1.20 cm      FS 27.1 %      IVS/LVPW 1.00 cm      ESV(cubed) 42.9 ml      LV Sys Vol (BSA corrected) 26.5 cm2      EDV(cubed) 110.6 ml      LV [place] Vol (BSA corrected) 57.3 cm2      LV mass(C)d 219.1 grams      LVOT area 3.1 cm2      LVOT diam 2.00 cm      EDV(MOD-sp4) 122.0 ml      ESV(MOD-sp4) 56.4 ml      SV(MOD-sp4) 65.6 ml      SVi(MOD-SP4) 30.8 ml/m2      SVi (LVOT) 31.7 ml/m2      EF(MOD-sp4) 53.8 %      MV E max judy 57.5 cm/sec      MV A max judy 48.0 cm/sec      MV dec time 0.25 sec      MV E/A 1.20     MV A dur 0.11 sec      LA  ESV Index (BP) 39.8 ml/m2      Med Peak E' Vivek 7.0 cm/sec      Lat Peak E' Vivek 12.7 cm/sec      Avg E/e' ratio 5.84     SV(LVOT) 67.5 ml      TAPSE (>1.6) 1.63 cm      RV S' 6.9 cm/sec      LA dimension (2D)  3.6 cm      LV V1 max 93.2 cm/sec      LV V1 max PG 3.5 mmHg      LV V1 mean PG 2.00 mmHg      LV V1 VTI 21.5 cm      Ao pk vivek 252.0 cm/sec      Ao max PG 25.4 mmHg      Ao mean PG 13.0 mmHg      Ao V2 VTI 56.7 cm      MAT(I,D) 1.19 cm2      Dimensionless Index 0.37 (DI)      MV max PG 3.2 mmHg      MV mean PG 1.00 mmHg      MV V2 VTI 33.9 cm      MV P1/2t 80.5 msec      MVA(P1/2t) 2.7 cm2      MVA(VTI) 1.99 cm2      MV dec slope 315.0 cm/sec2      MR max vivek 513.0 cm/sec      MR max .3 mmHg      RV V1 max PG 1.89 mmHg      RV V1 max 68.7 cm/sec      RV V1 VTI 16.2 cm      PA V2 max 89.8 cm/sec      PA acc time 0.14 sec      Sinus 3.5 cm      STJ 3.3 cm     Narrative:        Left ventricular ejection fraction appears to be 46 - 50%.      ECG 12 Lead Chest Pain [[id number]] Collected: 04/24/25 1539     Updated: 04/25/25 1013     QT Interval 410 ms      QTC Interval 427 ms     Narrative:      HEART RATE=65  bpm  RR Hzzvaqtu=882  ms  WV Pnonhszs=307  ms  P Horizontal Axis=14  deg  P Front Axis=19  deg  QRSD Interval=95  ms  QT Kvgonlci=822  ms  HTrC=669  ms  QRS Axis=-40  deg  T Wave Axis=14  deg  - ABNORMAL ECG -  Sinus rhythm  Left anterior fascicular block  When compared with ECG of 27-Aug-2024 19:54:56,  No significant change  Electronically Signed By: Reji Weston ([facility]) 2025-04-25 10:08:29  Date and Time of Study:2025-04-24 15:39:29    ECG 12 Lead Chest Pain [[id number]] Collected: 04/24/25 1815     Updated: 04/25/25 1014     QT Interval 467 ms      QTC Interval 427 ms     Narrative:      HEART RATE=50  bpm  RR Oisgjend=2234  ms  WV Xwufejny=141  ms  P Horizontal Axis=24  deg  P Front Axis=-10  deg  QRSD Rdmmdktj=529  ms  QT Wzvtpspp=459  ms  UEsK=826  ms  QRS Axis=-38  deg  T Wave Axis=-23  deg  -  ABNORMAL ECG -  Sinus bradycardia  Left anterior fascicular block  Left ventricular hypertrophy  When compared with ECG of 24-Apr-2025 15:39:29,  No significant change  Electronically Signed By: Reji Weston ([facility]) 2025-04-25 10:08:53  Date and Time of Study:2025-04-24 18:15:39    Telemetry Scan [[id number]] Resulted: 04/24/25     Updated: 04/26/25 0045    Telemetry Scan [[id number]] Resulted: 04/24/25     Updated: 04/26/25 0743          reviewed    Results for orders placed during the hospital encounter of 03/20/24    Bilateral Carotid Duplex    Interpretation Summary    Right internal carotid artery demonstrates a less than 50% stenosis.    Left internal carotid artery demonstrates a less than 50% stenosis.      Results for orders placed during the hospital encounter of 04/24/25    Adult Transthoracic Echo Complete W/ Cont if Necessary Per Protocol    Interpretation Summary    Left ventricular ejection fraction appears to be 46 - 50%.      Microbiology Results (last 10 days)       ** No results found for the last 240 hours. **            Assessment & Plan     Chest pain     Chest pain        Lab Results   Component Value Date     TROPONINT 17 04/25/2025     TROPONINT 18 04/24/2025     TROPONINT 19 04/24/2025   -History of CABG x5 (2016), AAA, aortic valve replacement, CAD  -Hold Eliquis for possible further intervention needed   -Chest X-ray: No acute cardiopulmonary findings with chronic left lower lobe scarring  -A1c, thyroid panel within normal limits  -Total cholesterol 151, HDL 36, LDL 81, triglycerides 201  -EKG: Sinus rhythm, LAFB  -Stress Test and echocardiogram ordered  -Telemetry  -Cardiology consulted      Hypertension/hyperlipidemia      BP Readings from Last 1 Encounters:   04/26/25 118/74   - Continue aspirin, statin, lisinopril, amlodipine  - Monitor while admitted     History of occipital CVA  - CT head shows no acute intercranial findings with trace fluid and mastoid air cells with opacification of  paranasal sinuses, scattered white matter hypodensities with no acute infarct  - Aspirin and statin     AECOPD        Lab Results   Component Value Date     WBC 6.89 04/25/2025     EOSABS 0.29 04/25/2025   -Continue Radha Hardy  -Telemetry and pulse oximetry          I discussed the patients findings and my recommendations with patient and family.     Discharge Diagnosis:      Chest pain      Hospital Course  Patient is a 69 y.o. male presented with chest pain.  Patient had troponin of 19, 18 and 17.  Chest x-ray showed no acute cardiopulmonary findings with chronic left lower lobe scarring.  EKG showed sinus rhythm with LAFB.  Patient had echocardiogram which showed LVEF of 46 to 50% and be reviewed by cardiologist.  Patient had stress test which showed no ischemia.  Patient also had CT of head due to history of occipital CVA which showed no acute intercranial findings with trace fluid and mastoid air cell opacification of paranasal sinuses with no acute infarct.  Patient evaluated by cardiology which recommended to continue current treatment monitor blood pressure at home.  To monitor blood pressure and follow-up with cardiology and PCP at outpatient appointments.  Tests and recommendations reviewed patient and family and they agree with treatment plan.  If symptoms worsen patient to call 911 or go to nearest ED.    Past Medical History:     Past Medical History:   Diagnosis Date    Acute on chronic diastolic heart failure 12/14/2022    Allergic     Allergic rhinitis [id number]    Aneurysm     Anxiety     Asthma     Atrial fibrillation     Bronchitis     CHF (congestive heart failure)     Coronary artery disease     Depression     Encounter for laboratory testing for COVID-19 virus 08/22/2022    Gout     Heart murmur [id number]    Hyperlipidemia     Hypertension     Intractable headache 01/09/2023    Myocardial infarction     Pneumonia     Pulmonary nodule     Familia Mountain spotted fever     Stroke        Past  Surgical History:     Past Surgical History:   Procedure Laterality Date    AORTIC VALVE REPAIR/REPLACEMENT  12/03/2016    CABG x 5/ tissue AVR by DR. PHAN    CARDIAC CATHETERIZATION      CARDIAC SURGERY      CARDIOVASCULAR STRESS TEST  2020    CAROTID STENT      CORONARY ARTERY BYPASS GRAFT  12/02/2016    X4  Dr Phan    CORONARY STENT PLACEMENT  2009    HERNIA REPAIR  2010    ILIAC ARTERY ANEURYSM REPAIR  01/05/2017    abdominal and bilateral    NASAL POLYP EXCISION  2005    OTHER SURGICAL HISTORY      [facility]    REPAIR CHOANAL ATRESIA  2005    SINUS SURGERY      VASCULAR SURGERY         Social History:   Social History     Socioeconomic History    Marital status:    Tobacco Use    Smoking status: Never     Passive exposure: Never    Smokeless tobacco: Never   Vaping Use    Vaping status: Never Used   Substance and Sexual Activity    Alcohol use: Not Currently     Comment: quit 2005    Drug use: Yes     Types: Marijuana    Sexual activity: Defer     Partners: Female       Procedures Performed         Consults:   Consults       Date and Time Order Name Status Description    4/24/2025  6:06 PM Inpatient Cardiology Consult Completed             Condition on Discharge:     Stable    Discharge Disposition  Home or Self Care    Discharge Medications     Discharge Medications        Changes to Medications        Instructions Start Date   lisinopril 20 MG tablet  Commonly known as: PRINIVIL,ZESTRIL  What changed: how much to take   10 mg, Oral, 2 Times Daily             Continue These Medications        Instructions Start Date   allopurinol 300 MG tablet  Commonly known as: ZYLOPRIM   300 mg, Oral, Every Night at Bedtime      amLODIPine 10 MG tablet  Commonly known as: NORVASC   10 mg, Oral, Daily      apixaban 5 MG tablet tablet  Commonly known as: ELIQUIS   5 mg, Oral, Every 12 Hours Scheduled      ascorbic acid 250 MG tablet  Commonly known as: VITAMIN C   250 mg, Daily      aspirin 81 MG EC tablet   81 mg,  Daily      Cholecalciferol 25 MCG (1000 UT) capsule   1,000 Units, Daily      Fluticasone-Umeclidin-Vilant 200-62.5-25 MCG/ACT inhaler  Commonly known as: TRELEGY ELLIPTA   1 puff, Inhalation, Daily      ipratropium-albuterol 0.5-2.5 mg/3 ml nebulizer  Commonly known as: DUO-NEB   3 mL, Every 4 Hours PRN      multivitamin with minerals tablet tablet   1 tablet, Daily      rosuvastatin 20 MG tablet  Commonly known as: CRESTOR   20 mg, Oral, Daily      Ryaltris 665-25 MCG/ACT suspension  Generic drug: Olopatadine-Mometasone   1 spray, 2 Times Daily      Ventolin  (90 Base) MCG/ACT inhaler  Generic drug: albuterol sulfate HFA   2 puffs, Inhalation, Every 6 Hours PRN             ASK your doctor about these medications        Instructions Start Date   zinc sulfate 220 (50 Zn) MG capsule  Commonly known as: ZINCATE   50 mg, Daily               Discharge Diet:   Diet Instructions       Diet: Cardiac Diets; Healthy Heart (2-3 Na+); Regular (IDDSI 7); Thin (IDDSI 0)      Discharge Diet: Cardiac Diets    Cardiac Diet: Healthy Heart (2-3 Na+)    Texture: Regular (IDDSI 7)    Fluid Consistency: Thin (IDDSI 0)            Activity at Discharge:   Activity Instructions       Activity as Tolerated      Measure Blood Pressure              Follow-up Appointments  Future Appointments   Date Time Provider Department Center   6/12/2025 12:40 PM Dharmesh Eugene MD [facility] None   7/7/2025  1:00 PM Yoshi Lyon MD [facility]   9/4/2025  1:30 PM Maciej Blank MD [facility]     Additional Instructions for the Follow-ups that You Need to Schedule       Discharge Follow-up with PCP   As directed       Currently Documented PCP:    Yoshi Lyon MD    PCP Phone Number:    [phone]     Follow Up Details: 7-10 days                Test Results Pending at Discharge  Pending Results       None             Risk for Readmission (LACE) Score: 8 (4/26/2025  6:00 AM)          Cher Collado  GARY  04/26/25  15:52 EDT        I spent 35 minutes caring for lAejandro on this date of service. This time includes time spent by me in the following activities: reviewing tests, performing a medically appropriate examination and/or evaluation, counseling and educating the patient/family/caregiver, referring and communicating with other health care professionals, documenting information in the medical record, independently interpreting results and communicating that information with the patient/family/caregiver, care coordination, ordering medications, ordering test(s), ordering procedure(s), obtaining a separately obtained history, and reviewing a separately obtained history.

## 2025-04-26 NOTE — OUTREACH NOTE
Prep Survey      Flowsheet Row Responses   [facility] patient discharged from? [facility]   Is LACE score < 7 ? No   Eligibility [facility]   Date of Admission 04/24/25   Date of Discharge 04/26/25   Discharge Disposition Home or Self Care   Discharge diagnosis Chest pain   Does the patient have one of the following disease processes/diagnoses(primary or secondary)? Other   Does the patient have Home health ordered? No   Is there a DME ordered? No   Medication alerts for this patient see AVS   Prep survey completed? Yes            Kiesha HERNANDEZ - Registered Nurse              Kiesha HERNANDEZ - Registered Nurse

## 2025-04-26 NOTE — PROGRESS NOTES
LOS: 0 days   Admitting Physician- Reji Weston MD    Reason For Followup:    Chest pain  CAD  CABG  Hypertension    Subjective     Patient is chest pain-free    Objective     Hemodynamics are stable    Review of Systems:   Review of Systems   Constitutional: Negative for chills and fever.   HENT:  Negative for ear discharge and nosebleeds.    Eyes:  Negative for discharge and redness.   Cardiovascular:  Negative for chest pain, orthopnea, palpitations, paroxysmal nocturnal dyspnea and syncope.   Respiratory:  Negative for cough, shortness of breath and wheezing.    Endocrine: Negative for heat intolerance.   Skin:  Negative for rash.   Musculoskeletal:  Negative for arthritis and myalgias.   Gastrointestinal:  Negative for abdominal pain, melena, nausea and vomiting.   Genitourinary:  Negative for dysuria and hematuria.   Neurological:  Negative for dizziness, light-headedness, numbness and tremors.   Psychiatric/Behavioral:  Negative for depression. The patient is not nervous/anxious.          Vital Signs  Vitals:    04/26/25 0704 04/26/25 0707 04/26/25 0836 04/26/25 1218   BP:   118/74 105/68   BP Location:   Right arm Right arm   Patient Position:   Lying Lying   Pulse: 54 59 53 54   Resp: 12 12 17 16   Temp:   97.5 °F (36.4 °C)    TempSrc:   Oral Oral   SpO2: 93% 93% 93% 93%   Weight:       Height:         Wt Readings from Last 1 Encounters:   04/24/25 88.5 kg (195 lb)       Intake/Output Summary (Last 24 hours) at 4/26/2025 1222  Last data filed at 4/25/2025 2040  Gross per 24 hour   Intake 720 ml   Output --   Net 720 ml     Physical Exam:  Constitutional:       Appearance: Well-developed.   Eyes:      General: No scleral icterus.        Right eye: No discharge.   HENT:      Head: Normocephalic and atraumatic.   Neck:      Thyroid: No thyromegaly.      Lymphadenopathy: No cervical adenopathy.   Pulmonary:      Effort: Pulmonary effort is normal. No respiratory distress.      Breath sounds: Normal  breath sounds. No wheezing. No rales.   Cardiovascular:      Normal rate. Regular rhythm.      No gallop.    Edema:     Peripheral edema absent.   Abdominal:      Tenderness: There is no abdominal tenderness.   Skin:     Findings: No erythema or rash.   Neurological:      Mental Status: Alert and oriented to person, place, and time.         Results Review:   Lab Results (last 24 hours)       Procedure Component Value Units Date/Time    CBC & Differential [[id number]]  (Abnormal) Collected: 04/26/25 0359    Specimen: Blood from Arm, Right Updated: 04/26/25 0546    Narrative:      The following orders were created for panel order CBC & Differential.  Procedure                               Abnormality         Status                     ---------                               -----------         ------                     CBC Auto Differential[[id number]]        Abnormal            Final result               Scan Slide[[id number]]                                       Final result                 Please view results for these tests on the individual orders.    Scan Slide [[id number]] Collected: 04/26/25 0359    Specimen: Blood from Arm, Right Updated: 04/26/25 0546     RBC Morphology Normal     WBC Morphology Normal     Platelet Estimate Decreased    CBC Auto Differential [[id number]]  (Abnormal) Collected: 04/26/25 0359    Specimen: Blood from Arm, Right Updated: 04/26/25 0546     WBC 8.67 10*3/mm3      RBC 4.90 10*6/mm3      Hemoglobin 13.7 g/dL      Hematocrit 41.9 %      MCV 85.5 fL      MCH 28.0 pg      MCHC 32.7 g/dL      RDW 14.2 %      RDW-SD 43.8 fl      MPV 11.0 fL      Platelets 135 10*3/mm3      Neutrophil % 74.3 %      Lymphocyte % 11.6 %      Monocyte % 9.5 %      Eosinophil % 3.5 %      Basophil % 0.5 %      Immature Grans % 0.6 %      Neutrophils, Absolute 6.45 10*3/mm3      Lymphocytes, Absolute 1.01 10*3/mm3      Monocytes, Absolute 0.82 10*3/mm3      Eosinophils, Absolute 0.30 10*3/mm3      Basophils,  Absolute 0.04 10*3/mm3      Immature Grans, Absolute 0.05 10*3/mm3      nRBC 0.0 /100 WBC     Basic Metabolic Panel [[id number]]  (Abnormal) Collected: 04/26/25 0359    Specimen: Blood from Arm, Right Updated: 04/26/25 0536     Glucose 102 mg/dL      BUN 19 mg/dL      Creatinine 0.87 mg/dL      Sodium 138 mmol/L      Potassium 4.1 mmol/L      Chloride 106 mmol/L      CO2 20.7 mmol/L      Calcium 8.9 mg/dL      BUN/Creatinine Ratio 21.8     Anion Gap 11.3 mmol/L      eGFR 94.6 mL/min/1.73     Narrative:      GFR Categories in Chronic Kidney Disease (CKD)      GFR Category          GFR (mL/min/1.73)    Interpretation  G1                     90 or greater         Normal or high (1)  G2                      60-89                Mild decrease (1)  G3a                   45-59                Mild to moderate decrease  G3b                   30-44                Moderate to severe decrease  G4                    15-29                Severe decrease  G5                    14 or less           Kidney failure          (1)In the absence of evidence of kidney disease, neither GFR category G1 or G2 fulfill the criteria for CKD.    eGFR calculation 2021 CKD-EPI creatinine equation, which does not include race as a factor          Imaging Results (Last 72 Hours)       Procedure Component Value Units Date/Time    CT Head Without Contrast [[id number]] Collected: 04/24/25 1802     Updated: 04/24/25 1810    Narrative:      CT HEAD WO CONTRAST    Date of Exam: 4/24/2025 5:35 PM EDT    Indication: transient confusion with hx of occipital cva.    Comparison: Brain MRI 3/21/2024, head CT 3/20/2024    Technique: Axial CT images were obtained of the head without contrast administration.  Coronal reconstructions were performed.  Automated exposure control and iterative reconstruction methods were used.      Findings:  No acute intracranial hemorrhage. No acute large territory infarct. Redemonstration of chronic infarct in the left PCA territory.  There are scattered white matter hypodensities which are nonspecific and can be seen in the setting of chronic small vessel   ischemic change. No extra-axial collections. No midline shift or herniation. Normal size and configuration of the ventricles. There is intracranial atherosclerosis. Normal appearance of the orbits. There is near complete opacification of the paranasal   sinuses. Trace fluid in the mastoid air cells. No acute or suspicious bony findings.      Impression:      Impression:  No acute intracranial findings.      Chronic and senescent changes as above.      Pansinus disease.        Electronically Signed: Barber Sumner MD    4/24/2025 6:08 PM EDT    Workstation ID: [id number]    XR Chest 1 View [[id number]] Collected: 04/24/25 1609     Updated: 04/24/25 1612    Narrative:      XR CHEST 1 VW    Date of Exam: 4/24/2025 4:03 PM EDT    Indication: CP    Comparison: AP chest 8/27/2024.    Findings:  Chronic scarring at the left lower lobe overlying the diaphragm. No acute airspace disease. Normal heart size. Median sternotomy changes are present. Old left rib fracture. No pleural effusion or pneumothorax or acute osseous abnormality      Impression:      Impression:  No acute chest finding. Chronic left basilar scarring.      Electronically Signed: Kelin Pond MD    4/24/2025 4:10 PM EDT    Workstation ID: [id number]          ECG/EMG Results (most recent)       Procedure Component Value Units Date/Time    Telemetry Scan [[id number]] Resulted: 04/24/25     Updated: 04/24/25 2259    Telemetry Scan [[id number]] Resulted: 04/24/25     Updated: 04/24/25 2324    Telemetry Scan [[id number]] Resulted: 04/24/25     Updated: 04/25/25 0451    Adult Transthoracic Echo Complete W/ Cont if Necessary Per Protocol [[id number]] Resulted: 04/25/25 0547     Updated: 04/25/25 0547     LVIDd 4.8 cm      LVIDs 3.5 cm      IVSd 1.20 cm      LVPWd 1.20 cm      FS 27.1 %      IVS/LVPW 1.00 cm      ESV(cubed) 42.9 ml      LV Sys Vol  (BSA corrected) 26.5 cm2      EDV(cubed) 110.6 ml      LV [place] Vol (BSA corrected) 57.3 cm2      LV mass(C)d 219.1 grams      LVOT area 3.1 cm2      LVOT diam 2.00 cm      EDV(MOD-sp4) 122.0 ml      ESV(MOD-sp4) 56.4 ml      SV(MOD-sp4) 65.6 ml      SVi(MOD-SP4) 30.8 ml/m2      SVi (LVOT) 31.7 ml/m2      EF(MOD-sp4) 53.8 %      MV E max vivek 57.5 cm/sec      MV A max vivek 48.0 cm/sec      MV dec time 0.25 sec      MV E/A 1.20     MV A dur 0.11 sec      LA ESV Index (BP) 39.8 ml/m2      Med Peak E' Vivek 7.0 cm/sec      Lat Peak E' Vivek 12.7 cm/sec      Avg E/e' ratio 5.84     SV(LVOT) 67.5 ml      TAPSE (>1.6) 1.63 cm      RV S' 6.9 cm/sec      LA dimension (2D)  3.6 cm      LV V1 max 93.2 cm/sec      LV V1 max PG 3.5 mmHg      LV V1 mean PG 2.00 mmHg      LV V1 VTI 21.5 cm      Ao pk vivek 252.0 cm/sec      Ao max PG 25.4 mmHg      Ao mean PG 13.0 mmHg      Ao V2 VTI 56.7 cm      MAT(I,D) 1.19 cm2      Dimensionless Index 0.37 (DI)      MV max PG 3.2 mmHg      MV mean PG 1.00 mmHg      MV V2 VTI 33.9 cm      MV P1/2t 80.5 msec      MVA(P1/2t) 2.7 cm2      MVA(VTI) 1.99 cm2      MV dec slope 315.0 cm/sec2      MR max vivek 513.0 cm/sec      MR max .3 mmHg      RV V1 max PG 1.89 mmHg      RV V1 max 68.7 cm/sec      RV V1 VTI 16.2 cm      PA V2 max 89.8 cm/sec      PA acc time 0.14 sec      Sinus 3.5 cm      STJ 3.3 cm     Narrative:        Left ventricular ejection fraction appears to be 46 - 50%.      ECG 12 Lead Chest Pain [[id number]] Collected: 04/24/25 1539     Updated: 04/25/25 1013     QT Interval 410 ms      QTC Interval 427 ms     Narrative:      HEART RATE=65  bpm  RR Myncsxhm=539  ms  SD Ripgstvh=580  ms  P Horizontal Axis=14  deg  P Front Axis=19  deg  QRSD Interval=95  ms  QT Brgucceo=436  ms  NPwW=564  ms  QRS Axis=-40  deg  T Wave Axis=14  deg  - ABNORMAL ECG -  Sinus rhythm  Left anterior fascicular block  When compared with ECG of 27-Aug-2024 19:54:56,  No significant change  Electronically Signed By:  Reji Weston ([facility]) 2025-04-25 10:08:29  Date and Time of Study:2025-04-24 15:39:29    ECG 12 Lead Chest Pain [[id number]] Collected: 04/24/25 1815     Updated: 04/25/25 1014     QT Interval 467 ms      QTC Interval 427 ms     Narrative:      HEART RATE=50  bpm  RR Wocpbxot=1901  ms  HI Tmafiysd=860  ms  P Horizontal Axis=24  deg  P Front Axis=-10  deg  QRSD Rndveuyw=589  ms  QT Zfwntngy=470  ms  ZWcR=396  ms  QRS Axis=-38  deg  T Wave Axis=-23  deg  - ABNORMAL ECG -  Sinus bradycardia  Left anterior fascicular block  Left ventricular hypertrophy  When compared with ECG of 24-Apr-2025 15:39:29,  No significant change  Electronically Signed By: Reji Weston ([facility]) 2025-04-25 10:08:53  Date and Time of Study:2025-04-24 18:15:39    Telemetry Scan [[id number]] Resulted: 04/24/25     Updated: 04/26/25 0045    Telemetry Scan [[id number]] Resulted: 04/24/25     Updated: 04/26/25 0743          CBC    Results from last 7 days   Lab Units 04/26/25  0359 04/25/25  0021 04/24/25  1552   WBC 10*3/mm3 8.67 6.89 9.62   HEMOGLOBIN g/dL 13.7 12.5* 14.2   PLATELETS 10*3/mm3 135* 139* 159     BMP   Results from last 7 days   Lab Units 04/26/25  0359 04/25/25  0021 04/24/25  1552   SODIUM mmol/L 138 138 140   POTASSIUM mmol/L 4.1 3.8 3.9   CHLORIDE mmol/L 106 105 107   CO2 mmol/L 20.7* 20.6* 21.1*   BUN mg/dL 19 18 20   CREATININE mg/dL 0.87 0.87 1.01   GLUCOSE mg/dL 102* 94 118*   MAGNESIUM mg/dL  --   --  2.0     CMP   Results from last 7 days   Lab Units 04/26/25  0359 04/25/25  0021 04/24/25  1552   SODIUM mmol/L 138 138 140   POTASSIUM mmol/L 4.1 3.8 3.9   CHLORIDE mmol/L 106 105 107   CO2 mmol/L 20.7* 20.6* 21.1*   BUN mg/dL 19 18 20   CREATININE mg/dL 0.87 0.87 1.01   GLUCOSE mg/dL 102* 94 118*   ALBUMIN g/dL  --  3.8 4.4   BILIRUBIN mg/dL  --  0.3 0.4   ALK PHOS U/L  --  114 131*   AST (SGOT) U/L  --  21 26   ALT (SGPT) U/L  --  13 17     Cardiac Studies:  Echo- Results for orders placed during the hospital encounter of  04/24/25    Adult Transthoracic Echo Complete W/ Cont if Necessary Per Protocol    Interpretation Summary    Left ventricular ejection fraction appears to be 46 - 50%.    Stress Myoview-  Cath-      Medication Review:   Scheduled Meds:allopurinol, 300 mg, Oral, Daily  amLODIPine, 10 mg, Oral, Daily  [Held by provider] apixaban, 5 mg, Oral, Q12H  arformoterol, 15 mcg, Nebulization, BID - RT   And  budesonide, 0.5 mg, Nebulization, BID - RT   And  revefenacin, 175 mcg, Nebulization, Daily - RT  aspirin, 81 mg, Oral, Daily  cetirizine, 10 mg, Oral, Daily  guaiFENesin, 600 mg, Oral, Q12H  [Held by provider] lisinopril, 20 mg, Oral, BID  rosuvastatin, 20 mg, Oral, Daily  sodium chloride, 10 mL, Intravenous, Q12H      Continuous Infusions:   PRN Meds:.  acetaminophen **OR** acetaminophen **OR** acetaminophen    senna-docusate sodium **AND** polyethylene glycol **AND** bisacodyl **AND** bisacodyl    Calcium Replacement - Follow Nurse / BPA Driven Protocol    HYDROcodone-acetaminophen    HYDROmorphone **AND** naloxone    ipratropium-albuterol    Magnesium Standard Dose Replacement - Follow Nurse / BPA Driven Protocol    nitroglycerin    Phosphorus Replacement - Follow Nurse / BPA Driven Protocol    Potassium Replacement - Follow Nurse / BPA Driven Protocol    sodium chloride    sodium chloride      Assessment & Plan     Chest pain    MDM:    1.  Chest pain:    Patient denies any symptom of chest pain or tightness or heaviness.  Patient underwent stress test.  If this test is negative patient can be discharged continue current treatment    2.  CAD/CABG:    Patient is stable proceed with stress test if negative patient can be discharged    3.  Mixed hyperlipidemia:    Continue Crestor.    4.  Hypertension:    Blood pressure is controlled.    Electronically signed by Maciej Blank MD, 04/26/25, 12:24 PM EDT.      Maciej Blank MD  04/26/25  12:22 EDT

## 2025-04-26 NOTE — PLAN OF CARE
Problem: Adult Inpatient Plan of Care  Goal: Plan of Care Review  Outcome: Met  Flowsheets (Taken 4/26/2025 1617)  Progress: improving  Plan of Care Reviewed With:   patient   spouse  Goal: Patient-Specific Goal (Individualized)  Outcome: Met  Goal: Absence of Hospital-Acquired Illness or Injury  Outcome: Met  Intervention: Identify and Manage Fall Risk  Recent Flowsheet Documentation  Taken 4/26/2025 1400 by Paxton Sheridan RN  Safety Promotion/Fall Prevention: safety round/check completed  Taken 4/26/2025 1246 by Paxton Sheridan RN  Safety Promotion/Fall Prevention: safety round/check completed  Taken 4/26/2025 1000 by Paxton Sheridan RN  Safety Promotion/Fall Prevention: safety round/check completed  Taken 4/26/2025 0801 by Paxton Sheridan RN  Safety Promotion/Fall Prevention: safety round/check completed  Goal: Optimal Comfort and Wellbeing  Outcome: Met  Goal: Readiness for Transition of Care  Outcome: Met  Goal: Plan of Care Review  Outcome: Met  Flowsheets (Taken 4/26/2025 1617)  Progress: improving  Plan of Care Reviewed With:   patient   spouse  Goal: Patient-Specific Goal (Individualized)  Outcome: Met  Goal: Absence of Hospital-Acquired Illness or Injury  Outcome: Met  Intervention: Identify and Manage Fall Risk  Recent Flowsheet Documentation  Taken 4/26/2025 1400 by Paxton Sheridan RN  Safety Promotion/Fall Prevention: safety round/check completed  Taken 4/26/2025 1246 by Paxton Sheridan RN  Safety Promotion/Fall Prevention: safety round/check completed  Taken 4/26/2025 1000 by Paxton Sheridan RN  Safety Promotion/Fall Prevention: safety round/check completed  Taken 4/26/2025 0801 by Paxton Sheridan RN  Safety Promotion/Fall Prevention: safety round/check completed  Goal: Optimal Comfort and Wellbeing  Outcome: Met  Goal: Readiness for Transition of Care  Outcome: Met     Problem: Chest Pain  Goal: Resolution of Chest Pain Symptoms  Outcome: Met     Problem: Comorbidity Management  Goal: Maintenance of Asthma  Control  Outcome: Met   Goal Outcome Evaluation:  Plan of Care Reviewed With: patient, spouse        Progress: improving

## 2025-04-28 ENCOUNTER — TRANSITIONAL CARE MANAGEMENT TELEPHONE ENCOUNTER (OUTPATIENT)
Dept: CALL CENTER | Facility: HOSPITAL | Age: 70
End: 2025-04-28
Payer: MEDICARE

## 2025-04-28 NOTE — PROGRESS NOTES
Patient is following up with cardiologist and chose to not schedule an appointment for a hospital f/u with PCP   sudden onset

## 2025-04-28 NOTE — CASE MANAGEMENT/SOCIAL WORK
Case Management Discharge Note      Final Note: Routine home         Selected Continued Care - Discharged on 4/26/2025 Admission date: 4/24/2025 - Discharge disposition: Home or Self Care            Transportation Services  Private: Car    Final Discharge Disposition Code: 01 - home or self-care

## 2025-04-28 NOTE — OUTREACH NOTE
Call Center TCM Note      Flowsheet Row Responses   [facility] [place] patient discharged from? [facility]   Does the patient have one of the following disease processes/diagnoses(primary or secondary)? Other   TCM attempt successful? Yes   Call start time 1008   Call end time 1013   Discharge diagnosis Chest pain   Person spoke with today (if not patient) and relationship Priti (spouse)   Meds reviewed with patient/caregiver? Yes   Is the patient having any side effects they believe may be caused by any medication additions or changes? No   Does the patient have all medications ordered at discharge? Yes   Is the patient taking all medications as directed (includes completed medication regime)? Yes   Comments Spouse requests for patient to see PCP. No available appts to schedule with PCP.   Does the patient have an appointment with their PCP within 7-14 days of discharge? No   Nursing Interventions Routed TCM call to PCP office   Psychosocial issues? No   Did the patient receive a copy of their discharge instructions? Yes   Nursing interventions Reviewed instructions with patient   What is the patient's perception of their health status since discharge? Improving   Is the patient/caregiver able to teach back signs and symptoms related to disease process for when to call PCP? Yes   Is the patient/caregiver able to teach back signs and symptoms related to disease process for when to call 911? Yes   Is the patient/caregiver able to teach back the hierarchy of who to call/visit for symptoms/problems? PCP, Specialist, Home health nurse, Urgent Care, ED, 911 Yes   TCM call completed? Yes   Wrap up additional comments Spouse states patient is doing well. Patient is keeping a log of BPs. No questions or concerns at this time.   Call end time 1013   Would this patient benefit from a Referral to Amb Social Work? No   Is the patient interested in additional calls from an ambulatory ? Elizabeth Hsu  Nurse    4/28/2025, 10:14 EDT

## 2025-05-04 RX ORDER — ROSUVASTATIN CALCIUM 20 MG/1
20 TABLET, COATED ORAL DAILY
Qty: 90 TABLET | Refills: 1 | Status: SHIPPED | OUTPATIENT
Start: 2025-05-04

## 2025-05-06 ENCOUNTER — READMISSION MANAGEMENT (OUTPATIENT)
Dept: CALL CENTER | Facility: HOSPITAL | Age: 70
End: 2025-05-06
Payer: MEDICARE

## 2025-05-06 NOTE — OUTREACH NOTE
Medical Week 2 Survey      Flowsheet Row Responses   [facility] patient discharged from? [facility]   Does the patient have one of the following disease processes/diagnoses(primary or secondary)? Other   Week 2 attempt successful? Yes   Call start time 1158   Discharge diagnosis Chest pain   Call end time 1200   Meds reviewed with patient/caregiver? Yes   Is the patient taking all medications as directed (includes completed medication regime)? Yes   Does the patient have a primary care provider?  Yes   Does the patient have an appointment with their PCP within 7 days of discharge? Yes   Has the patient kept scheduled appointments due by today? Yes   Has home health visited the patient within 72 hours of discharge? N/A   Psychosocial issues? No   Did the patient receive a copy of their discharge instructions? Yes   Nursing interventions Reviewed instructions with patient   What is the patient's perception of their health status since discharge? Improving   Is the patient/caregiver able to teach back signs and symptoms related to disease process for when to call PCP? Yes   Is the patient/caregiver able to teach back signs and symptoms related to disease process for when to call 911? Yes   Is the patient/caregiver able to teach back the hierarchy of who to call/visit for symptoms/problems? PCP, Specialist, Home health nurse, Urgent Care, ED, 911 Yes   Week 2 Call Completed? Yes   Graduated Yes   Graduated/Revoked comments Pt reports improvement. No needs. Has seen PCP and has FU appt with cardio   Call end time 1200            DAVINA PIERRE - Registered Nurse

## 2025-05-09 NOTE — PROGRESS NOTES
Cardiology Office Follow Up Visit      Primary Care Provider:  Yoshi Lyon MD    Reason for f/u:     hospital follow-up      Subjective     CC:    Follow-up recent stress test and echocardiogram results    History of Present Illness       Alejandro Bain is a 70 y.o. male patient of Dr Blank.  He is known to have hypertension, coronary artery disease, previous abdominal aortic aneurysm, CABG with previous AVR by history and previous stroke.     Patient initially underwent bypass surgery in December 2016 with 5 vessel CABG.  He underwent AAA repair 4 weeks after his bypass surgery.     Patient's last stress test was in August 2021 which showed no evidence of ischemia.  Echocardiogram also at that time demonstrated normal LV size and function with EF of 55 to 60%.  Bioprosthetic aortic valve was functioning appropriately.     In January 2023 patient was admitted at [facility] with loss of peripheral vision and headache.  CT scan showed left posterior temporal lobe and left occipital lobe CVA.  He was noted to have vertebral artery and posterior cerebral stenosis on that left side.  He was treated with Eliquis.     Patient follows with neurology at [facility].  He has had previous event monitoring that showed no evidence of atrial fibrillation.  There were isolated PVCs and 14 beat run of wide-complex tachycardia.     In April 2024, patient was admitted at [facility] with blurry vision and was noted to have occipital CVA.  Patient was noted to have abnormal CTA.  Patient follows with neurology at [facility].    Patient last followed up in our office in December 2024.  At that time he was sinus with rate of 55 on EKG Dr. Blank's recommendations included stress test echocardiogram and repeat lipid testing.  Patient did have these completed during recent hospitalization April 2025: Lipids: Total 151, LDL 81, HDL 36, triglycerides 201.  Echocardiogram noted to be technically difficult study, EF  46-50%.  Mild mitral regurgitation with a calcified mass noted on the posterior left mitral valve. normal right ventricular size thickness systolic function septal wall motion. No other significant valvular abnormalities or wall motion abnormalities noted.  Stress test calculated EF 61%, small fixed area of hyper bow per previous fusion in mid anterolateral wall with no reversibility, possibly artifact, stress images improved over resting images with reverse distribution.  No reversible ischemia, low risk study, normal ECG stress test.    Patient returns today after hospitalization.  He has no complaints,No chest pain, palpitations, fatigue or shortness of breath.  We did discuss Dr. Blank's recommendation for sleep study related to patient's snoring.  Patient agreeable and referral was sent. Blood pressure today 135/86, he states at home blood pressures run 120/70.  LDL was 81 at last check, discussed with him goal is less than 70.  Patient and wife states that over the winter they have slacked on their diet and exercise.  Patient would like to try to institute diet and exercise changes and recheck lipids in a few months.  He does have a follow-up with Dr. Blank in September and we agreed they would rediscuss at that time for advancing statin.  Extensive discussion about preventative care, particularly diet and exercise.  Patient does walk 2 miles daily and golfs a few times a month without any difficulty.  He reports that he and Dr. Blank had previously set his goal weight at 175, and he has been on some more weight, he plans to follow heart healthy diet and increase his activity.  He also would like to transition his vascular care into the [facility] system, and I told him we could refer him to Dr. Berry/Baltazar/[place] if he would like. He had carotid Doppler in March 2024 with less than 50% carotid stenosis bilaterally, history abdominal abdominal aortic aneurysm repair.     Continue same at this time Norvasc 10 mg,  lisinopril 20 mg, Eliquis, aspirin on Crestor 20 mg.  Follow-up with Dr. Blank in September    ASSESSMENT/PLAN:    Recent hospital admission for chest pain    Diagnoses and all orders for this visit:    1. Coronary artery disease involving native coronary artery of native heart without angina pectoris (Primary)    2. Essential hypertension    3. Mixed hyperlipidemia    4. Paroxysmal atrial fibrillation    5. Cerebrovascular accident (CVA) due to embolism of left posterior cerebral artery    6. Snoring  -     Ambulatory Referral to Sleep Medicine    7.  Bradycardia    MEDICAL DECISION MAKIN.Status post CABG x 5 with AVR (tissue) by Dr. Phan. Stress test 26: low risk study, Small fixed area of hypoperfusion in the mid anterolateral wall with no reversibility, EF 61% hypoperfusion possibly artifact, stress imaging is improved over resting images with reverse redistribution No reversible ischemia identified Findings consistent with a normal ECG stress test. Was cleared by Abhay to discharge  2.  Blood pressure slightly elevated in office today 135/86, however home blood pressure log 120s over 70s  3.  LDL 81, discussed goal less than 70.  On Crestor 20 mg.  Patient resistant to advancing statin, would like to try a few months of diet and exercise to see if he can improve numbers.  Will reevaluate lipids with next visit  4. No episodes of A-fib reported, EKG 2025 sinus bradycardia  5. Status post CVA 2024.  Repeat CT head during recent admission due to history of occipital CVA which showed no acute intercranial findings   6.  Sleep medicine referral  7.  Review of historical EKGs and vital signs during his recent admission heart rate 55-65.  Patient states he has been told that he has an athlete's heart and this is normal for him.  Symptomatic  Status post AAA repair    Past Medical History:   Diagnosis Date    Acute on chronic diastolic heart failure 2022    Allergic     Allergic rhinitis  [id number]    Aneurysm     Anxiety     Asthma     Atrial fibrillation     Bronchitis     CHF (congestive heart failure)     Coronary artery disease     Depression     Encounter for laboratory testing for COVID-19 virus 08/22/2022    Gout     Heart murmur [id number]    Hyperlipidemia     Hypertension     Intractable headache 01/09/2023    Myocardial infarction     Pneumonia     Pulmonary nodule     Familia Mountain spotted fever     Stroke        Past Surgical History:   Procedure Laterality Date    AORTIC VALVE REPAIR/REPLACEMENT  12/03/2016    CABG x 5/ tissue AVR by DR. PHAN    CARDIAC CATHETERIZATION      CARDIAC SURGERY      CARDIOVASCULAR STRESS TEST  2020    CAROTID STENT      CORONARY ARTERY BYPASS GRAFT  12/02/2016    X4  Dr Phan    CORONARY STENT PLACEMENT  2009    HERNIA REPAIR  2010    ILIAC ARTERY ANEURYSM REPAIR  01/05/2017    abdominal and bilateral    NASAL POLYP EXCISION  2005    OTHER SURGICAL HISTORY      [facility]    REPAIR CHOANAL ATRESIA  2005    SINUS SURGERY      VASCULAR SURGERY           Current Outpatient Medications:     allopurinol (ZYLOPRIM) 300 MG tablet, TAKE 1 TABLET BY MOUTH EVERY DAY AT NIGHT, Disp: 90 tablet, Rfl: 1    amLODIPine (NORVASC) 10 MG tablet, TAKE 1 TABLET BY MOUTH EVERY DAY, Disp: 90 tablet, Rfl: 3    apixaban (ELIQUIS) 5 MG tablet tablet, Take 1 tablet by mouth Every 12 (Twelve) Hours. Indications: Atrial Fibrillation, Disp: 60 tablet, Rfl: 3    ascorbic acid (VITAMIN C) 250 MG tablet, Take 1 tablet by mouth Daily., Disp: , Rfl:     aspirin 81 MG EC tablet, Take 1 tablet by mouth Daily., Disp: , Rfl:     Cholecalciferol 25 MCG (1000 UT) capsule, Take 1 capsule by mouth Daily., Disp: , Rfl:     Fluticasone-Umeclidin-Vilant (TRELEGY ELLIPTA) 200-62.5-25 MCG/ACT inhaler, Inhale 1 puff Daily., Disp: 1 each, Rfl: 11    ipratropium-albuterol (DUO-NEB) 0.5-2.5 mg/3 ml nebulizer, Take 3 mL by nebulization Every 4 (Four) Hours As Needed for Wheezing., Disp: , Rfl:     lisinopril  (PRINIVIL,ZESTRIL) 20 MG tablet, Take 0.5 tablets by mouth 2 (Two) Times a Day for 30 days., Disp: 30 tablet, Rfl: 0    Multiple Vitamins-Minerals (MULTIVITAMIN ADULT PO), Take 1 tablet by mouth Daily., Disp: , Rfl:     Olopatadine-Mometasone (Ryaltris) 665-25 MCG/ACT suspension, Administer 1 spray into the nostril(s) as directed by provider 2 (Two) Times a Day., Disp: , Rfl:     rosuvastatin (CRESTOR) 20 MG tablet, TAKE 1 TABLET BY MOUTH EVERY DAY, Disp: 90 tablet, Rfl: 1    VENTOLIN  (90 Base) MCG/ACT inhaler, Inhale 2 puffs Every 6 (Six) Hours As Needed., Disp: , Rfl:     zinc sulfate (ZINCATE) 220 (50 Zn) MG capsule, Take 50 mg by mouth Daily., Disp: , Rfl:     Social History     Socioeconomic History    Marital status:    Tobacco Use    Smoking status: Never     Passive exposure: Never    Smokeless tobacco: Never   Vaping Use    Vaping status: Never Used   Substance and Sexual Activity    Alcohol use: Not Currently     Comment: quit 2005    Drug use: Yes     Types: Marijuana    Sexual activity: Defer     Partners: Female       Family History   Problem Relation Age of Onset    Pulmonary fibrosis Mother     Heart disease Mother     Cancer Father         brain    Cancer Sister         lung    Heart disease Sister     Cancer Brother         pancreatic, lung    Sleep apnea Neg Hx        The following portions of the patient's history were reviewed and updated as appropriate: allergies, current medications, past family history, past medical history, past social history, past surgical history and problem list.    Review of Systems   Constitutional: Negative for malaise/fatigue, weight gain and weight loss.   Eyes:         Has vision loss since his stroke, stable   Cardiovascular:  Negative for chest pain, dyspnea on exertion, irregular heartbeat, leg swelling, near-syncope, orthopnea, palpitations, paroxysmal nocturnal dyspnea and syncope.   Respiratory:  Positive for snoring. Negative for shortness of  "breath and sleep disturbances due to breathing.    Hematologic/Lymphatic: Negative for bleeding problem.   Musculoskeletal:  Negative for myalgias.   Gastrointestinal:  Negative for change in bowel habit.   Genitourinary:  Negative for dysuria.   Neurological:  Negative for dizziness, focal weakness, light-headedness and sensory change.   Psychiatric/Behavioral:  Negative for altered mental status.        Pertinent items are noted in HPI, all other systems reviewed and negative    /86 (BP Location: Right arm, Patient Position: Sitting)   Pulse 55   Resp 16   Ht 185.4 cm (73\")   Wt 88 kg (194 lb)   SpO2 93%   BMI 25.60 kg/m² .  Objective     Physical Exam  General Appearance: Alert, in no acute distress  Head:   Normocephalic, atraumatic  Eyes:    PERRLA, EOM intact  Throat: Oral mucosa moist  Neck:No carotid bruit or JVD  Lungs:  Clear to auscultation, respirations regular, even and unlabored   Heart:  Regular rhythm and normal rate, normal S1 and S2, no gallop, no rub, no click  Chest Wall:  No abnormalities observed  Abdomen:  Soft, obese, non-tender, non-distended, no guarding  Extremities:No edema, no cyanosis or redness  Pulses:Pulses palpable and equal bilaterally in all extremities  Skin:  No bleeding, bruising or rash   Neurologic:  Normal mood, thought content and behavior    Procedures       proBNP   Date Value Ref Range Status   04/24/2025 283.0 0.0 - 900.0 pg/mL Final     CMP          4/24/2025    15:52 4/25/2025    00:21 4/26/2025    03:59   CMP   Glucose 118  94  102    BUN 20  18  19    Creatinine 1.01  0.87  0.87    EGFR 81.5  94.6  94.6    Sodium 140  138  138    Potassium 3.9  3.8  4.1    Chloride 107  105  106    Calcium 9.8  8.8  8.9    Total Protein 6.8  6.1     Albumin 4.4  3.8     Globulin 2.4  2.3     Total Bilirubin 0.4  0.3     Alkaline Phosphatase 131  114     AST (SGOT) 26  21     ALT (SGPT) 17  13     Albumin/Globulin Ratio 1.8  1.7     BUN/Creatinine Ratio 19.8  20.7  21.8  " "  Anion Gap 11.9  12.4  11.3      CBC w/diff          4/24/2025    15:52 4/25/2025    00:21 4/26/2025    03:59   CBC w/Diff   WBC 9.62  6.89  8.67    RBC 5.12  4.61  4.90    Hemoglobin 14.2  12.5  13.7    Hematocrit 43.4  40.2  41.9    MCV 84.8  87.2  85.5    MCH 27.7  27.1  28.0    MCHC 32.7  31.1  32.7    RDW 14.2  14.6  14.2    Platelets 159  139  135    Neutrophil Rel % 78.8  68.0  74.3    Immature Granulocyte Rel % 0.4  0.3  0.6    Lymphocyte Rel % 11.3  17.4  11.6    Monocyte Rel % 6.1  9.7  9.5    Eosinophil Rel % 3.1  4.2  3.5    Basophil Rel % 0.3  0.4  0.5       Lab Results   Component Value Date    TSH 2.490 04/24/2025      Lab Results   Component Value Date    FREET4 0.94 04/24/2025      No results found for: \"DDIMERQUANT\"  Magnesium   Date Value Ref Range Status   04/24/2025 2.0 1.6 - 2.4 mg/dL Final      No results found for: \"DIGOXIN\"   Lab Results   Component Value Date    TROPONINT 17 04/25/2025           Lipid Panel          6/26/2024    14:49 12/30/2024    14:22 4/24/2025    16:57   Lipid Panel   Total Cholesterol 172  178  151    Triglycerides 205  224  201    HDL Cholesterol 41  44  36    VLDL Cholesterol 35  38  34    LDL Cholesterol  96  96  81    LDL/HDL Ratio 2.20  2.03  2.08      No results found for: \"POCTROP\"  Results for orders placed during the hospital encounter of 04/24/25    Stress Test With Myocardial Perfusion One Day    Interpretation Summary    Impressions are consistent with a low risk study.    Left ventricular ejection fraction is normal (Calculated EF = 61%).    There is no prior study available for comparison. Small fixed area of hypoperfusion in the mid anterolateral wall with no reversibility, EF 61% hypoperfusion possibly artifact, stress imaging is improved over resting images with reverse redistribution No reversible ischemia identified Low risk study .    Findings consistent with a normal ECG stress test.    Low risk study    Results for orders placed during the " hospital encounter of 04/24/25    Adult Transthoracic Echo Complete W/ Cont if Necessary Per Protocol    Interpretation Summary    Left ventricular ejection fraction appears to be 46 - 50%.     No results found for this or any previous visit.    Alejandro JOSSELINE Bain  reports that he has never smoked. He has never been exposed to tobacco smoke. He has never used smokeless tobacco.

## 2025-05-13 ENCOUNTER — OFFICE VISIT (OUTPATIENT)
Dept: CARDIOLOGY | Facility: CLINIC | Age: 70
End: 2025-05-13
Payer: MEDICARE

## 2025-05-13 VITALS
WEIGHT: 194 LBS | SYSTOLIC BLOOD PRESSURE: 135 MMHG | DIASTOLIC BLOOD PRESSURE: 86 MMHG | BODY MASS INDEX: 25.71 KG/M2 | HEIGHT: 73 IN | RESPIRATION RATE: 16 BRPM | OXYGEN SATURATION: 93 % | HEART RATE: 55 BPM

## 2025-05-13 DIAGNOSIS — I63.432 CEREBROVASCULAR ACCIDENT (CVA) DUE TO EMBOLISM OF LEFT POSTERIOR CEREBRAL ARTERY: ICD-10-CM

## 2025-05-13 DIAGNOSIS — E78.2 MIXED HYPERLIPIDEMIA: Chronic | ICD-10-CM

## 2025-05-13 DIAGNOSIS — I48.0 PAROXYSMAL ATRIAL FIBRILLATION: Chronic | ICD-10-CM

## 2025-05-13 DIAGNOSIS — R06.83 SNORING: ICD-10-CM

## 2025-05-13 DIAGNOSIS — I10 ESSENTIAL HYPERTENSION: Chronic | ICD-10-CM

## 2025-05-13 DIAGNOSIS — I25.10 CORONARY ARTERY DISEASE INVOLVING NATIVE CORONARY ARTERY OF NATIVE HEART WITHOUT ANGINA PECTORIS: Primary | Chronic | ICD-10-CM

## 2025-06-02 RX ORDER — APIXABAN 5 MG/1
TABLET, FILM COATED ORAL
Qty: 60 TABLET | Refills: 3 | Status: SHIPPED | OUTPATIENT
Start: 2025-06-02

## 2025-06-02 RX ORDER — LISINOPRIL 20 MG/1
20 TABLET ORAL 2 TIMES DAILY
Qty: 180 TABLET | Refills: 1 | Status: SHIPPED | OUTPATIENT
Start: 2025-06-02

## 2025-06-02 RX ORDER — ALBUTEROL SULFATE 90 UG/1
2 AEROSOL, METERED RESPIRATORY (INHALATION) EVERY 6 HOURS PRN
Qty: 8 G | Refills: 2 | Status: SHIPPED | OUTPATIENT
Start: 2025-06-02

## 2025-06-02 NOTE — TELEPHONE ENCOUNTER
Caller: Alejandro Bain    Relationship: Self    Best call back number: [phone]    Requested Prescriptions:   Requested Prescriptions     Pending Prescriptions Disp Refills    Ventolin  (90 Base) MCG/ACT inhaler       Sig: Inhale 2 puffs Every 6 (Six) Hours As Needed.      Pharmacy where request should be sent: [facility] [id number] [facility] [address] [place] [address] [phone] [facility] [phone]      Last office visit with prescribing clinician: 12/30/2024   Last telemedicine visit with prescribing clinician: Visit date not found   Next office visit with prescribing clinician: 7/7/2025     Additional details provided by patient: PT IS OUT OF MEDICATION. TYPICALLY ONLY USES FOR TRAVEL/AS A RESCUE INHALER.     Does the patient have less than a 3 day supply:  [x] Yes  [] No    Would you like a call back once the refill request has been completed: [] Yes [x] No    Isidro Rudd Rep   06/02/25 09:14 EDT

## 2025-07-07 ENCOUNTER — OFFICE VISIT (OUTPATIENT)
Dept: FAMILY MEDICINE CLINIC | Facility: CLINIC | Age: 70
End: 2025-07-07
Payer: MEDICARE

## 2025-07-07 VITALS
HEIGHT: 72 IN | RESPIRATION RATE: 18 BRPM | TEMPERATURE: 97.7 F | SYSTOLIC BLOOD PRESSURE: 113 MMHG | BODY MASS INDEX: 26.03 KG/M2 | WEIGHT: 192.2 LBS | HEART RATE: 58 BPM | OXYGEN SATURATION: 96 % | DIASTOLIC BLOOD PRESSURE: 71 MMHG

## 2025-07-07 DIAGNOSIS — E78.2 MIXED HYPERLIPIDEMIA: ICD-10-CM

## 2025-07-07 DIAGNOSIS — J45.40 MODERATE PERSISTENT ASTHMA, UNSPECIFIED WHETHER COMPLICATED: ICD-10-CM

## 2025-07-07 DIAGNOSIS — I48.0 PAROXYSMAL ATRIAL FIBRILLATION: ICD-10-CM

## 2025-07-07 DIAGNOSIS — I10 ESSENTIAL HYPERTENSION: Primary | ICD-10-CM

## 2025-07-07 PROCEDURE — G2211 COMPLEX E/M VISIT ADD ON: HCPCS | Performed by: FAMILY MEDICINE

## 2025-07-07 PROCEDURE — 1159F MED LIST DOCD IN RCRD: CPT | Performed by: FAMILY MEDICINE

## 2025-07-07 PROCEDURE — 1160F RVW MEDS BY RX/DR IN RCRD: CPT | Performed by: FAMILY MEDICINE

## 2025-07-07 PROCEDURE — 99214 OFFICE O/P EST MOD 30 MIN: CPT | Performed by: FAMILY MEDICINE

## 2025-07-07 PROCEDURE — 3078F DIAST BP <80 MM HG: CPT | Performed by: FAMILY MEDICINE

## 2025-07-07 PROCEDURE — 3074F SYST BP LT 130 MM HG: CPT | Performed by: FAMILY MEDICINE

## 2025-07-07 PROCEDURE — 1125F AMNT PAIN NOTED PAIN PRSNT: CPT | Performed by: FAMILY MEDICINE

## 2025-07-07 NOTE — PROGRESS NOTES
"Subjective   Alejandro Bain is a 70 y.o. male.     History of Present Illness  Alejandro Bain is in for follow up on his chronic issues with high blood pressure and high cholesterol.  He also suffers with chronic asthma which flares periodically.. There is no history of chest pain or dyspnea. There is no history of issue with bowel or bladder dysfunction. There is no history of dizziness or lightheadedness. There is no history of issue with sleep or mood. There is no history of issue with present medication.       Primary Care Follow-Up  Conditions present: other      Current symptoms: no chest pain, no dizziness, no nausea, no shortness of breath, no fatigue, no headaches, no neck pain, no myalgias, no wheezing, no abdominal pain, no sore throat, no constipation, no diarrhea and no trouble swallowing      Annual check up  Pertinent negative symptoms include no abdominal pain, no chest pain, no congestion, no fatigue, no fever, no headaches, no myalgias, no nausea, no neck pain, no sore throat, no dysuria and no vomiting.          /71 (BP Location: Left arm, Patient Position: Sitting, Cuff Size: Adult)   Pulse 58   Temp 97.7 °F (36.5 °C) (Temporal)   Resp 18   Ht 182.9 cm (72.01\")   Wt 87.2 kg (192 lb 3.2 oz)   SpO2 96%   BMI 26.06 kg/m²       Chief Complaint   Patient presents with    Primary Care Follow-Up     Essential hypertension           Current Outpatient Medications:     allopurinol (ZYLOPRIM) 300 MG tablet, TAKE 1 TABLET BY MOUTH EVERY DAY AT NIGHT, Disp: 90 tablet, Rfl: 1    amLODIPine (NORVASC) 10 MG tablet, TAKE 1 TABLET BY MOUTH EVERY DAY, Disp: 90 tablet, Rfl: 3    ascorbic acid (VITAMIN C) 250 MG tablet, Take 1 tablet by mouth Daily., Disp: , Rfl:     aspirin 81 MG EC tablet, Take 1 tablet by mouth Daily., Disp: , Rfl:     Cholecalciferol 25 MCG (1000 UT) capsule, Take 1 capsule by mouth Daily., Disp: , Rfl:     Eliquis 5 MG tablet tablet, TAKE 1 TABLET BY MOUTH EVERY 12 HOURS FOR ATRIAL " FIBRILLATION, Disp: 60 tablet, Rfl: 3    Fluticasone-Umeclidin-Vilant (TRELEGY ELLIPTA) 200-62.5-25 MCG/ACT inhaler, Inhale 1 puff Daily., Disp: 1 each, Rfl: 11    ipratropium-albuterol (DUO-NEB) 0.5-2.5 mg/3 ml nebulizer, Take 3 mL by nebulization Every 4 (Four) Hours As Needed for Wheezing., Disp: , Rfl:     lisinopril (PRINIVIL,ZESTRIL) 20 MG tablet, TAKE 1 TABLET BY MOUTH TWICE A DAY, Disp: 180 tablet, Rfl: 1    Multiple Vitamins-Minerals (MULTIVITAMIN ADULT PO), Take 1 tablet by mouth Daily., Disp: , Rfl:     Olopatadine-Mometasone (Ryaltris) 665-25 MCG/ACT suspension, Administer 1 spray into the nostril(s) as directed by provider 2 (Two) Times a Day., Disp: , Rfl:     rosuvastatin (CRESTOR) 20 MG tablet, TAKE 1 TABLET BY MOUTH EVERY DAY, Disp: 90 tablet, Rfl: 1    Ventolin  (90 Base) MCG/ACT inhaler, Inhale 2 puffs Every 6 (Six) Hours As Needed for Wheezing or Shortness of Air., Disp: 8 g, Rfl: 2    azithromycin (Zithromax Z-Avery) 250 MG tablet, Take 2 tablets by mouth on day 1, then 1 tablet daily on days 2-5 (Patient not taking: Reported on 7/7/2025), Disp: 6 tablet, Rfl: 0            The following portions of the patient's history were reviewed and updated as appropriate: allergies, current medications, past family history, past medical history, past social history, past surgical history, and problem list.    Review of Systems   Constitutional:  Negative for activity change, fatigue and fever.   HENT:  Negative for congestion, sinus pressure, sinus pain, sore throat and trouble swallowing.    Eyes:  Negative for visual disturbance.   Respiratory:  Negative for chest tightness, shortness of breath and wheezing.    Cardiovascular:  Negative for chest pain.   Gastrointestinal:  Negative for abdominal distention, abdominal pain, constipation, diarrhea, nausea and vomiting.   Genitourinary:  Negative for difficulty urinating and dysuria.   Musculoskeletal:  Negative for arthralgias, back pain, myalgias and  neck pain.   Neurological:  Negative for dizziness and headaches.   Psychiatric/Behavioral:  Negative for agitation, hallucinations and suicidal ideas.        Objective   Physical Exam  Vitals and nursing note reviewed.   Constitutional:       Appearance: Normal appearance.   HENT:      Right Ear: Tympanic membrane and ear canal normal.      Left Ear: Tympanic membrane and ear canal normal.   Cardiovascular:      Rate and Rhythm: Normal rate and regular rhythm.      Heart sounds: Normal heart sounds. No murmur heard.  Pulmonary:      Effort: Pulmonary effort is normal.      Breath sounds: No wheezing or rales.   Abdominal:      General: Bowel sounds are normal.      Palpations: Abdomen is soft.      Tenderness: There is no abdominal tenderness. There is no guarding or rebound.      Hernia: No hernia is present.   Musculoskeletal:      Cervical back: Neck supple. No rigidity.      Right lower leg: No edema.      Left lower leg: No edema.   Lymphadenopathy:      Cervical: No cervical adenopathy.   Neurological:      Mental Status: He is alert and oriented to person, place, and time. Mental status is at baseline.   Psychiatric:         Mood and Affect: Mood normal.           Assessment & Plan   Problems Addressed this Visit          Cardiac and Vasculature    Essential hypertension - Primary (Chronic)    Paroxysmal atrial fibrillation (Chronic)    Mixed hyperlipidemia (Chronic)       Pulmonary and Pneumonias    Asthma (Chronic)     Diagnoses         Codes Comments      Essential hypertension    -  Primary ICD-10-CM: I10  ICD-9-CM: 401.9       Mixed hyperlipidemia     ICD-10-CM: E78.2  ICD-9-CM: 272.2       Paroxysmal atrial fibrillation     ICD-10-CM: I48.0  ICD-9-CM: 427.31       Moderate persistent asthma, unspecified whether complicated     ICD-10-CM: J45.40  ICD-9-CM: 493.90             I asked him to get more serious about his guitar playing  I asked him to be more serious about exercise  I asked him to see me  again in 6 months  I asked him to call with any new concerns  I will get labs ahead of his next visit